# Patient Record
Sex: MALE | Race: BLACK OR AFRICAN AMERICAN | Employment: OTHER | URBAN - METROPOLITAN AREA
[De-identification: names, ages, dates, MRNs, and addresses within clinical notes are randomized per-mention and may not be internally consistent; named-entity substitution may affect disease eponyms.]

---

## 2020-08-11 ENCOUNTER — IP HISTORICAL/CONVERTED ENCOUNTER (OUTPATIENT)
Dept: OTHER | Age: 75
End: 2020-08-11

## 2020-08-20 ENCOUNTER — ED HISTORICAL/CONVERTED ENCOUNTER (OUTPATIENT)
Dept: OTHER | Age: 75
End: 2020-08-20

## 2020-10-11 ENCOUNTER — HOSPITAL ENCOUNTER (OUTPATIENT)
Dept: PREADMISSION TESTING | Age: 75
Discharge: HOME | End: 2020-10-11
Payer: MEDICARE

## 2020-10-11 PROCEDURE — 87635 SARS-COV-2 COVID-19 AMP PRB: CPT

## 2020-10-12 LAB — SARS-COV-2, COV2: NORMAL

## 2020-10-13 LAB — SARS-COV-2, COV2NT: NOT DETECTED

## 2020-10-14 ENCOUNTER — ANESTHESIA EVENT (OUTPATIENT)
Dept: ENDOSCOPY | Age: 75
End: 2020-10-14
Payer: MEDICARE

## 2020-10-15 ENCOUNTER — APPOINTMENT (OUTPATIENT)
Dept: ENDOSCOPY | Age: 75
End: 2020-10-15
Attending: INTERNAL MEDICINE
Payer: MEDICARE

## 2020-10-15 ENCOUNTER — TRANSCRIBE ORDER (OUTPATIENT)
Dept: SCHEDULING | Age: 75
End: 2020-10-15

## 2020-10-15 ENCOUNTER — ANESTHESIA (OUTPATIENT)
Dept: ENDOSCOPY | Age: 75
End: 2020-10-15
Payer: MEDICARE

## 2020-10-15 ENCOUNTER — HOSPITAL ENCOUNTER (OUTPATIENT)
Age: 75
Setting detail: OUTPATIENT SURGERY
Discharge: HOME | End: 2020-10-15
Attending: INTERNAL MEDICINE | Admitting: INTERNAL MEDICINE
Payer: MEDICARE

## 2020-10-15 VITALS
TEMPERATURE: 98.7 F | BODY MASS INDEX: 39.4 KG/M2 | HEIGHT: 68 IN | OXYGEN SATURATION: 98 % | WEIGHT: 260 LBS | HEART RATE: 84 BPM | DIASTOLIC BLOOD PRESSURE: 75 MMHG | RESPIRATION RATE: 16 BRPM | SYSTOLIC BLOOD PRESSURE: 130 MMHG

## 2020-10-15 DIAGNOSIS — R60.0 EDEMA, LEG: ICD-10-CM

## 2020-10-15 DIAGNOSIS — N18.9 CHRONIC KIDNEY DISEASE, UNSPECIFIED: Primary | ICD-10-CM

## 2020-10-15 PROCEDURE — 77030011223 HC DEV LIG POLYLP OCOA -B: Performed by: INTERNAL MEDICINE

## 2020-10-15 PROCEDURE — 76040000007: Performed by: INTERNAL MEDICINE

## 2020-10-15 PROCEDURE — 74011250636 HC RX REV CODE- 250/636: Performed by: NURSE ANESTHETIST, CERTIFIED REGISTERED

## 2020-10-15 PROCEDURE — 77030013992 HC SNR POLYP ENDOSC BSC -B: Performed by: INTERNAL MEDICINE

## 2020-10-15 PROCEDURE — 76060000032 HC ANESTHESIA 0.5 TO 1 HR: Performed by: INTERNAL MEDICINE

## 2020-10-15 PROCEDURE — 74011250636 HC RX REV CODE- 250/636: Performed by: INTERNAL MEDICINE

## 2020-10-15 PROCEDURE — 88305 TISSUE EXAM BY PATHOLOGIST: CPT

## 2020-10-15 PROCEDURE — 2709999900 HC NON-CHARGEABLE SUPPLY: Performed by: INTERNAL MEDICINE

## 2020-10-15 PROCEDURE — 74011000250 HC RX REV CODE- 250: Performed by: NURSE ANESTHETIST, CERTIFIED REGISTERED

## 2020-10-15 RX ORDER — LIDOCAINE HYDROCHLORIDE 20 MG/ML
INJECTION, SOLUTION EPIDURAL; INFILTRATION; INTRACAUDAL; PERINEURAL
Status: COMPLETED
Start: 2020-10-15 | End: 2020-10-15

## 2020-10-15 RX ORDER — LIDOCAINE HYDROCHLORIDE 20 MG/ML
INJECTION, SOLUTION EPIDURAL; INFILTRATION; INTRACAUDAL; PERINEURAL AS NEEDED
Status: DISCONTINUED | OUTPATIENT
Start: 2020-10-15 | End: 2020-10-15 | Stop reason: HOSPADM

## 2020-10-15 RX ORDER — PROPOFOL 10 MG/ML
INJECTION, EMULSION INTRAVENOUS AS NEEDED
Status: DISCONTINUED | OUTPATIENT
Start: 2020-10-15 | End: 2020-10-15 | Stop reason: HOSPADM

## 2020-10-15 RX ORDER — SODIUM CHLORIDE, SODIUM LACTATE, POTASSIUM CHLORIDE, CALCIUM CHLORIDE 600; 310; 30; 20 MG/100ML; MG/100ML; MG/100ML; MG/100ML
INJECTION, SOLUTION INTRAVENOUS
Status: DISCONTINUED | OUTPATIENT
Start: 2020-10-15 | End: 2020-10-15 | Stop reason: HOSPADM

## 2020-10-15 RX ORDER — SODIUM CHLORIDE, SODIUM LACTATE, POTASSIUM CHLORIDE, CALCIUM CHLORIDE 600; 310; 30; 20 MG/100ML; MG/100ML; MG/100ML; MG/100ML
50 INJECTION, SOLUTION INTRAVENOUS CONTINUOUS
Status: DISCONTINUED | OUTPATIENT
Start: 2020-10-15 | End: 2020-10-15 | Stop reason: HOSPADM

## 2020-10-15 RX ORDER — PROPOFOL 10 MG/ML
INJECTION, EMULSION INTRAVENOUS
Status: COMPLETED
Start: 2020-10-15 | End: 2020-10-15

## 2020-10-15 RX ADMIN — PROPOFOL 50 MG: 10 INJECTION, EMULSION INTRAVENOUS at 10:10

## 2020-10-15 RX ADMIN — SODIUM CHLORIDE, POTASSIUM CHLORIDE, SODIUM LACTATE AND CALCIUM CHLORIDE: 600; 310; 30; 20 INJECTION, SOLUTION INTRAVENOUS at 09:10

## 2020-10-15 RX ADMIN — LIDOCAINE HYDROCHLORIDE 40 MG: 20 INJECTION, SOLUTION EPIDURAL; INFILTRATION; INTRACAUDAL; PERINEURAL at 10:07

## 2020-10-15 RX ADMIN — PROPOFOL 50 MG: 10 INJECTION, EMULSION INTRAVENOUS at 10:25

## 2020-10-15 RX ADMIN — PROPOFOL 50 MG: 10 INJECTION, EMULSION INTRAVENOUS at 10:08

## 2020-10-15 RX ADMIN — PROPOFOL 50 MG: 10 INJECTION, EMULSION INTRAVENOUS at 10:13

## 2020-10-15 RX ADMIN — LIDOCAINE HYDROCHLORIDE 60 MG: 20 INJECTION, SOLUTION EPIDURAL; INFILTRATION; INTRACAUDAL; PERINEURAL at 10:08

## 2020-10-15 RX ADMIN — SODIUM CHLORIDE, POTASSIUM CHLORIDE, SODIUM LACTATE AND CALCIUM CHLORIDE 50 ML/HR: 600; 310; 30; 20 INJECTION, SOLUTION INTRAVENOUS at 09:12

## 2020-10-15 RX ADMIN — PROPOFOL 50 MG: 10 INJECTION, EMULSION INTRAVENOUS at 10:07

## 2020-10-15 RX ADMIN — PROPOFOL 50 MG: 10 INJECTION, EMULSION INTRAVENOUS at 10:22

## 2020-10-15 NOTE — ANESTHESIA PREPROCEDURE EVALUATION
Relevant Problems   No relevant active problems       Anesthetic History   No history of anesthetic complications            Review of Systems / Medical History  Patient summary reviewed, nursing notes reviewed and pertinent labs reviewed    Pulmonary  Within defined limits                 Neuro/Psych   Within defined limits           Cardiovascular    Hypertension          Hyperlipidemia    Exercise tolerance: >4 METS     GI/Hepatic/Renal  Within defined limits              Endo/Other        Obesity     Other Findings            Physical Exam    Airway  Mallampati: I  TM Distance: 4 - 6 cm  Neck ROM: normal range of motion   Mouth opening: Normal     Cardiovascular    Rhythm: regular  Rate: normal         Dental         Pulmonary  Breath sounds clear to auscultation               Abdominal  GI exam deferred       Other Findings            Anesthetic Plan    ASA: 3  Anesthesia type: general and total IV anesthesia          Induction: Intravenous  Anesthetic plan and risks discussed with: Patient

## 2020-10-15 NOTE — ROUTINE PROCESS
Pt and wife talked with MD prior to d/c.  IV d/c'd, v/s remained stable, pt taking po's well, d/c'd home with wife.

## 2020-10-15 NOTE — ANESTHESIA POSTPROCEDURE EVALUATION
Procedure(s):  ENDOSCOPIC POLYPECTOMY.     general, total IV anesthesia    Anesthesia Post Evaluation      Multimodal analgesia: multimodal analgesia not used between 6 hours prior to anesthesia start to PACU discharge  Patient location during evaluation: bedside  Patient participation: complete - patient participated  Level of consciousness: sleepy but conscious  Airway patency: fixed obstruction  Anesthetic complications: no  Cardiovascular status: acceptable  Respiratory status: acceptable  Hydration status: acceptable  Post anesthesia nausea and vomiting:  none      INITIAL Post-op Vital signs:   Vitals Value Taken Time   /75 10/15/2020 11:08 AM   Temp     Pulse 84 10/15/2020 11:08 AM   Resp 16 10/15/2020 11:08 AM   SpO2 98 % 10/15/2020 11:08 AM

## 2020-10-22 ENCOUNTER — HOSPITAL ENCOUNTER (OUTPATIENT)
Dept: ULTRASOUND IMAGING | Age: 75
Discharge: HOME | End: 2020-10-22
Attending: INTERNAL MEDICINE
Payer: MEDICARE

## 2020-10-22 DIAGNOSIS — N18.9 CHRONIC KIDNEY DISEASE, UNSPECIFIED: ICD-10-CM

## 2020-10-22 DIAGNOSIS — R60.0 EDEMA, LEG: ICD-10-CM

## 2020-10-22 PROCEDURE — 76770 US EXAM ABDO BACK WALL COMP: CPT

## 2021-07-20 ENCOUNTER — HOSPITAL ENCOUNTER (INPATIENT)
Age: 76
LOS: 6 days | Discharge: HOME HEALTH | DRG: 603 | End: 2021-07-27
Attending: EMERGENCY MEDICINE | Admitting: INTERNAL MEDICINE
Payer: MEDICARE

## 2021-07-20 DIAGNOSIS — E87.5 ACUTE HYPERKALEMIA: ICD-10-CM

## 2021-07-20 DIAGNOSIS — N17.9 AKI (ACUTE KIDNEY INJURY) (HCC): Primary | ICD-10-CM

## 2021-07-20 DIAGNOSIS — E87.1 HYPONATREMIA: ICD-10-CM

## 2021-07-20 DIAGNOSIS — L03.90 CELLULITIS, UNSPECIFIED CELLULITIS SITE: ICD-10-CM

## 2021-07-20 LAB
ALBUMIN SERPL-MCNC: 2.1 G/DL (ref 3.5–5)
ALBUMIN/GLOB SERPL: 0.4 {RATIO} (ref 1.1–2.2)
ALP SERPL-CCNC: 114 U/L (ref 45–117)
ALT SERPL-CCNC: 103 U/L (ref 12–78)
ANION GAP SERPL CALC-SCNC: 9 MMOL/L (ref 5–15)
AST SERPL W P-5'-P-CCNC: 35 U/L (ref 15–37)
BASOPHILS # BLD: 0 K/UL (ref 0–0.1)
BASOPHILS NFR BLD: 0 % (ref 0–1)
BILIRUB SERPL-MCNC: 0.3 MG/DL (ref 0.2–1)
BUN SERPL-MCNC: 127 MG/DL (ref 6–20)
BUN/CREAT SERPL: 36 (ref 12–20)
CA-I BLD-MCNC: 9.9 MG/DL (ref 8.5–10.1)
CHLORIDE SERPL-SCNC: 99 MMOL/L (ref 97–108)
CO2 SERPL-SCNC: 20 MMOL/L (ref 21–32)
CREAT SERPL-MCNC: 3.53 MG/DL (ref 0.7–1.3)
DIFFERENTIAL METHOD BLD: ABNORMAL
EOSINOPHIL # BLD: 0.1 K/UL (ref 0–0.4)
EOSINOPHIL NFR BLD: 1 % (ref 0–7)
ERYTHROCYTE [DISTWIDTH] IN BLOOD BY AUTOMATED COUNT: 14.6 % (ref 11.5–14.5)
GLOBULIN SER CALC-MCNC: 5.8 G/DL (ref 2–4)
GLUCOSE SERPL-MCNC: 197 MG/DL (ref 65–100)
HCT VFR BLD AUTO: 33.8 % (ref 36.6–50.3)
HGB BLD-MCNC: 10.8 G/DL (ref 12.1–17)
IMM GRANULOCYTES # BLD AUTO: 0 K/UL (ref 0–0.04)
IMM GRANULOCYTES NFR BLD AUTO: 0 % (ref 0–0.5)
LACTATE SERPL-SCNC: 2.1 MMOL/L (ref 0.4–2)
LYMPHOCYTES # BLD: 1.2 K/UL (ref 0.8–3.5)
LYMPHOCYTES NFR BLD: 8 % (ref 12–49)
MCH RBC QN AUTO: 27.6 PG (ref 26–34)
MCHC RBC AUTO-ENTMCNC: 32 G/DL (ref 30–36.5)
MCV RBC AUTO: 86.4 FL (ref 80–99)
METAMYELOCYTES NFR BLD MANUAL: 5 %
MONOCYTES # BLD: 1 K/UL (ref 0–1)
MONOCYTES NFR BLD: 7 % (ref 5–13)
MYELOCYTES NFR BLD MANUAL: 2 %
NEUTS SEG # BLD: 11.3 K/UL (ref 1.8–8)
NEUTS SEG NFR BLD: 77 % (ref 32–75)
NRBC # BLD: 0 K/UL (ref 0–0.01)
NRBC BLD-RTO: 0 PER 100 WBC
PLATELET # BLD AUTO: 395 K/UL (ref 150–400)
PMV BLD AUTO: 9.9 FL (ref 8.9–12.9)
POTASSIUM SERPL-SCNC: 6.6 MMOL/L (ref 3.5–5.1)
PROT SERPL-MCNC: 7.9 G/DL (ref 6.4–8.2)
RBC # BLD AUTO: 3.91 M/UL (ref 4.1–5.7)
RBC MORPH BLD: ABNORMAL
SODIUM SERPL-SCNC: 128 MMOL/L (ref 136–145)
WBC # BLD AUTO: 14.7 K/UL (ref 4.1–11.1)

## 2021-07-20 PROCEDURE — 99285 EMERGENCY DEPT VISIT HI MDM: CPT

## 2021-07-20 PROCEDURE — 83605 ASSAY OF LACTIC ACID: CPT

## 2021-07-20 PROCEDURE — 87040 BLOOD CULTURE FOR BACTERIA: CPT

## 2021-07-20 PROCEDURE — 93005 ELECTROCARDIOGRAM TRACING: CPT

## 2021-07-20 PROCEDURE — 86140 C-REACTIVE PROTEIN: CPT

## 2021-07-20 PROCEDURE — 85025 COMPLETE CBC W/AUTO DIFF WBC: CPT

## 2021-07-20 PROCEDURE — 36415 COLL VENOUS BLD VENIPUNCTURE: CPT

## 2021-07-20 PROCEDURE — 80053 COMPREHEN METABOLIC PANEL: CPT

## 2021-07-20 NOTE — ED TRIAGE NOTES
Per EMS, pt has had bilateral lower leg edema and weeping for the last year that has gotten worse over the last week and generalized weakness over the last week. Per EMS pt has been on multiple rounds of antibiotics to treat bilateral lower leg cellulitis.

## 2021-07-20 NOTE — ED NOTES
Bedside and Verbal shift change report given to Nancy GONZALES (oncoming nurse) by Gala Winchester (offgoing nurse). Report included the following information SBAR, Kardex, ED Summary and Cardiac Rhythm NSR. Labs and paired blood cultures drawn and sent. Wife at bedside.

## 2021-07-21 ENCOUNTER — APPOINTMENT (OUTPATIENT)
Dept: GENERAL RADIOLOGY | Age: 76
DRG: 603 | End: 2021-07-21
Attending: EMERGENCY MEDICINE
Payer: MEDICARE

## 2021-07-21 PROBLEM — E87.5 HYPERKALEMIA: Status: ACTIVE | Noted: 2021-07-21

## 2021-07-21 LAB
ANION GAP SERPL CALC-SCNC: 9 MMOL/L (ref 5–15)
APPEARANCE UR: CLEAR
BACTERIA URNS QL MICRO: NEGATIVE /HPF
BILIRUB UR QL: NEGATIVE
BUN SERPL-MCNC: 121 MG/DL (ref 6–20)
BUN/CREAT SERPL: 39 (ref 12–20)
CA-I BLD-MCNC: 9 MG/DL (ref 8.5–10.1)
CHLORIDE SERPL-SCNC: 105 MMOL/L (ref 97–108)
CO2 SERPL-SCNC: 19 MMOL/L (ref 21–32)
COLOR UR: ABNORMAL
CREAT SERPL-MCNC: 3.07 MG/DL (ref 0.7–1.3)
CREAT UR-MCNC: 52 MG/DL
CRP SERPL-MCNC: 8.64 MG/DL (ref 0–0.6)
GLUCOSE SERPL-MCNC: 121 MG/DL (ref 65–100)
GLUCOSE UR STRIP.AUTO-MCNC: NEGATIVE MG/DL
HGB UR QL STRIP: ABNORMAL
KETONES UR QL STRIP.AUTO: NEGATIVE MG/DL
LACTATE SERPL-SCNC: 1.3 MMOL/L (ref 0.4–2)
LEUKOCYTE ESTERASE UR QL STRIP.AUTO: ABNORMAL
MUCOUS THREADS URNS QL MICRO: ABNORMAL /LPF
NITRITE UR QL STRIP.AUTO: NEGATIVE
PH UR STRIP: 5 [PH] (ref 5–8)
POTASSIUM SERPL-SCNC: 5.6 MMOL/L (ref 3.5–5.1)
PROT UR STRIP-MCNC: NEGATIVE MG/DL
PROT UR-MCNC: 23 MG/DL (ref 0–11.9)
PROT/CREAT UR-RTO: 0.4
RBC #/AREA URNS HPF: ABNORMAL /HPF (ref 0–5)
SODIUM SERPL-SCNC: 133 MMOL/L (ref 136–145)
SODIUM UR-SCNC: 20 MMOL/L
SP GR UR REFRACTOMETRY: 1.01 (ref 1–1.03)
UA: UC IF INDICATED,UAUC: ABNORMAL
UROBILINOGEN UR QL STRIP.AUTO: 0.1 EU/DL (ref 0.1–1)
WBC URNS QL MICRO: ABNORMAL /HPF (ref 0–4)

## 2021-07-21 PROCEDURE — 83605 ASSAY OF LACTIC ACID: CPT

## 2021-07-21 PROCEDURE — 84300 ASSAY OF URINE SODIUM: CPT

## 2021-07-21 PROCEDURE — 73590 X-RAY EXAM OF LOWER LEG: CPT

## 2021-07-21 PROCEDURE — 96375 TX/PRO/DX INJ NEW DRUG ADDON: CPT

## 2021-07-21 PROCEDURE — 84156 ASSAY OF PROTEIN URINE: CPT

## 2021-07-21 PROCEDURE — 74011250636 HC RX REV CODE- 250/636: Performed by: INTERNAL MEDICINE

## 2021-07-21 PROCEDURE — 74011000258 HC RX REV CODE- 258: Performed by: INTERNAL MEDICINE

## 2021-07-21 PROCEDURE — 74011250636 HC RX REV CODE- 250/636: Performed by: EMERGENCY MEDICINE

## 2021-07-21 PROCEDURE — 74011250637 HC RX REV CODE- 250/637: Performed by: INTERNAL MEDICINE

## 2021-07-21 PROCEDURE — 65270000029 HC RM PRIVATE

## 2021-07-21 PROCEDURE — 80048 BASIC METABOLIC PNL TOTAL CA: CPT

## 2021-07-21 PROCEDURE — 74011000258 HC RX REV CODE- 258: Performed by: EMERGENCY MEDICINE

## 2021-07-21 PROCEDURE — 81001 URINALYSIS AUTO W/SCOPE: CPT

## 2021-07-21 PROCEDURE — 96365 THER/PROPH/DIAG IV INF INIT: CPT

## 2021-07-21 PROCEDURE — 96367 TX/PROPH/DG ADDL SEQ IV INF: CPT

## 2021-07-21 RX ORDER — FUROSEMIDE 40 MG/1
40 TABLET ORAL DAILY
Status: DISCONTINUED | OUTPATIENT
Start: 2021-07-22 | End: 2021-07-21

## 2021-07-21 RX ORDER — CARVEDILOL 12.5 MG/1
25 TABLET ORAL 2 TIMES DAILY WITH MEALS
Status: DISCONTINUED | OUTPATIENT
Start: 2021-07-21 | End: 2021-07-22

## 2021-07-21 RX ORDER — SODIUM POLYSTYRENE SULFONATE 15 G/60ML
15 SUSPENSION ORAL; RECTAL
Status: DISPENSED | OUTPATIENT
Start: 2021-07-21 | End: 2021-07-21

## 2021-07-21 RX ORDER — HYDROMORPHONE HYDROCHLORIDE 1 MG/ML
0.5 INJECTION, SOLUTION INTRAMUSCULAR; INTRAVENOUS; SUBCUTANEOUS ONCE
Status: COMPLETED | OUTPATIENT
Start: 2021-07-21 | End: 2021-07-21

## 2021-07-21 RX ORDER — ACETAMINOPHEN 325 MG/1
650 TABLET ORAL
Status: DISCONTINUED | OUTPATIENT
Start: 2021-07-21 | End: 2021-07-27 | Stop reason: HOSPADM

## 2021-07-21 RX ORDER — UREA 10 %
100 LOTION (ML) TOPICAL DAILY
Status: DISCONTINUED | OUTPATIENT
Start: 2021-07-22 | End: 2021-07-27 | Stop reason: HOSPADM

## 2021-07-21 RX ORDER — ASPIRIN 325 MG
325 TABLET ORAL DAILY
COMMUNITY

## 2021-07-21 RX ORDER — ASPIRIN 325 MG
325 TABLET ORAL DAILY
Status: DISCONTINUED | OUTPATIENT
Start: 2021-07-22 | End: 2021-07-25

## 2021-07-21 RX ORDER — HEPARIN SODIUM 5000 [USP'U]/ML
5000 INJECTION, SOLUTION INTRAVENOUS; SUBCUTANEOUS EVERY 12 HOURS
Status: DISCONTINUED | OUTPATIENT
Start: 2021-07-21 | End: 2021-07-27 | Stop reason: HOSPADM

## 2021-07-21 RX ORDER — FUROSEMIDE 40 MG/1
40 TABLET ORAL DAILY
COMMUNITY
End: 2021-07-27

## 2021-07-21 RX ORDER — FUROSEMIDE 10 MG/ML
60 INJECTION INTRAMUSCULAR; INTRAVENOUS
Status: COMPLETED | OUTPATIENT
Start: 2021-07-21 | End: 2021-07-21

## 2021-07-21 RX ORDER — UREA 10 %
100 LOTION (ML) TOPICAL DAILY
COMMUNITY
End: 2022-08-08

## 2021-07-21 RX ORDER — CARVEDILOL 25 MG/1
25 TABLET ORAL 2 TIMES DAILY WITH MEALS
COMMUNITY
End: 2021-07-27

## 2021-07-21 RX ADMIN — HYDROMORPHONE HYDROCHLORIDE 0.5 MG: 1 INJECTION, SOLUTION INTRAMUSCULAR; INTRAVENOUS; SUBCUTANEOUS at 00:10

## 2021-07-21 RX ADMIN — SODIUM CHLORIDE 1000 ML: 9 INJECTION, SOLUTION INTRAVENOUS at 01:34

## 2021-07-21 RX ADMIN — VANCOMYCIN HYDROCHLORIDE 1250 MG: 1.25 INJECTION, POWDER, LYOPHILIZED, FOR SOLUTION INTRAVENOUS at 03:26

## 2021-07-21 RX ADMIN — FUROSEMIDE 60 MG: 10 INJECTION INTRAMUSCULAR; INTRAVENOUS at 01:33

## 2021-07-21 RX ADMIN — CARVEDILOL 25 MG: 12.5 TABLET, FILM COATED ORAL at 17:54

## 2021-07-21 RX ADMIN — HEPARIN SODIUM 5000 UNITS: 5000 INJECTION INTRAVENOUS; SUBCUTANEOUS at 17:54

## 2021-07-21 RX ADMIN — ACETAMINOPHEN 650 MG: 325 TABLET, FILM COATED ORAL at 20:02

## 2021-07-21 RX ADMIN — PIPERACILLIN AND TAZOBACTAM 3.38 G: 3; .375 INJECTION, POWDER, LYOPHILIZED, FOR SOLUTION INTRAVENOUS at 01:33

## 2021-07-21 RX ADMIN — SODIUM CHLORIDE 1000 ML: 9 INJECTION, SOLUTION INTRAVENOUS at 01:33

## 2021-07-21 RX ADMIN — PIPERACILLIN AND TAZOBACTAM 2.25 G: 2; .25 INJECTION, POWDER, LYOPHILIZED, FOR SOLUTION INTRAVENOUS at 17:54

## 2021-07-21 NOTE — ROUTINE PROCESS
TRANSFER - OUT REPORT:    Verbal report given to Anjelica Chaves RN (name) on [employer].  being transferred to [facility] (unit) for routine progression of care       Report consisted of patients Situation, Background, Assessment and   Recommendations(SBAR). Information from the following report(s) SBAR, Kardex, ED Summary, [facility] and Recent Results was reviewed with the receiving nurse. Lines:   Peripheral IV 07/20/21 Left Antecubital (Active)        Opportunity for questions and clarification was provided.       Patient transported with:   Monitor  Tech, pt chart, personal belongings

## 2021-07-21 NOTE — PROGRESS NOTES
Primary Nurse Sil Puente RN and JANET Garcia performed a dual skin assessment on this patient Impairment noted- see wound doc flow sheet  Mekhi score is 21    +1 pitting edema in bilateral feet, barnacles noted on bilateral lower legs

## 2021-07-21 NOTE — ED PROVIDER NOTES
EMERGENCY DEPARTMENT HISTORY AND PHYSICAL EXAM      Date: 7/20/2021  Patient Name: Juan Manuel Chapin. History of Presenting Illness     Chief Complaint   Patient presents with    Peripheral Edema       History Provided By: Patient    HPI: Juan Manuel Nation, 67 y.o. male with PMHx as noted below presents the emergency department with complaints of lower leg pain and a generalized weakness. Patient states that he has been dealing with leg pain now for the last year. Patient states that he has had several rounds of antibiotics for cellulitis but notes symptoms seem to have acutely worsened approximately 1 week ago. Patient reports fatigue and some body aches. Pt denies any other alleviating or exacerbating factors. Additionally, pt specifically denies any recent fever, chills, headache, nausea, vomiting, abdominal pain, CP, SOB, lightheadedness, dizziness, numbness, weakness, BLE swelling, heart palpitations, urinary sxs, diarrhea, constipation, melena, hematochezia, cough, or congestion. PCP: Lisa Keller MD    Current Facility-Administered Medications   Medication Dose Route Frequency Provider Last Rate Last Admin    sodium polystyrene (KAYEXALATE) 15 gram/60 mL oral suspension 15 g  15 g Oral NOW Fartun Mckeon MD           Past History     Past Medical History:  Past Medical History:   Diagnosis Date    Dyslipidemia     HTN (hypertension)        Past Surgical History:  Past Surgical History:   Procedure Laterality Date    HX CHOLECYSTECTOMY         Family History:  Family History   Problem Relation Age of Onset    Diabetes Mother     Diabetes Brother        Social History:  Social History     Tobacco Use    Smoking status: Never Smoker    Smokeless tobacco: Never Used   Substance Use Topics    Alcohol use: Never    Drug use: Never       Allergies:  No Known Allergies      Review of Systems   Review of Systems  Constitutional: Negative for feve.   Positive chills, and fatigue. HENT: Negative for congestion, sore throat, rhinorrhea, sneezing and neck stiffness   Eyes: Negative for discharge and redness. Respiratory: Negative for  shortness of breath, wheezing   Cardiovascular: Negative for chest pain, palpitations   Gastrointestinal: Negative for nausea, vomiting, abdominal pain, constipation, diarrhea and blood in stool. Genitourinary: Negative for dysuria, urgency, frequency, hematuria, flank pain, decreased urine volume, discharge,   Musculoskeletal: Negative for myalgias or joint pain . Skin: Negative for rash or lesions . Neurological: Negative weakness, light-headedness, numbness and headaches. Physical Exam   Physical Exam    GENERAL: alert and oriented, no acute distress  EYES: PEERL, No injection, discharge or icterus. ENT: Mucous membranes pink and moist.  NECK: Supple  LUNGS: Airway patent. Non-labored respirations. Breath sounds clear with good air entry bilaterally. HEART: Regular rate and rhythm. No peripheral edema  ABDOMEN: Non-distended and non-tender, without guarding or rebound.   SKIN:  warm, dry  EXTREMITIES: Swelling and chronic changes noted of the lower extremities, there is some drainage noted  NEUROLOGICAL: Alert, oriented      Diagnostic Study Results     Labs -     Recent Results (from the past 12 hour(s))   METABOLIC PANEL, BASIC    Collection Time: 07/21/21  3:00 AM   Result Value Ref Range    Sodium 133 (L) 136 - 145 mmol/L    Potassium 5.6 (H) 3.5 - 5.1 mmol/L    Chloride 105 97 - 108 mmol/L    CO2 19 (L) 21 - 32 mmol/L    Anion gap 9 5 - 15 mmol/L    Glucose 121 (H) 65 - 100 mg/dL     (H) 6 - 20 mg/dL    Creatinine 3.07 (H) 0.70 - 1.30 mg/dL    BUN/Creatinine ratio 39 (H) 12 - 20      GFR est AA 24 (L) >60 ml/min/1.73m2    GFR est non-AA 20 (L) >60 ml/min/1.73m2    Calcium 9.0 8.5 - 10.1 mg/dL   LACTIC ACID    Collection Time: 07/21/21  6:00 AM   Result Value Ref Range    Lactic acid 1.3 0.4 - 2.0 mmol/L       Radiologic Studies -   XR TIB/FIB LT   Final Result   No fracture or other specific acute abnormality. 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 Soft tissue changes suggestive of edema. Correlate with physical exam               XR TIB/FIB RT   Final Result   No fracture or other specific acute abnormality. 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 Soft tissue changes suggestive of edema. Correlate with physical exam                 CT Results  (Last 48 hours)    None        CXR Results  (Last 48 hours)    None            Medical Decision Making     IAndres MD am the first provider for this patient and am the attending of record for this patient encounter. I reviewed the vital signs, available nursing notes, past medical history, past surgical history, family history and social history. Vital Signs-Reviewed the patient's vital signs. Patient Vitals for the past 12 hrs:   Pulse Resp BP SpO2   07/21/21 0616 97 18 127/77 100 %   07/21/21 0341 93 18 132/66 98 %   07/21/21 0134 88 18 (!) 106/57 97 %   07/21/21 0006 87 18 117/65 99 %   07/20/21 2020 81 18 113/62 100 %         Records Reviewed: Nursing Notes and Old Medical Records    Provider Notes (Medical Decision Making):   25-year-old male presenting with weakness and worsening lower extremity pain, swelling and drainage. Differential includes sepsis, cellulitis, DVT, necrotizing soft tissue infection, abscess. Labs were notable for leukocytosis concerning for possible infection. As patient states he has been on multiple antibiotics will broaden coverage with vancomycin and Zosyn. Labs also notable for presumed LINDA with hyperkalemia. There were no EKG changes so was aggressively hydrated with IV fluids and given Lasix, on reassessment potassium had significantly improved so no need for emergent dialysis. We will plan to admit for further management. ED Course:   Initial assessment performed.  The patients presenting problems have been discussed, and they are in agreement with the care plan formulated and outlined with them. I have encouraged them to ask questions as they arise throughout their visit. Medications   sodium polystyrene (KAYEXALATE) 15 gram/60 mL oral suspension 15 g (has no administration in time range)   HYDROmorphone (DILAUDID) syringe 0.5 mg (0.5 mg IntraVENous Given 7/21/21 0010)   sodium chloride 0.9 % bolus infusion 1,000 mL (1,000 mL IntraVENous New Bag 7/21/21 0134)   sodium chloride 0.9 % bolus infusion 1,000 mL (1,000 mL IntraVENous New Bag 7/21/21 0133)   furosemide (LASIX) injection 60 mg (60 mg IntraVENous Given 7/21/21 0133)   piperacillin-tazobactam (ZOSYN) 3.375 g in 0.9% sodium chloride (MBP/ADV) 100 mL MBP (3.375 g IntraVENous New Bag 7/21/21 0133)   vancomycin (VANCOCIN) 1,250 mg in 0.9% sodium chloride 250 mL (VIAL-MATE) (1,250 mg IntraVENous New Bag 7/21/21 0326)         PROGRESS:  The patient has been re-evaluated and sx have improved. Reviewed available results with patient and have counseled them on diagnosis and care plan. They have expressed understanding, and all their questions have been answered. They agree with plan for admission. CONSULT:  Andres Priest MD spoke with the hospitalist.  Discussed HPI and PE, available diagnostic tests and clinical findings. He is in agreement with care plans as outlined and will evaluate for admission     Admit Note  Patient is being admitted to the hospitalist.  The results of their tests and reasons for their admission have been discussed with them and/or available family. They convey agreement and understanding for the need to be admitted and for their admission diagnosis. Consultation has been made with the inpatient physician specialist for hospitalization. Disposition:  admission    PLAN:  1. Admit     Diagnosis     Clinical Impression:   1. LINDA (acute kidney injury) ([address]    2. Acute hyperkalemia    3. Hyponatremia    4.  Cellulitis, unspecified cellulitis site        Please note that this dictation was completed with Dragon, computer voice recognition software. Quite often unanticipated grammatical, syntax, homophones, and other interpretive errors are inadvertently transcribed by the computer software. Please disregard these errors. Additionally, please excuse any errors that have escaped final proofreading.

## 2021-07-21 NOTE — ACP (ADVANCE CARE PLANNING)
Advance Care Planning   Healthcare Decision Maker:       Primary Decision Maker: Poppy Watkins - [facility] - [phone]

## 2021-07-21 NOTE — PROGRESS NOTES
Reason for Admission:  Hyperkalemia                     RUR Score: 10%                    Plan for utilizing home health: Declined/uses w/c. PCP: First and Last name:  Sarah Ramos MD     Name of Practice:    Are you a current patient: Yes/No: Yes   Approximate date of last visit: Seen a few mos ago. Can you participate in a virtual visit with your PCP:                   Yes/Call  Current Advanced Directive/Advance Care Plan: No Order      Healthcare Decision Maker:                Primary Decision Maker: Poppy Watkins - Spouse - [phone]                  Transition of Care Plan:    D/C Plan is home with wife & she will transport home  Upon discharge. Pt uses w/c.

## 2021-07-21 NOTE — PROGRESS NOTES
7/21/21. PCP is Dr. Anika Paredes. D/C Plan is home with [facility] # [phone] & she will transport pt home upon discharge. Declined [facility]. Uses w/c.

## 2021-07-21 NOTE — PROGRESS NOTES
Bedside shift change report given to [facility] (oncoming nurse) by Landy Coronado RN (offgoing nurse).  Report included the following information SBAR, Intake/Output, Recent Results, Med Rec Status and Cardiac Rhythm SR.

## 2021-07-21 NOTE — H&P
History and Physical (Inpatient)    Patient:    Shivani Fernandez.   67 y.o. Chief Complaint:   Chief Complaint   Patient presents with    Peripheral Edema         History of Present Illness: This is a 69-year-old black male with history of chronic leg edema on diuretics, hypertension who presents with inability to walk some leg pain and worsening skin infection. He has had significant leg edema that is being managed with diuretics and now he comes in with foul-smelling leg elephantiasis looking. Denies any chest pain or shortness of breath at rest suggest [place] to walk. ROS: Denies any chronic cardiac blurry vision URI symptoms no chest pain palpitation no cough shortness of breath no orthopnea PND no abdominal pain nausea vomiting no bloody bowel movement no urinary symptoms of discharge urgency frequency. He has leg pain, swelling with secondary skin changes. History:  Past Medical History:   Diagnosis Date    Dyslipidemia     HTN (hypertension)    UTI    Past surgical history  Status post cholecystectomy  S/p Appendectomy    Social history  He is  with 2 children. He quit alcohol , he is a retired [employer] supervisor      Family History   Problem Relation Age of Onset    Diabetes Mother     Diabetes Brother      Both parents are . Mother had diabetes. Allergies:  No Known Allergies    Current Medications:    Current Facility-Administered Medications:     sodium polystyrene (KAYEXALATE) 15 gram/60 mL oral suspension 15 g, 15 g, Oral, NOW, Leia Sheth MD  No current outpatient medications on file.        Physical Exam:  Visit Vitals  /72   Pulse 100   Temp 98.9 °F (37.2 °C)   Resp 18   Ht 5' 8\" (1.727 m)   Wt 122.5 kg (270 lb)   SpO2 99%   BMI 41.05 kg/m²     Patient is an elderly black male lying in bed comfortably no distress  HEENT normocephalic atraumatic anicteric sclera  Neck without any distended neck vein   lungs are clear bilaterally no wheeze  Heart S1-S2 regular  Abdomen soft obese positive bowel sounds no appreciable organomegaly  Lower extremities with foul-smelling drainage. Elephantiasis-looking. Multiple nodularity skin lesion. Severe onychomycosis. CNS alert and oriented x3 moves extremities difficult to lift legs due to pain  Psych cooperative    This is a 79-year-old black male with history of chronic legs swelling hypertension who presented with bilateral lower extremity infection with significant and severe secondary skin changes admitted for further care. Findings/Diagnosis: Bilateral lower extremity cellulitis  Chronic leg edema with secondary skin changes  Onychomycosis  Hypertension  Obesity  Difficulty walking  Acute kidney injury due to diuretic use  Hyperkalemia    Laboratory:      Recent Results (from the past 24 hour(s))   CBC WITH AUTOMATED DIFF    Collection Time: 07/20/21  6:43 PM   Result Value Ref Range    WBC 14.7 (H) 4.1 - 11.1 K/uL    RBC 3.91 (L) 4.10 - 5.70 M/uL    HGB 10.8 (L) 12.1 - 17.0 g/dL    HCT 33.8 (L) 36.6 - 50.3 %    MCV 86.4 80.0 - 99.0 FL    MCH 27.6 26.0 - 34.0 PG    MCHC 32.0 30.0 - 36.5 g/dL    RDW 14.6 (H) 11.5 - 14.5 %    PLATELET 885 844 - 372 K/uL    MPV 9.9 8.9 - 12.9 FL    NRBC 0.0 0.0  WBC    ABSOLUTE NRBC 0.00 0.00 - 0.01 K/uL    NEUTROPHILS 77 (H) 32 - 75 %    LYMPHOCYTES 8 (L) 12 - 49 %    MONOCYTES 7 5 - 13 %    EOSINOPHILS 1 0 - 7 %    BASOPHILS 0 0 - 1 %    IMMATURE GRANULOCYTES 0 0 - 0.5 %    ABS. NEUTROPHILS 11.3 (H) 1.8 - 8.0 K/UL    ABS. LYMPHOCYTES 1.2 0.8 - 3.5 K/UL    ABS. MONOCYTES 1.0 0.0 - 1.0 K/UL    ABS. EOSINOPHILS 0.1 0.0 - 0.4 K/UL    ABS. BASOPHILS 0.0 0.0 - 0.1 K/UL    ABS. IMM.  GRANS. 0.0 0.00 - 0.04 K/UL    DF AUTOMATED      METAMYELOCYTES 5 %    MYELOCYTES 2 %    RBC COMMENTS Normocytic, Normochromic     METABOLIC PANEL, COMPREHENSIVE    Collection Time: 07/20/21  6:43 PM   Result Value Ref Range    Sodium 128 (L) 136 - 145 mmol/L    Potassium 6.6 (HH) 3.5 - 5.1 mmol/L    Chloride 99 97 - 108 mmol/L    CO2 20 (L) 21 - 32 mmol/L    Anion gap 9 5 - 15 mmol/L    Glucose 197 (H) 65 - 100 mg/dL     (H) 6 - 20 mg/dL    Creatinine 3.53 (H) 0.70 - 1.30 mg/dL    BUN/Creatinine ratio 36 (H) 12 - 20      GFR est AA 20 (L) >60 ml/min/1.73m2    GFR est non-AA 17 (L) >60 ml/min/1.73m2    Calcium 9.9 8.5 - 10.1 mg/dL    Bilirubin, total 0.3 0.2 - 1.0 mg/dL    AST (SGOT) 35 15 - 37 U/L    ALT (SGPT) 103 (H) 12 - 78 U/L    Alk.  phosphatase 114 45 - 117 U/L    Protein, total 7.9 6.4 - 8.2 g/dL    Albumin 2.1 (L) 3.5 - 5.0 g/dL    Globulin 5.8 (H) 2.0 - 4.0 g/dL    A-G Ratio 0.4 (L) 1.1 - 2.2     LACTIC ACID    Collection Time: 07/20/21  6:43 PM   Result Value Ref Range    Lactic acid 2.1 (HH) 0.4 - 2.0 mmol/L   C REACTIVE PROTEIN, QT    Collection Time: 07/20/21  6:43 PM   Result Value Ref Range    C-Reactive protein 8.64 (H) 0.00 - 7.30 mg/dL   METABOLIC PANEL, BASIC    Collection Time: 07/21/21  3:00 AM   Result Value Ref Range    Sodium 133 (L) 136 - 145 mmol/L    Potassium 5.6 (H) 3.5 - 5.1 mmol/L    Chloride 105 97 - 108 mmol/L    CO2 19 (L) 21 - 32 mmol/L    Anion gap 9 5 - 15 mmol/L    Glucose 121 (H) 65 - 100 mg/dL     (H) 6 - 20 mg/dL    Creatinine 3.07 (H) 0.70 - 1.30 mg/dL    BUN/Creatinine ratio 39 (H) 12 - 20      GFR est AA 24 (L) >60 ml/min/1.73m2    GFR est non-AA 20 (L) >60 ml/min/1.73m2    Calcium 9.0 8.5 - 10.1 mg/dL   LACTIC ACID    Collection Time: 07/21/21  6:00 AM   Result Value Ref Range    Lactic acid 1.3 0.4 - 2.0 mmol/L   URINALYSIS W/ REFLEX CULTURE    Collection Time: 07/21/21  8:10 AM    Specimen: Urine   Result Value Ref Range    Color Yellow/Straw      Appearance Clear Clear      Specific gravity 1.008 1.003 - 1.030      pH (UA) 5.0 5.0 - 8.0      Protein Negative Negative mg/dL    Glucose Negative Negative mg/dL    Ketone Negative Negative mg/dL    Bilirubin Negative Negative      Blood Small (A) Negative      Urobilinogen 0.1 0.1 - 1.0 EU/dL    Nitrites Negative Negative      Leukocyte Esterase Small (A) Negative      UA:UC IF INDICATED Culture not indicated by UA result Culture not indicated by UA result      WBC 5-10 0 - 4 /hpf    RBC 20-50 0 - 5 /hpf    Bacteria Negative Negative /hpf    Mucus Trace /lpf       XR TIB/FIB LT   Final Result   No fracture or other specific acute abnormality. 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 Soft tissue changes suggestive of edema. Correlate with physical exam               XR TIB/FIB RT   Final Result   No fracture or other specific acute abnormality. 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 Soft tissue changes suggestive of edema. Correlate with physical exam                   Plan of Care/Planned Procedure: Admit to the hospital.  Consult surgeon. Empiric antibiotics. Consult wound care. Continue his routine medications. Work-up for kidney failure. Hold diuretics. Gentle hydration. Renal ultrasound. Check urine sodium urine protein ratio. He may need extensive debridement of this leg but will defer to wound care/surgeon input. DVT prophylaxis.

## 2021-07-21 NOTE — ED NOTES
Dinorah care provided to patient. Pt cleaned, wiped down, and given new sheets and gown. Pt pulled up in bed and repositioned.

## 2021-07-22 ENCOUNTER — APPOINTMENT (OUTPATIENT)
Dept: ULTRASOUND IMAGING | Age: 76
DRG: 603 | End: 2021-07-22
Attending: INTERNAL MEDICINE
Payer: MEDICARE

## 2021-07-22 LAB
ALBUMIN SERPL-MCNC: 1.8 G/DL (ref 3.5–5)
ALBUMIN/GLOB SERPL: 0.4 {RATIO} (ref 1.1–2.2)
ALP SERPL-CCNC: 92 U/L (ref 45–117)
ALT SERPL-CCNC: 73 U/L (ref 12–78)
ANION GAP SERPL CALC-SCNC: 8 MMOL/L (ref 5–15)
AST SERPL W P-5'-P-CCNC: 62 U/L (ref 15–37)
ATRIAL RATE: 97 BPM
BASOPHILS # BLD: 0 K/UL (ref 0–0.1)
BASOPHILS NFR BLD: 0 % (ref 0–1)
BILIRUB SERPL-MCNC: 0.4 MG/DL (ref 0.2–1)
BUN SERPL-MCNC: 104 MG/DL (ref 6–20)
BUN/CREAT SERPL: 37 (ref 12–20)
CA-I BLD-MCNC: 9.3 MG/DL (ref 8.5–10.1)
CALCULATED P AXIS, ECG09: 45 DEGREES
CALCULATED R AXIS, ECG10: 1 DEGREES
CALCULATED T AXIS, ECG11: 39 DEGREES
CHLORIDE SERPL-SCNC: 105 MMOL/L (ref 97–108)
CO2 SERPL-SCNC: 26 MMOL/L (ref 21–32)
CREAT SERPL-MCNC: 2.78 MG/DL (ref 0.7–1.3)
DIAGNOSIS, 93000: NORMAL
DIFFERENTIAL METHOD BLD: ABNORMAL
EOSINOPHIL # BLD: 0.1 K/UL (ref 0–0.4)
EOSINOPHIL NFR BLD: 1 % (ref 0–7)
ERYTHROCYTE [DISTWIDTH] IN BLOOD BY AUTOMATED COUNT: 14.7 % (ref 11.5–14.5)
ERYTHROCYTE [SEDIMENTATION RATE] IN BLOOD: 129 MM/HR
GLOBULIN SER CALC-MCNC: 5 G/DL (ref 2–4)
GLUCOSE SERPL-MCNC: 134 MG/DL (ref 65–100)
HCT VFR BLD AUTO: 31.6 % (ref 36.6–50.3)
HGB BLD-MCNC: 9.9 G/DL (ref 12.1–17)
IMM GRANULOCYTES # BLD AUTO: 0 K/UL
IMM GRANULOCYTES NFR BLD AUTO: 0 %
LYMPHOCYTES # BLD: 0.8 K/UL (ref 0.8–3.5)
LYMPHOCYTES NFR BLD: 8 % (ref 12–49)
MCH RBC QN AUTO: 27.3 PG (ref 26–34)
MCHC RBC AUTO-ENTMCNC: 31.3 G/DL (ref 30–36.5)
MCV RBC AUTO: 87.1 FL (ref 80–99)
MONOCYTES # BLD: 0.7 K/UL (ref 0–1)
MONOCYTES NFR BLD: 7 % (ref 5–13)
NEUTS SEG # BLD: 8.9 K/UL (ref 1.8–8)
NEUTS SEG NFR BLD: 84 % (ref 32–75)
NRBC # BLD: 0 K/UL (ref 0–0.01)
NRBC BLD-RTO: 0 PER 100 WBC
P-R INTERVAL, ECG05: 186 MS
PLATELET # BLD AUTO: 377 K/UL (ref 150–400)
PMV BLD AUTO: 9.8 FL (ref 8.9–12.9)
POTASSIUM SERPL-SCNC: 3.9 MMOL/L (ref 3.5–5.1)
PROT SERPL-MCNC: 6.8 G/DL (ref 6.4–8.2)
Q-T INTERVAL, ECG07: 356 MS
QRS DURATION, ECG06: 62 MS
QTC CALCULATION (BEZET), ECG08: 452 MS
RBC # BLD AUTO: 3.63 M/UL (ref 4.1–5.7)
RBC MORPH BLD: ABNORMAL
SODIUM SERPL-SCNC: 139 MMOL/L (ref 136–145)
VENTRICULAR RATE, ECG03: 97 BPM
WBC # BLD AUTO: 10.5 K/UL (ref 4.1–11.1)

## 2021-07-22 PROCEDURE — 65270000029 HC RM PRIVATE

## 2021-07-22 PROCEDURE — 74011250636 HC RX REV CODE- 250/636: Performed by: INTERNAL MEDICINE

## 2021-07-22 PROCEDURE — 80053 COMPREHEN METABOLIC PANEL: CPT

## 2021-07-22 PROCEDURE — 97530 THERAPEUTIC ACTIVITIES: CPT

## 2021-07-22 PROCEDURE — 74011000258 HC RX REV CODE- 258: Performed by: INTERNAL MEDICINE

## 2021-07-22 PROCEDURE — 74011250637 HC RX REV CODE- 250/637: Performed by: INTERNAL MEDICINE

## 2021-07-22 PROCEDURE — 97165 OT EVAL LOW COMPLEX 30 MIN: CPT

## 2021-07-22 PROCEDURE — 36415 COLL VENOUS BLD VENIPUNCTURE: CPT

## 2021-07-22 PROCEDURE — 85652 RBC SED RATE AUTOMATED: CPT

## 2021-07-22 PROCEDURE — 76770 US EXAM ABDO BACK WALL COMP: CPT

## 2021-07-22 PROCEDURE — 85025 COMPLETE CBC W/AUTO DIFF WBC: CPT

## 2021-07-22 PROCEDURE — 99222 1ST HOSP IP/OBS MODERATE 55: CPT | Performed by: SURGERY

## 2021-07-22 RX ORDER — CARVEDILOL 3.12 MG/1
6.25 TABLET ORAL 2 TIMES DAILY WITH MEALS
Status: DISCONTINUED | OUTPATIENT
Start: 2021-07-23 | End: 2021-07-27 | Stop reason: HOSPADM

## 2021-07-22 RX ORDER — AMMONIUM LACTATE 12 G/100G
CREAM TOPICAL DAILY
Status: DISCONTINUED | OUTPATIENT
Start: 2021-07-23 | End: 2021-07-27 | Stop reason: HOSPADM

## 2021-07-22 RX ADMIN — CARVEDILOL 25 MG: 12.5 TABLET, FILM COATED ORAL at 09:57

## 2021-07-22 RX ADMIN — HEPARIN SODIUM 5000 UNITS: 5000 INJECTION INTRAVENOUS; SUBCUTANEOUS at 17:18

## 2021-07-22 RX ADMIN — VITAM B12 100 MCG: 100 TAB at 09:57

## 2021-07-22 RX ADMIN — PIPERACILLIN AND TAZOBACTAM 2.25 G: 2; .25 INJECTION, POWDER, LYOPHILIZED, FOR SOLUTION INTRAVENOUS at 09:57

## 2021-07-22 RX ADMIN — PIPERACILLIN AND TAZOBACTAM 2.25 G: 2; .25 INJECTION, POWDER, LYOPHILIZED, FOR SOLUTION INTRAVENOUS at 02:16

## 2021-07-22 RX ADMIN — ASPIRIN 325 MG ORAL TABLET 325 MG: 325 PILL ORAL at 09:57

## 2021-07-22 RX ADMIN — PIPERACILLIN AND TAZOBACTAM 2.25 G: 2; .25 INJECTION, POWDER, LYOPHILIZED, FOR SOLUTION INTRAVENOUS at 17:18

## 2021-07-22 RX ADMIN — HEPARIN SODIUM 5000 UNITS: 5000 INJECTION INTRAVENOUS; SUBCUTANEOUS at 05:35

## 2021-07-22 RX ADMIN — WHITE PETROLATUM,ZINC OXIDE: 57; 17 PASTE TOPICAL at 22:00

## 2021-07-22 RX ADMIN — WHITE PETROLATUM,ZINC OXIDE: 57; 17 PASTE TOPICAL at 17:26

## 2021-07-22 NOTE — PROGRESS NOTES
Problem: Falls - Risk of  Goal: *Absence of Falls  Description: Document Kelly Matthews Fall Risk and appropriate interventions in the flowsheet.   Outcome: Progressing Towards Goal  Note: Fall Risk Interventions:  Mobility Interventions: PT Consult for mobility concerns, Strengthening exercises (ROM-active/passive), Utilize walker, cane, or other assistive device         Medication Interventions: Patient to call before getting OOB, Teach patient to arise slowly    Elimination Interventions: Call light in reach, Toileting schedule/hourly rounds, Urinal in reach              Problem: Patient Education: Go to Patient Education Activity  Goal: Patient/Family Education  Outcome: Progressing Towards Goal

## 2021-07-22 NOTE — CONSULTS
History and Physical    Chief complaints: Bilateral leg wound issues. History of Presenting Illness:  Herman Ortiz is a 67 y.o. pleasant man currently hospitalized with bilateral leg swelling and wound issues. I was consulted for this. Patient examined this morning. Patient is morbidly obese. I examined both legs. Patient denies any particular worsening pain. Patient does have extensive edema and scaly skin throughout the medial calf and pretibial area bilaterally. There is no open ulcers. There is no cellulitis. Past Medical History:   Diagnosis Date    Dyslipidemia     HTN (hypertension)       Past Surgical History:   Procedure Laterality Date    HX CHOLECYSTECTOMY       Family History   Problem Relation Age of Onset    Diabetes Mother     Diabetes Brother       Social History     Tobacco Use    Smoking status: Never Smoker    Smokeless tobacco: Never Used   Substance Use Topics    Alcohol use: Never       Prior to Admission medications    Medication Sig Start Date End Date Taking? Authorizing Provider   aspirin (ASPIRIN) 325 mg tablet Take 325 mg by mouth daily. Yes Provider, Historical   cyanocobalamin (Vitamin B-12) 100 mcg tablet Take 100 mcg by mouth daily. Yes Provider, Historical   carvediloL (Coreg) 25 mg tablet Take 25 mg by mouth two (2) times daily (with meals). Yes Provider, Historical   furosemide (Lasix) 40 mg tablet Take 40 mg by mouth daily. Yes Provider, Historical     No Known Allergies     Review of Systems:  Pertinent review of systems discussed in HPI, and rest of organ systems personally reviewed and they are negative.     Objective:   Vital signs reviewed:      Visit Vitals  /64   Pulse 82   Temp 97.8 °F (36.6 °C)   Resp 17   Ht 5' 8\" (1.727 m)   Wt 270 lb (122.5 kg)   SpO2 99%   BMI 41.05 kg/m²       Physical Exam:   General appearance:   Patient is awake and alert  Head and neck atraumatic normocephalic  Cardiac is regular rate  Eyes pupil equal gaze appropriate  ENT oral mucosa is normal no hoarse voice no jaundice  Pulmonary no audible wheeze  Chest wall excursion was normal with respiration cycle  Abdomen soft not distended  Skin is warm moist  Hematology system no bruising  Neurologically intact nonfocal cranial nerves intact  Musculoskeletal system moving 4 extremities  Vascular examination both is well-perfused with palpable pulse with a warm foot. Patient has extensive edema  Psychological appropriate cooperative. Data Review: Labs are reviewed. Discussed  Recent Results (from the past 24 hour(s))   URINALYSIS W/ REFLEX CULTURE    Collection Time: 07/21/21  8:10 AM    Specimen: Urine   Result Value Ref Range    Color Yellow/Straw      Appearance Clear Clear      Specific gravity 1.008 1.003 - 1.030      pH (UA) 5.0 5.0 - 8.0      Protein Negative Negative mg/dL    Glucose Negative Negative mg/dL    Ketone Negative Negative mg/dL    Bilirubin Negative Negative      Blood Small (A) Negative      Urobilinogen 0.1 0.1 - 1.0 EU/dL    Nitrites Negative Negative      Leukocyte Esterase Small (A) Negative      UA:UC IF INDICATED Culture not indicated by UA result Culture not indicated by UA result      WBC 5-10 0 - 4 /hpf    RBC 20-50 0 - 5 /hpf    Bacteria Negative Negative /hpf    Mucus Trace /lpf   SODIUM, UR, RANDOM    Collection Time: 07/21/21  6:45 PM   Result Value Ref Range    Sodium,urine random 20 mmol/L   PROTEIN/CREATININE RATIO, URINE    Collection Time: 07/21/21  6:45 PM   Result Value Ref Range    Protein, urine random 23 (H) 0.0 - 11.9 mg/dL    Creatinine, urine 52.00 mg/dL    Protein/Creat. urine Ratio 0.4           Imagings reviewed: discussed as below. Assessment:     Active Problems:    Hyperkalemia (7/21/2021)        Plan:     Patient does have a lymphedema with significant scaly lesion on the skin level throughout the bilateral calf and the pretibial area. Patient says a he took a shower about a week ago. Patient will need complaints of lymphedema therapy including appropriate compression garments to be applied most likely the custom made once at some point once wounds are cleaned up. I do recommend the whirlpool therapy both calf area to cleanse these wounds debrided the or the scaly skin lesions and the deposits. I am not sure the [facility] can provide verbal therapy for this gentleman. I know we had a problem with the resources with orbital therapy from physical therapy department. Please consult physical therapy for other options to cleanse these wounds.

## 2021-07-22 NOTE — WOUND CARE
IP WOUND CONSULT    Sophie Keane MEDICAL RECORD NUMBER:  [id number]  AGE: 67 y.o. GENDER: male  : 1945  TODAY'S DATE:  2021    GENERAL     [] Follow-up   [x] New Consult    Sophie Keane is a 67 y.o. male referred by:   [x] Physician  [] Nursing  [] Other:         PAST MEDICAL HISTORY    Past Medical History:   Diagnosis Date    Dyslipidemia     HTN (hypertension)         PAST SURGICAL HISTORY    Past Surgical History:   Procedure Laterality Date    HX CHOLECYSTECTOMY         FAMILY HISTORY    Family History   Problem Relation Age of Onset    Diabetes Mother     Diabetes Brother          ALLERGIES    No Known Allergies    MEDICATIONS    No current facility-administered medications on file prior to encounter. Current Outpatient Medications on File Prior to Encounter   Medication Sig Dispense Refill    aspirin (ASPIRIN) 325 mg tablet Take 325 mg by mouth daily.  cyanocobalamin (Vitamin B-12) 100 mcg tablet Take 100 mcg by mouth daily.  carvediloL (Coreg) 25 mg tablet Take 25 mg by mouth two (2) times daily (with meals).  furosemide (Lasix) 40 mg tablet Take 40 mg by mouth daily.            [unfilled]  Visit Vitals  BP (!) 95/58 (BP 1 Location: Right upper arm, BP Patient Position: At rest;Supine)   Pulse 77   Temp 97.7 °F (36.5 °C)   Resp 18   Ht 5' 8\" (1.727 m)   Wt 122.5 kg (270 lb)   SpO2 100%   BMI 41.05 kg/m²       ASSESSMENT     Wound Identification & Type: Ulceration to LLE posterior r/t edema; minor MASD to buttocks and gluteal cleft; ulceration to penis and scrotum likely r/t MASD  Dressing change: Yes, see flowchart  Verbal consent for picture: Yes    Contributing Factors: anticoagulation therapy, edema, lymphedema, poor glucose control, decreased mobility, shear force, incontinence of urine and obesity    Wound Pretibial Right skin barnacles (Active)   Wound Etiology Other (Comment) 21 1421   Number of days: 2       Wound Pretibial Left skin barnacles (Active)   Wound Image   07/22/21 1336   Wound Etiology Other (Comment) 07/22/21 1336   Cleansed Other (Comment) 07/22/21 1336   Dressing/Treatment ABD pad;Alginate with Ag;Roll gauze;Tape/Soft cloth adhesive tape 07/22/21 1336   Dressing Change Due 07/23/21 07/22/21 1336   Wound Assessment Pink/red 07/22/21 1336   Drainage Amount Small 07/22/21 1336   Drainage Description Serosanguinous 07/22/21 1336   Wound Odor None 07/22/21 1336   Dinorah-Wound/Incision Assessment Dry/flaky;Fragile; Hyperkeratosis (Callous) 07/22/21 1336   Edges Undefined edges 07/22/21 1336   Wound Thickness Description Partial thickness 07/22/21 1336   Number of days: 2       Wound Rectum 07/21/21 (Active)   Number of days: 1       Wound Penis 07/21/21 (Active)   Wound Image   07/22/21 1326   Wound Etiology Other (Comment) 07/22/21 1326   Cleansed Other (Comment) 07/22/21 1326   Dressing/Treatment Zinc paste 07/22/21 1326   Wound Assessment Pink/red 07/22/21 1326   Wound Odor None 07/22/21 1326   Dinorah-Wound/Incision Assessment Fragile 07/22/21 1326   Edges Undefined edges 07/22/21 1326   Wound Thickness Description Partial thickness 07/22/21 1326   Number of days: 1       Wound Groin 07/21/21 (Active)   Wound Etiology Other (Comment) 07/22/21 1054   Number of days: 1          PLAN     Skin Care & Pressure Relief Recommendations  Minimize layers of linen  Turn/reposition approximately every 2 hours  Pillow wedges  Manage incontinence   Promote continence; Skin Protective lotion/cream to buttocks and sacrum daily and as needed with incontinence care  Offload heels pillows    Mekhi 20   Blood Glucose: 121 on 7/21/21                              Albumin: 2.1 on 7/20/21  WBCs: 14.7 on 7/20/21    Support Surface: Foam mattress. Will have transferred to a gel mattress for pressure reduction benefit. Physician/Provider notified:   Recommendations: Per patient, edema/swelling in BLEs is improved.   Wrap with ACE wraps daily for gentle compression, see dressing order. Apply Amlactin lotion daily after cleansing and prior to wrapping. Float heels with 2-3 pillows at all times daily while in the bed for offloading of the heels. Patient stated he is not incontinent of urine but said, \"when it comes it just comes\". Patient was sitting with urinal between between thighs which is probably contributing to some of the irritation to penis and scrotum. Spoke to JACINTAE and recommended applying Primofit Male Urinary Incontinence Management System. Apply zinc paste TID and prn to buttocks, gluteal fold / cleft, scrotum and penis. Use foam wedge to ensure turning q2h at 30 degree angle or more to offload sacrum and buttocks. Maintain HOB at 30 degrees or less if not contraindicated to reduce pressure to buttocks and coccyx. Will continue to follow.       Teaching completed with:   [] Patient           [] Family member       [] Caregiver          [] Nursing  [] Other    Patient/Caregiver Teaching:  Level of patient/caregiver understanding able to:   [] Indicates understanding       [] Needs reinforcement  [] Unsuccessful      [] Verbal Understanding  [] Demonstrated understanding       [] No evidence of learning  [] Refused teaching         [] N/A       Electronically signed by Marisol Early RN on 7/22/2021 at 2:03 PM

## 2021-07-22 NOTE — PROGRESS NOTES
OCCUPATIONAL THERAPY EVALUATION  Patient: Royal Kim (78 y.o. male)  Date: 7/22/2021  Primary Diagnosis: Hyperkalemia [E87.5]        Precautions: Fall    ASSESSMENT  Patient is a 67year old male, who came to the ED with lower leg pain and generalized weakness and admitted for hyperkalemia on 7/20/2021. PMH includes: chronic leg edema, dyslipidemia, HTN. Based on the objective data described below, the patient presents with impaired balance, generalized weakness, decreased activity tolerance and increased need for A with self care and functional mobility/transfers. Patient semi-supine upon OT arrival and agreeable to working with therapy. Patient A&O x4 and per pt report, pt lives with his wife in a 2 SH with 12 SHEILA and right handrail. Patient reports living on the 1st floor. Patient reports IND with ADLs/IADLs and uses a rollator for mobility. Patient reports owning a cane and shower chair, no other DME reported at this time. Patient states he has not had any falls in the last 3 months. Patient mod A x2 rolling, supine <> sit, min A x2 scooting for bed mobility. Patient total A LB dressing (socks). Patient max A x2 sit > stand with gait belt and RW x2 attempts. Patient tolerated approximately 15 seconds standing before requesting to return to EOB due to reported lower extremity weakness, mod A x2 stand > sit. Patient setup A grooming (washing face) seated EOB, mod A x2 sit > supine. Patient would benefit from continued skilled OT services to address above deficits and improve safety and independence with self care and functional mobility/transfers. Recommend discharge to SNF when medically appropriate.     Current Level of Function Impacting Discharge (ADLs/self-care): total A LB dressing (socks), setup A grooming (washing face) seated EOB    Other factors to consider for discharge: PLOF, DME, family support, severity of deficits     PLAN :  Recommendations and Planned Interventions: self care training, functional mobility training, therapeutic exercise, balance training, therapeutic activities, endurance activities, patient education and home safety training    Frequency/Duration: Patient will be followed by occupational therapy 5 times a week to address goals. Recommendation for discharge: (in order for the patient to meet his/her long term goals)  SNF    This discharge recommendation:  Has been made in collaboration with the attending provider and/or case management    IF patient discharges home will need the following DME: TBD       SUBJECTIVE:   Patient stated I need to sit back down, my legs are weak.     OBJECTIVE DATA SUMMARY:   HISTORY:   Past Medical History:   Diagnosis Date    Dyslipidemia     HTN (hypertension)      Past Surgical History:   Procedure Laterality Date    HX CHOLECYSTECTOMY         Expanded or extensive additional review of patient history:     Home Situation  Home Environment: Private residence  # Steps to Enter: 12  Rails to Enter: Yes  Hand Rails : Right  One/Two Story Residence: Two story, live on 1st floor  Living Alone: No  Support Systems: Spouse/Significant Other/Partner  Patient Expects to be Discharged to[de-identified] House  Current DME Used/Available at Home: [address] [facility], [place], straight, Walker, rollator, Shower chair  Tub or Shower Type: Shower    PLOF: Pt IND for ADLS/IADLS, mod I with mobility prior to admission. Hand dominance: Right    EXAMINATION OF PERFORMANCE DEFICITS:  Cognitive/Behavioral Status:  Neurologic State: Alert  Orientation Level: Oriented X4  Cognition: Follows commands    Skin: bandages to BLE, otherwise intact where visible    Edema: BLE    Hearing: Auditory  Auditory Impairment: None    Vision/Perceptual:    Not formally assessed, appears WFL.     Range of Motion:  AROM: Generally decreased, functional    Strength:  Strength: Generally decreased, functional     RUE Strength  Observation: Observed 4-/5     LUE Strength  Observation: Observed 4-/5    Tone & Sensation:  Tone: Normal  Sensation: Intact    Balance:  Sitting: Intact; With support  Standing: Impaired; With support  Standing - Static: Poor;Constant support    Functional Mobility and Transfers for ADLs:  Bed Mobility:  Rolling: Moderate assistance;Assist x2  Supine to Sit: Moderate assistance;Assist x2  Sit to Supine: Moderate assistance;Assist x2  Scooting: Minimum assistance;Assist x2    Transfers:  Sit to Stand: Maximum assistance;Assist x2  Stand to Sit: Moderate assistance;Assist x2  Assistive Device : Gait Belt;Walker, rolling    ADL Intervention and task modifications:  Grooming  Position Performed: Seated edge of bed  Washing Face: Set-up    Lower Body Dressing Assistance  Socks: Total assistance (dependent)    Therapeutic Exercise:  Patient may benefit from UE HEP, initiate at next session as able. Functional Measure:    [address] [place] [id number] AM-PAC \"6 Clicks\"                                                       Daily Activity Inpatient Short Form  How much help from another person does the patient currently need. .. Total; A Lot A Little None   1. Putting on and taking off regular lower body clothing? [x]  1 []  2 []  3 []  4   2. Bathing (including washing, rinsing, drying)? [x]  1 []  2 []  3 []  4   3. Toileting, which includes using toilet, bedpan or urinal? [x] 1 []  2 []  3 []  4   4. Putting on and taking off regular upper body clothing? []  1 [x]  2 []  3 []  4   5. Taking care of personal grooming such as brushing teeth? []  1 []  2 [x]  3 []  4   6. Eating meals? []  1 []  2 [x]  3 []  4   © 2007, Trustees of [address] [place] [address] [address] [address], under license to [employer]. All rights reserved     Score: 11/24     Interpretation of Tool:  Represents clinically-significant functional categories (i.e. Activities of daily living).   Percentage of Impairment CH    0%   CI    1-19% CJ    20-39% CK    40-59% CL    60-79% CM    80-99% CN     100%   [facility]  Score 6-24 24 23 20-22 15-19 10-14 7-9 6 Occupational Therapy Evaluation Charge Determination   History Examination Decision-Making   LOW Complexity : Brief history review  LOW Complexity : 1-3 performance deficits relating to physical, cognitive , or psychosocial skils that result in activity limitations and / or participation restrictions  MEDIUM Complexity : Patient may present with comorbidities that affect occupational performnce. Miniml to moderate modification of tasks or assistance (eg, physical or verbal ) with assesment(s) is necessary to enable patient to complete evaluation       Based on the above components, the patient evaluation is determined to be of the following complexity level: LOW     Pain Ratin/10    Activity Tolerance:   Fair    After treatment patient left in no apparent distress:    Supine in bed, Call bell within reach, Caregiver / family present and Side rails x 3, BLE elevated on pillows    COMMUNICATION/EDUCATION:   The patients plan of care was discussed with: Registered nurse. Patient/family have participated as able in goal setting and plan of care. This patients plan of care is appropriate for delegation to [facility]. Problem: Self Care Deficits Care Plan (Adult)  Goal: *Acute Goals and Plan of Care (Insert Text)  Description: Pt will be mod I sup <> sit in prep for EOB ADLs  Pt will be mod I grooming sitting EOB  Pt will be min A LE dressing sitting EOB/long sit  Pt will be mod I sit <>  prep for toileting  Pt will be mod I toileting/toilet transfer/cloth mgmt   Pt will be IND following UE HEP in prep for self care tasks   Outcome: Not Met     Patient was seen by OT student, Maurice Deras, under direct supervision of supervising OT, Irene Tayolr. Supervising OT has reviewed documentation for accuracy and co-signed report.     Thank you for this referral.  AGUSTO Mueller  Time Calculation: 33 mins

## 2021-07-22 NOTE — PROGRESS NOTES
PROGRESS NOTE      Subjective: Feels okay. Laying in bed. Legs wrapped. Wife bedside him. He denies any chest pain shortness of breath. No leg pain. Visit Vitals  BP 98/61 (BP 1 Location: Left upper arm, BP Patient Position: At rest;Supine)   Pulse 76   Temp 97.7 °F (36.5 °C)   Resp 18   Ht 5' 8\" (1.727 m)   Wt 122.5 kg (270 lb)   SpO2 98%   BMI 41.05 kg/m²       Current Facility-Administered Medications:     [START ON 7/23/2021] ammonium lactate (AMLACTIN) 12 % cream, , Topical, DAILY, KashCallie MD    zinc oxide-white petrolatum 17-57 % topical paste, , Topical, TID, Jovi HERNANDEZ MD    aspirin tablet 325 mg, 325 mg, Oral, DAILY, Tay Gonzales MD, 325 mg at 07/22/21 0957    carvediloL (COREG) tablet 25 mg, 25 mg, Oral, BID WITH MEALS, Tay Gonzales MD, 25 mg at 07/22/21 0957    cyanocobalamin (VITAMIN B12) tablet 100 mcg, 100 mcg, Oral, DAILY, Tay Landers MD, 100 mcg at 07/22/21 0957    piperacillin-tazobactam (ZOSYN) 2.25 g in 0.9% sodium chloride (MBP/ADV) 50 mL MBP, 2.25 g, IntraVENous, Q8H, Tay Landers MD, Last Rate: 12.5 mL/hr at 07/22/21 0957, 2.25 g at 07/22/21 0957    heparin (porcine) injection 5,000 Units, 5,000 Units, SubCUTAneous, Q12H, Jovi HERNANDEZ MD, 5,000 Units at 07/22/21 0535    acetaminophen (TYLENOL) tablet 650 mg, 650 mg, Oral, Q6H PRN, Jovi HERNANDEZ MD, 650 mg at 07/21/21 2002  Recent Results (from the past 24 hour(s))   SODIUM, UR, RANDOM    Collection Time: 07/21/21  6:45 PM   Result Value Ref Range    Sodium,urine random 20 mmol/L   PROTEIN/CREATININE RATIO, URINE    Collection Time: 07/21/21  6:45 PM   Result Value Ref Range    Protein, urine random 23 (H) 0.0 - 11.9 mg/dL    Creatinine, urine 52.00 mg/dL    Protein/Creat.  urine Ratio 0.4     SED RATE (ESR)    Collection Time: 07/22/21  9:43 AM   Result Value Ref Range    Sed rate, automated 129 mm/hr   CBC WITH AUTOMATED DIFF    Collection Time: 07/22/21  9:43 AM   Result Value Ref Range    WBC 10.5 4.1 - 11.1 K/uL    RBC 3.63 (L) 4.10 - 5.70 M/uL    HGB 9.9 (L) 12.1 - 17.0 g/dL    HCT 31.6 (L) 36.6 - 50.3 %    MCV 87.1 80.0 - 99.0 FL    MCH 27.3 26.0 - 34.0 PG    MCHC 31.3 30.0 - 36.5 g/dL    RDW 14.7 (H) 11.5 - 14.5 %    PLATELET 230 920 - 168 K/uL    MPV 9.8 8.9 - 12.9 FL    NRBC 0.0 0.0  WBC    ABSOLUTE NRBC 0.00 0.00 - 0.01 K/uL    NEUTROPHILS 84 (H) 32 - 75 %    LYMPHOCYTES 8 (L) 12 - 49 %    MONOCYTES 7 5 - 13 %    EOSINOPHILS 1 0 - 7 %    BASOPHILS 0 0 - 1 %    IMMATURE GRANULOCYTES 0 %    ABS. NEUTROPHILS 8.9 (H) 1.8 - 8.0 K/UL    ABS. LYMPHOCYTES 0.8 0.8 - 3.5 K/UL    ABS. MONOCYTES 0.7 0.0 - 1.0 K/UL    ABS. EOSINOPHILS 0.1 0.0 - 0.4 K/UL    ABS. BASOPHILS 0.0 0.0 - 0.1 K/UL    ABS. IMM. GRANS. 0.0 K/UL    DF AUTOMATED      RBC COMMENTS Anisocytosis  1+       METABOLIC PANEL, COMPREHENSIVE    Collection Time: 07/22/21  9:43 AM   Result Value Ref Range    Sodium 139 136 - 145 mmol/L    Potassium 3.9 3.5 - 5.1 mmol/L    Chloride 105 97 - 108 mmol/L    CO2 26 21 - 32 mmol/L    Anion gap 8 5 - 15 mmol/L    Glucose 134 (H) 65 - 100 mg/dL     (H) 6 - 20 mg/dL    Creatinine 2.78 (H) 0.70 - 1.30 mg/dL    BUN/Creatinine ratio 37 (H) 12 - 20      GFR est AA 27 (L) >60 ml/min/1.73m2    GFR est non-AA 22 (L) >60 ml/min/1.73m2    Calcium 9.3 8.5 - 10.1 mg/dL    Bilirubin, total 0.4 0.2 - 1.0 mg/dL    AST (SGOT) 62 (H) 15 - 37 U/L    ALT (SGPT) 73 12 - 78 U/L    Alk. phosphatase 92 45 - 117 U/L    Protein, total 6.8 6.4 - 8.2 g/dL    Albumin 1.8 (L) 3.5 - 5.0 g/dL    Globulin 5.0 (H) 2.0 - 4.0 g/dL    A-G Ratio 0.4 (L) 1.1 - 2.2       US RETROPERITONEUM COMP   Final Result   1. There is mild thinning of the right renal cortex and a mild increase in echo   characteristics of the right kidney.    2.  There is a hypoechoic lesion within the medial cortex of the right kidney   likely representing a cyst but somewhat difficult to characterized based upon   this examination. See the above recommendation   3. This examination is negative for renal obstruction. 4.  There is increased echogenicity along the floor the bladder which may   represent layering debris within the bladder floor from an incomplete emptying   of the bladder. XR TIB/FIB LT   Final Result   No fracture or other specific acute abnormality. 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 Soft tissue changes suggestive of edema. Correlate with physical exam               XR TIB/FIB RT   Final Result   No fracture or other specific acute abnormality. 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 Soft tissue changes suggestive of edema. Correlate with physical exam                         HEENT: Normocephalic atraumatic anicteric sclera  Neck: No distended neck vein  Lungs: Clear anteriorly bilaterally no wheeze  Heart: Regular  Abdomen: Soft nontender nondistended positive bowel sounds  Lower Extremities: Legs are wrapped bilaterally. Onychomycosis. CNS: Alert and oriented moves extremities   psych: Cooperative     Assessment: Bilateral lower extremity edema with secondary skin changes and cellulitis. Onychomycosis  Hypotension   Bilateral lower extremity cellulitis   Chronic leg edema with secondary skin changes   Onychomycosis   Hypertension   Obesity   Difficulty walking   Acute kidney injury due to diuretic use   Hyperkalemia better         Plan: Appreciate consultant input. Will need long-term chronic wound care. Considering IV antibiotic for home might need PICC line. Decrease Coreg dose. Discussed with wife who is present in the room.

## 2021-07-22 NOTE — ROUTINE PROCESS
Bedside and Verbal shift change report given to Sandip Dumont RN   (oncoming nurse) by Gary Ibarra   (offgoing nurse). Report included the following information SBAR and Kardex.

## 2021-07-22 NOTE — PROGRESS NOTES
PT consult received regarding possible whirlpool tx for pt's B LEs. PT screen completed. At this time, pt is not appropriate for whirlpool tx for B LE wounds. Per general surgeon who was present in room during PT screen, pt will be following up at wound clinic post d/c from hospital.      Functional mobility was not assessed during this screen as pt needed L LE re-wrapped and dressing changed as it had been taken off when surgeon was present to assess. Pt also stated he wanted to eat his breakfast prior to any mobility attempts. Will check back at a later time to follow up with overall mobility assessment.

## 2021-07-22 NOTE — PROGRESS NOTES
Patient resting without distress; complaint of mild pain; seemingly resolved with tylenol (see MAR) VS within defined parameters; no further interventions necessary at this time.

## 2021-07-22 NOTE — CONSULTS
Consult    Patient: Courtney Penaloza MRN: [id number]  SSN: [id number]    YOB: 1945  Age: 67 y.o. Sex: male      Subjective:      Courtney Penaloza is a 67 y.o. male who is being seen for peripheral edema that is being treated with antibiotics and diuretics. He has had leg pain for the past year and got worse about a week ago. He states he is eating and sleeping well. He is not able to walk due to pain. He denies fever, chills, fatigue, nausea, vomiting, and pain at this time.      Past Medical History:   Diagnosis Date    Dyslipidemia     HTN (hypertension)      Past Surgical History:   Procedure Laterality Date    HX CHOLECYSTECTOMY        Family History   Problem Relation Age of Onset    Diabetes Mother     Diabetes Brother      Social History     Tobacco Use    Smoking status: Never Smoker    Smokeless tobacco: Never Used   Substance Use Topics    Alcohol use: Never      Current Facility-Administered Medications   Medication Dose Route Frequency Provider Last Rate Last Admin    aspirin tablet 325 mg  325 mg Oral DAILY Matt HERNANDEZ MD        carvediloL (COREG) tablet 25 mg  25 mg Oral BID WITH MEALS Matt HERNANDEZ MD   25 mg at 07/21/21 1754    cyanocobalamin (VITAMIN B12) tablet 100 mcg  100 mcg Oral DAILY Matt HERNANDEZ MD        piperacillin-tazobactam (ZOSYN) 2.25 g in 0.9% sodium chloride (MBP/ADV) 50 mL MBP  2.25 g IntraVENous Q8H Tay Landers MD 12.5 mL/hr at 07/22/21 0216 2.25 g at 07/22/21 0216    heparin (porcine) injection 5,000 Units  5,000 Units SubCUTAneous Q12H Matt HERNANDEZ MD   5,000 Units at 07/22/21 0535    acetaminophen (TYLENOL) tablet 650 mg  650 mg Oral Q6H PRN Matt Pompa MD   650 mg at 07/21/21 2002        No Known Allergies    Review of Systems:  Constitutional: Denies fever, chills, fatigue  Cardiovascular: Denies palpitations, CP, murmurs  Respiratory: Denies SOB, coughing, wheezing  Gastrointestinal: Denies nausea, vomiting, abdominal pain, constipation, diarrhea. Musculoskeletal: Denies joint pain, myalgia  Skin: Denies bruising, rashes, lesions. Objective:     Vitals:    07/21/21 2000 07/21/21 2341 07/22/21 0000 07/22/21 0400   BP:  103/66  132/64   Pulse: 84 82 84 82   Resp:  19  17   Temp:  97.6 °F (36.4 °C)  97.8 °F (36.6 °C)   SpO2:  100%  99%   Weight:       Height:            Physical Exam:  GENERAL: alert, cooperative, no distress, appears stated age  LUNG: clear to auscultation bilaterally. No wheezing, rales, rhonchi. HEART: regular rate and rhythm, S1, S2 normal, no murmur, click, rub or gallop  ABDOMEN: soft, non-tender. Bowel sounds normal. No masses,  no organomegaly  EXTREMITIES:  B/L severe lower extremity edema with chronic hypertrophic skin changes involving bilateral leg. Distal pulses +, no cyanosis or clubbing. . Left posterior leg area has an open wound about 6cms x 5cms in size, tender. Base clean and dry. Pain with lifting both legs. Little strength in both lower extremities. SKIN: wam, dry, with crust seen on the bed from both lower extremities. Assessment:     Hospital Problems  Never Reviewed        Codes Class Noted POA    Hyperkalemia ICD-10-CM: E87.5  ICD-9-CM: 276.7  7/21/2021 Unknown            Peripheral edema in b/l lower extremities    Chronic BLE Lymphedema    Plan:     Continue DVT prophylaxis     Continue current medications for chronic conditions. Elevate legs to reduce swelling. Continue on antibiotics for cellulitis    Apply AquacelAg to left posterior leg ulcer and apply kerlex. Apply ace wrap to BLE and keep both legs elevated. PT consult. Thank you for allowing me to participate in the care of Mr. Yumiko Cash.           Signed By: Fadia SPARROW    July 22, 2021

## 2021-07-23 LAB
ANION GAP SERPL CALC-SCNC: 7 MMOL/L (ref 5–15)
BUN SERPL-MCNC: 86 MG/DL (ref 6–20)
BUN/CREAT SERPL: 34 (ref 12–20)
CA-I BLD-MCNC: 8.7 MG/DL (ref 8.5–10.1)
CHLORIDE SERPL-SCNC: 110 MMOL/L (ref 97–108)
CO2 SERPL-SCNC: 25 MMOL/L (ref 21–32)
CREAT SERPL-MCNC: 2.5 MG/DL (ref 0.7–1.3)
GLUCOSE SERPL-MCNC: 116 MG/DL (ref 65–100)
IRON SATN MFR SERPL: 24 % (ref 20–50)
IRON SERPL-MCNC: 38 UG/DL (ref 35–150)
POTASSIUM SERPL-SCNC: 3.7 MMOL/L (ref 3.5–5.1)
SODIUM SERPL-SCNC: 142 MMOL/L (ref 136–145)
TIBC SERPL-MCNC: 157 UG/DL (ref 250–450)

## 2021-07-23 PROCEDURE — 74011250636 HC RX REV CODE- 250/636: Performed by: INTERNAL MEDICINE

## 2021-07-23 PROCEDURE — 83540 ASSAY OF IRON: CPT

## 2021-07-23 PROCEDURE — 97530 THERAPEUTIC ACTIVITIES: CPT | Performed by: PHYSICAL THERAPIST

## 2021-07-23 PROCEDURE — 36415 COLL VENOUS BLD VENIPUNCTURE: CPT

## 2021-07-23 PROCEDURE — 97161 PT EVAL LOW COMPLEX 20 MIN: CPT | Performed by: PHYSICAL THERAPIST

## 2021-07-23 PROCEDURE — 74011250637 HC RX REV CODE- 250/637: Performed by: INTERNAL MEDICINE

## 2021-07-23 PROCEDURE — 65270000029 HC RM PRIVATE

## 2021-07-23 PROCEDURE — 80048 BASIC METABOLIC PNL TOTAL CA: CPT

## 2021-07-23 PROCEDURE — 74011000258 HC RX REV CODE- 258: Performed by: INTERNAL MEDICINE

## 2021-07-23 RX ORDER — SODIUM CHLORIDE 9 MG/ML
75 INJECTION, SOLUTION INTRAVENOUS CONTINUOUS
Status: DISCONTINUED | OUTPATIENT
Start: 2021-07-23 | End: 2021-07-25

## 2021-07-23 RX ADMIN — ACETAMINOPHEN 650 MG: 325 TABLET, FILM COATED ORAL at 10:38

## 2021-07-23 RX ADMIN — VITAM B12 100 MCG: 100 TAB at 10:38

## 2021-07-23 RX ADMIN — SODIUM CHLORIDE 75 ML/HR: 9 INJECTION, SOLUTION INTRAVENOUS at 10:39

## 2021-07-23 RX ADMIN — CARVEDILOL 6.25 MG: 3.12 TABLET, FILM COATED ORAL at 17:53

## 2021-07-23 RX ADMIN — HEPARIN SODIUM 5000 UNITS: 5000 INJECTION INTRAVENOUS; SUBCUTANEOUS at 17:52

## 2021-07-23 RX ADMIN — PIPERACILLIN AND TAZOBACTAM 2.25 G: 2; .25 INJECTION, POWDER, LYOPHILIZED, FOR SOLUTION INTRAVENOUS at 17:53

## 2021-07-23 RX ADMIN — ACETAMINOPHEN 650 MG: 325 TABLET, FILM COATED ORAL at 01:16

## 2021-07-23 RX ADMIN — ASPIRIN 325 MG ORAL TABLET 325 MG: 325 PILL ORAL at 10:38

## 2021-07-23 RX ADMIN — PIPERACILLIN AND TAZOBACTAM 2.25 G: 2; .25 INJECTION, POWDER, LYOPHILIZED, FOR SOLUTION INTRAVENOUS at 10:37

## 2021-07-23 RX ADMIN — PIPERACILLIN AND TAZOBACTAM 2.25 G: 2; .25 INJECTION, POWDER, LYOPHILIZED, FOR SOLUTION INTRAVENOUS at 01:25

## 2021-07-23 RX ADMIN — CARVEDILOL 6.25 MG: 3.12 TABLET, FILM COATED ORAL at 10:39

## 2021-07-23 RX ADMIN — WHITE PETROLATUM,ZINC OXIDE: 57; 17 PASTE TOPICAL at 10:40

## 2021-07-23 RX ADMIN — HEPARIN SODIUM 5000 UNITS: 5000 INJECTION INTRAVENOUS; SUBCUTANEOUS at 05:39

## 2021-07-23 NOTE — PROGRESS NOTES
Problem: Falls - Risk of  Goal: *Absence of Falls  Description: Document Juan Medina Fall Risk and appropriate interventions in the flowsheet.   Outcome: Progressing Towards Goal  Note: Fall Risk Interventions:  Mobility Interventions: Bed/chair exit alarm, Communicate number of staff needed for ambulation/transfer, PT Consult for mobility concerns, Strengthening exercises (ROM-active/passive)         Medication Interventions: Bed/chair exit alarm, Patient to call before getting OOB, Teach patient to arise slowly    Elimination Interventions: Bed/chair exit alarm, Call light in reach, Urinal in reach, Toileting schedule/hourly rounds              Problem: Patient Education: Go to Patient Education Activity  Goal: Patient/Family Education  Outcome: Progressing Towards Goal     Problem: Patient Education: Go to Patient Education Activity  Goal: Patient/Family Education  Outcome: Progressing Towards Goal

## 2021-07-23 NOTE — PROGRESS NOTES
Patient resting without distress; no complaints of pain at this time; reminded to alter his weight on his bottom periodically; no further interventions at this time.

## 2021-07-23 NOTE — PROGRESS NOTES
PROGRESS NOTE      Subjective: Very weak. Unable to lift both legs against gravity. No fever or chills. No significant pain. Appetite is okay.      Visit Vitals  /67 (BP 1 Location: Right upper arm, BP Patient Position: At rest;Supine)   Pulse 86   Temp 97.8 °F (36.6 °C)   Resp 18   Ht 5' 8\" (1.727 m)   Wt 122.5 kg (270 lb)   SpO2 98%   BMI 41.05 kg/m²       Current Facility-Administered Medications:     0.9% sodium chloride infusion, 75 mL/hr, IntraVENous, CONTINUOUS, Tay Hickey MD    ammonium lactate (AMLACTIN) 12 % cream, , Topical, DAILY, Kash, Chalo Arana MD    zinc oxide-white petrolatum 17-57 % topical paste, , Topical, TID, Lillian HERNANDEZ MD, Given at 07/22/21 2200    carvediloL (COREG) tablet 6.25 mg, 6.25 mg, Oral, BID WITH MEALS, Lillian Molina MD    aspirin tablet 325 mg, 325 mg, Oral, DAILY, Lillian HERNANDEZ MD, 325 mg at 07/22/21 0957    cyanocobalamin (VITAMIN B12) tablet 100 mcg, 100 mcg, Oral, DAILY, Tay Hickey MD, 100 mcg at 07/22/21 0957    piperacillin-tazobactam (ZOSYN) 2.25 g in 0.9% sodium chloride (MBP/ADV) 50 mL MBP, 2.25 g, IntraVENous, Q8H, Tay Landers MD, Last Rate: 12.5 mL/hr at 07/23/21 0125, 2.25 g at 07/23/21 0125    heparin (porcine) injection 5,000 Units, 5,000 Units, SubCUTAneous, Q12H, Lillian HERNANDEZ MD, 5,000 Units at 07/23/21 0539    acetaminophen (TYLENOL) tablet 650 mg, 650 mg, Oral, Q6H PRN, Lillian HERNANDEZ MD, 650 mg at 07/23/21 0116  Recent Results (from the past 24 hour(s))   METABOLIC PANEL, BASIC    Collection Time: 07/23/21  4:00 AM   Result Value Ref Range    Sodium 142 136 - 145 mmol/L    Potassium 3.7 3.5 - 5.1 mmol/L    Chloride 110 (H) 97 - 108 mmol/L    CO2 25 21 - 32 mmol/L    Anion gap 7 5 - 15 mmol/L    Glucose 116 (H) 65 - 100 mg/dL    BUN 86 (H) 6 - 20 mg/dL    Creatinine 2.50 (H) 0.70 - 1.30 mg/dL    BUN/Creatinine ratio 34 (H) 12 - 20      GFR est AA 31 (L) >60 ml/min/1.73m2    GFR est non-AA 25 (L) >60 ml/min/1.73m2    Calcium 8.7 8.5 - 10.1 mg/dL     US RETROPERITONEUM COMP   Final Result   1. There is mild thinning of the right renal cortex and a mild increase in echo   characteristics of the right kidney. 2.  There is a hypoechoic lesion within the medial cortex of the right kidney   likely representing a cyst but somewhat difficult to characterized based upon   this examination. See the above recommendation   3. This examination is negative for renal obstruction. 4.  There is increased echogenicity along the floor the bladder which may   represent layering debris within the bladder floor from an incomplete emptying   of the bladder. XR TIB/FIB LT   Final Result   No fracture or other specific acute abnormality. 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 Soft tissue changes suggestive of edema. Correlate with physical exam               XR TIB/FIB RT   Final Result   No fracture or other specific acute abnormality. 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 Soft tissue changes suggestive of edema. Correlate with physical exam                         HEENT: Normocephalic atraumatic anicteric sclera. Neck: No distended neck vein. Lungs: Clear bilaterally no wheeze. Heart: Regular no murmur. Abdomen: Obese positive bowel sounds. Lower Extremities: Legs wrapped. Onychomycosis. CNS: Alert and oriented x3. Unable to lift legs against gravity due to weakness and weight  Psych: Cooperative. Assessment:     Bilateral lower extremity edema with secondary skin changes and cellulitis. Onychomycosis   Hypotension   Bilateral lower extremity cellulitis   Chronic leg edema with secondary skin changes   Onychomycosis   Hypertension   Obesity   Difficulty walking   Acute kidney injury due to diuretic use   Hyperkalemia better       Plan: We will give gentle hydration. Skilled nursing care facility transfer.   His wife is frail, she cannot do much for him if he cannot transfer at least.  Discussed with nurse and case management. Continue antibiotics for now. We will continue to monitor his kidney function. We will give one bag of fluid check iron studies.

## 2021-07-23 NOTE — PROGRESS NOTES
Problem: Mobility Impaired (Adult and Pediatric)  Goal: *Acute Goals and Plan of Care (Insert Text)  Description: Pt will be I with LE HEP in 7 days. Pt will perform bed mobility with Min A x2 in 7 days. Pt will perform transfers with Ar x2 in 7 days. Pt will amb 10 feet with LRAD safely with mod A x2 in 7 days. Outcome: Not Met     Problem: Patient Education: Go to Patient Education Activity  Goal: Patient/Family Education  Outcome: Not Met     PHYSICAL THERAPY EVALUATION  Patient: Janessa Robledo (78 y.o. male)  Date: 7/23/2021  Primary Diagnosis: Hyperkalemia [E87.5]        Precautions:   Fall    ASSESSMENT  Patient is a 67year old male, who came to the ED with lower leg pain and generalized weakness and admitted for hyperkalemia on 7/20/2021. PMH includes: chronic leg edema, dyslipidemia, HTN. Patient A&O x4 and per pt report, pt lives with his wife in a 2  with 12 SHEILA and right handrail. Patient reports living on the 1st floor. Patient reports IND with ADLs/IADLs and uses a rollator for mobility. Patient reports owning a cane and shower chair, no other DME reported at this time. Patient states he has not had any falls in the last 3 months. Patient max A x1 rolling in B directions. Pt was found to have soiled the bed so PT cleaned pt up. Supine <>sit was nto performed at this was not safe today. Based on the objective data described below, the patient presents with impaired balance, generalized weakness, decreased activity tolerance and increased need for assist with functional mobility/transfers. Patient would benefit from acute PT services to address above deficits and improve safety and independence with functional mobility/transfers. Recommend discharge to SNF when medically appropriate. Patient will benefit from skilled therapy intervention to address the above noted impairments.        PLAN :  Recommendations and Planned Interventions: bed mobility training, transfer training, gait training, therapeutic exercises, neuromuscular re-education, and therapeutic activities      Frequency/Duration: Patient will be followed by physical therapy:  4 times a week to address goals. Recommendation for discharge: (in order for the patient to meet his/her long term goals)  SNF    This discharge recommendation:  Has not yet been discussed the attending provider and/or case management    IF patient discharges home will need the following DME: none         SUBJECTIVE:   Patient stated i'm weak.     OBJECTIVE DATA SUMMARY:   HISTORY:    Past Medical History:   Diagnosis Date    Dyslipidemia     HTN (hypertension)      Past Surgical History:   Procedure Laterality Date    HX CHOLECYSTECTOMY         Personal factors and/or comorbidities impacting plan of care: see pt chart    Home Situation  Home Environment: Private residence  # Steps to Enter: 12  Rails to Enter: Yes  Hand Rails : Right  One/Two Story Residence: Two story, live on 1st floor  Living Alone: No  Support Systems: Spouse/Significant Other/Partner  Patient Expects to be Discharged to[de-identified] House  Current DME Used/Available at Home: [employer], straight, Walker, rollator, Shower chair  Tub or Shower Type: Shower    PLOF: Pt IND for ADLS/IADLS, IND with mobility prior to admission. EXAMINATION/PRESENTATION/DECISION MAKING:   Critical Behavior:  Neurologic State: Alert  Orientation Level: Oriented X4  Cognition: Follows commands     Hearing: Auditory  Auditory Impairment: None    Range Of Motion:      WFL                    Strength:        Decreased               Functional Mobility:  Bed Mobility:  Rolling: Maximum assistance;Assist x1        [facility] AM-PAC 6 Clicks         Basic Mobility Inpatient Short Form  How much difficulty does the patient currently have. .. Unable A Lot A Little None   1. Turning over in bed (including adjusting bedclothes, sheets and blankets)? [] 1   [x] 2   [] 3   [] 4   2.   Sitting down on and standing up from a chair with arms ( e.g., wheelchair, bedside commode, etc.)   [x] 1   [] 2   [] 3   [] 4   3. Moving from lying on back to sitting on the side of the bed? [x] 1   [] 2   [] 3   [] 4          How much help from another person does the patient currently need. .. Total A Lot A Little None   4. Moving to and from a bed to a chair (including a wheelchair)? [x] 1   [] 2   [] 3   [] 4   5. Need to walk in hospital room? [x] 1   [] 2   [] 3   [] 4   6. Climbing 3-5 steps with a railing? [x] 1   [] 2   [] 3   [] 4   © , Trustees of [facility] [employer], under license to [employer]. All rights reserved     Score:  Initial:  Most Recent: X (Date: -- )   Interpretation of Tool:  Represents activities that are increasingly more difficult (i.e. Bed mobility, Transfers, Gait). Score 24 23 22-20 19-15 14-10 9-7 6   Modifier CH CI CJ CK CL CM CN          Physical Therapy Evaluation Charge Determination   History Examination Presentation Decision-Making   LOW Complexity : Zero comorbidities / personal factors that will impact the outcome / POC LOW Complexity : 1-2 Standardized tests and measures addressing body structure, function, activity limitation and / or participation in recreation  LOW Complexity : Stable, uncomplicated  Other Functional Measure AMPAC 6       Based on the above components, the patient evaluation is determined to be of the following complexity level: LOW     Pain Ratin/10 BLE    Activity Tolerance:   Poor  Please refer to the flowsheet for vital signs taken during this treatment. After treatment patient left in no apparent distress:   Supine in bed, Call bell within reach, and nursing in room to fix pt's primo fit    COMMUNICATION/EDUCATION:   The patients plan of care was discussed with: Registered nurse. Patient/family agree to work toward stated goals and plan of care.     Thank you for this referral.  Sangeetha Aldana, PT, DPT   Time Calculation: 23 mins

## 2021-07-24 LAB
ALBUMIN SERPL-MCNC: 1.8 G/DL (ref 3.5–5)
ANION GAP SERPL CALC-SCNC: 7 MMOL/L (ref 5–15)
BASOPHILS # BLD: 0 K/UL (ref 0–0.1)
BASOPHILS NFR BLD: 1 % (ref 0–1)
BUN SERPL-MCNC: 66 MG/DL (ref 6–20)
BUN/CREAT SERPL: 30 (ref 12–20)
CA-I BLD-MCNC: 8 MG/DL (ref 8.5–10.1)
CA-I BLD-MCNC: 8.4 MG/DL (ref 8.5–10.1)
CHLORIDE SERPL-SCNC: 112 MMOL/L (ref 97–108)
CO2 SERPL-SCNC: 24 MMOL/L (ref 21–32)
CREAT SERPL-MCNC: 2.2 MG/DL (ref 0.7–1.3)
DIFFERENTIAL METHOD BLD: ABNORMAL
EOSINOPHIL # BLD: 0.2 K/UL (ref 0–0.4)
EOSINOPHIL NFR BLD: 3 % (ref 0–7)
ERYTHROCYTE [DISTWIDTH] IN BLOOD BY AUTOMATED COUNT: 14.9 % (ref 11.5–14.5)
GLUCOSE SERPL-MCNC: 108 MG/DL (ref 65–100)
HCT VFR BLD AUTO: 29.1 % (ref 36.6–50.3)
HGB BLD-MCNC: 9.1 G/DL (ref 12.1–17)
IMM GRANULOCYTES # BLD AUTO: 0.2 K/UL (ref 0–0.04)
IMM GRANULOCYTES NFR BLD AUTO: 3 % (ref 0–0.5)
LYMPHOCYTES # BLD: 1 K/UL (ref 0.8–3.5)
LYMPHOCYTES NFR BLD: 13 % (ref 12–49)
MCH RBC QN AUTO: 27.8 PG (ref 26–34)
MCHC RBC AUTO-ENTMCNC: 31.3 G/DL (ref 30–36.5)
MCV RBC AUTO: 89 FL (ref 80–99)
MONOCYTES # BLD: 0.6 K/UL (ref 0–1)
MONOCYTES NFR BLD: 8 % (ref 5–13)
NEUTS SEG # BLD: 5.4 K/UL (ref 1.8–8)
NEUTS SEG NFR BLD: 72 % (ref 32–75)
NRBC # BLD: 0 K/UL (ref 0–0.01)
NRBC BLD-RTO: 0 PER 100 WBC
PHOSPHATE SERPL-MCNC: 2.9 MG/DL (ref 2.6–4.7)
PLATELET # BLD AUTO: 356 K/UL (ref 150–400)
PMV BLD AUTO: 9.7 FL (ref 8.9–12.9)
POTASSIUM SERPL-SCNC: 3.5 MMOL/L (ref 3.5–5.1)
PTH-INTACT SERPL-MCNC: 196.5 PG/ML (ref 18.4–88)
RBC # BLD AUTO: 3.27 M/UL (ref 4.1–5.7)
SODIUM SERPL-SCNC: 143 MMOL/L (ref 136–145)
WBC # BLD AUTO: 7.5 K/UL (ref 4.1–11.1)

## 2021-07-24 PROCEDURE — 74011250636 HC RX REV CODE- 250/636: Performed by: INTERNAL MEDICINE

## 2021-07-24 PROCEDURE — 80069 RENAL FUNCTION PANEL: CPT

## 2021-07-24 PROCEDURE — 74011250637 HC RX REV CODE- 250/637: Performed by: SURGERY

## 2021-07-24 PROCEDURE — 74011250637 HC RX REV CODE- 250/637: Performed by: INTERNAL MEDICINE

## 2021-07-24 PROCEDURE — 83970 ASSAY OF PARATHORMONE: CPT

## 2021-07-24 PROCEDURE — 65270000029 HC RM PRIVATE

## 2021-07-24 PROCEDURE — 74011000258 HC RX REV CODE- 258: Performed by: INTERNAL MEDICINE

## 2021-07-24 PROCEDURE — 36415 COLL VENOUS BLD VENIPUNCTURE: CPT

## 2021-07-24 PROCEDURE — 85027 COMPLETE CBC AUTOMATED: CPT

## 2021-07-24 RX ADMIN — PIPERACILLIN AND TAZOBACTAM 2.25 G: 2; .25 INJECTION, POWDER, LYOPHILIZED, FOR SOLUTION INTRAVENOUS at 21:21

## 2021-07-24 RX ADMIN — VITAM B12 100 MCG: 100 TAB at 09:17

## 2021-07-24 RX ADMIN — WHITE PETROLATUM,ZINC OXIDE: 57; 17 PASTE TOPICAL at 21:22

## 2021-07-24 RX ADMIN — Medication: at 01:05

## 2021-07-24 RX ADMIN — HEPARIN SODIUM 5000 UNITS: 5000 INJECTION INTRAVENOUS; SUBCUTANEOUS at 17:49

## 2021-07-24 RX ADMIN — CARVEDILOL 6.25 MG: 3.12 TABLET, FILM COATED ORAL at 17:49

## 2021-07-24 RX ADMIN — ACETAMINOPHEN 650 MG: 325 TABLET, FILM COATED ORAL at 09:17

## 2021-07-24 RX ADMIN — HEPARIN SODIUM 5000 UNITS: 5000 INJECTION INTRAVENOUS; SUBCUTANEOUS at 06:39

## 2021-07-24 RX ADMIN — ASPIRIN 325 MG ORAL TABLET 325 MG: 325 PILL ORAL at 09:17

## 2021-07-24 RX ADMIN — PIPERACILLIN AND TAZOBACTAM 2.25 G: 2; .25 INJECTION, POWDER, LYOPHILIZED, FOR SOLUTION INTRAVENOUS at 01:04

## 2021-07-24 RX ADMIN — CARVEDILOL 6.25 MG: 3.12 TABLET, FILM COATED ORAL at 09:17

## 2021-07-24 RX ADMIN — PIPERACILLIN AND TAZOBACTAM: 2; .25 INJECTION, POWDER, LYOPHILIZED, FOR SOLUTION INTRAVENOUS at 09:17

## 2021-07-24 RX ADMIN — WHITE PETROLATUM,ZINC OXIDE: 57; 17 PASTE TOPICAL at 01:04

## 2021-07-24 NOTE — PROGRESS NOTES
Problem: Falls - Risk of  Goal: *Absence of Falls  Description: Document Valentine David Fall Risk and appropriate interventions in the flowsheet. Outcome: Progressing Towards Goal  Note: Fall Risk Interventions:  Mobility Interventions: Patient to call before getting OOB, Bed/chair exit alarm, Strengthening exercises (ROM-active/passive)         Medication Interventions: Bed/chair exit alarm, Patient to call before getting OOB    Elimination Interventions: Bed/chair exit alarm, Call light in reach, Patient to call for help with toileting needs              Problem: Patient Education: Go to Patient Education Activity  Goal: Patient/Family Education  Outcome: Progressing Towards Goal     Problem: Pressure Injury - Risk of  Goal: *Prevention of pressure injury  Description: Document Mekhi Scale and appropriate interventions in the flowsheet.   Outcome: Progressing Towards Goal  Note: Pressure Injury Interventions:  Sensory Interventions: Assess changes in LOC, Float heels, Keep linens dry and wrinkle-free, Maintain/enhance activity level, Minimize linen layers         Activity Interventions: Increase time out of bed, PT/OT evaluation    Mobility Interventions: PT/OT evaluation, HOB 30 degrees or less, Float heels    Nutrition Interventions: Document food/fluid/supplement intake, Offer support with meals,snacks and hydration    Friction and Shear Interventions: Apply protective barrier, creams and emollients, Feet elevated on foot rest, HOB 30 degrees or less, Minimize layers                Problem: Patient Education: Go to Patient Education Activity  Goal: Patient/Family Education  Outcome: Progressing Towards Goal

## 2021-07-24 NOTE — PROGRESS NOTES
PROGRESS NOTE      Subjective: Uneventful night. No chest pain shortness of breath no fever chills. Still very weak. Occasional soreness in the legs.      Visit Vitals  /62 (BP 1 Location: Right upper arm, BP Patient Position: At rest;Semi fowlers)   Pulse 87   Temp 98.4 °F (36.9 °C)   Resp 14   Ht 5' 8\" (1.727 m)   Wt 122.5 kg (270 lb)   SpO2 99%   BMI 41.05 kg/m²       Current Facility-Administered Medications:     0.9% sodium chloride infusion, 75 mL/hr, IntraVENous, CONTINUOUS, Tay Lopez MD, Last Rate: 75 mL/hr at 07/23/21 1039, 75 mL/hr at 07/23/21 1039    ammonium lactate (AMLACTIN) 12 % cream, , Topical, DAILY, Kash, Diane Graves MD, Given at 07/24/21 0105    zinc oxide-white petrolatum 17-57 % topical paste, , Topical, TID, Vikas Bergeron MD, Given at 07/24/21 0104    carvediloL (COREG) tablet 6.25 mg, 6.25 mg, Oral, BID WITH MEALS, Vikas HERNANDEZ MD, 6.25 mg at 07/23/21 1753    aspirin tablet 325 mg, 325 mg, Oral, DAILY, Vikas HERNANDEZ MD, 325 mg at 07/23/21 1038    cyanocobalamin (VITAMIN B12) tablet 100 mcg, 100 mcg, Oral, DAILY, Tay Lopez MD, 100 mcg at 07/23/21 1038    piperacillin-tazobactam (ZOSYN) 2.25 g in 0.9% sodium chloride (MBP/ADV) 50 mL MBP, 2.25 g, IntraVENous, Q8H, Tay Landers MD, Last Rate: 12.5 mL/hr at 07/24/21 0104, 2.25 g at 07/24/21 0104    heparin (porcine) injection 5,000 Units, 5,000 Units, SubCUTAneous, Q12H, Vikas HERNANDEZ MD, 5,000 Units at 07/24/21 0639    acetaminophen (TYLENOL) tablet 650 mg, 650 mg, Oral, Q6H PRN, Vikas HERNANDEZ MD, 650 mg at 07/23/21 1038  Recent Results (from the past 24 hour(s))   RENAL FUNCTION PANEL    Collection Time: 07/24/21  8:02 AM   Result Value Ref Range    Sodium 143 136 - 145 mmol/L    Potassium 3.5 3.5 - 5.1 mmol/L    Chloride 112 (H) 97 - 108 mmol/L    CO2 24 21 - 32 mmol/L    Anion gap 7 5 - 15 mmol/L    Glucose 108 (H) 65 - 100 mg/dL    BUN 66 (H) 6 - 20 mg/dL    Creatinine 2.20 (H) 0.70 - 1.30 mg/dL    BUN/Creatinine ratio 30 (H) 12 - 20      GFR est AA 35 (L) >60 ml/min/1.73m2    GFR est non-AA 29 (L) >60 ml/min/1.73m2    Calcium 8.4 (L) 8.5 - 10.1 mg/dL    Phosphorus 2.9 2.6 - 4.7 mg/dL    Albumin 1.8 (L) 3.5 - 5.0 g/dL     US RETROPERITONEUM COMP   Final Result   1. There is mild thinning of the right renal cortex and a mild increase in echo   characteristics of the right kidney. 2.  There is a hypoechoic lesion within the medial cortex of the right kidney   likely representing a cyst but somewhat difficult to characterized based upon   this examination. See the above recommendation   3. This examination is negative for renal obstruction. 4.  There is increased echogenicity along the floor the bladder which may   represent layering debris within the bladder floor from an incomplete emptying   of the bladder. XR TIB/FIB LT   Final Result   No fracture or other specific acute abnormality. 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 Soft tissue changes suggestive of edema. Correlate with physical exam               XR TIB/FIB RT   Final Result   No fracture or other specific acute abnormality. 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 Soft tissue changes suggestive of edema. Correlate with physical exam                         HEENT: Normocephalic atraumatic. Neck: No distended neck vein. Lungs: Clear anteriorly bilaterally no coarse crackles or wheeze  Heart: Regular  Abdomen: Soft obese nontender nondistended   Lower Extremities: Legs wrapped. Onychomycosis. CNS: Alert and oriented x3   psych: Cooperative. Assessment:         Bilateral lower extremity edema with secondary skin changes and cellulitis.    Onychomycosis   Hypotension   Bilateral lower extremity cellulitis   Chronic leg edema with secondary skin changes   Onychomycosis   Hypertension   Obesity   Difficulty walking   Acute kidney injury on CKD stage III due to diuretic use   Hyperkalemia better    Plan: Kidney function improving. Continue gentle hydration. Ambulate. Continue antibiotics. Continue to watch kidney function.

## 2021-07-25 LAB
ANION GAP SERPL CALC-SCNC: 6 MMOL/L (ref 5–15)
BUN SERPL-MCNC: 48 MG/DL (ref 6–20)
BUN/CREAT SERPL: 24 (ref 12–20)
CA-I BLD-MCNC: 8.3 MG/DL (ref 8.5–10.1)
CHLORIDE SERPL-SCNC: 114 MMOL/L (ref 97–108)
CO2 SERPL-SCNC: 25 MMOL/L (ref 21–32)
CREAT SERPL-MCNC: 1.98 MG/DL (ref 0.7–1.3)
GLUCOSE SERPL-MCNC: 94 MG/DL (ref 65–100)
POTASSIUM SERPL-SCNC: 3.5 MMOL/L (ref 3.5–5.1)
SODIUM SERPL-SCNC: 145 MMOL/L (ref 136–145)

## 2021-07-25 PROCEDURE — 74011250636 HC RX REV CODE- 250/636: Performed by: INTERNAL MEDICINE

## 2021-07-25 PROCEDURE — 65270000029 HC RM PRIVATE

## 2021-07-25 PROCEDURE — 74011250637 HC RX REV CODE- 250/637: Performed by: INTERNAL MEDICINE

## 2021-07-25 PROCEDURE — 74011000258 HC RX REV CODE- 258: Performed by: INTERNAL MEDICINE

## 2021-07-25 PROCEDURE — 36415 COLL VENOUS BLD VENIPUNCTURE: CPT

## 2021-07-25 PROCEDURE — 80048 BASIC METABOLIC PNL TOTAL CA: CPT

## 2021-07-25 RX ORDER — GUAIFENESIN 100 MG/5ML
81 LIQUID (ML) ORAL DAILY
Status: DISCONTINUED | OUTPATIENT
Start: 2021-07-26 | End: 2021-07-27 | Stop reason: HOSPADM

## 2021-07-25 RX ADMIN — PIPERACILLIN AND TAZOBACTAM 2.25 G: 2; .25 INJECTION, POWDER, LYOPHILIZED, FOR SOLUTION INTRAVENOUS at 07:18

## 2021-07-25 RX ADMIN — HEPARIN SODIUM 5000 UNITS: 5000 INJECTION INTRAVENOUS; SUBCUTANEOUS at 17:41

## 2021-07-25 RX ADMIN — ACETAMINOPHEN 650 MG: 325 TABLET, FILM COATED ORAL at 09:05

## 2021-07-25 RX ADMIN — ASPIRIN 325 MG ORAL TABLET 325 MG: 325 PILL ORAL at 09:04

## 2021-07-25 RX ADMIN — CARVEDILOL 6.25 MG: 3.12 TABLET, FILM COATED ORAL at 17:41

## 2021-07-25 RX ADMIN — PIPERACILLIN AND TAZOBACTAM 2.25 G: 2; .25 INJECTION, POWDER, LYOPHILIZED, FOR SOLUTION INTRAVENOUS at 17:42

## 2021-07-25 RX ADMIN — VITAM B12 100 MCG: 100 TAB at 09:05

## 2021-07-25 RX ADMIN — CARVEDILOL 6.25 MG: 3.12 TABLET, FILM COATED ORAL at 09:04

## 2021-07-25 RX ADMIN — HEPARIN SODIUM 5000 UNITS: 5000 INJECTION INTRAVENOUS; SUBCUTANEOUS at 07:18

## 2021-07-25 NOTE — PROGRESS NOTES
Progress Note    Patient: Travis Eid MRN: [id number]  SSN: [id number]    YOB: 1945  Age: 67 y.o. Sex: male      Admit Date: 7/20/2021    LOS: 4 days     Subjective:     No new complaints. No fever or chills. Still has some pain in bilateral LE. Past Medical History:   Diagnosis Date    Dyslipidemia      HTN (hypertension)              Past Surgical History:   Procedure Laterality Date    HX CHOLECYSTECTOMY                Family History   Problem Relation Age of Onset    Diabetes Mother      Diabetes Brother        Social History           Tobacco Use    Smoking status: Never Smoker    Smokeless tobacco: Never Used   Substance Use Topics    Alcohol use: Never                Current Facility-Administered Medications   Medication Dose Route Frequency Provider Last Rate Last Admin    aspirin tablet 325 mg  325 mg Oral DAILY Jessica HERNANDEZ MD        carvediloL (COREG) tablet 25 mg  25 mg Oral BID WITH MEALS Jessica HERNANDEZ MD   25 mg at 07/21/21 1754    cyanocobalamin (VITAMIN B12) tablet 100 mcg  100 mcg Oral DAILY Jessica HERNANDEZ MD        piperacillin-tazobactam (ZOSYN) 2.25 g in 0.9% sodium chloride (MBP/ADV) 50 mL MBP  2.25 g IntraVENous Q8H Tay Landers MD 12.5 mL/hr at 07/22/21 0216 2.25 g at 07/22/21 0216    heparin (porcine) injection 5,000 Units  5,000 Units SubCUTAneous Q12H Jessica HERNANDEZ MD   5,000 Units at 07/22/21 0535    acetaminophen (TYLENOL) tablet 650 mg  650 mg Oral Q6H PRN Jessica Monteiro MD   650 mg at 07/21/21 2002      No Known Allergies     Review of Systems:  Constitutional: Denies fever, chills, fatigue  Cardiovascular: Denies palpitations, CP, murmurs  Respiratory: Denies SOB, coughing, wheezing  Gastrointestinal: Denies nausea, vomiting, abdominal pain, constipation, diarrhea. Musculoskeletal: Denies joint pain, myalgia  Skin: Denies bruising, rashes, lesions.       Objective:     Vitals: 07/24/21 1952 07/25/21 0410 07/25/21 0809 07/25/21 1139   BP: 104/68 94/62 106/64 (!) 90/58   Pulse: 67 75 92 91   Resp: 17 17 18 20   Temp: 99 °F (37.2 °C) 99 °F (37.2 °C) 99.1 °F (37.3 °C) 98.8 °F (37.1 °C)   SpO2: 98% 96% 98% 97%   Weight:       Height:            Intake and Output:  Current Shift: No intake/output data recorded. Last three shifts: 07/23 1901 - 07/25 0700  In: -   Out: 800 [Urine:800]    Physical Exam:  GENERAL: alert, cooperative, no distress, appears stated age  LUNG: clear to auscultation bilaterally. No wheezing, rales, rhonchi. HEART: regular rate and rhythm, S1, S2 normal, no murmur, click, rub or gallop  ABDOMEN: soft, non-tender. Bowel sounds normal. No masses,  no organomegaly  EXTREMITIES:  B/L severe lower extremity edema with chronic hypertrophic skin changes involving bilateral leg. Kerlix bandage had been applied. Distal pulses +, no cyanosis or clubbing. . Left posterior leg area has an open wound about 5cms x 5cms in size, tender. Base clean and dry. SKIN: wam, dry, with crust seen on the bed from both lower extremities. Lab/Data Review:    Recent Results (from the past 24 hour(s))   CBC W/O DIFF    Collection Time: 07/24/21  6:00 PM   Result Value Ref Range    WBC 7.5 4.1 - 11.1 K/uL    RBC 3.27 (L) 4.10 - 5.70 M/uL    HGB 9.1 (L) 12.1 - 17.0 g/dL    HCT 29.1 (L) 36.6 - 50.3 %    MCV 89.0 80.0 - 99.0 FL    MCH 27.8 26.0 - 34.0 PG    MCHC 31.3 30.0 - 36.5 g/dL    RDW 14.9 (H) 11.5 - 14.5 %    PLATELET 453 291 - 628 K/uL    MPV 9.7 8.9 - 12.9 FL    NRBC 0.0 0.0  WBC    ABSOLUTE NRBC 0.00 0.00 - 0.01 K/uL   DIFFERENTIAL, AUTO    Collection Time: 07/24/21  6:00 PM   Result Value Ref Range    NEUTROPHILS 72 32 - 75 %    LYMPHOCYTES 13 12 - 49 %    MONOCYTES 8 5 - 13 %    EOSINOPHILS 3 0 - 7 %    BASOPHILS 1 0 - 1 %    IMMATURE GRANULOCYTES 3 (H) 0 - 0.5 %    ABS. NEUTROPHILS 5.4 1.8 - 8.0 K/UL    ABS. LYMPHOCYTES 1.0 0.8 - 3.5 K/UL    ABS.  MONOCYTES 0.6 0.0 - 1.0 K/UL ABS. EOSINOPHILS 0.2 0.0 - 0.4 K/UL    ABS. BASOPHILS 0.0 0.0 - 0.1 K/UL    ABS. IMM. GRANS. 0.2 (H) 0.00 - 0.04 K/UL    DF AUTOMATED           Assessment:     Active Problems:    Hyperkalemia (7/21/2021)    Peripheral edema in b/l lower extremities    Chronic BLE lymphedema    Left posterior leg open wound    Plan:     Continue DVT prophylaxis      Continue current medications for chronic conditions.     Continue on antibiotics for cellulitis     Apply AquacelAg to left posterior leg ulcer and apply kerlex.     Apply ace wrap to BLE and continue to keep both legs elevated with pillows.     Thank you for allowing me to participate in the care of Mr. Esther Flores.     By:

## 2021-07-25 NOTE — PROGRESS NOTES
PROGRESS NOTE      Subjective: No complaints today apparently still feels weak just laying down. Wife is at bedside. She reports poor appetite. Dry skin. No particular pain no shortness of breath. He tells me he has not been able to walk to the bathroom by himself.      Visit Vitals  /65 (BP 1 Location: Right upper arm)   Pulse 84   Temp 98.8 °F (37.1 °C)   Resp 16   Ht 5' 8\" (1.727 m)   Wt 122.5 kg (270 lb)   SpO2 98%   BMI 41.05 kg/m²       Current Facility-Administered Medications:     0.9% sodium chloride infusion, 75 mL/hr, IntraVENous, CONTINUOUS, Tay Gillis MD, Last Rate: 75 mL/hr at 07/23/21 1039, 75 mL/hr at 07/23/21 1039    ammonium lactate (AMLACTIN) 12 % cream, , Topical, DAILY, Kash, Leeanna Snider MD, Given at 07/24/21 0105    zinc oxide-white petrolatum 17-57 % topical paste, , Topical, TID, Rinku Carr MD, Given at 07/24/21 2122    carvediloL (COREG) tablet 6.25 mg, 6.25 mg, Oral, BID WITH MEALS, Rinku HERNANDEZ MD, 6.25 mg at 07/25/21 0117    aspirin tablet 325 mg, 325 mg, Oral, DAILY, Rinku HERNANDEZ MD, 325 mg at 07/25/21 0904    cyanocobalamin (VITAMIN B12) tablet 100 mcg, 100 mcg, Oral, DAILY, Tay Gillis MD, 100 mcg at 07/25/21 0905    piperacillin-tazobactam (ZOSYN) 2.25 g in 0.9% sodium chloride (MBP/ADV) 50 mL MBP, 2.25 g, IntraVENous, Q8H, Rinku HERNANDEZ MD, Held at 07/25/21 1000    heparin (porcine) injection 5,000 Units, 5,000 Units, SubCUTAneous, Q12H, Rinku HERNANDEZ MD, 5,000 Units at 07/25/21 0718    acetaminophen (TYLENOL) tablet 650 mg, 650 mg, Oral, Q6H PRN, Rinku HERNANDEZ MD, 650 mg at 07/25/21 4795  Recent Results (from the past 24 hour(s))   CBC W/O DIFF    Collection Time: 07/24/21  6:00 PM   Result Value Ref Range    WBC 7.5 4.1 - 11.1 K/uL    RBC 3.27 (L) 4.10 - 5.70 M/uL    HGB 9.1 (L) 12.1 - 17.0 g/dL    HCT 29.1 (L) 36.6 - 50.3 %    MCV 89.0 80.0 - 99.0 FL    MCH 27.8 26.0 - 34.0 PG MCHC 31.3 30.0 - 36.5 g/dL    RDW 14.9 (H) 11.5 - 14.5 %    PLATELET 412 767 - 440 K/uL    MPV 9.7 8.9 - 12.9 FL    NRBC 0.0 0.0  WBC    ABSOLUTE NRBC 0.00 0.00 - 0.01 K/uL   DIFFERENTIAL, AUTO    Collection Time: 07/24/21  6:00 PM   Result Value Ref Range    NEUTROPHILS 72 32 - 75 %    LYMPHOCYTES 13 12 - 49 %    MONOCYTES 8 5 - 13 %    EOSINOPHILS 3 0 - 7 %    BASOPHILS 1 0 - 1 %    IMMATURE GRANULOCYTES 3 (H) 0 - 0.5 %    ABS. NEUTROPHILS 5.4 1.8 - 8.0 K/UL    ABS. LYMPHOCYTES 1.0 0.8 - 3.5 K/UL    ABS. MONOCYTES 0.6 0.0 - 1.0 K/UL    ABS. EOSINOPHILS 0.2 0.0 - 0.4 K/UL    ABS. BASOPHILS 0.0 0.0 - 0.1 K/UL    ABS. IMM. GRANS. 0.2 (H) 0.00 - 0.04 K/UL    DF AUTOMATED       US RETROPERITONEUM COMP   Final Result   1. There is mild thinning of the right renal cortex and a mild increase in echo   characteristics of the right kidney. 2.  There is a hypoechoic lesion within the medial cortex of the right kidney   likely representing a cyst but somewhat difficult to characterized based upon   this examination. See the above recommendation   3. This examination is negative for renal obstruction. 4.  There is increased echogenicity along the floor the bladder which may   represent layering debris within the bladder floor from an incomplete emptying   of the bladder. XR TIB/FIB LT   Final Result   No fracture or other specific acute abnormality. 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 Soft tissue changes suggestive of edema. Correlate with physical exam               XR TIB/FIB RT   Final Result   No fracture or other specific acute abnormality. 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 Soft tissue changes suggestive of edema. Correlate with physical exam                         HEENT: Normocephalic atraumatic anicteric sclera   neck: No distended neck vein.   Lungs: Clear anteriorly bilaterally no wheeze or crackles  Heart: Regular  Abdomen: Obese positive bowel sounds   lower Extremities: Legs are wrapped. Onychomycosis. CNS: Alert and oriented 3. Bilateral lower extremity weakness  Psych: Cooperative     Assessment:     Bilateral lower extremity edema with secondary skin changes and cellulitis. Onychomycosis   Hypotension   Bilateral lower extremity cellulitis   Chronic leg edema with secondary skin changes   Onychomycosis   Hypertension   Obesity   Chronic anemia  Difficulty walking   Acute kidney injury on CKD stage III due to diuretic use   Hyperkalemia better       Plan: He will probably be best served in an acute inpatient rehab. Especially since he was walking before all this. Discontinue IV fluids. Continue IV antibiotics for now switch to p.o. on discharge. Moisturizing lotion. Discussed with wife and nurse.

## 2021-07-25 NOTE — PROGRESS NOTES
Problem: Pressure Injury - Risk of  Goal: *Prevention of pressure injury  Description: Document Mekhi Scale and appropriate interventions in the flowsheet. Outcome: Progressing Towards Goal  Note: Pressure Injury Interventions:  Sensory Interventions: Assess changes in LOC, Float heels, Keep linens dry and wrinkle-free, Minimize linen layers, Pad between skin to skin, Turn and reposition approx. every two hours (pillows and wedges if needed)    Moisture Interventions: Absorbent underpads, Apply protective barrier, creams and emollients, Check for incontinence Q2 hours and as needed, Maintain skin hydration (lotion/cream), Internal/External urinary devices    Activity Interventions: PT/OT evaluation, Increase time out of bed    Mobility Interventions: Float heels, PT/OT evaluation, Turn and reposition approx.  every two hours(pillow and wedges)    Nutrition Interventions: Document food/fluid/supplement intake, Offer support with meals,snacks and hydration    Friction and Shear Interventions: Apply protective barrier, creams and emollients, Minimize layers                Problem: Patient Education: Go to Patient Education Activity  Goal: Patient/Family Education  Outcome: Progressing Towards Goal

## 2021-07-26 LAB
ALBUMIN SERPL-MCNC: 1.7 G/DL (ref 3.5–5)
ANION GAP SERPL CALC-SCNC: 6 MMOL/L (ref 5–15)
BUN SERPL-MCNC: 42 MG/DL (ref 6–20)
BUN/CREAT SERPL: 23 (ref 12–20)
CA-I BLD-MCNC: 8.6 MG/DL (ref 8.5–10.1)
CHLORIDE SERPL-SCNC: 114 MMOL/L (ref 97–108)
CO2 SERPL-SCNC: 25 MMOL/L (ref 21–32)
CREAT SERPL-MCNC: 1.86 MG/DL (ref 0.7–1.3)
GLUCOSE SERPL-MCNC: 110 MG/DL (ref 65–100)
PHOSPHATE SERPL-MCNC: 2.2 MG/DL (ref 2.6–4.7)
POTASSIUM SERPL-SCNC: 3.4 MMOL/L (ref 3.5–5.1)
SODIUM SERPL-SCNC: 145 MMOL/L (ref 136–145)

## 2021-07-26 PROCEDURE — 74011000258 HC RX REV CODE- 258: Performed by: INTERNAL MEDICINE

## 2021-07-26 PROCEDURE — 97530 THERAPEUTIC ACTIVITIES: CPT

## 2021-07-26 PROCEDURE — 36415 COLL VENOUS BLD VENIPUNCTURE: CPT

## 2021-07-26 PROCEDURE — 65270000029 HC RM PRIVATE

## 2021-07-26 PROCEDURE — 74011250637 HC RX REV CODE- 250/637: Performed by: INTERNAL MEDICINE

## 2021-07-26 PROCEDURE — 74011250636 HC RX REV CODE- 250/636: Performed by: INTERNAL MEDICINE

## 2021-07-26 PROCEDURE — 80069 RENAL FUNCTION PANEL: CPT

## 2021-07-26 RX ADMIN — VITAM B12 100 MCG: 100 TAB at 10:32

## 2021-07-26 RX ADMIN — HEPARIN SODIUM 5000 UNITS: 5000 INJECTION INTRAVENOUS; SUBCUTANEOUS at 08:02

## 2021-07-26 RX ADMIN — Medication: at 02:34

## 2021-07-26 RX ADMIN — HEPARIN SODIUM 5000 UNITS: 5000 INJECTION INTRAVENOUS; SUBCUTANEOUS at 17:52

## 2021-07-26 RX ADMIN — WHITE PETROLATUM,ZINC OXIDE: 57; 17 PASTE TOPICAL at 02:35

## 2021-07-26 RX ADMIN — ASPIRIN 81 MG: 81 TABLET, CHEWABLE ORAL at 10:31

## 2021-07-26 RX ADMIN — PIPERACILLIN AND TAZOBACTAM 2.25 G: 2; .25 INJECTION, POWDER, LYOPHILIZED, FOR SOLUTION INTRAVENOUS at 17:52

## 2021-07-26 RX ADMIN — PIPERACILLIN AND TAZOBACTAM 2.25 G: 2; .25 INJECTION, POWDER, LYOPHILIZED, FOR SOLUTION INTRAVENOUS at 10:31

## 2021-07-26 RX ADMIN — WHITE PETROLATUM,ZINC OXIDE: 57; 17 PASTE TOPICAL at 10:34

## 2021-07-26 RX ADMIN — PIPERACILLIN AND TAZOBACTAM 2.25 G: 2; .25 INJECTION, POWDER, LYOPHILIZED, FOR SOLUTION INTRAVENOUS at 02:34

## 2021-07-26 NOTE — PROGRESS NOTES
Problem: Falls - Risk of  Goal: *Absence of Falls  Description: Document Anaya De Los Santos Fall Risk and appropriate interventions in the flowsheet. Outcome: Progressing Towards Goal  Note: Fall Risk Interventions:  Mobility Interventions: Bed/chair exit alarm, Utilize walker, cane, or other assistive device, Patient to call before getting OOB, OT consult for ADLs         Medication Interventions: Bed/chair exit alarm, Patient to call before getting OOB, Teach patient to arise slowly    Elimination Interventions: Bed/chair exit alarm, Call light in reach, Patient to call for help with toileting needs              Problem: Pressure Injury - Risk of  Goal: *Prevention of pressure injury  Description: Document Mekhi Scale and appropriate interventions in the flowsheet. Outcome: Progressing Towards Goal  Note: Pressure Injury Interventions:  Sensory Interventions: Assess changes in LOC, Float heels, Keep linens dry and wrinkle-free, Maintain/enhance activity level, Minimize linen layers    Moisture Interventions: Absorbent underpads, Apply protective barrier, creams and emollients, Check for incontinence Q2 hours and as needed, Minimize layers    Activity Interventions: PT/OT evaluation, Increase time out of bed    Mobility Interventions: PT/OT evaluation, Turn and reposition approx.  every two hours(pillow and wedges)    Nutrition Interventions: Document food/fluid/supplement intake, Offer support with meals,snacks and hydration    Friction and Shear Interventions: Apply protective barrier, creams and emollients, Minimize layers                Problem: Patient Education: Go to Patient Education Activity  Goal: Patient/Family Education  Outcome: Progressing Towards Goal

## 2021-07-26 NOTE — PROGRESS NOTES
PHYSICAL THERAPY TREATMENT  Patient: Stephen Casanova (78 y.o. male)  Date: 7/26/2021  Diagnosis: Hyperkalemia [E87.5] <principal problem not specified>       Precautions: Fall  Chart, physical therapy assessment, plan of care and goals were reviewed. ASSESSMENT  Patient continues with skilled PT services and is progressing towards goals. Pt semi supine in bed upon arrival and agreeable to session. Noted improvement in all mobility this session, see details below. Patient able to ambulate to bathroom with RW and min A, patient slightly unsteady but no LOB noted. Patient took seated rest break while going to the bathroom, see OT note for toileting details. Pt ambulated from bathroom to recliner, no LOB noted. Completed long sit therex, see details below. Pt left seated in recliner with call bell in reach, needs met, and chair alarm on. Recommending d/c to SNF once medically appropriate. .     Current Level of Function Impacting Discharge (mobility/balance): assistance required for OOB mobiltiy    Other factors to consider for discharge: PLOF, time since onset, severity of deficits         PLAN :  Patient continues to benefit from skilled intervention to address the above impairments. Continue treatment per established plan of care. to address goals. Recommendation for discharge: (in order for the patient to meet his/her long term goals)  Therapy up to 5 days/week in SNF setting    This discharge recommendation:  Has been made in collaboration with the attending provider and/or case management    IF patient discharges home will need the following DME: to be determined (TBD)       SUBJECTIVE:   Patient stated I can get up.     OBJECTIVE DATA SUMMARY:   Critical Behavior:  Neurologic State: Alert  Orientation Level: Oriented X4  Cognition: Follows commands     Functional Mobility Training:  Bed Mobility:  Rolling: Stand-by assistance  Supine to Sit: Stand-by assistance  Scooting: Stand-by assistance    Transfers:  Sit to Stand: Minimum assistance;Assist x2  Stand to Sit: Minimum assistance;Assist x2  Bed to Chair: Minimum assistance    Balance:  Sitting: Intact; Without support  Standing: Impaired; With support  Standing - Static: Constant support; Fair  Standing - Dynamic : Constant support; Fair    Ambulation/Gait Training:  Distance (ft): 25 Feet (ft)  Assistive Device: Gait belt;Walker, rolling  Ambulation - Level of Assistance: Minimal assistance  Base of Support: Narrowed  Speed/Jeanette: Slow  Step Length: Left shortened;Right shortened      Therapeutic Exercises:   1 x 10 AP  1 x 10 SLR  1 x 10 Heel slides    Pain Ratin/10    Activity Tolerance:   Fair and requires rest breaks  Please refer to the flowsheet for vital signs taken during this treatment. After treatment patient left in no apparent distress:   Sitting in chair, Call bell within reach, and Bed / chair alarm activated    COMMUNICATION/COLLABORATION:   The patients plan of care was discussed with: Occupational therapy assistant and Registered nurse. OT/PT sessions occurred together for increased patient and clinician safety    Problem: Mobility Impaired (Adult and Pediatric)  Goal: *Acute Goals and Plan of Care (Insert Text)  Description: Pt will be I with LE HEP in 7 days. Pt will perform bed mobility with Min A x2 in 7 days. Pt will perform transfers with Ar x2 in 7 days. Pt will amb 10 feet with LRAD safely with mod A x2 in 7 days.    Outcome: Progressing Towards Goal       Dougie Garcia PTA   Time Calculation: 39 mins

## 2021-07-26 NOTE — PROGRESS NOTES
CM met with patient and his wife, Tasneem Jacinto, in room to discuss DCP. Patient is currently recc for SNF by PT/OT, however patient reports that he has improved enough to where he feels comfortable returning home. Patient states that he has been up and able to go to and from the bathroom independently at this time and feels he will continue to progress at home. Patient does have approx 8 steps to enter home, CM explained that he would likely need assistance to get up the steps and he would not qualify for ambulance transport, patient reported that he would have someone to assist him. Patient's wife is agreeable to patient d/c to home. Patient is agreeable to [facility], gave no preference, choice letter signed, referral sent via [facility]im[facility], awaiting [facility] acceptance. CM continues to follow.

## 2021-07-26 NOTE — PROGRESS NOTES
PROGRESS NOTE      Subjective: No new complaints. Patient was actually sitting in the chair for the first time. He said he ambulated to the bathroom today. No fever or chills. No diarrhea.      Visit Vitals  BP 98/66 (BP 1 Location: Right upper arm, BP Patient Position: Sitting)   Pulse 91   Temp 98 °F (36.7 °C)   Resp 18   Ht 5' 8\" (1.727 m)   Wt 122.5 kg (270 lb)   SpO2 100%   BMI 41.05 kg/m²       Current Facility-Administered Medications:     aspirin chewable tablet 81 mg, 81 mg, Oral, DAILY, Tay Gillis MD, 81 mg at 07/26/21 1031    ammonium lactate (AMLACTIN) 12 % cream, , Topical, DAILY, Kash, Leeanna Snider MD, Given at 07/26/21 0234    zinc oxide-white petrolatum 17-57 % topical paste, , Topical, TID, Rinku HERNANDEZ MD, Given at 07/26/21 1034    carvediloL (COREG) tablet 6.25 mg, 6.25 mg, Oral, BID WITH MEALS, Rinku HERNANDEZ MD, 6.25 mg at 07/25/21 1741    cyanocobalamin (VITAMIN B12) tablet 100 mcg, 100 mcg, Oral, DAILY, Tay Gillis MD, 100 mcg at 07/26/21 1032    piperacillin-tazobactam (ZOSYN) 2.25 g in 0.9% sodium chloride (MBP/ADV) 50 mL MBP, 2.25 g, IntraVENous, Q8H, Tay Landers MD, Last Rate: 12.5 mL/hr at 07/26/21 1752, 2.25 g at 07/26/21 1752    heparin (porcine) injection 5,000 Units, 5,000 Units, SubCUTAneous, Q12H, Rinku HERNANDEZ MD, 5,000 Units at 07/26/21 1752    acetaminophen (TYLENOL) tablet 650 mg, 650 mg, Oral, Q6H PRN, Rinku HERNANDEZ MD, 650 mg at 07/25/21 1770  Recent Results (from the past 24 hour(s))   RENAL FUNCTION PANEL    Collection Time: 07/26/21  6:00 AM   Result Value Ref Range    Sodium 145 136 - 145 mmol/L    Potassium 3.4 (L) 3.5 - 5.1 mmol/L    Chloride 114 (H) 97 - 108 mmol/L    CO2 25 21 - 32 mmol/L    Anion gap 6 5 - 15 mmol/L    Glucose 110 (H) 65 - 100 mg/dL    BUN 42 (H) 6 - 20 mg/dL    Creatinine 1.86 (H) 0.70 - 1.30 mg/dL    BUN/Creatinine ratio 23 (H) 12 - 20      GFR est AA 43 (L) >60 ml/min/1.73m2    GFR est non-AA 35 (L) >60 ml/min/1.73m2    Calcium 8.6 8.5 - 10.1 mg/dL    Phosphorus 2.2 (L) 2.6 - 4.7 mg/dL    Albumin 1.7 (L) 3.5 - 5.0 g/dL     US RETROPERITONEUM COMP   Final Result   1. There is mild thinning of the right renal cortex and a mild increase in echo   characteristics of the right kidney. 2.  There is a hypoechoic lesion within the medial cortex of the right kidney   likely representing a cyst but somewhat difficult to characterized based upon   this examination. See the above recommendation   3. This examination is negative for renal obstruction. 4.  There is increased echogenicity along the floor the bladder which may   represent layering debris within the bladder floor from an incomplete emptying   of the bladder. XR TIB/FIB LT   Final Result   No fracture or other specific acute abnormality. 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 Soft tissue changes suggestive of edema. Correlate with physical exam               XR TIB/FIB RT   Final Result   No fracture or other specific acute abnormality. 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 Soft tissue changes suggestive of edema. Correlate with physical exam                         HEENT: Normocephalic atraumatic  Neck:   Lungs: Clear anteriorly heart: Regular  Abdomen: Obese positive bowel sounds  Lower Extremities: Legs wrapped. Onychomycosis CNS: Alert and oriented follows command  Psych: Cooperative     Assessment:     Bilateral lower extremity edema with secondary skin changes and cellulitis. Onychomycosis   Hypotension   Bilateral lower extremity cellulitis   Chronic leg edema with secondary skin changes   Onychomycosis   Hypertension   Obesity   Chronic anemia   Difficulty walking   Acute kidney injury on CKD stage III due to diuretic use   Hyperkalemia better       Plan: Discharge planning. Hopefully we can switch to antibiotic pills tomorrow. Continue current care.   We will need close follow-up

## 2021-07-26 NOTE — PROGRESS NOTES
OCCUPATIONAL THERAPY TREATMENT  Patient: Sho Allen (78 y.o. male)  Date: 7/26/2021  Diagnosis: Hyperkalemia [E87.5] <principal problem not specified>       Precautions: Fall  Chart, occupational therapy assessment, plan of care, and goals were reviewed. ASSESSMENT  Patient continues with skilled OT services and is progressing towards goals. Pt. Received semi-supine in bed and agreeable to therapy session. Noted significant improvement in all mobility this session. Patient completed bed mobility with SBA, good sitting balance at EOB, pt able to ambulate to bathroom with RW and min A. Pt required Min A for toilet transfer. Increased time to complete toileting, pt able to complete toileting hygiene with SBA while seated on commode however required total A for throughoness while standing at RW. Pt completed functional ambulation from bathroom to recliner. Pt completed facial grooming and hand washing while seated in chair with set-up assistance. Pt left seated in recliner with call bell in reach, needs met, and chair alarm on. Recommending d/c to SNF once medically appropriate. Current Level of Function Impacting Discharge (ADLs): increased assistance with transfers and ambulation. Other factors to consider for discharge: PLOF, time since on set. PLAN :  Patient continues to benefit from skilled intervention to address the above impairments. Continue treatment per established plan of care. to address goals. Recommendation for discharge: (in order for the patient to meet his/her long term goals)  Therapy up to 5 days/week in SNF setting    This discharge recommendation:  Has been made in collaboration with the attending provider and/or case management    IF patient discharges home will need the following DME: TBD       SUBJECTIVE:   Patient stated  my legs hurt a little bit.     OBJECTIVE DATA SUMMARY:   Cognitive/Behavioral Status:  Neurologic State: Alert  Orientation Level: Oriented X4  Cognition: Follows commands    Functional Mobility and Transfers for ADLs:  Bed Mobility:  Rolling: Stand-by assistance  Supine to Sit: Stand-by assistance  Scooting: Stand-by assistance    Transfers:  Sit to Stand: Minimum assistance;Assist x2  Functional Transfers  Bathroom Mobility: Minimum assistance  Toilet Transfer : Minimum assistance  Bed to Chair: Minimum assistance    Balance:  Sitting: Intact; Without support  Standing: Impaired; With support  Standing - Static: Constant support; Fair  Standing - Dynamic : Constant support; Fair    ADL Intervention:  Grooming  Grooming Assistance: Set-up; Stand-by assistance  Position Performed: Seated in chair  Washing Hands: Stand-by assistance    Lower Body Dressing Assistance  Socks: Total assistance (dependent)  Position Performed: Seated edge of bed    Toileting  Toileting Assistance: Stand-by assistance  Bowel Hygiene: Stand-by assistance; Total assistance (dependent)  Clothing Management: Total assistance (dependent)    Therapeutic Exercises: khanh UE's  Exercise Sets Reps AROM AAROM PROM Self PROM Comments   Shoulder flex/ext 1 10 [x] [] [] []    Elbow flex/ext 1 10 [x] [] [] []    Wrist flex/ext 1 10 [x] [] [] []       [] [] [] []      Pain:  Slight pain with Khanh LE's    Activity Tolerance:   Fair  Please refer to the flowsheet for vital signs taken during this treatment. After treatment patient left in no apparent distress:   Sitting in chair, Call bell within reach, and Bed / chair alarm activated    COMMUNICATION/COLLABORATION:   The patients plan of care was discussed with: Physical therapy assistant. Co-tx with PTA for increased assistance with transfers. pt will require assistance with  increased distance during functional ambulation.        [facility] [place]  Time Calculation: 40 mins    Problem: Self Care Deficits Care Plan (Adult)  Goal: *Acute Goals and Plan of Care (Insert Text)  Description: Pt will be mod I sup <> sit in prep for EOB ADLs  Pt will be mod I grooming sitting EOB  Pt will be min A LE dressing sitting EOB/long sit  Pt will be mod I sit <>  prep for toileting  Pt will be mod I toileting/toilet transfer/cloth mgmt   Pt will be IND following UE HEP in prep for self care tasks   Outcome: Progressing Towards Goal

## 2021-07-27 VITALS
DIASTOLIC BLOOD PRESSURE: 67 MMHG | HEIGHT: 68 IN | WEIGHT: 270 LBS | TEMPERATURE: 97.8 F | SYSTOLIC BLOOD PRESSURE: 123 MMHG | RESPIRATION RATE: 18 BRPM | OXYGEN SATURATION: 99 % | HEART RATE: 81 BPM | BODY MASS INDEX: 40.92 KG/M2

## 2021-07-27 LAB
BACTERIA SPEC CULT: NORMAL
SPECIAL REQUESTS,SREQ: NORMAL

## 2021-07-27 PROCEDURE — 74011250636 HC RX REV CODE- 250/636: Performed by: INTERNAL MEDICINE

## 2021-07-27 PROCEDURE — 74011000258 HC RX REV CODE- 258: Performed by: INTERNAL MEDICINE

## 2021-07-27 PROCEDURE — 74011250637 HC RX REV CODE- 250/637: Performed by: INTERNAL MEDICINE

## 2021-07-27 RX ORDER — CARVEDILOL 6.25 MG/1
6.25 TABLET ORAL 2 TIMES DAILY WITH MEALS
Qty: 60 TABLET | Refills: 0 | Status: SHIPPED | OUTPATIENT
Start: 2021-07-27 | End: 2021-08-26

## 2021-07-27 RX ORDER — AMOXICILLIN AND CLAVULANATE POTASSIUM 875; 125 MG/1; MG/1
1 TABLET, FILM COATED ORAL 2 TIMES DAILY
Qty: 14 TABLET | Refills: 0 | Status: SHIPPED | OUTPATIENT
Start: 2021-07-27 | End: 2021-08-03

## 2021-07-27 RX ORDER — AMMONIUM LACTATE 12 G/100G
CREAM TOPICAL DAILY
Qty: 280 G | Refills: 0 | Status: SHIPPED | OUTPATIENT
Start: 2021-07-28 | End: 2022-08-08

## 2021-07-27 RX ADMIN — Medication: at 09:00

## 2021-07-27 RX ADMIN — WHITE PETROLATUM,ZINC OXIDE: 57; 17 PASTE TOPICAL at 16:46

## 2021-07-27 RX ADMIN — CARVEDILOL 6.25 MG: 3.12 TABLET, FILM COATED ORAL at 16:50

## 2021-07-27 RX ADMIN — ASPIRIN 81 MG: 81 TABLET, CHEWABLE ORAL at 09:26

## 2021-07-27 RX ADMIN — VITAM B12 100 MCG: 100 TAB at 09:26

## 2021-07-27 RX ADMIN — WHITE PETROLATUM,ZINC OXIDE: 57; 17 PASTE TOPICAL at 09:31

## 2021-07-27 RX ADMIN — PIPERACILLIN AND TAZOBACTAM 2.25 G: 2; .25 INJECTION, POWDER, LYOPHILIZED, FOR SOLUTION INTRAVENOUS at 02:19

## 2021-07-27 RX ADMIN — PIPERACILLIN AND TAZOBACTAM 2.25 G: 2; .25 INJECTION, POWDER, LYOPHILIZED, FOR SOLUTION INTRAVENOUS at 09:26

## 2021-07-27 RX ADMIN — HEPARIN SODIUM 5000 UNITS: 5000 INJECTION INTRAVENOUS; SUBCUTANEOUS at 05:06

## 2021-07-27 RX ADMIN — CARVEDILOL 6.25 MG: 3.12 TABLET, FILM COATED ORAL at 09:26

## 2021-07-27 NOTE — PROGRESS NOTES
Patient has been accepted with [facility]. SOC date 7/29/2021. CM has sent all necessary paperwork to [address] to initiate services. Discharge plan of care/case management plan validated with provider discharge order.

## 2021-07-27 NOTE — PROGRESS NOTES
Comprehensive Nutrition Assessment    Type and Reason for Visit: RD nutrition re-screen/LOS    Nutrition Recommendations/Plan:   Continue current diet  Nursing to monitor BM, I/Os, %PO    Nutrition Assessment:  Admitted 2/2 hyperkalemia, leg edema. Current appetite reported as fair. Per pt 50%PO Cardiac Restriction: Low Fat/Low Chol/High Fiber/EDMUND. Nutrition intervention- RD added snacks per pt request, food preferences. Labs: HH 9.1/29. 1. Phos 2.2. K 3.4. BUN 42. . Creat 1.86. Alb 1.7. Cl 114. Meds reviewed. Malnutrition Assessment:  Malnutrition Status:  No malnutrition      Nutrition Related Findings:  No NFPE performed, appears nourished, morbid obesity. Denies N/V/C/D. BM 7/26, normal per pt. Denies C/S issues. Edema: generalized non-pitting. Wounds:    Multiple (Pretibial right skin barnacles, pretibial left skin barnacles, rectum wound, penis wound, groin wound)       Current Nutrition Therapies:  ADULT DIET Regular; Low Fat/Low Chol/High Fiber/EDMUND; No pork, Likes: chicken, turkey. ADULT ORAL NUTRITION SUPPLEMENT PM Snack; Snack; Fruit cup (peaches) and low sodium potato chips  ADULT ORAL NUTRITION SUPPLEMENT Dinner; Snack;  Apple pie or sweets    Anthropometric Measures:  · Height:  5' 8\" (172.7 cm)  · Current Body Wt:  122.5 kg (270 lb)   · Admission Body Wt:  270 lb    · Usual Body Wt:   (250-260 lbs)     · Ideal Body Wt:  154 lbs:  175.3 %   · BMI Category:  Obese class 3 (BMI 40.0 or greater)     Wt Readings from Last 3 Encounters:   07/21/21 122.5 kg (270 lb)   10/15/20 117.9 kg (260 lb)   ·     Nutrition Interventions:   Food and/or Nutrient Delivery: Continue current diet  Nutrition Education and Counseling: No recommendations at this time  Coordination of Nutrition Care: No recommendation at this time    Nutrition Monitoring and Evaluation:   Behavioral-Environmental Outcomes: None identified  Food/Nutrient Intake Outcomes: Food and nutrient intake  Physical Signs/Symptoms Outcomes: Weight    Discharge Planning:    Continue current diet     Electronically signed by Emilio Frey RD on 7/27/2021 at 12:37 PM    Contact: Ext [phone]

## 2021-07-27 NOTE — PROGRESS NOTES
Transfer skin assessment done by myself and Collins Hamlin. Moisture associated skin damage present to buttocks, gluteal cleft, penis and scrotum. Barnacles present on bilaterally legs and ulceration present to left lower posterior extremity. Cleansed and new dressing applied. The rest of the skin is clean, dry, and intact.

## 2021-07-27 NOTE — DISCHARGE SUMMARY
Discharge Summary     Eamon Akers. Admit Date:   7/20/2021  5:45 PM  Discharge Date:   7/27/2021  Discharge Condition:    Improved, stable  Discharge Diagnosis  Problem List Items Addressed This Visit     None      Visit Diagnoses     LINDA (acute kidney injury) ([facility] [address]    -  Primary    Relevant Medications    furosemide (Lasix) 40 mg tablet    Acute hyperkalemia        Relevant Medications    furosemide (Lasix) 40 mg tablet    Hyponatremia        Relevant Medications    furosemide (Lasix) 40 mg tablet    Cellulitis, unspecified cellulitis site          Bilateral lower extremity edema with secondary skin changes and cellulitis. Onychomycosis   Hypotension   Bilateral lower extremity cellulitis   Chronic leg edema with secondary skin changes   Onychomycosis   Hypertension   Obesity   Chronic anemia   Difficulty walking   Acute kidney injury on CKD stage III due to diuretic use   Hyperkalemia better       Hospital Stay  Narrative of Hospital Course: See H&P for full details. Briefly this is a 76-year-old black male with history of hypertension chronic lymphedema, leg swelling who presents with leg pain difficulty ambulating found to have infection admitted for further care. Patient was admitted to the hospital.  His hyperkalemia was treated. Initially had diuretics to help the leg swelling. This continued to improve. Was seen by vascular surgeon as well as general surgeon. Recommendations were followed. He had his leg wrapped. He was started on antibiotic for the foul-smelling discharge. His white count improved his leg pain improved his blood pressure actually decreased so his Coreg had to be adjusted. Because of worsening kidney function due to diuretics he had gentle hydration and the diuretics were discontinued. Today is hemodynamically stable will be discharged to home he is ambulating better. He still need physical therapy at home and skilled nursing services.              Consultants: General surgeon for wound care    Surgeries/procedures Performed:  Unna boot, antibiotics IV         Discharge Plan:    [facility]/Incidental Findings Requiring Follow Up:     Patient Instructions:  Leg elevation. Unna boots. Antibiotics. Diet: DIET ADULT  DIET ADULT ORAL NUTRITION SUPPLEMENT  DIET ADULT ORAL NUTRITION SUPPLEMENT     Activity: As tolerated. For number of days (if applicable): Other Instructions: Follow-up with PCP and wound care doctor. Provider Follow-Up:   As scheduled. Follow-up Appointments   Procedures    FOLLOW UP VISIT Appointment in: One Week     Standing Status:   Standing     Number of Occurrences:   1     Order Specific Question:   Appointment in     Answer: One Week        Significant Diagnostic Studies:     US RETROPERITONEUM COMP   Final Result   1. There is mild thinning of the right renal cortex and a mild increase in echo   characteristics of the right kidney. 2.  There is a hypoechoic lesion within the medial cortex of the right kidney   likely representing a cyst but somewhat difficult to characterized based upon   this examination. See the above recommendation   3. This examination is negative for renal obstruction. 4.  There is increased echogenicity along the floor the bladder which may   represent layering debris within the bladder floor from an incomplete emptying   of the bladder. XR TIB/FIB LT   Final Result   No fracture or other specific acute abnormality. 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 Soft tissue changes suggestive of edema. Correlate with physical exam               XR TIB/FIB RT   Final Result   No fracture or other specific acute abnormality. 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 Soft tissue changes suggestive of edema. Correlate with physical exam                   No results found for this or any previous visit (from the past 24 hour(s)).     Discharge Medications:  Current Discharge Medication List      START taking these medications    Details   ammonium lactate (AMLACTIN) 12 % topical cream Apply  to affected area daily. rub in to affected area well  Indications: dry skin  Qty: 280 g, Refills: 0  Start date: 7/28/2021      zinc oxide-white petrolatum 17-57 % topical paste Apply to both legs daily  Indications: skin irritation  Qty: 60 g, Refills: 0  Start date: 7/27/2021      amoxicillin-clavulanate (Augmentin) 875-125 mg per tablet Take 1 Tablet by mouth two (2) times a day for 7 days. Qty: 14 Tablet, Refills: 0  Start date: 7/27/2021, End date: 8/3/2021         CONTINUE these medications which have CHANGED    Details   carvediloL (COREG) 6.25 mg tablet Take 1 Tablet by mouth two (2) times daily (with meals) for 30 days. Qty: 60 Tablet, Refills: 0  Start date: 7/27/2021, End date: 8/26/2021         CONTINUE these medications which have NOT CHANGED    Details   aspirin (ASPIRIN) 325 mg tablet Take 325 mg by mouth daily. cyanocobalamin (Vitamin B-12) 100 mcg tablet Take 100 mcg by mouth daily.          STOP taking these medications       furosemide (Lasix) 40 mg tablet Comments:   Reason for Stopping:                Time Spent on Discharge:

## 2021-07-27 NOTE — PROGRESS NOTES
Patient given dc instructions verbalized understanding. Alert and oriented x4. On room air. No distress noted. Dc home with home health Discharge plan of care/case management plan validated with provider discharge order. dc home via wheelchair with wife

## 2021-08-16 ENCOUNTER — OFFICE VISIT (OUTPATIENT)
Dept: SURGERY | Age: 76
End: 2021-08-16
Payer: MEDICARE

## 2021-08-16 VITALS
RESPIRATION RATE: 18 BRPM | HEART RATE: 88 BPM | WEIGHT: 263.8 LBS | DIASTOLIC BLOOD PRESSURE: 62 MMHG | BODY MASS INDEX: 40.11 KG/M2 | OXYGEN SATURATION: 95 % | TEMPERATURE: 98.6 F | SYSTOLIC BLOOD PRESSURE: 119 MMHG

## 2021-08-16 DIAGNOSIS — I89.0 LYMPHEDEMA: Primary | ICD-10-CM

## 2021-08-16 PROCEDURE — G8536 NO DOC ELDER MAL SCRN: HCPCS | Performed by: SURGERY

## 2021-08-16 PROCEDURE — G8417 CALC BMI ABV UP PARAM F/U: HCPCS | Performed by: SURGERY

## 2021-08-16 PROCEDURE — 1101F PT FALLS ASSESS-DOCD LE1/YR: CPT | Performed by: SURGERY

## 2021-08-16 PROCEDURE — 99213 OFFICE O/P EST LOW 20 MIN: CPT | Performed by: SURGERY

## 2021-08-16 PROCEDURE — G8510 SCR DEP NEG, NO PLAN REQD: HCPCS | Performed by: SURGERY

## 2021-08-16 PROCEDURE — G8427 DOCREV CUR MEDS BY ELIG CLIN: HCPCS | Performed by: SURGERY

## 2021-08-16 PROCEDURE — 1111F DSCHRG MED/CURRENT MED MERGE: CPT | Performed by: SURGERY

## 2021-08-16 RX ORDER — FUROSEMIDE 40 MG/1
40 TABLET ORAL 2 TIMES DAILY
COMMUNITY

## 2021-08-16 RX ORDER — LANOLIN ALCOHOL/MO/W.PET/CERES
400 CREAM (GRAM) TOPICAL DAILY
COMMUNITY

## 2021-08-16 NOTE — PROGRESS NOTES
Identified pt with two pt identifiers(name and ). Reviewed record in preparation for visit and have obtained necessary documentation. Chief Complaint   Patient presents with    Wound Check     BLE      Vitals:    21 1323   BP: 119/62   Pulse: 88   Resp: 18   Temp: 98.6 °F (37 °C)   TempSrc: Temporal   SpO2: 95%   Weight: 263 lb 12.8 oz (119.7 kg)   PainSc:   0 - No pain       Health Maintenance Review: Patient reminded of \"due or due soon\" health maintenance. I have asked the patient to contact his/her primary care provider (PCP) for follow-up on his/her health maintenance. Coordination of Care Questionnaire:  :   1) Have you been to an emergency room, urgent care, or hospitalized since your last visit? If yes, where when, and reason for visit? no       2. Have seen or consulted any other health care provider since your last visit? If yes, where when, and reason for visit? NO    ADL Assessment 2021   Feeding yourself No Help Needed   Getting from bed to chair No Help Needed   Getting dressed No Help Needed   Bathing or showering No Help Needed   Walk across the room (includes cane/walker) No Help Needed   Using the telphone No Help Needed   Taking your medications No Help Needed   Preparing meals No Help Needed   Managing money (expenses/bills) No Help Needed   Moderately strenuous housework (laundry) No Help Needed   Shopping for personal items (toiletries/medicines) No Help Needed   Shopping for groceries No Help Needed   Driving No Help Needed   Climbing a flight of stairs No Help Needed   Getting to places beyond walking distances No Help Needed     Abuse Screening Questionnaire 2021   Do you ever feel afraid of your partner? N   Are you in a relationship with someone who physically or mentally threatens you? N   Is it safe for you to go home? Y     Who is the primary learner? Patient    What is the preferred language for health care of the primary learner?  ENGLISH    How does the primary learner prefer to learn new concepts?  VIDEOS    Answered By patient    Relationship to Learner SELF

## 2021-08-19 PROBLEM — I89.0 LYMPHEDEMA: Status: ACTIVE | Noted: 2021-08-19

## 2021-08-19 NOTE — PROGRESS NOTES
VASCULAR FOLLOW UP      Subjective:   CHIEF COMPLAINTS:  Lymphedema  PRESENTATION OF ILLNESS:    Mr. Tim Garcia was recently hospitalized for bilateral leg edema and cellulitis. This was resolved. Patient is here today for follow-up. Patient denies any chest pain shortness of breath patient has not been wearing a compression wraps or stockings yet. Past Medical History:   Diagnosis Date    Dyslipidemia     HTN (hypertension)       Past Surgical History:   Procedure Laterality Date    HX CHOLECYSTECTOMY       Family History   Problem Relation Age of Onset    Diabetes Mother     Diabetes Brother       Social History     Tobacco Use    Smoking status: Never Smoker    Smokeless tobacco: Never Used   Substance Use Topics    Alcohol use: Never       Prior to Admission medications    Medication Sig Start Date End Date Taking? Authorizing Provider   furosemide (LASIX) 40 mg tablet Take 40 mg by mouth two (2) times a day. Yes Provider, Historical   vitamin E, dl,tocopheryl acet, (vitamin E acetate) 400 unit capsule Take 400 Units by mouth daily. Yes Provider, Historical   carvediloL (COREG) 6.25 mg tablet Take 1 Tablet by mouth two (2) times daily (with meals) for 30 days. 7/27/21 8/26/21 Yes Lei Lujan MD   ammonium lactate (AMLACTIN) 12 % topical cream Apply  to affected area daily. rub in to affected area well  Indications: dry skin 7/28/21  Yes Lei Lujan MD   zinc oxide-white petrolatum 17-57 % topical paste Apply to both legs daily  Indications: skin irritation 7/27/21  Yes Lei Lujan MD   aspirin (ASPIRIN) 325 mg tablet Take 325 mg by mouth daily. Yes Provider, Historical   cyanocobalamin (Vitamin B-12) 100 mcg tablet Take 100 mcg by mouth daily.    Yes Provider, Historical     No Known Allergies     Review of Systems:  I reviewed the rest of organ systems personally and they were negative signed by Dr. Raquel Piña    Objective:     Visit Vitals  /62 (BP 1 Location: Left upper arm, BP Patient Position: Sitting, BP Cuff Size: Adult)   Pulse 88   Temp 98.6 °F (37 °C) (Temporal)   Resp 18   Wt 263 lb 12.8 oz (119.7 kg)   SpO2 95%   BMI 40.11 kg/m²     VITAL SIGNS REVIEWED. Physical Exam:  Patient is well-nourished pleasant in conversation is appropriate. Head and neck examination atraumatic, normocephalic. Gaze appropriate. Conversation appropriate. Neck examination shows supple. No mass. No obvious carotid bruit. Chest examination shows lungs are clear bilaterally well-expanded, no crackles or wheezes. Cardiovascular system regular rate, no obvious murmur. Skin warm to touch  and moist, no skin lesions. Abdomen is soft ,not tender or distended bowel sounds present. No palpable mass. Neurological examinations, no focal neuro deficits moving all 4 extremities. Cranial nerves intact. Sensation is intact as well. Hematologic: No obvious bruise or swelling or obvious lymphadenopathy. Psychosocial: Appropriate. Has good effect. Musculoskeletal system: No muscle wasting, appropriate movements upper and lower extremity. Vascular examination: I examined both legs patient has significant drainage in the open wound and also the posterior calf of the left leg. I wrapped the leg with Aquacel Ag and 2 layer compression wraps on both legs. Data Review:   No results displayed because visit has over 200 results. Assessment:     Problem List Items Addressed This Visit        Other    Lymphedema - Primary    Relevant Medications    furosemide (LASIX) 40 mg tablet              Plan:     Dressing was applied on the especially left calf with drainage area with Aquacel Ag 2 layers of compression wrap. Patient is to be closely monitor wound healing progress. Patient will be reassessed in 2 weeks. Patient was instructed about wound care in details. And once wounds are closed the patient will refer to lymphedema clinic.         Cem Tate MD

## 2021-09-23 ENCOUNTER — TELEPHONE (OUTPATIENT)
Dept: SURGERY | Age: 76
End: 2021-09-23

## 2021-09-23 NOTE — TELEPHONE ENCOUNTER
Kathy Meneses from [address] [place], [place] [address] [facility] called stated patients wound is not getting any better and has maggots in lower left extremities.  Please call Kathy Meneses to advise [phone]

## 2021-09-24 NOTE — TELEPHONE ENCOUNTER
Oriana Rodriguez again. She states that yesterday's visit was the first time she had seen the patient in 2-3 weeks. I advised her that Dr. Anurag Kolb was not in the office currently and my best advice was for the patient to go to the ER. She states that other nurses have seen this patient and documented the maggots. She states she didn't see where anyone had contacted our office, appears they've contacted Dr. Sherryle Basta office. Dr. Sangeetha Fabian has ordered the wound be cleaned with Dakins solution - states the pharm hasn't gotten that order apparently and she has a call out to them as well. She states she knows that Dr. Anurag Kolb has been following patient's wound and wants to make sure the Dakins is ok with him. Also said that Dr. Sherryle Basta office is signing the home health orders. I advised her I would reach out to Dr. Anurag Kolb about the maggots and the Dakins solution. She verbalized understanding and requested a call back after I speak with him.

## 2021-11-18 ENCOUNTER — OFFICE VISIT (OUTPATIENT)
Dept: SURGERY | Age: 76
End: 2021-11-18
Payer: MEDICARE

## 2021-11-18 VITALS
HEART RATE: 76 BPM | DIASTOLIC BLOOD PRESSURE: 79 MMHG | TEMPERATURE: 98.4 F | OXYGEN SATURATION: 95 % | SYSTOLIC BLOOD PRESSURE: 138 MMHG | WEIGHT: 272.4 LBS | HEIGHT: 68 IN | BODY MASS INDEX: 41.28 KG/M2

## 2021-11-18 DIAGNOSIS — E87.5 HYPERKALEMIA: ICD-10-CM

## 2021-11-18 DIAGNOSIS — I89.0 LYMPHEDEMA: Primary | ICD-10-CM

## 2021-11-18 PROCEDURE — G8432 DEP SCR NOT DOC, RNG: HCPCS | Performed by: SURGERY

## 2021-11-18 PROCEDURE — 99213 OFFICE O/P EST LOW 20 MIN: CPT | Performed by: SURGERY

## 2021-11-18 PROCEDURE — G8536 NO DOC ELDER MAL SCRN: HCPCS | Performed by: SURGERY

## 2021-11-18 PROCEDURE — 1101F PT FALLS ASSESS-DOCD LE1/YR: CPT | Performed by: SURGERY

## 2021-11-18 PROCEDURE — 29581 APPL MULTLAYER CMPRN SYS LEG: CPT | Performed by: SURGERY

## 2021-11-18 PROCEDURE — G8417 CALC BMI ABV UP PARAM F/U: HCPCS | Performed by: SURGERY

## 2021-11-18 PROCEDURE — G8427 DOCREV CUR MEDS BY ELIG CLIN: HCPCS | Performed by: SURGERY

## 2021-11-18 NOTE — PROGRESS NOTES
Chief Complaint   Patient presents with    Follow-up     lymphedema     Visit Vitals  /79 (BP 1 Location: Left arm, BP Patient Position: Sitting, BP Cuff Size: Adult)   Pulse 76   Temp 98.4 °F (36.9 °C) (Temporal)   Ht 5' 8\" (1.727 m)   Wt 272 lb 6.4 oz (123.6 kg)   SpO2 95%   BMI 41.42 kg/m²

## 2021-11-30 NOTE — PROGRESS NOTES
VASCULAR FOLLOW UP      Subjective:   CHIEF COMPLAINTS:  Lymphedema follow-up. PRESENTATION OF ILLNESS:  Gloria Villa is a 67 y.o. very pleasant man does have a complication from wound issues in both legs with lymphedema. Last time we discussed about wound cares and compression wrap to be applied. Patient denies fever chills. Last time wound seems to be improved. However wound seems to be worse today's visit. Past Medical History:   Diagnosis Date    Dyslipidemia     HTN (hypertension)       Past Surgical History:   Procedure Laterality Date    HX CHOLECYSTECTOMY       Family History   Problem Relation Age of Onset    Diabetes Mother     Diabetes Brother       Social History     Tobacco Use    Smoking status: Never Smoker    Smokeless tobacco: Never Used   Substance Use Topics    Alcohol use: Never       Prior to Admission medications    Medication Sig Start Date End Date Taking? Authorizing Provider   furosemide (LASIX) 40 mg tablet Take 40 mg by mouth two (2) times a day. Yes Provider, Historical   vitamin E, dl,tocopheryl acet, (vitamin E acetate) 400 unit capsule Take 400 Units by mouth daily. Yes Provider, Historical   ammonium lactate (AMLACTIN) 12 % topical cream Apply  to affected area daily. rub in to affected area well  Indications: dry skin 7/28/21  Yes Theresa Washington MD   zinc oxide-white petrolatum 17-57 % topical paste Apply to both legs daily  Indications: skin irritation 7/27/21  Yes Theresa Washington MD   aspirin (ASPIRIN) 325 mg tablet Take 325 mg by mouth daily. Yes Provider, Historical   cyanocobalamin (Vitamin B-12) 100 mcg tablet Take 100 mcg by mouth daily.    Yes Provider, Historical     No Known Allergies     Review of Systems:  I reviewed the rest of organ systems personally and they were negative signed by Dr. Jose Rafael Taveras    Objective:     Visit Vitals  /79 (BP 1 Location: Left arm, BP Patient Position: Sitting, BP Cuff Size: Adult)   Pulse 76 Temp 98.4 °F (36.9 °C) (Temporal)   Ht 5' 8\" (1.727 m)   Wt 272 lb 6.4 oz (123.6 kg)   SpO2 95%   BMI 41.42 kg/m²     VITAL SIGNS REVIEWED. Physical Exam:  Patient is well-nourished pleasant in conversation is appropriate. Head and neck examination atraumatic, normocephalic. Gaze appropriate. Conversation appropriate. Neck examination shows supple. No mass. No obvious carotid bruit. Chest examination shows lungs are clear bilaterally well-expanded, no crackles or wheezes. Cardiovascular system regular rate, no obvious murmur. Skin warm to touch  and moist, no skin lesions. Abdomen is soft ,not tender or distended bowel sounds present. No palpable mass. Neurological examinations, no focal neuro deficits moving all 4 extremities. Cranial nerves intact. Sensation is intact as well. Hematologic: No obvious bruise or swelling or obvious lymphadenopathy. Psychosocial: Appropriate. Has good effect. Musculoskeletal system: No muscle wasting, appropriate movements upper and lower extremity. Vascular examination: I examined both legs lymphedema is somewhat under control however wounds are open. There is drainage noted. I covered all the open area with Aquacel Ag Kerlix and Ace wraps and modified compression wraps applied both legs. Data Review:   No visits with results within 1 Month(s) from this visit. Latest known visit with results is:   No results displayed because visit has over 200 results. Assessment:     Problem List Items Addressed This Visit        Other    Hyperkalemia    Lymphedema - Primary              Plan:     Patient was instructed about wound care is to be done every other day. And patient will need to be reassessed 3 to 4 weeks follow-up.         Yolanda Cervantes MD

## 2022-01-06 ENCOUNTER — APPOINTMENT (OUTPATIENT)
Dept: PHYSICAL THERAPY | Age: 77
End: 2022-01-06

## 2022-02-21 ENCOUNTER — OFFICE VISIT (OUTPATIENT)
Dept: SURGERY | Age: 77
End: 2022-02-21
Payer: MEDICARE

## 2022-02-21 VITALS
HEIGHT: 68 IN | BODY MASS INDEX: 42.1 KG/M2 | TEMPERATURE: 98 F | OXYGEN SATURATION: 97 % | SYSTOLIC BLOOD PRESSURE: 158 MMHG | DIASTOLIC BLOOD PRESSURE: 82 MMHG | WEIGHT: 277.8 LBS | HEART RATE: 83 BPM

## 2022-02-21 DIAGNOSIS — I89.0 LYMPHEDEMA: Primary | ICD-10-CM

## 2022-02-21 DIAGNOSIS — L02.416 CELLULITIS AND ABSCESS OF LEFT LOWER EXTREMITY: ICD-10-CM

## 2022-02-21 DIAGNOSIS — L03.116 CELLULITIS AND ABSCESS OF LEFT LOWER EXTREMITY: ICD-10-CM

## 2022-02-21 PROCEDURE — G8417 CALC BMI ABV UP PARAM F/U: HCPCS | Performed by: SURGERY

## 2022-02-21 PROCEDURE — 29581 APPL MULTLAYER CMPRN SYS LEG: CPT | Performed by: SURGERY

## 2022-02-21 PROCEDURE — G8536 NO DOC ELDER MAL SCRN: HCPCS | Performed by: SURGERY

## 2022-02-21 PROCEDURE — 99213 OFFICE O/P EST LOW 20 MIN: CPT | Performed by: SURGERY

## 2022-02-21 PROCEDURE — G8432 DEP SCR NOT DOC, RNG: HCPCS | Performed by: SURGERY

## 2022-02-21 PROCEDURE — 1101F PT FALLS ASSESS-DOCD LE1/YR: CPT | Performed by: SURGERY

## 2022-02-21 PROCEDURE — G8427 DOCREV CUR MEDS BY ELIG CLIN: HCPCS | Performed by: SURGERY

## 2022-02-21 RX ORDER — CIPROFLOXACIN 500 MG/1
500 TABLET ORAL 2 TIMES DAILY
Qty: 20 TABLET | Refills: 0 | Status: SHIPPED | OUTPATIENT
Start: 2022-02-21 | End: 2022-03-03

## 2022-02-21 RX ORDER — CLINDAMYCIN HYDROCHLORIDE 300 MG/1
300 CAPSULE ORAL 4 TIMES DAILY
Qty: 40 CAPSULE | Refills: 0 | Status: SHIPPED | OUTPATIENT
Start: 2022-02-21 | End: 2022-03-03

## 2022-02-21 NOTE — PROGRESS NOTES
Chief Complaint   Patient presents with    Follow-up     2 month - check legs     Visit Vitals  BP (!) 158/82 (BP 1 Location: Left arm, BP Patient Position: Sitting, BP Cuff Size: Adult)   Pulse 83   Temp 98 °F (36.7 °C) (Temporal)   Ht 5' 8\" (1.727 m)   Wt 277 lb 12.8 oz (126 kg)   SpO2 97%   BMI 42.24 kg/m²     1. Have you been to the ER, urgent care clinic since your last visit? Hospitalized since your last visit? No  2. Have you seen or consulted any other health care providers outside of the [address] [place] [address] since your last visit? Include any pap smears or colon screening. Yes - PCP    Patient states he took his BP this morning at home and it was 122/73. Patient states he has not taken his \"fluid pill\" yet since he had his appointment.

## 2022-02-25 PROBLEM — L02.416 CELLULITIS AND ABSCESS OF LEFT LOWER EXTREMITY: Status: ACTIVE | Noted: 2022-02-25

## 2022-02-25 PROBLEM — L03.116 CELLULITIS AND ABSCESS OF LEFT LOWER EXTREMITY: Status: ACTIVE | Noted: 2022-02-25

## 2022-02-25 NOTE — PROGRESS NOTES
VASCULAR FOLLOW UP      Subjective:   CHIEF COMPLAINTS:  Lymphedema with infection  PRESENTATION OF ILLNESS:  Mollie Boast. is a 67 y.o. very pleasant man is here today for follow-up wound check of both legs. Patient suffers from longstanding lymphedema. Last time when we examined the wound patient has wound developed in both calf area. At that time recommend continue Aquacel Ag and multilayer compression wraps. Patient is here today for follow-up. Past Medical History:   Diagnosis Date    Dyslipidemia     HTN (hypertension)       Past Surgical History:   Procedure Laterality Date    HX CHOLECYSTECTOMY       Family History   Problem Relation Age of Onset    Diabetes Mother     Diabetes Brother       Social History     Tobacco Use    Smoking status: Never Smoker    Smokeless tobacco: Never Used   Substance Use Topics    Alcohol use: Never       Prior to Admission medications    Medication Sig Start Date End Date Taking? Authorizing Provider   ciprofloxacin HCl (CIPRO) 500 mg tablet Take 1 Tablet by mouth two (2) times a day for 10 days. 2/21/22 3/3/22 Yes Concepcion Hewitt MD   clindamycin (CLEOCIN) 300 mg capsule Take 1 Capsule by mouth four (4) times daily for 10 days. 2/21/22 3/3/22 Yes Concepcion Hewitt MD   furosemide (LASIX) 40 mg tablet Take 40 mg by mouth two (2) times a day. Yes Provider, Historical   vitamin E, dl,tocopheryl acet, (vitamin E acetate) 400 unit capsule Take 400 Units by mouth daily. Yes Provider, Historical   ammonium lactate (AMLACTIN) 12 % topical cream Apply  to affected area daily. rub in to affected area well  Indications: dry skin 7/28/21  Yes Gurpreet Schmid MD   zinc oxide-white petrolatum 17-57 % topical paste Apply to both legs daily  Indications: skin irritation 7/27/21  Yes Gurpreet Schmid MD   aspirin (ASPIRIN) 325 mg tablet Take 325 mg by mouth daily.    Yes Provider, Historical   cyanocobalamin (Vitamin B-12) 100 mcg tablet Take 100 mcg by mouth daily. Yes Provider, Historical     No Known Allergies     Review of Systems:  I reviewed the rest of organ systems personally and they were negative signed by Dr. John Smoker    Objective:     Visit Vitals  BP (!) 158/82 (BP 1 Location: Left arm, BP Patient Position: Sitting, BP Cuff Size: Adult)   Pulse 83   Temp 98 °F (36.7 °C) (Temporal)   Ht 5' 8\" (1.727 m)   Wt 277 lb 12.8 oz (126 kg)   SpO2 97%   BMI 42.24 kg/m²     VITAL SIGNS REVIEWED. Physical Exam:  Patient is well-nourished pleasant in conversation is appropriate. Head and neck examination atraumatic, normocephalic. Gaze appropriate. Conversation appropriate. Neck examination shows supple. No mass. No obvious carotid bruit. Chest examination shows lungs are clear bilaterally well-expanded, no crackles or wheezes. Cardiovascular system regular rate, no obvious murmur. Skin warm to touch  and moist, no skin lesions. Abdomen is soft ,not tender or distended bowel sounds present. No palpable mass. Neurological examinations, no focal neuro deficits moving all 4 extremities. Cranial nerves intact. Sensation is intact as well. Hematologic: No obvious bruise or swelling or obvious lymphadenopathy. Psychosocial: Appropriate. Has good effect. Musculoskeletal system: No muscle wasting, appropriate movements upper and lower extremity. Vascular examination: I examined both legs. There is more drainage in both calf area with serous fluid. Swelling is about the same. I have wrapped both legs with both of her compression wraps including Kerlix and Ace wraps. Aquacel Ag is applied on the open area both calf. Data Review:   No visits with results within 1 Month(s) from this visit. Latest known visit with results is:   No results displayed because visit has over 200 results.            Assessment:     Problem List Items Addressed This Visit        Other    Lymphedema - Primary    Cellulitis and abscess of left lower extremity Plan:     Patient was provided additional prescription for Aquacel Ag dressing supplies. Patient was instructed to the wound care daily possible. If more drainage dressings to be changed daily if not every 48 hours. Patient was instructed use Aquacel Ag and Ace wraps both legs. Patient will be reassessed 2 weeks follow-up. I will also prescribe the patient antibiotics including Cipro and clindamycin for suspected secondary infection both calf area.         Pili Trinh MD

## 2022-03-18 PROBLEM — E87.5 HYPERKALEMIA: Status: ACTIVE | Noted: 2021-07-21

## 2022-03-19 PROBLEM — I89.0 LYMPHEDEMA: Status: ACTIVE | Noted: 2021-08-19

## 2022-03-20 PROBLEM — L02.416 CELLULITIS AND ABSCESS OF LEFT LOWER EXTREMITY: Status: ACTIVE | Noted: 2022-02-25

## 2022-03-20 PROBLEM — L03.116 CELLULITIS AND ABSCESS OF LEFT LOWER EXTREMITY: Status: ACTIVE | Noted: 2022-02-25

## 2022-07-21 ENCOUNTER — OFFICE VISIT (OUTPATIENT)
Dept: SURGERY | Age: 77
End: 2022-07-21
Payer: MEDICARE

## 2022-07-21 VITALS
HEART RATE: 88 BPM | SYSTOLIC BLOOD PRESSURE: 134 MMHG | HEIGHT: 68 IN | TEMPERATURE: 98 F | WEIGHT: 288 LBS | BODY MASS INDEX: 43.65 KG/M2 | OXYGEN SATURATION: 97 % | DIASTOLIC BLOOD PRESSURE: 71 MMHG

## 2022-07-21 DIAGNOSIS — L02.416 CELLULITIS AND ABSCESS OF LEFT LOWER EXTREMITY: Primary | ICD-10-CM

## 2022-07-21 DIAGNOSIS — I89.0 LYMPHEDEMA: ICD-10-CM

## 2022-07-21 DIAGNOSIS — L03.116 CELLULITIS AND ABSCESS OF LEFT LOWER EXTREMITY: Primary | ICD-10-CM

## 2022-07-21 PROCEDURE — G8510 SCR DEP NEG, NO PLAN REQD: HCPCS | Performed by: SURGERY

## 2022-07-21 PROCEDURE — 1101F PT FALLS ASSESS-DOCD LE1/YR: CPT | Performed by: SURGERY

## 2022-07-21 PROCEDURE — G8536 NO DOC ELDER MAL SCRN: HCPCS | Performed by: SURGERY

## 2022-07-21 PROCEDURE — 29581 APPL MULTLAYER CMPRN SYS LEG: CPT | Performed by: SURGERY

## 2022-07-21 PROCEDURE — G8427 DOCREV CUR MEDS BY ELIG CLIN: HCPCS | Performed by: SURGERY

## 2022-07-21 PROCEDURE — 1123F ACP DISCUSS/DSCN MKR DOCD: CPT | Performed by: SURGERY

## 2022-07-21 PROCEDURE — 99213 OFFICE O/P EST LOW 20 MIN: CPT | Performed by: SURGERY

## 2022-07-21 PROCEDURE — G8417 CALC BMI ABV UP PARAM F/U: HCPCS | Performed by: SURGERY

## 2022-07-21 NOTE — PROGRESS NOTES
Chief Complaint   Patient presents with    Follow-up     Check legs     Visit Vitals  /71 (BP 1 Location: Left upper arm, BP Patient Position: Sitting, BP Cuff Size: Adult)   Pulse 88   Temp 98 °F (36.7 °C) (Temporal)   Ht 5' 8\" (1.727 m)   Wt 288 lb (130.6 kg)   SpO2 97%   BMI 43.79 kg/m² Excisional Biopsy Additional Text (Leave Blank If You Do Not Want): The margin was drawn around the clinically apparent lesion. An elliptical shape was then drawn on the skin incorporating the lesion and margins.  Incisions were then made along these lines to the appropriate tissue plane and the lesion was extirpated.

## 2022-07-22 NOTE — PROGRESS NOTES
VASCULAR FOLLOW UP      Subjective:   CHIEF COMPLAINTS:  Edema and drainage  PRESENTATION OF ILLNESS:  Jessika Urbano is a 65 y.o. very pleasant man is here today for follow-up to reexamine bilateral lower extremity wound. Patient does have his chronic lymphedema and the drainage problem. Last time we applied Aquacel Ag to the open area of the bilateral calf area with multilayer compression wrap. Patient is currently doing wound care with his wife to help dressing changes. Apparently nurse stopped coming. Past Medical History:   Diagnosis Date    Dyslipidemia     HTN (hypertension)       Past Surgical History:   Procedure Laterality Date    HX CHOLECYSTECTOMY       Family History   Problem Relation Age of Onset    Diabetes Mother     Diabetes Brother       Social History     Tobacco Use    Smoking status: Never    Smokeless tobacco: Never   Substance Use Topics    Alcohol use: Never       Prior to Admission medications    Medication Sig Start Date End Date Taking? Authorizing Provider   furosemide (LASIX) 40 mg tablet Take 40 mg by mouth two (2) times a day. Yes Provider, Historical   vitamin E, dl,tocopheryl acet, (vitamin E acetate) 400 unit capsule Take 400 Units by mouth daily. Yes Provider, Historical   ammonium lactate (AMLACTIN) 12 % topical cream Apply  to affected area daily. rub in to affected area well  Indications: dry skin 7/28/21  Yes Brittany Gomez MD   aspirin (ASPIRIN) 325 mg tablet Take 325 mg by mouth daily. Yes Provider, Historical   cyanocobalamin (VITAMIN B12) 100 mcg tablet Take 100 mcg by mouth daily.    Yes Provider, Historical   zinc oxide-white petrolatum 17-57 % topical paste Apply to both legs daily  Indications: skin irritation  Patient not taking: Reported on 7/21/2022 7/27/21   Brittany Gomez MD     No Known Allergies     Review of Systems:  I reviewed the rest of organ systems personally and they were negative signed by Dr. Quinton Jurado    Objective: Visit Vitals  /71 (BP 1 Location: Left upper arm, BP Patient Position: Sitting, BP Cuff Size: Adult)   Pulse 88   Temp 98 °F (36.7 °C) (Temporal)   Ht 5' 8\" (1.727 m)   Wt 288 lb (130.6 kg)   SpO2 97%   BMI 43.79 kg/m²     VITAL SIGNS REVIEWED. Physical Exam:  Patient is well-nourished pleasant in conversation is appropriate. Head and neck examination atraumatic, normocephalic. Gaze appropriate. Conversation appropriate. Neck examination shows supple. No mass. No obvious carotid bruit. Chest examination shows lungs are clear bilaterally well-expanded, no crackles or wheezes. Cardiovascular system regular rate, no obvious murmur. Skin warm to touch  and moist, no skin lesions. Abdomen is soft ,not tender or distended bowel sounds present. No palpable mass. Neurological examinations, no focal neuro deficits moving all 4 extremities. Cranial nerves intact. Sensation is intact as well. Hematologic: No obvious bruise or swelling or obvious lymphadenopathy. Psychosocial: Appropriate. Has good effect. Musculoskeletal system: No muscle wasting, appropriate movements upper and lower extremity. Vascular examination: I examined both legs. Patient still have a significant drainage from the bilateral calf area. All these areas covered with Aquacel Ag and the multilayer compression wraps applied. Data Review:   No visits with results within 1 Month(s) from this visit. Latest known visit with results is:   No results displayed because visit has over 200 results. Assessment:     Problem List Items Addressed This Visit          Other    Lymphedema    Cellulitis and abscess of left lower extremity - Primary    Relevant Orders    REFERRAL TO WOUND CARE           Plan:     Patient will be referred to wound care center for wound care. Meanwhile patient was prescribed with prescription for Aquacel Ag. In the meantime will make an appointment wound care center for chronic follow-up. Patient was instructed daily wound care with Aquacel Ag to the open area of the bilateral calf area and apply multilayer compression wraps. Patient was also provided additional wound care supplies as well.         Ronaldo Yoon MD

## 2022-08-08 ENCOUNTER — HOSPITAL ENCOUNTER (OUTPATIENT)
Dept: WOUND CARE | Age: 77
Discharge: HOME | End: 2022-08-08
Payer: MEDICARE

## 2022-08-08 VITALS
HEIGHT: 68 IN | DIASTOLIC BLOOD PRESSURE: 71 MMHG | WEIGHT: 270 LBS | RESPIRATION RATE: 18 BRPM | TEMPERATURE: 99 F | SYSTOLIC BLOOD PRESSURE: 105 MMHG | BODY MASS INDEX: 40.92 KG/M2 | HEART RATE: 59 BPM

## 2022-08-08 DIAGNOSIS — L97.822 NON-PRESSURE CHRONIC ULCER OF OTHER PART OF LEFT LOWER LEG WITH FAT LAYER EXPOSED (HCC): ICD-10-CM

## 2022-08-08 DIAGNOSIS — L03.116 CELLULITIS AND ABSCESS OF LEFT LOWER EXTREMITY: Primary | ICD-10-CM

## 2022-08-08 DIAGNOSIS — L02.416 CELLULITIS AND ABSCESS OF LEFT LOWER EXTREMITY: Primary | ICD-10-CM

## 2022-08-08 DIAGNOSIS — L97.812 NON-PRESSURE CHRONIC ULCER OF OTHER PART OF RIGHT LOWER LEG WITH FAT LAYER EXPOSED (HCC): ICD-10-CM

## 2022-08-08 PROCEDURE — 99214 OFFICE O/P EST MOD 30 MIN: CPT

## 2022-08-08 PROCEDURE — 74011000250 HC RX REV CODE- 250: Performed by: SPECIALIST

## 2022-08-08 RX ORDER — LIDOCAINE HYDROCHLORIDE 20 MG/ML
15 SOLUTION OROPHARYNGEAL ONCE
Status: COMPLETED | OUTPATIENT
Start: 2022-08-08 | End: 2022-08-08

## 2022-08-08 RX ORDER — LIDOCAINE HYDROCHLORIDE 20 MG/ML
15 SOLUTION OROPHARYNGEAL ONCE
Status: CANCELLED | OUTPATIENT
Start: 2022-08-08 | End: 2022-08-08

## 2022-08-08 RX ORDER — SILVER SULFADIAZINE 10 G/1000G
CREAM TOPICAL ONCE
Status: CANCELLED | OUTPATIENT
Start: 2022-08-08 | End: 2022-08-08

## 2022-08-08 RX ORDER — MUPIROCIN 20 MG/G
OINTMENT TOPICAL ONCE
Status: CANCELLED | OUTPATIENT
Start: 2022-08-08 | End: 2022-08-08

## 2022-08-08 RX ORDER — BISMUTH SUBSALICYLATE 262 MG
1 TABLET,CHEWABLE ORAL DAILY
COMMUNITY

## 2022-08-08 RX ADMIN — LIDOCAINE HYDROCHLORIDE 15 ML: 20 SOLUTION ORAL; TOPICAL at 11:37

## 2022-08-08 NOTE — DISCHARGE INSTRUCTIONS
Discharge Instructions for  [address] [place] [address], [address] [place] [address]  Telephone: [phone]    NAME:  Patrick Moore. YOB: 1945  MEDICAL RECORD NUMBER:  [id number]  DATE:  8/8/2022      Return Appointment:  [] Dressing supply provider:   [x] Home Healthcare: [address] [place] [address]  initiate  [] Return Appointment:    1   Week(s)  [] Nurse Visit: Week(s)    [] Discharge from [facility]  [] Ordered tests:   [x] Referrals: ordered lymphedema pumps  [] Rx:   [] Other:      Wound Cleansing:   Do not scrub or use excessive force. Cleanse wound prior to applying a clean dressing with:  [] Normal Saline   [] Mild Soap & Water/Baby Shampoo   [] Keep Wound Dry in Shower  [] Other:      Topical Treatments:  Do not apply lotions, creams, or ointments to wound bed unless directed. [] Apply moisturizing lotion to skin surrounding the wound prior to dressing change.  [] Apply antifungal ointment to skin surrounding the wound prior to dressing change.  [] Apply thin film of moisture barrier/zinc ointment to skin immediately around wound. [] Other:       Dressings:           Wound Location R and L legs   [] Apply Primary Dressing:       [] MediHoney Gel    [] MediHoney Alginate               [] Calcium Alginate      [x] Calcium Alginate with Silver: Silvercel   [] Collagen                   [] Collagen with Silver                [] Santyl with Moistened saline gauze              [] Hydrofera Blue (cut to size and moistened)  [] Hydrofera Blue Ready (Cut to size)   [] NS wet to dry    [] Betadine wet to dry              [] Hydrogel                [] Mepitel     [] Bactroban/Mupirocin               [] Other:     [] Cover and Secure with:     [] Gauze [] Ayan [] Kerlix   [] Foam [] Super Absorbant [] ABD     [] ACE Wrap            [x] Other: drawtex   Limit contact of tape with skin.     [] Change dressing: [] Daily    [] Every Other Day [] Once per week   [] Twice per week   [x] Three times per week [] Do Not Change Dressing   [] Other:     Negative Pressure:           Wound Location:   [] Pressure @  mm/Hg  []Continuous []Intermittent   [] Black Foam [] White Foam              [] Bridge:               [] Change vac dressing three times per week    Pressure Relief:  [] When sitting, shift position or do seat lifts every 15 minutes.  [] Wheelchair cushion [] Specialty Bed/Mattress  [] Turn every 2 hours when in bed. Avoid direct pressure on wound site. Limit side lying to 30 degree tilt. Limit HOB elevation to 30 degrees. Edema Control:  Apply: [] Compression Stocking:  mmHg  []Right Leg []Left Leg   [x] Tubigrip [x]Right Leg Double Layer [x]Left Leg Double Layer      []Right Leg Single Layer []Left Leg Single Layer     [] Elevate leg(s) above the level of the heart when sitting. [] Avoid prolonged standing in one place. Compression:  Apply: [] Multilayer Compression Wrap: []RightLeg []Left Leg                                 []Coflex   []Coflex Lite             []Unna     [] Do not get leg(s) with wrap wet. If wraps become too tight call the center or completely remove the wrap. [] Elevate leg(s) above the level of the heart when sitting. [] Avoid prolonged standing in one place.     Off-Loading:   [] Off-loading when [] walking  [] in bed [] sitting  [] Total non-weight bearing  [] Right Leg  [] Left Leg   [] Assistive Device [] Walker [] Cane      [] Wheelchair  [] Crutches   [] Surgical shoe    [] Podus Boot(s)   [] Foam Boot(s)  [] Roll About    [] Cast Boot [] CROW Boot  [] Wedge Shoe  [] Other:  Dietary:  [x] Diet as tolerated: [] Calorie Diabetic Diet: [] No Added Salt:  [] Increase Protein: [] Other:     Activity:  [x] Activity as tolerated:    [] Patient has no activity restrictions       [] Strict Bedrest:   [] Remain off Work:       [] May return to full duty work:                                     [] Return to work with restrictions:               Wound Care Center Information: Should you experience any significant changes in your wound(s) or have questions about your wound care, please contact Errol Pereira 26 at [facility] 9536 8:00 am - 4:30. If you need help with your wound outside of these hours and cannot wait until we are again available, contact your PCP or go to the hospital emergency room. PLEASE NOTE: IF YOU ARE UNABLE TO OBTAIN WOUND SUPPLIES, CONTINUE TO USE THE SUPPLIES YOU HAVE AVAILABLE UNTIL YOU ARE ABLE TO REACH US. IT IS MOST IMPORTANT TO KEEP THE WOUND COVERED AT ALL TIMES.     [x] Dr. Savannah Cervantes   [] Dr. Evon Lennon  [] Dr. Aleksander Deluna

## 2022-08-08 NOTE — WOUND CARE
Discharge Instructions for  [facility], [facility]  Telephone: [phone]    NAME:  Lanora Favre. YOB: 1945  MEDICAL RECORD NUMBER:  [id number]  DATE:  8/8/2022      Return Appointment:  [] Dressing supply provider:   [x] Home Healthcare: [facility]  initiate  [] Return Appointment:    1   Week(s)  [] Nurse Visit: Week(s)    [] Discharge from [facility]  [] Ordered tests:   [x] Referrals: ordered lymphedema pumps  [] Rx:   [] Other:      Wound Cleansing:   Do not scrub or use excessive force. Cleanse wound prior to applying a clean dressing with:  [] Normal Saline   [] Mild Soap & Water/Baby Shampoo   [] Keep Wound Dry in Shower  [] Other:      Topical Treatments:  Do not apply lotions, creams, or ointments to wound bed unless directed. [] Apply moisturizing lotion to skin surrounding the wound prior to dressing change.  [] Apply antifungal ointment to skin surrounding the wound prior to dressing change.  [] Apply thin film of moisture barrier/zinc ointment to skin immediately around wound. [] Other:       Dressings:           Wound Location R and L legs   [] Apply Primary Dressing:       [] MediHoney Gel    [] MediHoney Alginate               [] Calcium Alginate      [x] Calcium Alginate with Silver: Silvercel   [] Collagen                   [] Collagen with Silver                [] Santyl with Moistened saline gauze              [] Hydrofera Blue (cut to size and moistened)  [] Hydrofera Blue Ready (Cut to size)   [] NS wet to dry    [] Betadine wet to dry              [] Hydrogel                [] Mepitel     [] Bactroban/Mupirocin               [] Other:     [] Cover and Secure with:     [] Gauze [] Ayan [] Kerlix   [] Foam [] Super Absorbant [] ABD     [] ACE Wrap            [x] Other: drawtex   Limit contact of tape with skin.     [] Change dressing: [] Daily    [] Every Other Day [] Once per week   [] Twice per week   [x] Three times per week [] Do Not Change Dressing   [] Other:     Negative Pressure:           Wound Location:   [] Pressure @  mm/Hg  []Continuous []Intermittent   [] Black Foam [] White Foam              [] Bridge:               [] Change vac dressing three times per week    Pressure Relief:  [] When sitting, shift position or do seat lifts every 15 minutes.  [] Wheelchair cushion [] Specialty Bed/Mattress  [] Turn every 2 hours when in bed. Avoid direct pressure on wound site. Limit side lying to 30 degree tilt. Limit HOB elevation to 30 degrees. Edema Control:  Apply: [] Compression Stocking:  mmHg  []Right Leg []Left Leg   [x] Tubigrip [x]Right Leg Double Layer [x]Left Leg Double Layer      []Right Leg Single Layer []Left Leg Single Layer     [] Elevate leg(s) above the level of the heart when sitting. [] Avoid prolonged standing in one place. Compression:  Apply: [] Multilayer Compression Wrap: []RightLeg []Left Leg                                 []Coflex   []Coflex Lite             []Unna     [] Do not get leg(s) with wrap wet. If wraps become too tight call the center or completely remove the wrap. [] Elevate leg(s) above the level of the heart when sitting. [] Avoid prolonged standing in one place.     Off-Loading:   [] Off-loading when [] walking  [] in bed [] sitting  [] Total non-weight bearing  [] Right Leg  [] Left Leg   [] Assistive Device [] Walker [] Cane      [] Wheelchair  [] Crutches   [] Surgical shoe    [] Podus Boot(s)   [] Foam Boot(s)  [] Roll About    [] Cast Boot [] CROW Boot  [] Wedge Shoe  [] Other:  Dietary:  [x] Diet as tolerated: [] Calorie Diabetic Diet: [] No Added Salt:  [] Increase Protein: [] Other:     Activity:  [x] Activity as tolerated:    [] Patient has no activity restrictions       [] Strict Bedrest:   [] Remain off Work:       [] May return to full duty work:                                     [] Return to work with restrictions:               Wound Care Center Information: Should you experience any significant changes in your wound(s) or have questions about your wound care, please contact Errol Kelli 26 at [facility] 1690 8:00 am - 4:30. If you need help with your wound outside of these hours and cannot wait until we are again available, contact your PCP or go to the hospital emergency room. PLEASE NOTE: IF YOU ARE UNABLE TO OBTAIN WOUND SUPPLIES, CONTINUE TO USE THE SUPPLIES YOU HAVE AVAILABLE UNTIL YOU ARE ABLE TO REACH US. IT IS MOST IMPORTANT TO KEEP THE WOUND COVERED AT ALL TIMES.     [x] Dr. Carmine Pate   [] Dr. Max Garcia  [] Dr. Jennifer Mir

## 2022-08-08 NOTE — WOUND CARE
[address]   Progress Note and Procedure Note   NO Procedure      Luís Prasad MEDICAL RECORD NUMBER:  [id number]  AGE: 65 y.o. RACE BLACK/  GENDER: male  : 1945  EPISODE DATE:  2022    Subjective:   67-year-old male has been followed by Dr. Gerry Devine for the past few months for evaluation and treatment of his lower extremity venous disease and lymphedema with open skin ulcers. Patient reportedly sleeps in a chair at night, does not wear compression stockings, does not have a lymphedema pump  Chief Complaint   Patient presents with    Wound Check     R leg, l leg          HISTORY of PRESENT ILLNESS HPI    Luís Prasad is a 65 y.o. male who presents today for wound/ulcer evaluation. History of Wound Context: BLE  Wound/Ulcer Pain Timing/Severity: mild  Quality of pain: aching  Severity:  1 / 10   Modifying Factors: None  Associated Signs/Symptoms: edema    Ulcer Identification:  Ulcer Type: venous    Contributing Factors: lymphedema    Wound: BLE         PAST MEDICAL HISTORY    Past Medical History:   Diagnosis Date    Dyslipidemia     HTN (hypertension)         PAST SURGICAL HISTORY    Past Surgical History:   Procedure Laterality Date    HX CHOLECYSTECTOMY         FAMILY HISTORY    Family History   Problem Relation Age of Onset    Diabetes Mother     Diabetes Brother     Diabetes Son        SOCIAL HISTORY    Social History     Tobacco Use    Smoking status: Never    Smokeless tobacco: Never   Vaping Use    Vaping Use: Never used   Substance Use Topics    Alcohol use: Never    Drug use: Never       ALLERGIES    No Known Allergies    MEDICATIONS    Current Outpatient Medications on File Prior to Encounter   Medication Sig Dispense Refill    multivitamin (ONE A DAY) tablet Take 1 Tablet by mouth in the morning. furosemide (LASIX) 40 mg tablet Take 40 mg by mouth two (2) times a day.       vitamin E, dl,tocopheryl acet, (vitamin E acetate) 400 unit capsule Take 400 Units by mouth daily. aspirin (ASPIRIN) 325 mg tablet Take 325 mg by mouth daily. No current facility-administered medications on file prior to encounter. REVIEW OF SYSTEMS    Pertinent items are noted in HPI. Objective:     Visit Vitals  /71 (BP 1 Location: Left upper arm, BP Patient Position: At rest;Sitting)   Pulse (!) 59   Temp 99 °F (37.2 °C)   Resp 18   Ht 5' 8\" (1.727 m)   Wt 122.5 kg (270 lb)   BMI 41.05 kg/m²       Wt Readings from Last 3 Encounters:   08/08/22 122.5 kg (270 lb)   07/21/22 130.6 kg (288 lb)   02/21/22 126 kg (277 lb 12.8 oz)       PHYSICAL EXAM  Bilateral calf wounds consistent with venous disease, chronic skin changes    Pertinent items are noted in HPI. Assessment:    Bilateral leg edema with open venous wounds    Problem List Items Addressed This Visit       Cellulitis and abscess of left lower extremity - Primary    Relevant Medications    lidocaine (XYLOCAINE) 2 % viscous solution 15 mL (Completed) (Start on 8/8/2022 12:00 PM)    Other Relevant Orders    INITIATE OUTPATIENT WOUND CARE PROTOCOL    Non-pressure chronic ulcer of other part of right lower leg with 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 ([address]    Relevant Orders    DUPLEX LOWER EXT VENOUS REFLUX BILAT    LOWER EXT ART PVR MULT LEVEL SEG PRESSURES    Non-pressure chronic ulcer of other part of left lower leg with 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 ([address]    Relevant Orders    DUPLEX LOWER EXT VENOUS REFLUX BILAT    LOWER EXT ART PVR MULT LEVEL SEG PRESSURES       Procedure Note  Indications:  Based on my examination of this patient's wound(s)/ulcer(s) today, debridement is not required to promote healing and evaluate the wound base.     Wound Leg Left #1 08/08/22 (Active)   Wound Image   08/08/22 1107   Wound Etiology Other (Comment) 08/08/22 1107   Dressing Status Breakthrough drainage noted 08/08/22 1107   Cleansed Cleansed with saline 08/08/22 1107   Wound Length (cm) 20 cm 08/08/22 1107   Wound Width (cm) 28 cm 08/08/22 1107   Wound Depth (cm) 0.1 cm 08/08/22 1107   Wound Surface Area (cm^2) 560 cm^2 08/08/22 1107   Wound Volume (cm^3) 56 cm^3 08/08/22 1107   Wound Assessment Eschar moist;Granulation tissue;Slough 08/08/22 1107   Drainage Amount Copious 08/08/22 1107   Drainage Description Serosanguinous 08/08/22 1107   Wound Odor Mild 08/08/22 1107   Dinorah-Wound/Incision Assessment Maceration; Hyperpigmented 08/08/22 1107   Edges Attached edges 08/08/22 1107   Wound Thickness Description Full thickness 08/08/22 1107   Number of days: 0       Wound Leg Right #2 08/08/22 (Active)   Wound Image   08/08/22 1107   Wound Etiology Other (Comment) 08/08/22 1107   Dressing Status Breakthrough drainage noted 08/08/22 1107   Cleansed Cleansed with saline 08/08/22 1107   Wound Length (cm) 18 cm 08/08/22 1107   Wound Width (cm) 27 cm 08/08/22 1107   Wound Depth (cm) 0.1 cm 08/08/22 1107   Wound Surface Area (cm^2) 486 cm^2 08/08/22 1107   Wound Volume (cm^3) 48.6 cm^3 08/08/22 1107   Wound Assessment Slough;Eschar moist;Granulation tissue 08/08/22 1107   Drainage Amount Copious 08/08/22 1107   Drainage Description Serosanguinous 08/08/22 1107   Wound Odor Mild 08/08/22 1107   Dinorah-Wound/Incision Assessment Maceration; Hypopigmented 08/08/22 1107   Edges Attached edges 08/08/22 1107   Wound Thickness Description Full thickness 08/08/22 1107   Number of days: 0        Plan:   Silvercel, double drawtex, leg elevation  Vascular studies - arterial and venous  Sleep in bed with legs elevated  Submit for lymphedema pump      Treatment Note please see attached Discharge Instructions    Written patient dismissal instructions given to patient or POA.          Electronically signed by Martina Coreas MD on 8/8/2022 at 11:52 AM

## 2022-08-15 ENCOUNTER — HOSPITAL ENCOUNTER (OUTPATIENT)
Dept: WOUND CARE | Age: 77
Discharge: HOME | End: 2022-08-15
Payer: MEDICARE

## 2022-08-15 VITALS
TEMPERATURE: 98.1 F | SYSTOLIC BLOOD PRESSURE: 120 MMHG | HEART RATE: 88 BPM | RESPIRATION RATE: 18 BRPM | DIASTOLIC BLOOD PRESSURE: 69 MMHG

## 2022-08-15 DIAGNOSIS — L03.116 CELLULITIS AND ABSCESS OF LEFT LOWER EXTREMITY: ICD-10-CM

## 2022-08-15 DIAGNOSIS — L97.822 NON-PRESSURE CHRONIC ULCER OF OTHER PART OF LEFT LOWER LEG WITH FAT LAYER EXPOSED (HCC): ICD-10-CM

## 2022-08-15 DIAGNOSIS — L97.812 NON-PRESSURE CHRONIC ULCER OF OTHER PART OF RIGHT LOWER LEG WITH FAT LAYER EXPOSED (HCC): Primary | ICD-10-CM

## 2022-08-15 DIAGNOSIS — L02.416 CELLULITIS AND ABSCESS OF LEFT LOWER EXTREMITY: ICD-10-CM

## 2022-08-15 PROCEDURE — 11045 DBRDMT SUBQ TISS EACH ADDL: CPT

## 2022-08-15 PROCEDURE — 74011000250 HC RX REV CODE- 250: Performed by: SPECIALIST

## 2022-08-15 PROCEDURE — 11042 DBRDMT SUBQ TIS 1ST 20SQCM/<: CPT

## 2022-08-15 RX ORDER — LIDOCAINE HYDROCHLORIDE 20 MG/ML
15 SOLUTION OROPHARYNGEAL ONCE
Status: DISCONTINUED | OUTPATIENT
Start: 2022-08-15 | End: 2022-08-16 | Stop reason: HOSPADM

## 2022-08-15 RX ORDER — MUPIROCIN 20 MG/G
OINTMENT TOPICAL ONCE
Status: CANCELLED | OUTPATIENT
Start: 2022-08-15 | End: 2022-08-15

## 2022-08-15 RX ORDER — LIDOCAINE HYDROCHLORIDE 20 MG/ML
15 SOLUTION OROPHARYNGEAL ONCE
Status: CANCELLED | OUTPATIENT
Start: 2022-08-15 | End: 2022-08-15

## 2022-08-15 RX ORDER — SILVER SULFADIAZINE 10 G/1000G
CREAM TOPICAL ONCE
Status: CANCELLED | OUTPATIENT
Start: 2022-08-15 | End: 2022-08-15

## 2022-08-15 NOTE — WOUND CARE
[address]   Progress Note and Procedure Note     Cira Paez MEDICAL RECORD NUMBER:  [id number]  AGE: 65 y.o. RACE BLACK/  GENDER: male  : 1945  EPISODE DATE:  8/15/2022    Subjective:   No new problems  Chief Complaint   Patient presents with    Wound Check     Bilateral legs         HISTORY of PRESENT ILLNESS HPI    Cira Paez is a 65 y.o. male who presents today for wound/ulcer evaluation. History of Wound Context: BLE  Wound/Ulcer Pain Timing/Severity: mild  Quality of pain: aching  Severity:  1 / 10   Modifying Factors: None  Associated Signs/Symptoms: edema    Ulcer Identification:  Ulcer Type: venous    Contributing Factors: edema    Wound: BLE         PAST MEDICAL HISTORY    Past Medical History:   Diagnosis Date    Dyslipidemia     HTN (hypertension)         PAST SURGICAL HISTORY    Past Surgical History:   Procedure Laterality Date    HX CHOLECYSTECTOMY         FAMILY HISTORY    Family History   Problem Relation Age of Onset    Diabetes Mother     Diabetes Brother     Diabetes Son        SOCIAL HISTORY    Social History     Tobacco Use    Smoking status: Never    Smokeless tobacco: Never   Vaping Use    Vaping Use: Never used   Substance Use Topics    Alcohol use: Never    Drug use: Never       ALLERGIES    No Known Allergies    MEDICATIONS    Current Outpatient Medications on File Prior to Encounter   Medication Sig Dispense Refill    multivitamin (ONE A DAY) tablet Take 1 Tablet by mouth in the morning. furosemide (LASIX) 40 mg tablet Take 40 mg by mouth two (2) times a day. vitamin E, dl,tocopheryl acet, (vitamin E acetate) 400 unit capsule Take 400 Units by mouth daily. aspirin (ASPIRIN) 325 mg tablet Take 325 mg by mouth daily. No current facility-administered medications on file prior to encounter. REVIEW OF SYSTEMS    Pertinent items are noted in HPI.     Objective:     Visit Vitals  /69 (BP 1 Location: Left upper arm, BP Patient Position: At rest;Sitting)   Pulse 88   Temp 98.1 °F (36.7 °C)   Resp 18       Wt Readings from Last 3 Encounters:   08/08/22 122.5 kg (270 lb)   07/21/22 130.6 kg (288 lb)   02/21/22 126 kg (277 lb 12.8 oz)       PHYSICAL EXAM  Bilateral lower extremity ulcerations are unchanged in area, foul-smelling, slough debrided, no evidence of active infection  Pertinent items are noted in HPI.     Assessment:   No improvement  Problem List Items Addressed This Visit       Cellulitis and abscess of left lower extremity    Relevant Medications    lidocaine (XYLOCAINE) 2 % viscous solution 15 mL    Other Relevant Orders    INITIATE OUTPATIENT WOUND CARE PROTOCOL    Non-pressure chronic ulcer of other part of right lower leg with 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 ([address] - Primary    Relevant Medications    lidocaine (XYLOCAINE) 2 % viscous solution 15 mL    Other Relevant Orders    INITIATE OUTPATIENT WOUND CARE PROTOCOL    Non-pressure chronic ulcer of other part of left lower leg with 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 ([facility])    Relevant Medications    lidocaine (XYLOCAINE) 2 % viscous solution 15 mL    Other Relevant Orders    INITIATE OUTPATIENT WOUND CARE PROTOCOL       Wound Leg Left #1 circumferential 08/08/22 (Active)   Wound Image   08/15/22 1325   Wound Etiology Other (Comment) 08/08/22 1107   Dressing Status Breakthrough drainage noted 08/15/22 1325   Cleansed Cleansed with saline 08/15/22 1325   Wound Length (cm) 19 cm 08/15/22 1325   Wound Width (cm) 28 cm 08/15/22 1325   Wound Depth (cm) 0.1 cm 08/15/22 1325   Wound Surface Area (cm^2) 532 cm^2 08/15/22 1325   Change in Wound Size % 5 08/15/22 1325   Wound Volume (cm^3) 53.2 cm^3 08/15/22 1325   Wound Healing % 5 08/15/22 1325   Post-Procedure Length (cm) 19 cm 08/15/22 1345   Post-Procedure Width (cm) 28 cm 08/15/22 1345   Post-Procedure Depth (cm) 0.1 cm 08/15/22 1345   Post-Procedure Surface Area (cm^2) 532 cm^2 08/15/22 1345   Post-Procedure Volume (cm^3) 53.2 cm^3 08/15/22 1345   Wound Assessment Granulation tissue;Slough 08/15/22 1325   Drainage Amount Copious 08/15/22 1325   Drainage Description Serosanguinous;Green 08/15/22 1325   Wound Odor None 08/15/22 1325   Dinorah-Wound/Incision Assessment Maceration; Hyperpigmented 08/15/22 1325   Edges Attached edges 08/15/22 1325   Wound Thickness Description Full thickness 08/15/22 1325   Number of days: 7       Wound Leg Right #2 circumferential 08/08/22 (Active)   Wound Image   08/15/22 1322   Wound Etiology Other (Comment) 08/08/22 1107   Dressing Status Breakthrough drainage noted 08/15/22 1322   Cleansed Cleansed with saline 08/15/22 1322   Wound Length (cm) 18 cm 08/15/22 1322   Wound Width (cm) 27 cm 08/15/22 1322   Wound Depth (cm) 0.1 cm 08/15/22 1322   Wound Surface Area (cm^2) 486 cm^2 08/15/22 1322   Change in Wound Size % 0 08/15/22 1322   Wound Volume (cm^3) 48.6 cm^3 08/15/22 1322   Wound Healing % 0 08/15/22 1322   Post-Procedure Length (cm) 18 cm 08/15/22 1347   Post-Procedure Width (cm) 27 cm 08/15/22 1347   Post-Procedure Depth (cm) 0.1 cm 08/15/22 1347   Post-Procedure Surface Area (cm^2) 486 cm^2 08/15/22 1347   Post-Procedure Volume (cm^3) 48.6 cm^3 08/15/22 1347   Wound Assessment Slough;Granulation tissue 08/15/22 1322   Drainage Amount Copious 08/15/22 1322   Drainage Description Serosanguinous;Green 08/15/22 1322   Wound Odor None 08/15/22 1322   Dinorah-Wound/Incision Assessment Maceration; Hypopigmented 08/15/22 1322   Edges Attached edges 08/15/22 1322   Wound Thickness Description Full thickness 08/15/22 1322   Number of days: 7       POP IN PROCEDURE TYPE (DEBRIDEMENT, BIOPSY, I&D, SKIN SUB, CHEMICAL CAUERTY)     Plan:   Continue silver cell, drawtex, double Tubigrip compression  Noninvasive vascular studies  Leg elevation, sleep in bed dry wound care    Treatment Note please see attached Discharge Instructions    Written patient dismissal instructions given to patient or POA. Electronically signed by Stephanie Alonso MD on 8/15/2022 at 2:04 PM               Debridement Wound Care        Problem List Items Addressed This Visit       Cellulitis and abscess of left lower extremity    Relevant Medications    lidocaine (XYLOCAINE) 2 % viscous solution 15 mL    Other Relevant Orders    INITIATE OUTPATIENT WOUND CARE PROTOCOL    Non-pressure chronic ulcer of other part of right lower leg with 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 ([address] - Primary    Relevant Medications    lidocaine (XYLOCAINE) 2 % viscous solution 15 mL    Other Relevant Orders    INITIATE OUTPATIENT WOUND CARE PROTOCOL    Non-pressure chronic ulcer of other part of left lower leg with 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 ([address]    Relevant Medications    lidocaine (XYLOCAINE) 2 % viscous solution 15 mL    Other Relevant Orders    INITIATE OUTPATIENT WOUND CARE PROTOCOL       Procedure Note  Indications:  Based on my examination of this patient's wound(s)/ulcer(s) today, debridement is required to promote healing and evaluate the wound base.     Performed by: Stephanie Alonso MD    Consent obtained: Yes    Time out taken: Yes    Debridement: Excisional    Using curette the wound(s)/ulcer(s) was/were sharply debrided down through and including the removal of    subcutaneous tissue    Devitalized Tissue Debrided: slough    Pre Debridement Measurements:  Are located in the [place]  Documentation Flow Sheet    Non-Pressure ulcer, 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    Wound/Ulcer #: 1 and 2    Post Debridement Measurements:  Wound/Ulcer Descriptions are Pre Debridement except measurements:    Wound Leg Left #1 circumferential 08/08/22 (Active)   Wound Image   08/15/22 1325   Wound Etiology Other (Comment) 08/08/22 1107   Dressing Status Breakthrough drainage noted 08/15/22 1325   Cleansed Cleansed with saline 08/15/22 1325   Wound Length (cm) 19 cm 08/15/22 1325   Wound Width (cm) 28 cm 08/15/22 1325   Wound Depth (cm) 0.1 cm 08/15/22 1325   Wound Surface Area (cm^2) 532 cm^2 08/15/22 1325   Change in Wound Size % 5 08/15/22 1325   Wound Volume (cm^3) 53.2 cm^3 08/15/22 1325   Wound Healing % 5 08/15/22 1325   Post-Procedure Length (cm) 19 cm 08/15/22 1345   Post-Procedure Width (cm) 28 cm 08/15/22 1345   Post-Procedure Depth (cm) 0.1 cm 08/15/22 1345   Post-Procedure Surface Area (cm^2) 532 cm^2 08/15/22 1345   Post-Procedure Volume (cm^3) 53.2 cm^3 08/15/22 1345   Wound Assessment Granulation tissue;Slough 08/15/22 1325   Drainage Amount Copious 08/15/22 1325   Drainage Description Serosanguinous;Green 08/15/22 1325   Wound Odor None 08/15/22 1325   Dinorah-Wound/Incision Assessment Maceration; Hyperpigmented 08/15/22 1325   Edges Attached edges 08/15/22 1325   Wound Thickness Description Full thickness 08/15/22 1325   Number of days: 7       Wound Leg Right #2 circumferential 08/08/22 (Active)   Wound Image   08/15/22 1322   Wound Etiology Other (Comment) 08/08/22 1107   Dressing Status Breakthrough drainage noted 08/15/22 1322   Cleansed Cleansed with saline 08/15/22 1322   Wound Length (cm) 18 cm 08/15/22 1322   Wound Width (cm) 27 cm 08/15/22 1322   Wound Depth (cm) 0.1 cm 08/15/22 1322   Wound Surface Area (cm^2) 486 cm^2 08/15/22 1322   Change in Wound Size % 0 08/15/22 1322   Wound Volume (cm^3) 48.6 cm^3 08/15/22 1322   Wound Healing % 0 08/15/22 1322   Post-Procedure Length (cm) 18 cm 08/15/22 1347   Post-Procedure Width (cm) 27 cm 08/15/22 1347   Post-Procedure Depth (cm) 0.1 cm 08/15/22 1347   Post-Procedure Surface Area (cm^2) 486 cm^2 08/15/22 1347   Post-Procedure Volume (cm^3) 48.6 cm^3 08/15/22 1347   Wound Assessment Slough;Granulation tissue 08/15/22 1322   Drainage Amount Copious 08/15/22 1322   Drainage Description Serosanguinous;Green 08/15/22 1322   Wound Odor None 08/15/22 1322   Dinorah-Wound/Incision Assessment Maceration; Hypopigmented 08/15/22 1322   Edges Attached edges 08/15/22 1322   Wound Thickness Description Full thickness 08/15/22 1322   Number of days: 7 Total Surface Area Debrided:  200 sq cm     Estimated Blood Loss:  Minimal     Hemostasis Achieved: Pressure    Procedural Pain: 1 / 10     Post Procedural Pain: 0 / 10     Response to treatment: Well tolerated by patient

## 2022-08-15 NOTE — DISCHARGE INSTRUCTIONS
Discharge Instructions for  [address] [facility], [facility]  Telephone: [phone]    NAME:  Jhonatan Doss. YOB: 1945  MEDICAL RECORD NUMBER:  [id number]  DATE:  8/15/2022      Return Appointment:  [] Dressing supply provider:   [x] Home Healthcare: Clare Tovar [facility]  [] Return Appointment: 1 Week(s)  [] Nurse Visit: Kirit Peña    [] Discharge from [facility]  [x] [facility]  [] Referrals:   [] Rx:   [] Other:      Wound Cleansing:   Do not scrub or use excessive force. Cleanse wound prior to applying a clean dressing with:  [] Normal Saline   [x] Mild Soap & Water/Baby Shampoo   [] Keep Wound Dry in Shower  [] Other:      Topical Treatments:  Do not apply lotions, creams, or ointments to wound bed unless directed. [x] Apply moisturizing lotion to skin surrounding the wound prior to dressing change.  [] Apply antifungal ointment to skin surrounding the wound prior to dressing change.  [] Apply thin film of moisture barrier/zinc ointment to skin immediately around wound. [] Other:       Dressings:           Wound Location R and L legs   [x] Apply Primary Dressing:       [] MediHoney Gel    [] MediHoney Alginate               [] Calcium Alginate      [x] Calcium Alginate with Silver: Silvercel   [] Collagen                   [] Collagen with Silver                [] Santyl with Moistened saline gauze              [] Hydrofera Blue (cut to size and moistened)  [] Hydrofera Blue Ready (Cut to size)   [] NS wet to dry    [] Betadine wet to dry              [] Hydrogel                [] Mepitel     [] Bactroban/Mupirocin               [x] Other: Drawtex    [x] Cover and Secure with:     [x] Gauze [] Ayan [x] Kerlix   [] Foam [] Super Absorbant [] ABD     [] ACE Wrap            [] Other:    Limit contact of tape with skin.     [] Change dressing: [] Daily    [] Every Other Day [] Once per week   [] Twice per week   [x] Three times per week [] Do Not Change Dressing   [] Other:     Edema Control:  Apply: [] Compression Stocking:  mmHg  []Right Leg []Left Leg   [x] Tubigrip [x]Right Leg Double Layer [x]Left Leg Double Layer      []Right Leg Single Layer []Left Leg Single Layer     [x] Elevate leg(s) above the level of the heart when sitting. [x] Avoid prolonged standing in one place. Compression:  Apply: [] Multilayer Compression Wrap: []RightLeg []Left Leg                                 []Coflex   []Coflex Lite             []Unna     [] Do not get leg(s) with wrap wet. If wraps become too tight call the center or completely remove the wrap. [] Elevate leg(s) above the level of the heart when sitting. [] Avoid prolonged standing in one place. Dietary:  [x] Diet as tolerated: [] Calorie Diabetic Diet: [] No Added Salt:  [x] Increase Protein: [] Other:     Activity:  [x] Activity as tolerated:    [] Patient has no activity restrictions       [] Strict Bedrest:   [] Remain off Work:       [] May return to full duty work:                                     [] Return to work with restrictions:               [address] Information: Should you experience any significant changes in your wound(s) or have questions about your wound care, please contact Errol Barry at [place] [address] 8:00 am - 4:30. If you need help with your wound outside of these hours and cannot wait until we are again available, contact your PCP or go to the hospital emergency room. PLEASE NOTE: IF YOU ARE UNABLE TO OBTAIN WOUND SUPPLIES, CONTINUE TO USE THE SUPPLIES YOU HAVE AVAILABLE UNTIL YOU ARE ABLE TO REACH US. IT IS MOST IMPORTANT TO KEEP THE WOUND COVERED AT ALL TIMES.     [x] Dr. Luther Alfonso   [] Dr. Magalis Pinto  [] Dr. Pili Raza

## 2022-08-15 NOTE — WOUND CARE
Discharge Instructions for  [address] [place] [address], [address] [place] [address]  Telephone: [phone]    NAME:  Cassius Guzman YOB: 1945  MEDICAL RECORD NUMBER:  [id number]  DATE:  8/15/2022      Return Appointment:  [] Dressing supply provider:   [x] Home Healthcare: Tiago Julian  [] Return Appointment: 1 Week(s)  [] Nurse Visit: [facility])    [] Discharge from [facility]  [x] [facility]  [] Referrals:   [] Rx:   [] Other:      Wound Cleansing:   Do not scrub or use excessive force. Cleanse wound prior to applying a clean dressing with:  [] Normal Saline   [x] Mild Soap & Water/Baby Shampoo   [] Keep Wound Dry in Shower  [] Other:      Topical Treatments:  Do not apply lotions, creams, or ointments to wound bed unless directed. [x] Apply moisturizing lotion to skin surrounding the wound prior to dressing change.  [] Apply antifungal ointment to skin surrounding the wound prior to dressing change.  [] Apply thin film of moisture barrier/zinc ointment to skin immediately around wound. [] Other:       Dressings:           Wound Location R and L legs   [x] Apply Primary Dressing:       [] MediHoney Gel    [] MediHoney Alginate               [] Calcium Alginate      [x] Calcium Alginate with Silver: Silvercel   [] Collagen                   [] Collagen with Silver                [] Santyl with Moistened saline gauze              [] Hydrofera Blue (cut to size and moistened)  [] Hydrofera Blue Ready (Cut to size)   [] NS wet to dry    [] Betadine wet to dry              [] Hydrogel                [] Mepitel     [] Bactroban/Mupirocin               [x] Other: Drawtex    [x] Cover and Secure with:     [x] Gauze [] Ayan [x] Kerlix   [] Foam [] Super Absorbant [] ABD     [] ACE Wrap            [] Other:    Limit contact of tape with skin.     [] Change dressing: [] Daily    [] Every Other Day [] Once per week   [] Twice per week   [x] Three times per week [] Do Not Change Dressing   [] Other:     Edema Control:  Apply: [] Compression Stocking:  mmHg  []Right Leg []Left Leg   [x] Tubigrip [x]Right Leg Double Layer [x]Left Leg Double Layer      []Right Leg Single Layer []Left Leg Single Layer     [x] Elevate leg(s) above the level of the heart when sitting. [x] Avoid prolonged standing in one place. Compression:  Apply: [] Multilayer Compression Wrap: []RightLeg []Left Leg                                 []Coflex   []Coflex Lite             []Unna     [] Do not get leg(s) with wrap wet. If wraps become too tight call the center or completely remove the wrap. [] Elevate leg(s) above the level of the heart when sitting. [] Avoid prolonged standing in one place. Dietary:  [x] Diet as tolerated: [] Calorie Diabetic Diet: [] No Added Salt:  [x] Increase Protein: [] Other:     Activity:  [x] Activity as tolerated:    [] Patient has no activity restrictions       [] Strict Bedrest:   [] Remain off Work:       [] May return to full duty work:                                     [] Return to work with restrictions:               [address] Information: Should you experience any significant changes in your wound(s) or have questions about your wound care, please contact Errol Barry at [place] [address] 8:00 am - 4:30. If you need help with your wound outside of these hours and cannot wait until we are again available, contact your PCP or go to the hospital emergency room. PLEASE NOTE: IF YOU ARE UNABLE TO OBTAIN WOUND SUPPLIES, CONTINUE TO USE THE SUPPLIES YOU HAVE AVAILABLE UNTIL YOU ARE ABLE TO REACH US. IT IS MOST IMPORTANT TO KEEP THE WOUND COVERED AT ALL TIMES.     [x] Dr. Simone Ramirez   [] Dr. Timoteo Golden  [] Dr. Daron Andino

## 2022-08-19 ENCOUNTER — HOSPITAL ENCOUNTER (OUTPATIENT)
Dept: NON INVASIVE DIAGNOSTICS | Age: 77
Discharge: HOME | End: 2022-08-19
Attending: SPECIALIST
Payer: MEDICARE

## 2022-08-19 DIAGNOSIS — L97.822 NON-PRESSURE CHRONIC ULCER OF OTHER PART OF LEFT LOWER LEG WITH FAT LAYER EXPOSED (HCC): ICD-10-CM

## 2022-08-19 DIAGNOSIS — L97.812 NON-PRESSURE CHRONIC ULCER OF OTHER PART OF RIGHT LOWER LEG WITH FAT LAYER EXPOSED (HCC): ICD-10-CM

## 2022-08-19 LAB
LEFT ABI: 1.04
LEFT ARM BP: 131 MMHG
LEFT TBI: 0.6
LEFT TOE PRESSURE: 88 MMHG
RIGHT ABI: 1.14
RIGHT ARM BP: 147 MMHG
RIGHT TBI: 0.54
RIGHT TOE PRESSURE: 80 MMHG
VAS LEFT DORSALIS PEDIS BP: 153 MMHG
VAS RIGHT DORSALIS PEDIS BP: 167 MMHG

## 2022-08-19 PROCEDURE — 93923 UPR/LXTR ART STDY 3+ LVLS: CPT

## 2022-08-19 PROCEDURE — 93970 EXTREMITY STUDY: CPT

## 2022-08-22 ENCOUNTER — HOSPITAL ENCOUNTER (OUTPATIENT)
Dept: WOUND CARE | Age: 77
Discharge: HOME | End: 2022-08-22
Payer: MEDICARE

## 2022-08-22 VITALS
RESPIRATION RATE: 18 BRPM | HEART RATE: 78 BPM | DIASTOLIC BLOOD PRESSURE: 61 MMHG | SYSTOLIC BLOOD PRESSURE: 112 MMHG | TEMPERATURE: 98.7 F

## 2022-08-22 DIAGNOSIS — L02.416 CELLULITIS AND ABSCESS OF LEFT LOWER EXTREMITY: ICD-10-CM

## 2022-08-22 DIAGNOSIS — L97.812 NON-PRESSURE CHRONIC ULCER OF OTHER PART OF RIGHT LOWER LEG WITH FAT LAYER EXPOSED (HCC): Primary | ICD-10-CM

## 2022-08-22 DIAGNOSIS — L97.822 NON-PRESSURE CHRONIC ULCER OF OTHER PART OF LEFT LOWER LEG WITH FAT LAYER EXPOSED (HCC): ICD-10-CM

## 2022-08-22 DIAGNOSIS — L03.116 CELLULITIS AND ABSCESS OF LEFT LOWER EXTREMITY: ICD-10-CM

## 2022-08-22 PROCEDURE — 99213 OFFICE O/P EST LOW 20 MIN: CPT

## 2022-08-22 PROCEDURE — 74011000250 HC RX REV CODE- 250: Performed by: SPECIALIST

## 2022-08-22 RX ORDER — MUPIROCIN 20 MG/G
OINTMENT TOPICAL ONCE
Status: CANCELLED | OUTPATIENT
Start: 2022-08-22 | End: 2022-08-22

## 2022-08-22 RX ORDER — SILVER SULFADIAZINE 10 G/1000G
CREAM TOPICAL ONCE
Status: CANCELLED | OUTPATIENT
Start: 2022-08-22 | End: 2022-08-22

## 2022-08-22 RX ORDER — LIDOCAINE HYDROCHLORIDE 20 MG/ML
15 SOLUTION OROPHARYNGEAL ONCE
Status: DISCONTINUED | OUTPATIENT
Start: 2022-08-22 | End: 2022-08-23 | Stop reason: HOSPADM

## 2022-08-22 RX ORDER — LIDOCAINE HYDROCHLORIDE 20 MG/ML
15 SOLUTION OROPHARYNGEAL ONCE
Status: CANCELLED | OUTPATIENT
Start: 2022-08-22 | End: 2022-08-22

## 2022-08-22 NOTE — DISCHARGE INSTRUCTIONS
Discharge Instructions for  [address] [place] [address], [address] [place] [address]  Telephone: [phone]    NAME:  Cassius Guzman YOB: 1945  MEDICAL RECORD NUMBER:  [id number]  DATE:  8/22/2022      Return Appointment:  [] Dressing supply provider:   [x] Home Healthcare: [facility]  [x] Return Appointment: 1 Week(s)  [] Nurse Visit: [facility])    [] Discharge from [facility]  [] Ordered tests:   [] Referrals:   [] Rx:   [] Other:      Wound Cleansing:   Do not scrub or use excessive force. Cleanse wound prior to applying a clean dressing with:  [] Normal Saline   [x] Mild Soap & Water/Baby Shampoo   [] Keep Wound Dry in Shower  [] Other:      Topical Treatments:  Do not apply lotions, creams, or ointments to wound bed unless directed. [x] Apply moisturizing lotion to skin surrounding the wound prior to dressing change.  [] Apply antifungal ointment to skin surrounding the wound prior to dressing change.  [] Apply thin film of moisture barrier/zinc ointment to skin immediately around wound. [] Other:       Dressings:           Wound Location R and L legs   [x] Apply Primary Dressing:       [] MediHoney Gel    [] MediHoney Alginate               [] Calcium Alginate      [x] Calcium Alginate with Silver: Silvercel   [] Collagen                   [] Collagen with Silver                [] Santyl with Moistened saline gauze              [] Hydrofera Blue (cut to size and moistened)  [] Hydrofera Blue Ready (Cut to size)   [] NS wet to dry    [] Betadine wet to dry              [] Hydrogel                [] Mepitel     [] Bactroban/Mupirocin               [x] Other: Drawtex    [x] Cover and Secure with:     [x] Gauze [] Ayan [x] Kerlix   [] Foam [] Super Absorbant [] ABD     [] ACE Wrap            [] Other:    Limit contact of tape with skin.     [] Change dressing: [] Daily    [] Every Other Day [] Once per week   [] Twice per week   [x] Three times per week [] Do Not Change Dressing   [] Other:     Edema Control:  Apply: [] Compression Stocking:  mmHg  []Right Leg []Left Leg   [x] Tubigrip [x]Right Leg Double Layer [x]Left Leg Double Layer      []Right Leg Single Layer []Left Leg Single Layer     [x] Elevate leg(s) above the level of the heart when sitting. [x] Avoid prolonged standing in one place. Compression:  Apply: [] Multilayer Compression Wrap: []RightLeg []Left Leg                                 []Coflex   []Coflex Lite             []Unna     [] Do not get leg(s) with wrap wet. If wraps become too tight call the center or completely remove the wrap. [] Elevate leg(s) above the level of the heart when sitting. [] Avoid prolonged standing in one place. Dietary:  [x] Diet as tolerated: [] Calorie Diabetic Diet: [] No Added Salt:  [x] Increase Protein: [] Other:     Activity:  [x] Activity as tolerated:    [] Patient has no activity restrictions       [] Strict Bedrest:   [] Remain off Work:       [] May return to full duty work:                                     [] Return to work with restrictions:               [address] Information: Should you experience any significant changes in your wound(s) or have questions about your wound care, please contact Errol Barry at [place] [address] 8:00 am - 4:30. If you need help with your wound outside of these hours and cannot wait until we are again available, contact your PCP or go to the hospital emergency room. PLEASE NOTE: IF YOU ARE UNABLE TO OBTAIN WOUND SUPPLIES, CONTINUE TO USE THE SUPPLIES YOU HAVE AVAILABLE UNTIL YOU ARE ABLE TO REACH US. IT IS MOST IMPORTANT TO KEEP THE WOUND COVERED AT ALL TIMES.     [x] Dr. Kaitlyn Blake   [] Dr. Fawad Rojas  [] Dr. Gardner Gosselin

## 2022-08-22 NOTE — WOUND CARE
Discharge Instructions for  [address] [place] [address], [address] [place] [address]  Telephone: [phone]    NAME:  Vi Valderrama. YOB: 1945  MEDICAL RECORD NUMBER:  [id number]  DATE:  8/22/2022      Return Appointment:  [] Dressing supply provider:   [x] Home Healthcare: [facility]  [x] Return Appointment: 1 Week(s)  [] Nurse Visit: [facility])    [] Discharge from [facility]  [] Ordered tests:   [] Referrals:   [] Rx:   [] Other:      Wound Cleansing:   Do not scrub or use excessive force. Cleanse wound prior to applying a clean dressing with:  [] Normal Saline   [x] Mild Soap & Water/Baby Shampoo   [] Keep Wound Dry in Shower  [] Other:      Topical Treatments:  Do not apply lotions, creams, or ointments to wound bed unless directed. [x] Apply moisturizing lotion to skin surrounding the wound prior to dressing change.  [] Apply antifungal ointment to skin surrounding the wound prior to dressing change.  [] Apply thin film of moisture barrier/zinc ointment to skin immediately around wound. [] Other:       Dressings:           Wound Location R and L legs   [x] Apply Primary Dressing:       [] MediHoney Gel    [] MediHoney Alginate               [] Calcium Alginate      [x] Calcium Alginate with Silver: Silvercel   [] Collagen                   [] Collagen with Silver                [] Santyl with Moistened saline gauze              [] Hydrofera Blue (cut to size and moistened)  [] Hydrofera Blue Ready (Cut to size)   [] NS wet to dry    [] Betadine wet to dry              [] Hydrogel                [] Mepitel     [] Bactroban/Mupirocin               [x] Other: Drawtex    [x] Cover and Secure with:     [x] Gauze [] Ayan [x] Kerlix   [] Foam [] Super Absorbant [] ABD     [] ACE Wrap            [] Other:    Limit contact of tape with skin.     [] Change dressing: [] Daily    [] Every Other Day [] Once per week   [] Twice per week   [x] Three times per week [] Do Not Change Dressing   [] Other:     Edema Control:  Apply: [] Compression Stocking:  mmHg  []Right Leg []Left Leg   [x] Tubigrip [x]Right Leg Double Layer [x]Left Leg Double Layer      []Right Leg Single Layer []Left Leg Single Layer     [x] Elevate leg(s) above the level of the heart when sitting. [x] Avoid prolonged standing in one place. Compression:  Apply: [] Multilayer Compression Wrap: []RightLeg []Left Leg                                 []Coflex   []Coflex Lite             []Unna     [] Do not get leg(s) with wrap wet. If wraps become too tight call the center or completely remove the wrap. [] Elevate leg(s) above the level of the heart when sitting. [] Avoid prolonged standing in one place. Dietary:  [x] Diet as tolerated: [] Calorie Diabetic Diet: [] No Added Salt:  [x] Increase Protein: [] Other:     Activity:  [x] Activity as tolerated:    [] Patient has no activity restrictions       [] Strict Bedrest:   [] Remain off Work:       [] May return to full duty work:                                     [] Return to work with restrictions:               [address] Information: Should you experience any significant changes in your wound(s) or have questions about your wound care, please contact Errol Barry at [place] [address] 8:00 am - 4:30. If you need help with your wound outside of these hours and cannot wait until we are again available, contact your PCP or go to the hospital emergency room. PLEASE NOTE: IF YOU ARE UNABLE TO OBTAIN WOUND SUPPLIES, CONTINUE TO USE THE SUPPLIES YOU HAVE AVAILABLE UNTIL YOU ARE ABLE TO REACH US. IT IS MOST IMPORTANT TO KEEP THE WOUND COVERED AT ALL TIMES.     [x] Dr. Sherin Beyer   [] Dr. Kee Becerra  [] Dr. Noemi Ivory

## 2022-08-22 NOTE — WOUND CARE
[address]   Progress Note and Procedure Note   NO Procedure      Babak Peres MEDICAL RECORD NUMBER:  [id number]  AGE: 65 y.o. RACE BLACK/  GENDER: male  : 1945  EPISODE DATE:  2022    Subjective:   Noninvasive venous studies did not demonstrate significant superficial venous reflux in either leg  The arterial study showed significant bilateral popliteal and below-knee arterial disease, worse on the left  Chief Complaint   Patient presents with    Wound Check     Bilateral legs         HISTORY of PRESENT ILLNESS HPI    Babak Peres is a 65 y.o. male who presents today for wound/ulcer evaluation. History of Wound Context: BLE  Wound/Ulcer Pain Timing/Severity: none  Quality of pain: dull  Severity:  0 / 10   Modifying Factors: None  Associated Signs/Symptoms: edema    Ulcer Identification:  Ulcer Type: venous    Contributing Factors: edema    Wound: BLE         PAST MEDICAL HISTORY    Past Medical History:   Diagnosis Date    Dyslipidemia     HTN (hypertension)         PAST SURGICAL HISTORY    Past Surgical History:   Procedure Laterality Date    HX CHOLECYSTECTOMY         FAMILY HISTORY    Family History   Problem Relation Age of Onset    Diabetes Mother     Diabetes Brother     Diabetes Son        SOCIAL HISTORY    Social History     Tobacco Use    Smoking status: Never    Smokeless tobacco: Never   Vaping Use    Vaping Use: Never used   Substance Use Topics    Alcohol use: Never    Drug use: Never       ALLERGIES    No Known Allergies    MEDICATIONS    Current Outpatient Medications on File Prior to Encounter   Medication Sig Dispense Refill    multivitamin (ONE A DAY) tablet Take 1 Tablet by mouth in the morning. furosemide (LASIX) 40 mg tablet Take 40 mg by mouth two (2) times a day. vitamin E, dl,tocopheryl acet, (vitamin E acetate) 400 unit capsule Take 400 Units by mouth daily.       aspirin (ASPIRIN) 325 mg tablet Take 325 mg by mouth daily. No current facility-administered medications on file prior to encounter. REVIEW OF SYSTEMS    Pertinent items are noted in HPI. Objective:     Visit Vitals  /61 (BP 1 Location: Left upper arm, BP Patient Position: At rest;Sitting)   Pulse 78   Temp 98.7 °F (37.1 °C)   Resp 18       Wt Readings from Last 3 Encounters:   08/08/22 122.5 kg (270 lb)   07/21/22 130.6 kg (288 lb)   02/21/22 126 kg (277 lb 12.8 oz)       PHYSICAL EXAM  Both left and right leg wounds measure smaller, less slough, no evidence of active infection    Pertinent items are noted in HPI. Assessment:   some Improvement noted  Problem List Items Addressed This Visit       Cellulitis and abscess of left lower extremity    Relevant Medications    lidocaine (XYLOCAINE) 2 % viscous solution 15 mL    Other Relevant Orders    INITIATE OUTPATIENT WOUND CARE PROTOCOL    Non-pressure chronic ulcer of other part of right lower leg with 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 ([address] - Primary    Relevant Medications    lidocaine (XYLOCAINE) 2 % viscous solution 15 mL    Other Relevant Orders    INITIATE OUTPATIENT WOUND CARE PROTOCOL    Non-pressure chronic ulcer of other part of left lower leg with 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 (HCC)    Relevant Medications    lidocaine (XYLOCAINE) 2 % viscous solution 15 mL    Other Relevant Orders    INITIATE OUTPATIENT WOUND CARE PROTOCOL       Procedure Note  Indications:  Based on my examination of this patient's wound(s)/ulcer(s) today, debridement is not required to promote healing and evaluate the wound base.     Wound Leg Left #1 circumferential 08/08/22 (Active)   Wound Image   08/22/22 1345   Wound Etiology Other (Comment) 08/08/22 1107   Dressing Status Breakthrough drainage noted 08/22/22 1345   Cleansed Cleansed with saline 08/22/22 1345   Wound Length (cm) 15 cm 08/22/22 1345   Wound Width (cm) 17 cm 08/22/22 1345   Wound Depth (cm) 0.1 cm 08/22/22 1345   Wound Surface Area (cm^2) 255 cm^2 08/22/22 1345 Change in Wound Size % 54.46 08/22/22 1345   Wound Volume (cm^3) 25.5 cm^3 08/22/22 1345   Wound Healing % 54 08/22/22 1345   Post-Procedure Length (cm) 19 cm 08/15/22 1345   Post-Procedure Width (cm) 28 cm 08/15/22 1345   Post-Procedure Depth (cm) 0.1 cm 08/15/22 1345   Post-Procedure Surface Area (cm^2) 532 cm^2 08/15/22 1345   Post-Procedure Volume (cm^3) 53.2 cm^3 08/15/22 1345   Wound Assessment Granulation tissue;Slough 08/22/22 1345   Drainage Amount Copious 08/22/22 1345   Drainage Description Serosanguinous;Green 08/22/22 1345   Wound Odor Moderate 08/22/22 1345   Dinorah-Wound/Incision Assessment Maceration; Hyperpigmented 08/22/22 1345   Edges Attached edges 08/22/22 1345   Wound Thickness Description Full thickness 08/22/22 1345   Number of days: 14       Wound Leg Right #2 circumferential 08/08/22 (Active)   Wound Image   08/22/22 1359   Wound Etiology Other (Comment) 08/08/22 1107   Dressing Status Breakthrough drainage noted 08/22/22 1359   Cleansed Cleansed with saline 08/22/22 1359   Wound Length (cm) 13 cm 08/22/22 1359   Wound Width (cm) 27 cm 08/22/22 1359   Wound Depth (cm) 0.1 cm 08/22/22 1359   Wound Surface Area (cm^2) 351 cm^2 08/22/22 1359   Change in Wound Size % 27.78 08/22/22 1359   Wound Volume (cm^3) 35.1 cm^3 08/22/22 1359   Wound Healing % 28 08/22/22 1359   Post-Procedure Length (cm) 18 cm 08/15/22 1347   Post-Procedure Width (cm) 27 cm 08/15/22 1347   Post-Procedure Depth (cm) 0.1 cm 08/15/22 1347   Post-Procedure Surface Area (cm^2) 486 cm^2 08/15/22 1347   Post-Procedure Volume (cm^3) 48.6 cm^3 08/15/22 1347   Wound Assessment Slough;Granulation tissue 08/22/22 1359   Drainage Amount Copious 08/22/22 1359   Drainage Description Serosanguinous;Green 08/22/22 1359   Wound Odor Moderate 08/22/22 1359   Dinorah-Wound/Incision Assessment Maceration; Hypopigmented 08/22/22 1359   Edges Attached edges 08/22/22 1359   Wound Thickness Description Full thickness 08/22/22 1359   Number of days: 14        Plan:   Continue silvercel, drawtex, double Tubigrip  Treatment Note please see attached Discharge Instructions    Written patient dismissal instructions given to patient or POA.          Electronically signed by Zayda Rawls MD on 8/22/2022 at 2:12 PM

## 2022-08-29 ENCOUNTER — HOSPITAL ENCOUNTER (OUTPATIENT)
Dept: WOUND CARE | Age: 77
Discharge: HOME | End: 2022-08-29
Payer: MEDICARE

## 2022-08-29 VITALS
HEART RATE: 82 BPM | SYSTOLIC BLOOD PRESSURE: 101 MMHG | DIASTOLIC BLOOD PRESSURE: 63 MMHG | TEMPERATURE: 99.6 F | RESPIRATION RATE: 18 BRPM

## 2022-08-29 DIAGNOSIS — L03.116 CELLULITIS AND ABSCESS OF LEFT LOWER EXTREMITY: ICD-10-CM

## 2022-08-29 DIAGNOSIS — L97.812 NON-PRESSURE CHRONIC ULCER OF OTHER PART OF RIGHT LOWER LEG WITH FAT LAYER EXPOSED (HCC): Primary | ICD-10-CM

## 2022-08-29 DIAGNOSIS — L02.416 CELLULITIS AND ABSCESS OF LEFT LOWER EXTREMITY: ICD-10-CM

## 2022-08-29 DIAGNOSIS — L97.822 NON-PRESSURE CHRONIC ULCER OF OTHER PART OF LEFT LOWER LEG WITH FAT LAYER EXPOSED (HCC): ICD-10-CM

## 2022-08-29 PROCEDURE — 11042 DBRDMT SUBQ TIS 1ST 20SQCM/<: CPT

## 2022-08-29 PROCEDURE — 11045 DBRDMT SUBQ TISS EACH ADDL: CPT

## 2022-08-29 PROCEDURE — 74011000250 HC RX REV CODE- 250: Performed by: SPECIALIST

## 2022-08-29 RX ORDER — MUPIROCIN 20 MG/G
OINTMENT TOPICAL ONCE
Status: CANCELLED | OUTPATIENT
Start: 2022-08-29 | End: 2022-08-29

## 2022-08-29 RX ORDER — LIDOCAINE HYDROCHLORIDE 20 MG/ML
15 SOLUTION OROPHARYNGEAL ONCE
Status: COMPLETED | OUTPATIENT
Start: 2022-08-29 | End: 2022-08-29

## 2022-08-29 RX ORDER — SILVER SULFADIAZINE 10 G/1000G
CREAM TOPICAL ONCE
Status: CANCELLED | OUTPATIENT
Start: 2022-08-29 | End: 2022-08-29

## 2022-08-29 RX ORDER — LIDOCAINE HYDROCHLORIDE 20 MG/ML
15 SOLUTION OROPHARYNGEAL ONCE
Status: CANCELLED | OUTPATIENT
Start: 2022-08-29 | End: 2022-08-29

## 2022-08-29 RX ADMIN — LIDOCAINE HYDROCHLORIDE 15 ML: 20 SOLUTION ORAL; TOPICAL at 13:28

## 2022-08-29 NOTE — DISCHARGE INSTRUCTIONS
Discharge Instructions for  [address] [place] [address], [address] [place] [address]  Telephone: [phone]    NAME:  Anamika Norton. YOB: 1945  MEDICAL RECORD NUMBER:  [id number]  DATE:  8/29/2022      Return Appointment:  [] Dressing supply provider:   [x] Home Healthcare: [facility]  [x] Return Appointment: 2 Week(s)  [] Nurse Visit: Apolinar Eaton    [] Discharge from [facility]  [] Ordered tests:   [] Referrals:   [] Rx:   [] Other:      Wound Cleansing:   Do not scrub or use excessive force. Cleanse wound prior to applying a clean dressing with:  [] Normal Saline   [x] Mild Soap & Water/Baby Shampoo   [] Keep Wound Dry in Shower  [] Other:      Topical Treatments:  Do not apply lotions, creams, or ointments to wound bed unless directed. [x] Apply moisturizing lotion to skin surrounding the wound prior to dressing change.  [] Apply antifungal ointment to skin surrounding the wound prior to dressing change.  [] Apply thin film of moisture barrier/zinc ointment to skin immediately around wound. [] Other:       Dressings:           Wound Location R and L legs   [x] Apply Primary Dressing:       [] MediHoney Gel    [] MediHoney Alginate               [] Calcium Alginate      [x] Calcium Alginate with Silver: Silvercel   [] Collagen                   [] Collagen with Silver                [] Santyl with Moistened saline gauze              [] Hydrofera Blue (cut to size and moistened)  [] Hydrofera Blue Ready (Cut to size)   [] NS wet to dry    [] Betadine wet to dry              [] Hydrogel                [] Mepitel     [] Bactroban/Mupirocin               [x] Other: Drawtex    [x] Cover and Secure with:     [x] Gauze [] Laymond Riddles [] Kerlix   [] Foam [] Super Absorbant [] ABD     [] ACE Wrap            [] Other:    Limit contact of tape with skin.     [] Change dressing: [] Daily    [] Every Other Day [] Once per week   [x] Twice per week   [] Three times per week [] Do Not Change Dressing   [] Other:     Edema Control:  Apply: [] Compression Stocking:  mmHg  []Right Leg []Left Leg   [] Tubigrip []Right Leg Double Layer []Left Leg Double Layer      []Right Leg Single Layer []Left Leg Single Layer     [] Elevate leg(s) above the level of the heart when sitting. [] Avoid prolonged standing in one place. Compression:  Apply: [x] Multilayer Compression Wrap: [x]RightLeg [x]Left Leg                                 []Coflex   []Coflex Lite             [x]Unna Lite     [x] Do not get leg(s) with wrap wet. If wraps become too tight call the center or completely remove the wrap. [x] Elevate leg(s) above the level of the heart when sitting. [x] Avoid prolonged standing in one place. Dietary:  [x] Diet as tolerated: [] Calorie Diabetic Diet: [] No Added Salt:  [x] Increase Protein: [] Other:     Activity:  [x] Activity as tolerated:    [] Patient has no activity restrictions       [] Strict Bedrest:   [] Remain off Work:       [] May return to full duty work:                                     [] Return to work with restrictions:               [address] Information: Should you experience any significant changes in your wound(s) or have questions about your wound care, please contact Errol Pereira 26 at [place] [address] 8:00 am - 4:30. If you need help with your wound outside of these hours and cannot wait until we are again available, contact your PCP or go to the hospital emergency room. PLEASE NOTE: IF YOU ARE UNABLE TO OBTAIN WOUND SUPPLIES, CONTINUE TO USE THE SUPPLIES YOU HAVE AVAILABLE UNTIL YOU ARE ABLE TO REACH US. IT IS MOST IMPORTANT TO KEEP THE WOUND COVERED AT ALL TIMES.     [x] Dr. Melissa Duenas   [] Dr. Nona Marquez  [] Dr. Ashley Henry

## 2022-08-29 NOTE — WOUND CARE
[employer] Application   Below Knee    NAME:  Amado Bang. YOB: 1945  MEDICAL RECORD NUMBER:  [id number]  DATE:  8/29/2022    Remove old [employer] or Boots. Applied moisturizing agent to dry skin as needed. Appied primary and secondary dressing as ordered. Applied Unna roll from toes to knee overlapping each time. Applied ace wrap or coban from toes to below the knee. Secured with tape and/or metal clips covered with tape. Instructed patient/caregiver to keep dressing dry and intact. DO NOT REMOVE DRESSING. Instructed pt/family/caregiver to report excessive draining, loose bandage, wet dressing, severe pain or tingling in toes. Applied [employer] dressing below the knee to bilateral lower legs. Unna Boot(s) were applied per  Guidelines.     Response to treatment: Well tolerated by patient      Electronically signed by Lieutenant Richard LPN on 5/23/4579 at 9:30 PM

## 2022-08-29 NOTE — WOUND CARE
[address]   Progress Note and Procedure Note     David Workman MEDICAL RECORD NUMBER:  [id number]  AGE: 65 y.o. RACE BLACK/  GENDER: male  : 1945  EPISODE DATE:  2022    Subjective:   No new problems  Chief Complaint   Patient presents with    Wound Check     Bilateral legs         HISTORY of PRESENT ILLNESS HPI    David Workman is a 65 y.o. male who presents today for wound/ulcer evaluation. History of Wound Context: BLE  Wound/Ulcer Pain Timing/Severity: mild  Quality of pain: aching  Severity:  1 / 10   Modifying Factors: None  Associated Signs/Symptoms: edema    Ulcer Identification:  Ulcer Type: lymphedema    Contributing Factors: lymphedema    Wound: BLE         PAST MEDICAL HISTORY    Past Medical History:   Diagnosis Date    Dyslipidemia     HTN (hypertension)         PAST SURGICAL HISTORY    Past Surgical History:   Procedure Laterality Date    HX CHOLECYSTECTOMY         FAMILY HISTORY    Family History   Problem Relation Age of Onset    Diabetes Mother     Diabetes Brother     Diabetes Son        SOCIAL HISTORY    Social History     Tobacco Use    Smoking status: Never    Smokeless tobacco: Never   Vaping Use    Vaping Use: Never used   Substance Use Topics    Alcohol use: Never    Drug use: Never       ALLERGIES    No Known Allergies    MEDICATIONS    Current Outpatient Medications on File Prior to Encounter   Medication Sig Dispense Refill    multivitamin (ONE A DAY) tablet Take 1 Tablet by mouth in the morning. furosemide (LASIX) 40 mg tablet Take 40 mg by mouth two (2) times a day. vitamin E, dl,tocopheryl acet, (vitamin E acetate) 400 unit capsule Take 400 Units by mouth daily. aspirin (ASPIRIN) 325 mg tablet Take 325 mg by mouth daily. No current facility-administered medications on file prior to encounter. REVIEW OF SYSTEMS    Pertinent items are noted in HPI.     Objective:     Visit Vitals  BP 101/63 (BP 1 Location: Left upper arm, BP Patient Position: At rest;Sitting)   Pulse 82   Temp 99.6 °F (37.6 °C)   Resp 18       Wt Readings from Last 3 Encounters:   08/08/22 122.5 kg (270 lb)   07/21/22 130.6 kg (288 lb)   02/21/22 126 kg (277 lb 12.8 oz)       PHYSICAL EXAM  Bilateral leg wounds measure essentially unchanged, less slough  Pertinent items are noted in HPI.     Assessment:    Minimal change  Problem List Items Addressed This Visit       Cellulitis and abscess of left lower extremity    Relevant Medications    lidocaine (XYLOCAINE) 2 % viscous solution 15 mL (Completed) (Start on 8/29/2022  2:00 PM)    Other Relevant Orders    INITIATE OUTPATIENT WOUND CARE PROTOCOL    Non-pressure chronic ulcer of other part of right lower leg with 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 ([address] - Primary    Relevant Medications    lidocaine (XYLOCAINE) 2 % viscous solution 15 mL (Completed) (Start on 8/29/2022  2:00 PM)    Other Relevant Orders    INITIATE OUTPATIENT WOUND CARE PROTOCOL    Non-pressure chronic ulcer of other part of left lower leg with 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 ([address]    Relevant Medications    lidocaine (XYLOCAINE) 2 % viscous solution 15 mL (Completed) (Start on 8/29/2022  2:00 PM)    Other Relevant Orders    INITIATE OUTPATIENT WOUND CARE PROTOCOL       Wound Leg Left #1 circumferential 08/08/22 (Active)   Wound Image   08/29/22 1323   Wound Etiology Other (Comment) 08/08/22 1107   Dressing Status Breakthrough drainage noted 08/29/22 1323   Cleansed Cleansed with saline 08/29/22 1323   Wound Length (cm) 13 cm 08/29/22 1323   Wound Width (cm) 18 cm 08/29/22 1323   Wound Depth (cm) 0.1 cm 08/29/22 1323   Wound Surface Area (cm^2) 234 cm^2 08/29/22 1323   Change in Wound Size % 58.21 08/29/22 1323   Wound Volume (cm^3) 23.4 cm^3 08/29/22 1323   Wound Healing % 58 08/29/22 1323   Post-Procedure Length (cm) 13 cm 08/29/22 1338   Post-Procedure Width (cm) 18 cm 08/29/22 1338   Post-Procedure Depth (cm) 0.1 cm 08/29/22 1338 Post-Procedure Surface Area (cm^2) 234 cm^2 08/29/22 1338   Post-Procedure Volume (cm^3) 23.4 cm^3 08/29/22 1338   Wound Assessment Granulation tissue;Slough 08/29/22 1323   Drainage Amount Copious 08/29/22 1323   Drainage Description Serosanguinous 08/29/22 1323   Wound Odor Moderate 08/29/22 1323   Dinorah-Wound/Incision Assessment Maceration; Hyperpigmented 08/29/22 1323   Edges Attached edges 08/29/22 1323   Wound Thickness Description Partial thickness 08/29/22 1323   Number of days: 21       Wound Leg Right #2 circumferential 08/08/22 (Active)   Wound Image   08/29/22 1326   Wound Etiology Other (Comment) 08/08/22 1107   Dressing Status Breakthrough drainage noted 08/29/22 1326   Cleansed Wound cleanser 08/29/22 1326   Wound Length (cm) 15.5 cm 08/29/22 1326   Wound Width (cm) 15 cm 08/29/22 1326   Wound Depth (cm) 0.1 cm 08/29/22 1326   Wound Surface Area (cm^2) 232.5 cm^2 08/29/22 1326   Change in Wound Size % 52.16 08/29/22 1326   Wound Volume (cm^3) 23.25 cm^3 08/29/22 1326   Wound Healing % 52 08/29/22 1326   Post-Procedure Length (cm) 15.5 cm 08/29/22 1338   Post-Procedure Width (cm) 15 cm 08/29/22 1338   Post-Procedure Depth (cm) 0.1 cm 08/29/22 1338   Post-Procedure Surface Area (cm^2) 232.5 cm^2 08/29/22 1338   Post-Procedure Volume (cm^3) 23.25 cm^3 08/29/22 1338   Wound Assessment Slough;Granulation tissue 08/29/22 1326   Drainage Amount Copious 08/29/22 1326   Drainage Description Serosanguinous 08/29/22 1326   Wound Odor Moderate 08/29/22 1326   Dinorah-Wound/Incision Assessment Maceration 08/29/22 1326   Edges Attached edges 08/29/22 1326   Wound Thickness Description Partial thickness 08/29/22 1326   Number of days: 21       POP IN PROCEDURE TYPE (DEBRIDEMENT, BIOPSY, I&D, SKIN SUB, CHEMICAL CAUERTY)     Plan:   Continue silvercel + drawtex  Switch from tubi  to [employer]    Treatment Note please see attached Discharge Instructions    Written patient dismissal instructions given to patient or POA.         Electronically signed by Harpal Moncada MD on 8/29/2022 at 1:49 PM               Debridement Wound Care        Problem List Items Addressed This Visit       Cellulitis and abscess of left lower extremity    Relevant Medications    lidocaine (XYLOCAINE) 2 % viscous solution 15 mL (Completed) (Start on 8/29/2022  2:00 PM)    Other Relevant Orders    INITIATE OUTPATIENT WOUND CARE PROTOCOL    Non-pressure chronic ulcer of other part of right lower leg with 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 ([address] - Primary    Relevant Medications    lidocaine (XYLOCAINE) 2 % viscous solution 15 mL (Completed) (Start on 8/29/2022  2:00 PM)    Other Relevant Orders    INITIATE OUTPATIENT WOUND CARE PROTOCOL    Non-pressure chronic ulcer of other part of left lower leg with 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 ([address]    Relevant Medications    lidocaine (XYLOCAINE) 2 % viscous solution 15 mL (Completed) (Start on 8/29/2022  2:00 PM)    Other Relevant Orders    INITIATE OUTPATIENT WOUND CARE PROTOCOL       Procedure Note  Indications:  Based on my examination of this patient's wound(s)/ulcer(s) today, debridement is required to promote healing and evaluate the wound base.     Performed by: Harpal oMncada MD    Consent obtained: Yes    Time out taken: Yes    Debridement: Excisional    Using curette the wound(s)/ulcer(s) was/were sharply debrided down through and including the removal of    subcutaneous tissue    Devitalized Tissue Debrided: slough    Pre Debridement Measurements:  Are located in the [facility]  Documentation Flow Sheet    Non-Pressure ulcer, 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    Wound/Ulcer #: 1 and 2    Post Debridement Measurements:  Wound/Ulcer Descriptions are Pre Debridement except measurements:    Wound Leg Left #1 circumferential 08/08/22 (Active)   Wound Image   08/29/22 1323   Wound Etiology Other (Comment) 08/08/22 1107   Dressing Status Breakthrough drainage noted 08/29/22 1323   Cleansed Cleansed with saline 08/29/22 1323   Wound Length (cm) 13 cm 08/29/22 1323   Wound Width (cm) 18 cm 08/29/22 1323   Wound Depth (cm) 0.1 cm 08/29/22 1323   Wound Surface Area (cm^2) 234 cm^2 08/29/22 1323   Change in Wound Size % 58.21 08/29/22 1323   Wound Volume (cm^3) 23.4 cm^3 08/29/22 1323   Wound Healing % 58 08/29/22 1323   Post-Procedure Length (cm) 13 cm 08/29/22 1338   Post-Procedure Width (cm) 18 cm 08/29/22 1338   Post-Procedure Depth (cm) 0.1 cm 08/29/22 1338   Post-Procedure Surface Area (cm^2) 234 cm^2 08/29/22 1338   Post-Procedure Volume (cm^3) 23.4 cm^3 08/29/22 1338   Wound Assessment Granulation tissue;Slough 08/29/22 1323   Drainage Amount Copious 08/29/22 1323   Drainage Description Serosanguinous 08/29/22 1323   Wound Odor Moderate 08/29/22 1323   Dinorah-Wound/Incision Assessment Maceration; Hyperpigmented 08/29/22 1323   Edges Attached edges 08/29/22 1323   Wound Thickness Description Partial thickness 08/29/22 1323   Number of days: 21       Wound Leg Right #2 circumferential 08/08/22 (Active)   Wound Image   08/29/22 1326   Wound Etiology Other (Comment) 08/08/22 1107   Dressing Status Breakthrough drainage noted 08/29/22 1326   Cleansed Wound cleanser 08/29/22 1326   Wound Length (cm) 15.5 cm 08/29/22 1326   Wound Width (cm) 15 cm 08/29/22 1326   Wound Depth (cm) 0.1 cm 08/29/22 1326   Wound Surface Area (cm^2) 232.5 cm^2 08/29/22 1326   Change in Wound Size % 52.16 08/29/22 1326   Wound Volume (cm^3) 23.25 cm^3 08/29/22 1326   Wound Healing % 52 08/29/22 1326   Post-Procedure Length (cm) 15.5 cm 08/29/22 1338   Post-Procedure Width (cm) 15 cm 08/29/22 1338   Post-Procedure Depth (cm) 0.1 cm 08/29/22 1338   Post-Procedure Surface Area (cm^2) 232.5 cm^2 08/29/22 1338   Post-Procedure Volume (cm^3) 23.25 cm^3 08/29/22 1338   Wound Assessment Slough;Granulation tissue 08/29/22 1326   Drainage Amount Copious 08/29/22 1326   Drainage Description Serosanguinous 08/29/22 1326   Wound Odor Moderate 08/29/22 1326   Dinorah-Wound/Incision Assessment Maceration 08/29/22 1326   Edges Attached edges 08/29/22 1326   Wound Thickness Description Partial thickness 08/29/22 1326   Number of days: 21        Total Surface Area Debrided:  240 sq cm     Estimated Blood Loss:  None    Hemostasis Achieved: Pressure    Procedural Pain: 1 / 10     Post Procedural Pain: 0 / 10     Response to treatment: Well tolerated by patient

## 2022-08-29 NOTE — WOUND CARE
Discharge Instructions for  [address] [place] [address], [facility]  Telephone: [phone]    NAME:  Amado Bang. YOB: 1945  MEDICAL RECORD NUMBER:  [id number]  DATE:  8/29/2022      Return Appointment:  [] Dressing supply provider:   [x] Home Healthcare: [address] [place] [address]  [x] Return Appointment: 2 Week(s)  [] Nurse Visit: [facility])    [] Discharge from [facility]  [] Ordered tests:   [] Referrals:   [] Rx:   [] Other:      Wound Cleansing:   Do not scrub or use excessive force. Cleanse wound prior to applying a clean dressing with:  [] Normal Saline   [x] Mild Soap & Water/Baby Shampoo   [] Keep Wound Dry in Shower  [] Other:      Topical Treatments:  Do not apply lotions, creams, or ointments to wound bed unless directed. [x] Apply moisturizing lotion to skin surrounding the wound prior to dressing change.  [] Apply antifungal ointment to skin surrounding the wound prior to dressing change.  [] Apply thin film of moisture barrier/zinc ointment to skin immediately around wound. [] Other:       Dressings:           Wound Location R and L legs   [x] Apply Primary Dressing:       [] MediHoney Gel    [] MediHoney Alginate               [] Calcium Alginate      [x] Calcium Alginate with Silver: Silvercel   [] Collagen                   [] Collagen with Silver                [] Santyl with Moistened saline gauze              [] Hydrofera Blue (cut to size and moistened)  [] Hydrofera Blue Ready (Cut to size)   [] NS wet to dry    [] Betadine wet to dry              [] Hydrogel                [] Mepitel     [] Bactroban/Mupirocin               [x] Other: Drawtex    [x] Cover and Secure with:     [x] Gauze [] Hannah Dunk [] Kerlix   [] Foam [] Super Absorbant [] ABD     [] ACE Wrap            [] Other:    Limit contact of tape with skin.     [] Change dressing: [] Daily    [] Every Other Day [] Once per week   [x] Twice per week   [] Three times per week [] Do Not Change Dressing   [] Other:     Edema Control:  Apply: [] Compression Stocking:  mmHg  []Right Leg []Left Leg   [] Tubigrip []Right Leg Double Layer []Left Leg Double Layer      []Right Leg Single Layer []Left Leg Single Layer     [] Elevate leg(s) above the level of the heart when sitting. [] Avoid prolonged standing in one place. Compression:  Apply: [x] Multilayer Compression Wrap: [x]RightLeg [x]Left Leg                                 []Coflex   []Coflex Lite             [x]Unna Lite     [x] Do not get leg(s) with wrap wet. If wraps become too tight call the center or completely remove the wrap. [x] Elevate leg(s) above the level of the heart when sitting. [x] Avoid prolonged standing in one place. Dietary:  [x] Diet as tolerated: [] Calorie Diabetic Diet: [] No Added Salt:  [x] Increase Protein: [] Other:     Activity:  [x] Activity as tolerated:    [] Patient has no activity restrictions       [] Strict Bedrest:   [] Remain off Work:       [] May return to full duty work:                                     [] Return to work with restrictions:               [address] Information: Should you experience any significant changes in your wound(s) or have questions about your wound care, please contact Errol [address] at [place] [address] 8:00 am - 4:30. If you need help with your wound outside of these hours and cannot wait until we are again available, contact your PCP or go to the hospital emergency room. PLEASE NOTE: IF YOU ARE UNABLE TO OBTAIN WOUND SUPPLIES, CONTINUE TO USE THE SUPPLIES YOU HAVE AVAILABLE UNTIL YOU ARE ABLE TO REACH US. IT IS MOST IMPORTANT TO KEEP THE WOUND COVERED AT ALL TIMES.     [x] Dr. Kaleen Goldmann   [] Dr. Huy Vogt  [] Dr. Víctor Loera

## 2022-09-12 ENCOUNTER — HOSPITAL ENCOUNTER (OUTPATIENT)
Dept: WOUND CARE | Age: 77
Discharge: HOME | End: 2022-09-12
Payer: MEDICARE

## 2022-09-12 VITALS
DIASTOLIC BLOOD PRESSURE: 78 MMHG | RESPIRATION RATE: 18 BRPM | SYSTOLIC BLOOD PRESSURE: 136 MMHG | HEART RATE: 84 BPM | TEMPERATURE: 97.1 F

## 2022-09-12 DIAGNOSIS — L02.416 CELLULITIS AND ABSCESS OF LEFT LOWER EXTREMITY: ICD-10-CM

## 2022-09-12 DIAGNOSIS — L97.812 NON-PRESSURE CHRONIC ULCER OF OTHER PART OF RIGHT LOWER LEG WITH FAT LAYER EXPOSED (HCC): Primary | ICD-10-CM

## 2022-09-12 DIAGNOSIS — L03.116 CELLULITIS AND ABSCESS OF LEFT LOWER EXTREMITY: ICD-10-CM

## 2022-09-12 DIAGNOSIS — L97.822 NON-PRESSURE CHRONIC ULCER OF OTHER PART OF LEFT LOWER LEG WITH FAT LAYER EXPOSED (HCC): ICD-10-CM

## 2022-09-12 PROCEDURE — 29580 STRAPPING UNNA BOOT: CPT

## 2022-09-12 PROCEDURE — 74011000250 HC RX REV CODE- 250: Performed by: SPECIALIST

## 2022-09-12 RX ORDER — MUPIROCIN 20 MG/G
OINTMENT TOPICAL ONCE
Status: CANCELLED | OUTPATIENT
Start: 2022-09-12 | End: 2022-09-12

## 2022-09-12 RX ORDER — LIDOCAINE HYDROCHLORIDE 20 MG/ML
15 SOLUTION OROPHARYNGEAL ONCE
Status: CANCELLED | OUTPATIENT
Start: 2022-09-12 | End: 2022-09-12

## 2022-09-12 RX ORDER — SILVER SULFADIAZINE 10 G/1000G
CREAM TOPICAL ONCE
Status: CANCELLED | OUTPATIENT
Start: 2022-09-12 | End: 2022-09-12

## 2022-09-12 RX ORDER — LIDOCAINE HYDROCHLORIDE 20 MG/ML
15 SOLUTION OROPHARYNGEAL ONCE
Status: COMPLETED | OUTPATIENT
Start: 2022-09-12 | End: 2022-09-12

## 2022-09-12 RX ADMIN — LIDOCAINE HYDROCHLORIDE 15 ML: 20 SOLUTION ORAL; TOPICAL at 13:30

## 2022-09-12 NOTE — WOUND CARE
Discharge Instructions for  [facility], [facility]  Telephone: [phone]    NAME:  Arminda Penn. YOB: 1945  MEDICAL RECORD NUMBER:  [id number]  DATE:  9/12/2022      Return Appointment:  [] Dressing supply provider:   [x] Home Healthcare: [address] [place] [address] (R leg is healed)  [x] Return Appointment: 2 Week(s)  [] Nurse Visit: Week(s)    [] Discharge from [facility]  [] Ordered tests:   [] Referrals:   [] Rx:   [] Other:      Wound Cleansing:   Do not scrub or use excessive force. Cleanse wound prior to applying a clean dressing with:  [] Normal Saline   [x] Mild Soap & Water/Baby Shampoo   [] Keep Wound Dry in Shower  [] Other:      Topical Treatments:  Do not apply lotions, creams, or ointments to wound bed unless directed. [x] Apply moisturizing lotion to skin surrounding the wound prior to dressing change.  [] Apply antifungal ointment to skin surrounding the wound prior to dressing change.  [] Apply thin film of moisture barrier/zinc ointment to skin immediately around wound. [] Other:       Dressings:           Wound Location R lateral foot and L leg   [x] Apply Primary Dressing:       [] MediHoney Gel    [] MediHoney Alginate               [] Calcium Alginate      [x] Calcium Alginate with Silver: Silvercel   [] Collagen                   [] Collagen with Silver                [] Santyl with Moistened saline gauze              [] Hydrofera Blue (cut to size and moistened)  [] Hydrofera Blue Ready (Cut to size)   [] NS wet to dry    [] Betadine wet to dry              [] Hydrogel                [] Mepitel     [] Bactroban/Mupirocin               [x] Other: Drawtex    [x] Cover and Secure with:     [x] Gauze [] Maria Del Carmen Dolores [] Kerlix   [] Foam [] Super Absorbant [] ABD     [] ACE Wrap            [] Other:    Limit contact of tape with skin.     [] Change dressing: [] Daily    [] Every Other Day [] Once per week [x] Twice per week   [] Three times per week [] Do Not Change Dressing   [] Other:     Edema Control:  Apply: [] Compression Stocking:  mmHg  []Right Leg []Left Leg   [x] Tubigrip [x]Right Leg Double Layer []Left Leg Double Layer      []Right Leg Single Layer []Left Leg Single Layer     [x] Elevate leg(s) above the level of the heart when sitting. [x] Avoid prolonged standing in one place. Compression:  Apply: [x] Multilayer Compression Wrap: []RightLeg [x]Left Leg                                 []Coflex   []Coflex Lite             [x]Unna Lite     [x] Do not get leg(s) with wrap wet. If wraps become too tight call the center or completely remove the wrap. [x] Elevate leg(s) above the level of the heart when sitting. [x] Avoid prolonged standing in one place. Dietary:  [x] Diet as tolerated: [] Calorie Diabetic Diet: [] No Added Salt:  [x] Increase Protein: [] Other:     Activity:  [x] Activity as tolerated:    [] Patient has no activity restrictions       [] Strict Bedrest:   [] Remain off Work:       [] May return to full duty work:                                     [] Return to work with restrictions:               [address] Information: Should you experience any significant changes in your wound(s) or have questions about your wound care, please contact Errol Barry at [place] [address] 8:00 am - 4:30. If you need help with your wound outside of these hours and cannot wait until we are again available, contact your PCP or go to the hospital emergency room. PLEASE NOTE: IF YOU ARE UNABLE TO OBTAIN WOUND SUPPLIES, CONTINUE TO USE THE SUPPLIES YOU HAVE AVAILABLE UNTIL YOU ARE ABLE TO REACH US. IT IS MOST IMPORTANT TO KEEP THE WOUND COVERED AT ALL TIMES.     [x] Dr. Melissa Duenas   [] Dr. Nona Marquez  [] Dr. [place] Henry

## 2022-09-12 NOTE — WOUND CARE
[address]   Progress Note and Procedure Note   NO Procedure      Susana Davis MEDICAL RECORD NUMBER:  [id number]  AGE: 65 y.o. RACE BLACK/  GENDER: male  : 1945  EPISODE DATE:  2022    Subjective:   Pain R foot from trying to force foot into tight shoe using shoe horn    Chief Complaint   Patient presents with    Wound Check     Bilateral legs         HISTORY of PRESENT ILLNESS HPI    Susana Davis is a 65 y.o. male who presents today for wound/ulcer evaluation. History of Wound Context: BLE  Wound/Ulcer Pain Timing/Severity: moderate  Quality of pain: aching  Severity:  2 / 10   Modifying Factors: None  Associated Signs/Symptoms: edema    Ulcer Identification:  Ulcer Type: venous    Contributing Factors: lymphedema    Wound: BLE         PAST MEDICAL HISTORY    Past Medical History:   Diagnosis Date    Dyslipidemia     HTN (hypertension)         PAST SURGICAL HISTORY    Past Surgical History:   Procedure Laterality Date    HX CHOLECYSTECTOMY         FAMILY HISTORY    Family History   Problem Relation Age of Onset    Diabetes Mother     Diabetes Brother     Diabetes Son        SOCIAL HISTORY    Social History     Tobacco Use    Smoking status: Never    Smokeless tobacco: Never   Vaping Use    Vaping Use: Never used   Substance Use Topics    Alcohol use: Never    Drug use: Never       ALLERGIES    No Known Allergies    MEDICATIONS    Current Outpatient Medications on File Prior to Encounter   Medication Sig Dispense Refill    multivitamin (ONE A DAY) tablet Take 1 Tablet by mouth in the morning. furosemide (LASIX) 40 mg tablet Take 40 mg by mouth two (2) times a day. vitamin E, dl,tocopheryl acet, (vitamin E acetate) 400 unit capsule Take 400 Units by mouth daily. aspirin (ASPIRIN) 325 mg tablet Take 325 mg by mouth daily. No current facility-administered medications on file prior to encounter.        REVIEW OF SYSTEMS    Pertinent items are noted in HPI. Objective:     Visit Vitals  /78 (BP 1 Location: Left arm, BP Patient Position: At rest;Sitting)   Pulse 84   Temp 97.1 °F (36.2 °C)   Resp 18       Wt Readings from Last 3 Encounters:   08/08/22 122.5 kg (270 lb)   07/21/22 130.6 kg (288 lb)   02/21/22 126 kg (277 lb 12.8 oz)       PHYSICAL EXAM  R leg healed, new wound R lateral foot  LLE wounds slightly smaller    Pertinent items are noted in HPI. Assessment:   Some progress    Problem List Items Addressed This Visit       Cellulitis and abscess of left lower extremity    Relevant Medications    lidocaine (XYLOCAINE) 2 % viscous solution 15 mL (Completed) (Start on 9/12/2022  2:00 PM)    Other Relevant Orders    INITIATE OUTPATIENT WOUND CARE PROTOCOL    Non-pressure chronic ulcer of other part of right lower leg with 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 ([address] - Primary    Relevant Medications    lidocaine (XYLOCAINE) 2 % viscous solution 15 mL (Completed) (Start on 9/12/2022  2:00 PM)    Other Relevant Orders    INITIATE OUTPATIENT WOUND CARE PROTOCOL    Non-pressure chronic ulcer of other part of left lower leg with 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 ([address]    Relevant Medications    lidocaine (XYLOCAINE) 2 % viscous solution 15 mL (Completed) (Start on 9/12/2022  2:00 PM)    Other Relevant Orders    INITIATE OUTPATIENT WOUND CARE PROTOCOL       Procedure Note  Indications:  Based on my examination of this patient's wound(s)/ulcer(s) today, debridement is not required to promote healing and evaluate the wound base.     Wound Leg Left #1 circumferential 08/08/22 (Active)   Wound Image   09/12/22 1336   Wound Etiology Other (Comment) 09/12/22 1336   Dressing Status Breakthrough drainage noted 09/12/22 1336   Cleansed Soap and water 09/12/22 1336   Wound Length (cm) 14 cm 09/12/22 1336   Wound Width (cm) 10.5 cm 09/12/22 1336   Wound Depth (cm) 0.1 cm 09/12/22 1336   Wound Surface Area (cm^2) 147 cm^2 09/12/22 1336   Change in Wound Size % 73.75 09/12/22 1336   Wound Volume (cm^3) 14.7 cm^3 09/12/22 1336   Wound Healing % 74 09/12/22 1336   Post-Procedure Length (cm) 13 cm 08/29/22 1338   Post-Procedure Width (cm) 18 cm 08/29/22 1338   Post-Procedure Depth (cm) 0.1 cm 08/29/22 1338   Post-Procedure Surface Area (cm^2) 234 cm^2 08/29/22 1338   Post-Procedure Volume (cm^3) 23.4 cm^3 08/29/22 1338   Wound Assessment Granulation tissue;Slough 09/12/22 1336   Drainage Amount Large 09/12/22 1336   Drainage Description Serosanguinous; Yellow 09/12/22 1336   Wound Odor Moderate 09/12/22 1336   Dinorah-Wound/Incision Assessment Maceration; Hyperpigmented;Dry/flaky 09/12/22 1336   Edges Attached edges 09/12/22 1336   Wound Thickness Description Partial thickness 09/12/22 1336   Number of days: 35       Wound Foot Right #3 09/12/22 (Active)   Wound Image   09/12/22 1338   Wound Etiology Other (Comment) 09/12/22 1338   Dressing Status Clean;Dry; Intact 09/12/22 1338   Cleansed Soap and water 09/12/22 1338   Wound Length (cm) 1 cm 09/12/22 1338   Wound Width (cm) 0.5 cm 09/12/22 1338   Wound Depth (cm) 0.1 cm 09/12/22 1338   Wound Surface Area (cm^2) 0.5 cm^2 09/12/22 1338   Wound Volume (cm^3) 0.05 cm^3 09/12/22 1338   Wound Assessment Granulation tissue;Slough 09/12/22 1338   Drainage Amount Small 09/12/22 1338   Drainage Description Serosanguinous 09/12/22 1338   Wound Odor None 09/12/22 1338   Dinorah-Wound/Incision Assessment Dry/flaky 09/12/22 1338   Edges Attached edges 09/12/22 1338   Wound Thickness Description Partial thickness 09/12/22 1338   Number of days: 0        Plan:   Silvercel + drawtex BLE  Unna lite on LLE  Double tubi  on RLE    Treatment Note please see attached Discharge Instructions    Written patient dismissal instructions given to patient or POA.          Electronically signed by Miladys Barragan MD on 9/12/2022 at 1:59 PM

## 2022-09-12 NOTE — DISCHARGE INSTRUCTIONS
Discharge Instructions for  [address] [place] [address], [address] [place] [address]  Telephone: [phone]    NAME:  Sagar Terry. YOB: 1945  MEDICAL RECORD NUMBER:  [id number]  DATE:  9/12/2022      Return Appointment:  [] Dressing supply provider:   [x] Home Healthcare: [address] [place] [address] (R leg is healed)  [x] Return Appointment: 2 Week(s)  [] Nurse Visit: Week(s)    [] Discharge from [facility]  [] Ordered tests:   [] Referrals:   [] Rx:   [] Other:      Wound Cleansing:   Do not scrub or use excessive force. Cleanse wound prior to applying a clean dressing with:  [] Normal Saline   [x] Mild Soap & Water/Baby Shampoo   [] Keep Wound Dry in Shower  [] Other:      Topical Treatments:  Do not apply lotions, creams, or ointments to wound bed unless directed. [x] Apply moisturizing lotion to skin surrounding the wound prior to dressing change.  [] Apply antifungal ointment to skin surrounding the wound prior to dressing change.  [] Apply thin film of moisture barrier/zinc ointment to skin immediately around wound. [] Other:       Dressings:           Wound Location R lateral foot and L leg   [x] Apply Primary Dressing:       [] MediHoney Gel    [] MediHoney Alginate               [] Calcium Alginate      [x] Calcium Alginate with Silver: Silvercel   [] Collagen                   [] Collagen with Silver                [] Santyl with Moistened saline gauze              [] Hydrofera Blue (cut to size and moistened)  [] Hydrofera Blue Ready (Cut to size)   [] NS wet to dry    [] Betadine wet to dry              [] Hydrogel                [] Mepitel     [] Bactroban/Mupirocin               [x] Other: Drawtex    [x] Cover and Secure with:     [x] Gauze [] Ellan Rosalio [] Kerlix   [] Foam [] Super Absorbant [] ABD     [] ACE Wrap            [] Other:    Limit contact of tape with skin.     [] Change dressing: [] Daily    [] Every Other Day [] Once per week   [x] Twice per week   [] Three times per week [] Do Not Change Dressing   [] Other:     Edema Control:  Apply: [] Compression Stocking:  mmHg  []Right Leg []Left Leg   [x] Tubigrip [x]Right Leg Double Layer []Left Leg Double Layer      []Right Leg Single Layer []Left Leg Single Layer     [x] Elevate leg(s) above the level of the heart when sitting. [x] Avoid prolonged standing in one place. Compression:  Apply: [x] Multilayer Compression Wrap: []RightLeg [x]Left Leg                                 []Coflex   []Coflex Lite             [x]Unna Lite     [x] Do not get leg(s) with wrap wet. If wraps become too tight call the center or completely remove the wrap. [x] Elevate leg(s) above the level of the heart when sitting. [x] Avoid prolonged standing in one place. Dietary:  [x] Diet as tolerated: [] Calorie Diabetic Diet: [] No Added Salt:  [x] Increase Protein: [] Other:     Activity:  [x] Activity as tolerated:    [] Patient has no activity restrictions       [] Strict Bedrest:   [] Remain off Work:       [] May return to full duty work:                                     [] Return to work with restrictions:               [address] Information: Should you experience any significant changes in your wound(s) or have questions about your wound care, please contact Errol Barry at [place] [address] 8:00 am - 4:30. If you need help with your wound outside of these hours and cannot wait until we are again available, contact your PCP or go to the hospital emergency room. PLEASE NOTE: IF YOU ARE UNABLE TO OBTAIN WOUND SUPPLIES, CONTINUE TO USE THE SUPPLIES YOU HAVE AVAILABLE UNTIL YOU ARE ABLE TO REACH US. IT IS MOST IMPORTANT TO KEEP THE WOUND COVERED AT ALL TIMES.     [x] Dr. Linda aPscal   [] Dr. Fanny Denton  [] Dr. Pili Novak

## 2022-09-12 NOTE — WOUND CARE
[employer] Application   Below Knee    NAME:  Gloria Quinteros. YOB: 1945  MEDICAL RECORD NUMBER:  [id number]  DATE:  9/12/2022    Remove old [employer] or Boots. Applied moisturizing agent to dry skin as needed. Appied primary and secondary dressing as ordered. Applied Unna roll from toes to knee overlapping each time. Applied ace wrap or coban from toes to below the knee. Secured with tape and/or metal clips covered with tape. Instructed patient/caregiver to keep dressing dry and intact. DO NOT REMOVE DRESSING. Instructed pt/family/caregiver to report excessive draining, loose bandage, wet dressing, severe pain or tingling in toes. Applied [employer] dressing below the knee to left lower leg. Unna Boot(s) were applied per  Guidelines.     Response to treatment: Well tolerated by patient      Electronically signed by Carey Perales LPN on 5/93/2299 at 4:37 PM

## 2022-09-26 ENCOUNTER — HOSPITAL ENCOUNTER (OUTPATIENT)
Dept: WOUND CARE | Age: 77
Discharge: HOME | End: 2022-09-26
Payer: MEDICARE

## 2022-09-26 VITALS
RESPIRATION RATE: 20 BRPM | DIASTOLIC BLOOD PRESSURE: 71 MMHG | SYSTOLIC BLOOD PRESSURE: 131 MMHG | HEART RATE: 81 BPM | TEMPERATURE: 97.1 F

## 2022-09-26 DIAGNOSIS — L97.822 NON-PRESSURE CHRONIC ULCER OF OTHER PART OF LEFT LOWER LEG WITH FAT LAYER EXPOSED (HCC): ICD-10-CM

## 2022-09-26 DIAGNOSIS — L02.416 CELLULITIS AND ABSCESS OF LEFT LOWER EXTREMITY: ICD-10-CM

## 2022-09-26 DIAGNOSIS — L03.116 CELLULITIS AND ABSCESS OF LEFT LOWER EXTREMITY: ICD-10-CM

## 2022-09-26 DIAGNOSIS — L97.812 NON-PRESSURE CHRONIC ULCER OF OTHER PART OF RIGHT LOWER LEG WITH FAT LAYER EXPOSED (HCC): Primary | ICD-10-CM

## 2022-09-26 PROBLEM — L97.512: Status: ACTIVE | Noted: 2022-09-26

## 2022-09-26 PROCEDURE — 11042 DBRDMT SUBQ TIS 1ST 20SQCM/<: CPT

## 2022-09-26 PROCEDURE — 29580 STRAPPING UNNA BOOT: CPT

## 2022-09-26 PROCEDURE — 74011000250 HC RX REV CODE- 250: Performed by: SPECIALIST

## 2022-09-26 RX ORDER — SILVER SULFADIAZINE 10 G/1000G
CREAM TOPICAL ONCE
Status: CANCELLED | OUTPATIENT
Start: 2022-09-26 | End: 2022-09-26

## 2022-09-26 RX ORDER — MUPIROCIN 20 MG/G
OINTMENT TOPICAL ONCE
Status: CANCELLED | OUTPATIENT
Start: 2022-09-26 | End: 2022-09-26

## 2022-09-26 RX ORDER — LIDOCAINE HYDROCHLORIDE 20 MG/ML
15 SOLUTION OROPHARYNGEAL ONCE
Status: CANCELLED | OUTPATIENT
Start: 2022-09-26 | End: 2022-09-26

## 2022-09-26 RX ORDER — LIDOCAINE HYDROCHLORIDE 20 MG/ML
15 SOLUTION OROPHARYNGEAL ONCE
Status: COMPLETED | OUTPATIENT
Start: 2022-09-26 | End: 2022-09-26

## 2022-09-26 RX ADMIN — LIDOCAINE HYDROCHLORIDE 15 ML: 20 SOLUTION ORAL; TOPICAL at 11:28

## 2022-09-26 NOTE — WOUND CARE
[address]   Progress Note and Procedure Note     Jessika Khan. MEDICAL RECORD NUMBER:  [id number]  AGE: 65 y.o. RACE BLACK/  GENDER: male  : 1945  EPISODE DATE:  2022    Subjective:   No new problems reported  Chief Complaint   Patient presents with    Wound Check     Khanh. Lower legs         HISTORY of PRESENT ILLNESS HPI    Jessika Urbano is a 65 y.o. male who presents today for wound/ulcer evaluation. History of Wound Context: BLE  Wound/Ulcer Pain Timing/Severity: mild  Quality of pain: aching and dull  Severity:  1 / 10   Modifying Factors: None  Associated Signs/Symptoms: edema    Ulcer Identification:  Ulcer Type: venous    Contributing Factors: lymphedema    Wound: BLE         PAST MEDICAL HISTORY    Past Medical History:   Diagnosis Date    Dyslipidemia     HTN (hypertension)         PAST SURGICAL HISTORY    Past Surgical History:   Procedure Laterality Date    HX CHOLECYSTECTOMY         FAMILY HISTORY    Family History   Problem Relation Age of Onset    Diabetes Mother     Diabetes Brother     Diabetes Son        SOCIAL HISTORY    Social History     Tobacco Use    Smoking status: Never    Smokeless tobacco: Never   Vaping Use    Vaping Use: Never used   Substance Use Topics    Alcohol use: Never    Drug use: Never       ALLERGIES    No Known Allergies    MEDICATIONS    Current Outpatient Medications on File Prior to Encounter   Medication Sig Dispense Refill    multivitamin (ONE A DAY) tablet Take 1 Tablet by mouth in the morning. furosemide (LASIX) 40 mg tablet Take 40 mg by mouth two (2) times a day. vitamin E, dl,tocopheryl acet, (vitamin E acetate) 400 unit capsule Take 400 Units by mouth daily. aspirin (ASPIRIN) 325 mg tablet Take 325 mg by mouth daily. No current facility-administered medications on file prior to encounter. REVIEW OF SYSTEMS    Pertinent items are noted in HPI.     Objective:     Visit Vitals  /71 (BP 1 Location: Right upper arm, BP Patient Position: At rest;Sitting)   Pulse 81   Temp 97.1 °F (36.2 °C)   Resp 20       Wt Readings from Last 3 Encounters:   08/08/22 122.5 kg (270 lb)   07/21/22 130.6 kg (288 lb)   02/21/22 126 kg (277 lb 12.8 oz)       PHYSICAL EXAM  LLE wound smaller, RLE wound larger, both superficial, no infection    Pertinent items are noted in HPI. Assessment:    L improved, R worse    Problem List Items Addressed This Visit       Cellulitis and abscess of left lower extremity    Relevant Medications    lidocaine (XYLOCAINE) 2 % viscous solution 15 mL (Completed) (Start on 9/26/2022 12:00 PM)    Other Relevant Orders    INITIATE OUTPATIENT WOUND CARE PROTOCOL    Non-pressure chronic ulcer of other part of right lower leg with 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 ([address] - Primary    Relevant Medications    lidocaine (XYLOCAINE) 2 % viscous solution 15 mL (Completed) (Start on 9/26/2022 12:00 PM)    Other Relevant Orders    INITIATE OUTPATIENT WOUND CARE PROTOCOL    Non-pressure chronic ulcer of other part of left lower leg with 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 ([address]    Relevant Medications    lidocaine (XYLOCAINE) 2 % viscous solution 15 mL (Completed) (Start on 9/26/2022 12:00 PM)    Other Relevant Orders    INITIATE OUTPATIENT WOUND CARE PROTOCOL       Wound Leg Left #1 circumferential 08/08/22 (Active)   Wound Image   09/26/22 1133   Wound Etiology Other (Comment) 09/26/22 1133   Dressing Status Clean;Dry; Intact 09/26/22 1133   Cleansed Cleansed with saline 09/26/22 1133   Wound Length (cm) 5 cm 09/26/22 1133   Wound Width (cm) 9 cm 09/26/22 1133   Wound Depth (cm) 0.1 cm 09/26/22 1133   Wound Surface Area (cm^2) 45 cm^2 09/26/22 1133   Change in Wound Size % 91.96 09/26/22 1133   Wound Volume (cm^3) 4.5 cm^3 09/26/22 1133   Wound Healing % 92 09/26/22 1133   Post-Procedure Length (cm) 13 cm 08/29/22 1338   Post-Procedure Width (cm) 18 cm 08/29/22 1338   Post-Procedure Depth (cm) 0.1 cm 08/29/22 5594 Post-Procedure Surface Area (cm^2) 234 cm^2 08/29/22 1338   Post-Procedure Volume (cm^3) 23.4 cm^3 08/29/22 1338   Wound Assessment Granulation tissue;Slough 09/26/22 1133   Drainage Amount Moderate 09/26/22 1133   Drainage Description Serosanguinous 09/26/22 1133   Wound Odor Mild 09/26/22 1133   Dinorah-Wound/Incision Assessment Hyperpigmented;Dry/flaky 09/26/22 1133   Edges Attached edges 09/26/22 1133   Wound Thickness Description Partial thickness 09/26/22 1133   Number of days: 49       Wound Foot Right #3 09/12/22 (Active)   Wound Image   09/26/22 1132   Wound Etiology Other (Comment) 09/26/22 1132   Dressing Status Clean;Dry; Intact 09/26/22 1132   Cleansed Cleansed with saline 09/26/22 1132   Wound Length (cm) 3.8 cm 09/26/22 1132   Wound Width (cm) 0.8 cm 09/26/22 1132   Wound Depth (cm) 0.2 cm 09/26/22 1132   Wound Surface Area (cm^2) 3.04 cm^2 09/26/22 1132   Change in Wound Size % -508 09/26/22 1132   Wound Volume (cm^3) 0.608 cm^3 09/26/22 1132   Wound Healing % -1116 09/26/22 1132   Post-Procedure Length (cm) 4.7 cm 09/26/22 1143   Post-Procedure Width (cm) 3.7 cm 09/26/22 1143   Post-Procedure Depth (cm) 0.1 cm 09/26/22 1143   Post-Procedure Surface Area (cm^2) 17.39 cm^2 09/26/22 1143   Post-Procedure Volume (cm^3) 1.739 cm^3 09/26/22 1143   Wound Assessment Granulation tissue;Slough 09/26/22 1132   Drainage Amount Moderate 09/26/22 1132   Drainage Description Serosanguinous 09/26/22 1132   Wound Odor Mild 09/26/22 1132   Dinorah-Wound/Incision Assessment Dry/flaky 09/26/22 1132   Edges Attached edges 09/26/22 1132   Wound Thickness Description Partial thickness 09/26/22 1132   Number of days: 14       POP IN PROCEDURE TYPE (DEBRIDEMENT, BIOPSY, I&D, SKIN SUB, CHEMICAL CAUERTY)     Plan:   Silvercel, drawtex, unna bilateral    Treatment Note please see attached Discharge Instructions    Written patient dismissal instructions given to patient or POA.          Electronically signed by Lora Officer, MD on 9/26/2022 at 11:49 AM               Debridement Wound Care        Problem List Items Addressed This Visit       Cellulitis and abscess of left lower extremity    Relevant Medications    lidocaine (XYLOCAINE) 2 % viscous solution 15 mL (Completed) (Start on 9/26/2022 12:00 PM)    Other Relevant Orders    INITIATE OUTPATIENT WOUND CARE PROTOCOL    Non-pressure chronic ulcer of other part of right lower leg with 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 ([address] - Primary    Relevant Medications    lidocaine (XYLOCAINE) 2 % viscous solution 15 mL (Completed) (Start on 9/26/2022 12:00 PM)    Other Relevant Orders    INITIATE OUTPATIENT WOUND CARE PROTOCOL    Non-pressure chronic ulcer of other part of left 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 Performed by: Alivia Thomas MD    Consent obtained: Yes    Time out taken: Yes    Debridement: Excisional    Using scissors and forceps the wound(s)/ulcer(s) was/were sharply debrided down through and including the removal of    dermis and subcutaneous tissue    Devitalized Tissue Debrided: slough and necrotic/eschar    Pre Debridement Measurements:  Are located in the [place]  Documentation Flow Sheet    Non-Pressure ulcer, 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    Wound/Ulcer #: 1 and 2    Post Debridement Measurements:  Wound/Ulcer Descriptions are Pre Debridement except measurements:    Wound Leg Left #1 circumferential 08/08/22 (Active)   Wound Image   09/26/22 1133   Wound Etiology Other (Comment) 09/26/22 1133   Dressing Status Clean;Dry; Intact 09/26/22 1133   Cleansed Cleansed with saline 09/26/22 1133   Wound Length (cm) 5 cm 09/26/22 1133 Wound Width (cm) 9 cm 09/26/22 1133   Wound Depth (cm) 0.1 cm 09/26/22 1133   Wound Surface Area (cm^2) 45 cm^2 09/26/22 1133   Change in Wound Size % 91.96 09/26/22 1133   Wound Volume (cm^3) 4.5 cm^3 09/26/22 1133   Wound Healing % 92 09/26/22 1133   Post-Procedure Length (cm) 13 cm 08/29/22 1338   Post-Procedure Width (cm) 18 cm 08/29/22 1338   Post-Procedure Depth (cm) 0.1 cm 08/29/22 1338   Post-Procedure Surface Area (cm^2) 234 cm^2 08/29/22 1338   Post-Procedure Volume (cm^3) 23.4 cm^3 08/29/22 1338   Wound Assessment Granulation tissue;Slough 09/26/22 1133   Drainage Amount Moderate 09/26/22 1133   Drainage Description Serosanguinous 09/26/22 1133   Wound Odor Mild 09/26/22 1133   Dinorah-Wound/Incision Assessment Hyperpigmented;Dry/flaky 09/26/22 1133   Edges Attached edges 09/26/22 1133   Wound Thickness Description Partial thickness 09/26/22 1133   Number of days: 49       Wound Foot Right #3 09/12/22 (Active)   Wound Image   09/26/22 1132   Wound Etiology Other (Comment) 09/26/22 1132   Dressing Status Clean;Dry; Intact 09/26/22 1132   Cleansed Cleansed with saline 09/26/22 1132   Wound Length (cm) 3.8 cm 09/26/22 1132   Wound Width (cm) 0.8 cm 09/26/22 1132   Wound Depth (cm) 0.2 cm 09/26/22 1132   Wound Surface Area (cm^2) 3.04 cm^2 09/26/22 1132   Change in Wound Size % -508 09/26/22 1132   Wound Volume (cm^3) 0.608 cm^3 09/26/22 1132   Wound Healing % -1116 09/26/22 1132   Post-Procedure Length (cm) 4.7 cm 09/26/22 1143   Post-Procedure Width (cm) 3.7 cm 09/26/22 1143   Post-Procedure Depth (cm) 0.1 cm 09/26/22 1143   Post-Procedure Surface Area (cm^2) 17.39 cm^2 09/26/22 1143   Post-Procedure Volume (cm^3) 1.739 cm^3 09/26/22 1143   Wound Assessment Granulation tissue;Slough 09/26/22 1132   Drainage Amount Moderate 09/26/22 1132   Drainage Description Serosanguinous 09/26/22 1132   Wound Odor Mild 09/26/22 1132   Dinorah-Wound/Incision Assessment Dry/flaky 09/26/22 1132   Edges Attached edges 09/26/22 1132   Wound Thickness Description Partial thickness 09/26/22 1132   Number of days: 14        Total Surface Area Debrided:  17.39 sq cm     Estimated Blood Loss:  Minimal     Hemostasis Achieved: Pressure    Procedural Pain: 2 / 10     Post Procedural Pain: 1 / 10     Response to treatment: Well tolerated by patient

## 2022-09-26 NOTE — DISCHARGE INSTRUCTIONS
Discharge Instructions for  [address] [place] [address], [address] [place] [address]  Telephone: [phone]    NAME:  Pastor Rincon. YOB: 1945  MEDICAL RECORD NUMBER:  [id number]  DATE:  9/26/2022      Return Appointment:  [] Dressing supply provider:   [x] Home Healthcare: [facility]   [x] Return Appointment: 1 Week(s)  [] Nurse Visit: [facility])    [] Discharge from [facility]  [] Ordered tests:   [] Referrals:   [] Rx:   [] Other:      Wound Cleansing:   Do not scrub or use excessive force. Cleanse wound prior to applying a clean dressing with:  [] Normal Saline   [x] Mild Soap & Water/Baby Shampoo   [] Keep Wound Dry in Shower  [] Other:      Topical Treatments:  Do not apply lotions, creams, or ointments to wound bed unless directed. [x] Apply moisturizing lotion to skin surrounding the wound prior to dressing change.  [] Apply antifungal ointment to skin surrounding the wound prior to dressing change.  [] Apply thin film of moisture barrier/zinc ointment to skin immediately around wound. [] Other:       Dressings:           Wound Location R lateral foot and L leg   [x] Apply Primary Dressing:       [] MediHoney Gel    [] MediHoney Alginate               [] Calcium Alginate      [x] Calcium Alginate with Silver: Silvercel   [] Collagen                   [] Collagen with Silver                [] Santyl with Moistened saline gauze              [] Hydrofera Blue (cut to size and moistened)  [] Hydrofera Blue Ready (Cut to size)   [] NS wet to dry    [] Betadine wet to dry              [] Hydrogel                [] Mepitel     [] Bactroban/Mupirocin               [x] Other: Drawtex    [x] Cover and Secure with:     [x] Gauze [] Paula Chapaa [] Kerlix   [] Foam [] Super Absorbant [] ABD     [] ACE Wrap            [] Other:    Limit contact of tape with skin.     [] Change dressing: [] Daily    [] Every Other Day [] Once per week   [x] Twice per week   [] Three times per week [] Do Not Change Dressing   [] Other:     Edema Control:  Apply: [] Compression Stocking:  mmHg  []Right Leg []Left Leg   [] Tubigrip []Right Leg Double Layer []Left Leg Double Layer      []Right Leg Single Layer []Left Leg Single Layer     [] Elevate leg(s) above the level of the heart when sitting. [] Avoid prolonged standing in one place. Compression:  Apply: [x] Multilayer Compression Wrap: [x]RightLeg [x]Left Leg                                 []Coflex   []Coflex Lite             [x]Unna Lite     [x] Do not get leg(s) with wrap wet. If wraps become too tight call the center or completely remove the wrap. [x] Elevate leg(s) above the level of the heart when sitting. [x] Avoid prolonged standing in one place. Dietary:  [x] Diet as tolerated: [] Calorie Diabetic Diet: [] No Added Salt:  [x] Increase Protein: [] Other:     Activity:  [x] Activity as tolerated:    [] Patient has no activity restrictions       [] Strict Bedrest:   [] Remain off Work:       [] May return to full duty work:                                     [] Return to work with restrictions:               [address] Information: Should you experience any significant changes in your wound(s) or have questions about your wound care, please contact Errol Pereira 26 at [place] [address] 8:00 am - 4:30. If you need help with your wound outside of these hours and cannot wait until we are again available, contact your PCP or go to the hospital emergency room. PLEASE NOTE: IF YOU ARE UNABLE TO OBTAIN WOUND SUPPLIES, CONTINUE TO USE THE SUPPLIES YOU HAVE AVAILABLE UNTIL YOU ARE ABLE TO REACH US. IT IS MOST IMPORTANT TO KEEP THE WOUND COVERED AT ALL TIMES.     [x] Dr. Evans Carlisle   [] Dr. Montilla Speaker  [] Dr. Jamar Arroyo

## 2022-09-26 NOTE — WOUND CARE
[employer] Application   Below Knee    NAME:  Dallin Mars. YOB: 1945  MEDICAL RECORD NUMBER:  [id number]  DATE:  9/26/2022    Remove old [employer] or Boots. Applied moisturizing agent to dry skin as needed. Appied primary and secondary dressing as ordered. Applied Unna roll from toes to knee overlapping each time. Applied ace wrap or coban from toes to below the knee. Secured with tape and/or metal clips covered with tape. Instructed patient/caregiver to keep dressing dry and intact. DO NOT REMOVE DRESSING. Instructed pt/family/caregiver to report excessive draining, loose bandage, wet dressing, severe pain or tingling in toes. Applied [employer] dressing below the knee to bilateral lower legs. Unna Boot(s) were applied per  Guidelines.     Response to treatment: Well tolerated by patient      Electronically signed by King Lino LPN on 6/66/7388 at 11:54 AM

## 2022-09-26 NOTE — WOUND CARE
Discharge Instructions for  [address] [place] [address], [address] [place] [address]  Telephone: [phone]    NAME:  Sadia Emerson. YOB: 1945  MEDICAL RECORD NUMBER:  [id number]  DATE:  9/26/2022      Return Appointment:  [] Dressing supply provider:   [x] [facility]   [x] Return Appointment: 1 Week(s)  [] Nurse Visit: [facility])    [] [facility]charge from [facility]  [] Ordered tests:   [] Referrals:   [] Rx:   [] Other:      Wound Cleansing:   Do not scrub or use excessive force. Cleanse wound prior to applying a clean dressing with:  [] Normal Saline   [x] Mild Soap & Water/Baby Shampoo   [] Keep Wound Dry in Shower  [] Other:      Topical Treatments:  Do not apply lotions, creams, or ointments to wound bed unless directed. [x] Apply moisturizing lotion to skin surrounding the wound prior to dressing change.  [] Apply antifungal ointment to skin surrounding the wound prior to dressing change.  [] Apply thin film of moisture barrier/zinc ointment to skin immediately around wound. [] Other:       Dressings:           Wound Location R lateral foot and L leg   [x] Apply Primary Dressing:       [] MediHoney Gel    [] MediHoney Alginate               [] Calcium Alginate      [x] Calcium Alginate with Silver: Silvercel   [] Collagen                   [] Collagen with Silver                [] Santyl with Moistened saline gauze              [] Hydrofera Blue (cut to size and moistened)  [] Hydrofera Blue Ready (Cut to size)   [] NS wet to dry    [] Betadine wet to dry              [] Hydrogel                [] Mepitel     [] Bactroban/Mupirocin               [x] Other: Drawtex    [x] Cover and Secure with:     [x] Gauze [] Maretta Conner [] Kerlix   [] Foam [] Super Absorbant [] ABD     [] ACE Wrap            [] Other:    Limit contact of tape with skin.     [] Change dressing: [] Daily    [] Every Other Day [] Once per week   [x] Twice per week   [] Three times per week [] Do Not Change Dressing   [] Other:     Edema Control:  Apply: [] Compression Stocking:  mmHg  []Right Leg []Left Leg   [] Tubigrip []Right Leg Double Layer []Left Leg Double Layer      []Right Leg Single Layer []Left Leg Single Layer     [] Elevate leg(s) above the level of the heart when sitting. [] Avoid prolonged standing in one place. Compression:  Apply: [x] Multilayer Compression Wrap: [x]RightLeg [x]Left Leg                                 []Coflex   []Coflex Lite             [x]Unna Lite     [x] Do not get leg(s) with wrap wet. If wraps become too tight call the center or completely remove the wrap. [x] Elevate leg(s) above the level of the heart when sitting. [x] Avoid prolonged standing in one place. Dietary:  [x] Diet as tolerated: [] Calorie Diabetic Diet: [] No Added Salt:  [x] Increase Protein: [] Other:     Activity:  [x] Activity as tolerated:    [] Patient has no activity restrictions       [] Strict Bedrest:   [] Remain off Work:       [] May return to full duty work:                                     [] Return to work with restrictions:               [address] Information: Should you experience any significant changes in your wound(s) or have questions about your wound care, please contact Errol Pereira 26 at [place] [address] 8:00 am - 4:30. If you need help with your wound outside of these hours and cannot wait until we are again available, contact your PCP or go to the hospital emergency room. PLEASE NOTE: IF YOU ARE UNABLE TO OBTAIN WOUND SUPPLIES, CONTINUE TO USE THE SUPPLIES YOU HAVE AVAILABLE UNTIL YOU ARE ABLE TO REACH US. IT IS MOST IMPORTANT TO KEEP THE WOUND COVERED AT ALL TIMES.     [x] Dr. Luther Alfonso   [] Dr. Magalis Pinto  [] Dr. Germaine Bowling

## 2022-10-03 ENCOUNTER — HOSPITAL ENCOUNTER (OUTPATIENT)
Dept: WOUND CARE | Age: 77
Discharge: HOME | End: 2022-10-03
Payer: MEDICARE

## 2022-10-03 VITALS
RESPIRATION RATE: 20 BRPM | DIASTOLIC BLOOD PRESSURE: 78 MMHG | HEART RATE: 79 BPM | TEMPERATURE: 98 F | SYSTOLIC BLOOD PRESSURE: 135 MMHG

## 2022-10-03 DIAGNOSIS — L02.416 CELLULITIS AND ABSCESS OF LEFT LOWER EXTREMITY: ICD-10-CM

## 2022-10-03 DIAGNOSIS — L03.116 CELLULITIS AND ABSCESS OF LEFT LOWER EXTREMITY: ICD-10-CM

## 2022-10-03 DIAGNOSIS — L97.812 NON-PRESSURE CHRONIC ULCER OF OTHER PART OF RIGHT LOWER LEG WITH FAT LAYER EXPOSED (HCC): Primary | ICD-10-CM

## 2022-10-03 DIAGNOSIS — L97.822 NON-PRESSURE CHRONIC ULCER OF OTHER PART OF LEFT LOWER LEG WITH FAT LAYER EXPOSED (HCC): ICD-10-CM

## 2022-10-03 DIAGNOSIS — L97.512 ULCER OF FOOT, CHRONIC, RIGHT, WITH FAT LAYER EXPOSED (HCC): ICD-10-CM

## 2022-10-03 PROCEDURE — 29580 STRAPPING UNNA BOOT: CPT

## 2022-10-03 PROCEDURE — 74011000250 HC RX REV CODE- 250: Performed by: SPECIALIST

## 2022-10-03 RX ORDER — LIDOCAINE HYDROCHLORIDE 20 MG/ML
15 SOLUTION OROPHARYNGEAL ONCE
Status: CANCELLED | OUTPATIENT
Start: 2022-10-03 | End: 2022-10-03

## 2022-10-03 RX ORDER — SILVER SULFADIAZINE 10 G/1000G
CREAM TOPICAL ONCE
Status: CANCELLED | OUTPATIENT
Start: 2022-10-03 | End: 2022-10-03

## 2022-10-03 RX ORDER — MUPIROCIN 20 MG/G
OINTMENT TOPICAL ONCE
Status: CANCELLED | OUTPATIENT
Start: 2022-10-03 | End: 2022-10-03

## 2022-10-03 RX ORDER — LIDOCAINE HYDROCHLORIDE 20 MG/ML
15 SOLUTION OROPHARYNGEAL ONCE
Status: COMPLETED | OUTPATIENT
Start: 2022-10-03 | End: 2022-10-03

## 2022-10-03 RX ADMIN — LIDOCAINE HYDROCHLORIDE 15 ML: 20 SOLUTION ORAL; TOPICAL at 11:07

## 2022-10-03 NOTE — WOUND CARE
[employer] Application   Below Knee    NAME:  Ness Dominguez. YOB: 1945  MEDICAL RECORD NUMBER:  [id number]  DATE:  10/3/2022    Remove old [employer] or Boots. Appied primary and secondary dressing as ordered. Applied Unna roll from toes to knee overlapping each time. Applied ace wrap or coban from toes to below the knee. Secured with tape and/or metal clips covered with tape. Instructed patient/caregiver to keep dressing dry and intact. DO NOT REMOVE DRESSING. Instructed pt/family/caregiver to report excessive draining, loose bandage, wet dressing, severe pain or tingling in toes. Applied [employer] dressing below the knee to bilateral lower legs. Unna Boot(s) were applied per  Guidelines.     Response to treatment: Well tolerated by patient      Electronically signed by Estela Ignacio RN on 10/3/2022 at 12:02 PM

## 2022-10-03 NOTE — WOUND CARE
Discharge Instructions for  [address] [place] [address], [address] [place] [address]  Telephone: [phone]    NAME:  Russell Murillo. YOB: 1945  MEDICAL RECORD NUMBER:  [id number]  DATE:  10/3/2022      Return Appointment:  [] Dressing supply provider:   [x] Home Healthcare: [facility]   [x] Return Appointment: 2 Week(s)  [] Nurse Visit: [facility])    [] Discharge from [facility]  [] Ordered tests:   [] Referrals:   [] Rx:   [] Other:      Wound Cleansing:   Do not scrub or use excessive force. Cleanse wound prior to applying a clean dressing with:  [] Normal Saline   [x] Mild Soap & Water/Baby Shampoo   [] Keep Wound Dry in Shower  [] Other:      Topical Treatments:  Do not apply lotions, creams, or ointments to wound bed unless directed. [x] Apply moisturizing lotion to skin surrounding the wound prior to dressing change.  [] Apply antifungal ointment to skin surrounding the wound prior to dressing change.  [] Apply thin film of moisture barrier/zinc ointment to skin immediately around wound. [] Other:       Dressings:           Wound Location R lateral foot and L leg   [x] Apply Primary Dressing:       [] MediHoney Gel    [] MediHoney Alginate               [] Calcium Alginate      [x] Calcium Alginate with Silver: Silvercel   [] Collagen                   [] Collagen with Silver                [] Santyl with Moistened saline gauze              [] Hydrofera Blue (cut to size and moistened)  [] Hydrofera Blue Ready (Cut to size)   [] NS wet to dry    [] Betadine wet to dry              [] Hydrogel                [] Mepitel     [] Bactroban/Mupirocin               [x] Other: Drawtex    [x] Cover and Secure with:     [x] Gauze [] Charl L[place] [] Kerlix   [] Foam [] Super Absorbant [] ABD     [] ACE Wrap            [] Other:    Limit contact of tape with skin.     [] Change dressing: [] Daily    [] Every Other Day [] Once per week   [x] Twice per week   [] Three times per week [] Do Not Change Dressing   [] Other:     Edema Control:  Apply: [] Compression Stocking:  mmHg  []Right Leg []Left Leg   [] Tubigrip []Right Leg Double Layer []Left Leg Double Layer      []Right Leg Single Layer []Left Leg Single Layer     [] Elevate leg(s) above the level of the heart when sitting. [] Avoid prolonged standing in one place. Compression:  Apply: [x] Multilayer Compression Wrap: [x]RightLeg [x]Left Leg                                 []Coflex   []Coflex Lite             [x]Unna Lite     [x] Do not get leg(s) with wrap wet. If wraps become too tight call the center or completely remove the wrap. [x] Elevate leg(s) above the level of the heart when sitting. [x] Avoid prolonged standing in one place. Dietary:  [x] Diet as tolerated: [] Calorie Diabetic Diet: [] No Added Salt:  [x] Increase Protein: [] Other:     Activity:  [x] Activity as tolerated:    [] Patient has no activity restrictions       [] Strict Bedrest:   [] Remain off Work:       [] May return to full duty work:                                     [] Return to work with restrictions:               [address] Information: Should you experience any significant changes in your wound(s) or have questions about your wound care, please contact Errol Pereira 26 at [place] [address] 8:00 am - 4:30. If you need help with your wound outside of these hours and cannot wait until we are again available, contact your PCP or go to the hospital emergency room. PLEASE NOTE: IF YOU ARE UNABLE TO OBTAIN WOUND SUPPLIES, CONTINUE TO USE THE SUPPLIES YOU HAVE AVAILABLE UNTIL YOU ARE ABLE TO REACH US. IT IS MOST IMPORTANT TO KEEP THE WOUND COVERED AT ALL TIMES.     [x] Dr. Varun Wan   [] Dr. Hugo Moss  [] Dr. Saray Lamas

## 2022-10-03 NOTE — DISCHARGE INSTRUCTIONS
Discharge Instructions for  [address] [place] [address], [address] [place] [address]  Telephone: [phone]    NAME:  Jenniffer Meza. YOB: 1945  MEDICAL RECORD NUMBER:  [id number]  DATE:  10/3/2022      Return Appointment:  [] Dressing supply provider:   [x] Home Healthcare: [facility]   [x] Return Appointment: 2 Week(s)  [] Nurse Visit: [facility])    [] Discharge from [facility]  [] Ordered tests:   [] Referrals:   [] Rx:   [] Other:      Wound Cleansing:   Do not scrub or use excessive force. Cleanse wound prior to applying a clean dressing with:  [] Normal Saline   [x] Mild Soap & Water/Baby Shampoo   [] Keep Wound Dry in Shower  [] Other:      Topical Treatments:  Do not apply lotions, creams, or ointments to wound bed unless directed. [x] Apply moisturizing lotion to skin surrounding the wound prior to dressing change.  [] Apply antifungal ointment to skin surrounding the wound prior to dressing change.  [] Apply thin film of moisture barrier/zinc ointment to skin immediately around wound. [] Other:       Dressings:           Wound Location R lateral foot and L leg   [x] Apply Primary Dressing:       [] MediHoney Gel    [] MediHoney Alginate               [] Calcium Alginate      [x] Calcium Alginate with Silver: Silvercel   [] Collagen                   [] Collagen with Silver                [] Santyl with Moistened saline gauze              [] Hydrofera Blue (cut to size and moistened)  [] Hydrofera Blue Ready (Cut to size)   [] NS wet to dry    [] Betadine wet to dry              [] Hydrogel                [] Mepitel     [] Bactroban/Mupirocin               [x] Other: Drawtex    [x] Cover and Secure with:     [x] Gauze [] Dora Fanti [] Kerlix   [] Foam [] Super Absorbant [] ABD     [] ACE Wrap            [] Other:    Limit contact of tape with skin.     [] Change dressing: [] Daily    [] Every Other Day [] Once per week   [x] Twice per week   [] Three times per week [] Do Not Change Dressing   [] Other:     Edema Control:  Apply: [] Compression Stocking:  mmHg  []Right Leg []Left Leg   [] Tubigrip []Right Leg Double Layer []Left Leg Double Layer      []Right Leg Single Layer []Left Leg Single Layer     [] Elevate leg(s) above the level of the heart when sitting. [] Avoid prolonged standing in one place. Compression:  Apply: [x] Multilayer Compression Wrap: [x]RightLeg [x]Left Leg                                 []Coflex   []Coflex Lite             [x]Unna Lite     [x] Do not get leg(s) with wrap wet. If wraps become too tight call the center or completely remove the wrap. [x] Elevate leg(s) above the level of the heart when sitting. [x] Avoid prolonged standing in one place. Dietary:  [x] Diet as tolerated: [] Calorie Diabetic Diet: [] No Added Salt:  [x] Increase Protein: [] Other:     Activity:  [x] Activity as tolerated:    [] Patient has no activity restrictions       [] Strict Bedrest:   [] Remain off Work:       [] May return to full duty work:                                     [] Return to work with restrictions:               [address] Information: Should you experience any significant changes in your wound(s) or have questions about your wound care, please contact Errol Pereira 26 at [place] [address] 8:00 am - 4:30. If you need help with your wound outside of these hours and cannot wait until we are again available, contact your PCP or go to the hospital emergency room. PLEASE NOTE: IF YOU ARE UNABLE TO OBTAIN WOUND SUPPLIES, CONTINUE TO USE THE SUPPLIES YOU HAVE AVAILABLE UNTIL YOU ARE ABLE TO REACH US. IT IS MOST IMPORTANT TO KEEP THE WOUND COVERED AT ALL TIMES.     [x] Dr. Ana Flores   [] Dr. Cristal Jordan  [] Dr. Sharita Padilla

## 2022-10-03 NOTE — WOUND CARE
[address]   Progress Note and Procedure Note   NO Procedure      Jenniffer Meza. MEDICAL RECORD NUMBER:  [id number]  AGE: 65 y.o. RACE BLACK/  GENDER: male  : 1945  EPISODE DATE:  10/3/2022    Subjective:   Some pain, mild drainage    Chief Complaint   Patient presents with    Wound Check     Right foot and left leg         HISTORY of PRESENT ILLNESS HPI    Jenniffer Hillman is a 65 y.o. male who presents today for wound/ulcer evaluation. History of Wound Context: BLE  Wound/Ulcer Pain Timing/Severity: mild  Quality of pain: aching  Severity:  1 / 10   Modifying Factors: None  Associated Signs/Symptoms: edema    Ulcer Identification:  Ulcer Type: venous    Contributing Factors: lymphedema    Wound: BLE         PAST MEDICAL HISTORY    Past Medical History:   Diagnosis Date    Dyslipidemia     HTN (hypertension)         PAST SURGICAL HISTORY    Past Surgical History:   Procedure Laterality Date    HX CHOLECYSTECTOMY         FAMILY HISTORY    Family History   Problem Relation Age of Onset    Diabetes Mother     Diabetes Brother     Diabetes Son        SOCIAL HISTORY    Social History     Tobacco Use    Smoking status: Never    Smokeless tobacco: Never   Vaping Use    Vaping Use: Never used   Substance Use Topics    Alcohol use: Never    Drug use: Never       ALLERGIES    No Known Allergies    MEDICATIONS    Current Outpatient Medications on File Prior to Encounter   Medication Sig Dispense Refill    multivitamin (ONE A DAY) tablet Take 1 Tablet by mouth in the morning. furosemide (LASIX) 40 mg tablet Take 40 mg by mouth two (2) times a day. vitamin E, dl,tocopheryl acet, (vitamin E acetate) 400 unit capsule Take 400 Units by mouth daily. aspirin (ASPIRIN) 325 mg tablet Take 325 mg by mouth daily. No current facility-administered medications on file prior to encounter.        REVIEW OF SYSTEMS    Pertinent items are noted in HPI.    Objective:     Visit Vitals  /78 (BP 1 Location: Left arm, BP Patient Position: At rest;Sitting)   Pulse 79   Temp 98 °F (36.7 °C)   Resp 20       Wt Readings from Last 3 Encounters:   08/08/22 122.5 kg (270 lb)   07/21/22 130.6 kg (288 lb)   02/21/22 126 kg (277 lb 12.8 oz)       PHYSICAL EXAM  R lateral foot wound slightly smaller, superficial, no infection  L lateral leg wounds x 2 smaller, no infection  Pertinent items are noted in HPI. Assessment:    Some improvement    Problem List Items Addressed This Visit       Cellulitis and abscess of left lower extremity    Relevant Medications    lidocaine (XYLOCAINE) 2 % viscous solution 15 mL (Completed) (Start on 10/3/2022 12:00 PM)    Other Relevant Orders    INITIATE OUTPATIENT WOUND CARE PROTOCOL    Non-pressure chronic ulcer of other part of right lower leg with 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 ([address] - Primary    Relevant Medications    lidocaine (XYLOCAINE) 2 % viscous solution 15 mL (Completed) (Start on 10/3/2022 12:00 PM)    Other Relevant Orders    INITIATE OUTPATIENT WOUND CARE PROTOCOL    Non-pressure chronic ulcer of other part of left lower leg with 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 ([address]    Relevant Medications    lidocaine (XYLOCAINE) 2 % viscous solution 15 mL (Completed) (Start on 10/3/2022 12:00 PM)    Other Relevant Orders    INITIATE OUTPATIENT WOUND CARE PROTOCOL    Ulcer of foot, chronic, right, with 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 ([address]    Relevant Medications    lidocaine (XYLOCAINE) 2 % viscous solution 15 mL (Completed) (Start on 10/3/2022 12:00 PM)    Other Relevant Orders    INITIATE OUTPATIENT WOUND CARE PROTOCOL       Procedure Note  Indications:  Based on my examination of this patient's wound(s)/ulcer(s) today, debridement is not required to promote healing and evaluate the wound base. Wound Leg Left #1 circumferential 08/08/22 (Active)   Wound Image   10/03/22 1112   Wound Etiology Other (Comment) 10/03/22 1112   Dressing Status Clean;Dry; Intact 10/03/22 1112 Cleansed Cleansed with saline 10/03/22 1112   Wound Length (cm) 14.5 cm 10/03/22 1112   Wound Width (cm) 4 cm 10/03/22 1112   Wound Depth (cm) 0.1 cm 10/03/22 1112   Wound Surface Area (cm^2) 58 cm^2 10/03/22 1112   Change in Wound Size % 89.64 10/03/22 1112   Wound Volume (cm^3) 5.8 cm^3 10/03/22 1112   Wound Healing % 90 10/03/22 1112   Post-Procedure Length (cm) 13 cm 08/29/22 1338   Post-Procedure Width (cm) 18 cm 08/29/22 1338   Post-Procedure Depth (cm) 0.1 cm 08/29/22 1338   Post-Procedure Surface Area (cm^2) 234 cm^2 08/29/22 1338   Post-Procedure Volume (cm^3) 23.4 cm^3 08/29/22 1338   Wound Assessment Granulation tissue;Slough 10/03/22 1112   Drainage Amount Moderate 10/03/22 1112   Drainage Description Serosanguinous 10/03/22 1112   Wound Odor Mild 10/03/22 1112   Dinorah-Wound/Incision Assessment Hyperpigmented;Dry/flaky; Maceration 10/03/22 1112   Edges Attached edges 10/03/22 1112   Wound Thickness Description Full thickness 10/03/22 1112   Number of days: 56       Wound Foot Right #3 09/12/22 (Active)   Wound Image   10/03/22 1113   Wound Etiology Other (Comment) 10/03/22 1113   Dressing Status Clean;Dry; Intact 10/03/22 1113   Cleansed Cleansed with saline 10/03/22 1113   Wound Length (cm) 1 cm 10/03/22 1113   Wound Width (cm) 1 cm 10/03/22 1113   Wound Depth (cm) 0.2 cm 10/03/22 1113   Wound Surface Area (cm^2) 1 cm^2 10/03/22 1113   Change in Wound Size % -100 10/03/22 1113   Wound Volume (cm^3) 0.2 cm^3 10/03/22 1113   Wound Healing % -300 10/03/22 1113   Post-Procedure Length (cm) 4.7 cm 09/26/22 1143   Post-Procedure Width (cm) 3.7 cm 09/26/22 1143   Post-Procedure Depth (cm) 0.1 cm 09/26/22 1143   Post-Procedure Surface Area (cm^2) 17.39 cm^2 09/26/22 1143   Post-Procedure Volume (cm^3) 1.739 cm^3 09/26/22 1143   Wound Assessment Granulation tissue;Slough 10/03/22 1113   Drainage Amount Moderate 10/03/22 1113   Drainage Description Serosanguinous 10/03/22 1113   Wound Odor Mild 10/03/22 1113 Dinorah-Wound/Incision Assessment Dry/flaky; Maceration 10/03/22 1113   Edges Attached edges 10/03/22 1113   Wound Thickness Description Full thickness 10/03/22 1113   Number of days: 21        Plan:   Silvercel, drawtex, Unna lite  Treatment Note please see attached Discharge Instructions    Written patient dismissal instructions given to patient or POA.          Electronically signed by Yvette Nguyen MD on 10/3/2022 at 11:39 AM

## 2022-10-17 ENCOUNTER — HOSPITAL ENCOUNTER (OUTPATIENT)
Dept: WOUND CARE | Age: 77
Discharge: HOME | End: 2022-10-17
Payer: MEDICARE

## 2022-10-17 VITALS — RESPIRATION RATE: 20 BRPM | TEMPERATURE: 96.6 F

## 2022-10-17 DIAGNOSIS — L97.512 ULCER OF FOOT, CHRONIC, RIGHT, WITH FAT LAYER EXPOSED (HCC): ICD-10-CM

## 2022-10-17 DIAGNOSIS — L97.822 NON-PRESSURE CHRONIC ULCER OF OTHER PART OF LEFT LOWER LEG WITH FAT LAYER EXPOSED (HCC): ICD-10-CM

## 2022-10-17 DIAGNOSIS — L02.416 CELLULITIS AND ABSCESS OF LEFT LOWER EXTREMITY: ICD-10-CM

## 2022-10-17 DIAGNOSIS — L03.116 CELLULITIS AND ABSCESS OF LEFT LOWER EXTREMITY: ICD-10-CM

## 2022-10-17 DIAGNOSIS — L97.812 NON-PRESSURE CHRONIC ULCER OF OTHER PART OF RIGHT LOWER LEG WITH FAT LAYER EXPOSED (HCC): Primary | ICD-10-CM

## 2022-10-17 PROCEDURE — 29580 STRAPPING UNNA BOOT: CPT

## 2022-10-17 PROCEDURE — 74011000250 HC RX REV CODE- 250: Performed by: SPECIALIST

## 2022-10-17 RX ORDER — LIDOCAINE HYDROCHLORIDE 20 MG/ML
15 SOLUTION OROPHARYNGEAL ONCE
Status: CANCELLED | OUTPATIENT
Start: 2022-10-17 | End: 2022-10-17

## 2022-10-17 RX ORDER — LIDOCAINE HYDROCHLORIDE 20 MG/ML
15 SOLUTION OROPHARYNGEAL ONCE
Status: COMPLETED | OUTPATIENT
Start: 2022-10-17 | End: 2022-10-17

## 2022-10-17 RX ORDER — MUPIROCIN 20 MG/G
OINTMENT TOPICAL ONCE
Status: CANCELLED | OUTPATIENT
Start: 2022-10-17 | End: 2022-10-17

## 2022-10-17 RX ORDER — SILVER SULFADIAZINE 10 G/1000G
CREAM TOPICAL ONCE
Status: CANCELLED | OUTPATIENT
Start: 2022-10-17 | End: 2022-10-17

## 2022-10-17 RX ADMIN — LIDOCAINE HYDROCHLORIDE 15 ML: 20 SOLUTION ORAL; TOPICAL at 10:55

## 2022-10-17 NOTE — DISCHARGE INSTRUCTIONS
Discharge Instructions for  [facility], [facility]  Telephone: [phone]    NAME:  Katy Jaramillo. YOB: 1945  MEDICAL RECORD NUMBER:  [id number]  DATE:  10/17/2022      Return Appointment:  [] Dressing supply provider:   [x] Home Healthcare: [facility]   [x] Return Appointment: 2 Week(s)  [] Nurse Visit: [facility])    [] Discharge from [facility]  [] Ordered tests:   [] Referrals:   [] Rx:   [x] Other: Consider whirlpool therapy     Wound Cleansing:   Do not scrub or use excessive force. Cleanse wound prior to applying a clean dressing with:  [] Normal Saline   [x] Mild Soap & Water/Baby Shampoo   [] Keep Wound Dry in Shower  [] Other:      Topical Treatments:  Do not apply lotions, creams, or ointments to wound bed unless directed. [x] Apply moisturizing lotion to skin surrounding the wound prior to dressing change.  [] Apply antifungal ointment to skin surrounding the wound prior to dressing change.  [] Apply thin film of moisture barrier/zinc ointment to skin immediately around wound. [] Other:       Dressings:           Wound Location L leg (R foot is healed)   [x] Apply Primary Dressing:       [] MediHoney Gel    [] MediHoney Alginate               [] Calcium Alginate      [x] Calcium Alginate with Silver: Silvercel   [] Collagen                   [] Collagen with Silver                [] Santyl with Moistened saline gauze              [] Hydrofera Blue (cut to size and moistened)  [] Hydrofera Blue Ready (Cut to size)   [] NS wet to dry    [] Betadine wet to dry              [] Hydrogel                [] Mepitel     [] Bactroban/Mupirocin               [x] Other: Drawtex    [x] Cover and Secure with:     [x] Gauze [] Katheran Leys [] Kerlix   [] Foam [] Super Absorbant [] ABD     [] ACE Wrap            [] Other:    Limit contact of tape with skin.     [] Change dressing: [] Daily    [] Every Other Day [] Once per week   [x] Twice per week   [] Three times per week [] Do Not Change Dressing   [] Other:     Edema Control:  Apply: [] Compression Stocking:  mmHg  []Right Leg []Left Leg   [x] Tubigrip []Right Leg Double Layer []Left Leg Double Layer      [x]Right Leg Single Layer []Left Leg Single Layer     [x] Elevate leg(s) above the level of the heart when sitting. [x] Avoid prolonged standing in one place. Compression:  Apply: [x] Multilayer Compression Wrap: []RightLeg [x]Left Leg                                 []Coflex   []Coflex Lite             [x]Unna Lite     [x] Do not get leg(s) with wrap wet. If wraps become too tight call the center or completely remove the wrap. [x] Elevate leg(s) above the level of the heart when sitting. [x] Avoid prolonged standing in one place. Dietary:  [x] Diet as tolerated: [] Calorie Diabetic Diet: [] No Added Salt:  [x] Increase Protein: [] Other:     Activity:  [x] Activity as tolerated:    [] Patient has no activity restrictions       [] Strict Bedrest:   [] Remain off Work:       [] May return to full duty work:                                     [] Return to work with restrictions:               [address] Information: Should you experience any significant changes in your wound(s) or have questions about your wound care, please contact Errol Barry at [place] [address] 8:00 am - 4:30. If you need help with your wound outside of these hours and cannot wait until we are again available, contact your PCP or go to the hospital emergency room. PLEASE NOTE: IF YOU ARE UNABLE TO OBTAIN WOUND SUPPLIES, CONTINUE TO USE THE SUPPLIES YOU HAVE AVAILABLE UNTIL YOU ARE ABLE TO REACH US. IT IS MOST IMPORTANT TO KEEP THE WOUND COVERED AT ALL TIMES.     [x] Dr. Luís Lee   [] Dr. Elgin Stoll  [] Dr. Gabe Soriano

## 2022-10-17 NOTE — WOUND CARE
[employer] Application   Below Knee    NAME:  Kendell Acharya. YOB: 1945  MEDICAL RECORD NUMBER:  [id number]  DATE:  10/17/2022    Remove old [employer] or Boots. Applied moisturizing agent to dry skin as needed. Appied primary and secondary dressing as ordered. Applied Unna roll from toes to knee overlapping each time. Applied ace wrap or coban from toes to below the knee. Secured with tape and/or metal clips covered with tape. Instructed patient/caregiver to keep dressing dry and intact. DO NOT REMOVE DRESSING. Instructed pt/family/caregiver to report excessive draining, loose bandage, wet dressing, severe pain or tingling in toes. Applied [employer] dressing below the knee to left lower leg. Unna Boot(s) were applied per  Guidelines.     Response to treatment: Well tolerated by patient      Electronically signed by Zoya Bethea RN on 10/17/2022 at 11:24 AM

## 2022-10-17 NOTE — WOUND CARE
[address]   Progress Note and Procedure Note   NO Procedure      Bartholomew Essex. MEDICAL RECORD NUMBER:  [id number]  AGE: 65 y.o. RACE BLACK/  GENDER: male  : 1945  EPISODE DATE:  10/17/2022    Subjective:   Mild pain LLE  No fever    Chief Complaint   Patient presents with    Wound Check     Right foot and left leg         HISTORY of PRESENT ILLNESS HPI    Bartholomew Essex. is a 65 y.o. male who presents today for wound/ulcer evaluation. History of Wound Context: LLE  Wound/Ulcer Pain Timing/Severity: mild  Quality of pain: dull  Severity:  1 / 10   Modifying Factors: Pain is relieved/improved with rest  Associated Signs/Symptoms: edema    Ulcer Identification:  Ulcer Type: venous    Contributing Factors: lymphedema    Wound: LLE         PAST MEDICAL HISTORY    Past Medical History:   Diagnosis Date    Dyslipidemia     HTN (hypertension)         PAST SURGICAL HISTORY    Past Surgical History:   Procedure Laterality Date    HX CHOLECYSTECTOMY         FAMILY HISTORY    Family History   Problem Relation Age of Onset    Diabetes Mother     Diabetes Brother     Diabetes Son        SOCIAL HISTORY    Social History     Tobacco Use    Smoking status: Never    Smokeless tobacco: Never   Vaping Use    Vaping Use: Never used   Substance Use Topics    Alcohol use: Never    Drug use: Never       ALLERGIES    No Known Allergies    MEDICATIONS    Current Outpatient Medications on File Prior to Encounter   Medication Sig Dispense Refill    multivitamin (ONE A DAY) tablet Take 1 Tablet by mouth in the morning. furosemide (LASIX) 40 mg tablet Take 40 mg by mouth two (2) times a day. vitamin E, dl,tocopheryl acet, (vitamin E acetate) 400 unit capsule Take 400 Units by mouth daily. aspirin (ASPIRIN) 325 mg tablet Take 325 mg by mouth daily. No current facility-administered medications on file prior to encounter.        REVIEW OF SYSTEMS    Pertinent items are noted in HPI. Objective:     Visit Vitals  Temp (!) 96.6 °F (35.9 °C)   Resp 20       Wt Readings from Last 3 Encounters:   08/08/22 122.5 kg (270 lb)   07/21/22 130.6 kg (288 lb)   02/21/22 126 kg (277 lb 12.8 oz)       PHYSICAL EXAM  RLE wounds healed  L leg wounds smaller, proximal wound slightly deeper, no evidence of infection  Pertinent items are noted in HPI. Assessment:    Modest progress  Problem List Items Addressed This Visit       Cellulitis and abscess of left lower extremity    Relevant Medications    lidocaine (XYLOCAINE) 2 % viscous solution 15 mL (Completed)    Other Relevant Orders    INITIATE OUTPATIENT WOUND CARE PROTOCOL    Non-pressure chronic ulcer of other part of right lower leg with 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 ([address] - Primary    Relevant Medications    lidocaine (XYLOCAINE) 2 % viscous solution 15 mL (Completed)    Other Relevant Orders    INITIATE OUTPATIENT WOUND CARE PROTOCOL    Non-pressure chronic ulcer of other part of left lower leg with 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 ([address]    Relevant Medications    lidocaine (XYLOCAINE) 2 % viscous solution 15 mL (Completed)    Other Relevant Orders    INITIATE OUTPATIENT WOUND CARE PROTOCOL    Ulcer of foot, chronic, right, with 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 (HCC)    Relevant Medications    lidocaine (XYLOCAINE) 2 % viscous solution 15 mL (Completed)    Other Relevant Orders    INITIATE OUTPATIENT WOUND CARE PROTOCOL       Procedure Note  Indications:  Based on my examination of this patient's wound(s)/ulcer(s) today, debridement is not required to promote healing and evaluate the wound base. Wound Leg Left #1 circumferential 08/08/22 (Active)   Wound Image   10/17/22 1106   Wound Etiology Other (Comment) 10/17/22 1106   Dressing Status Clean;Dry; Intact 10/03/22 1112   Cleansed Soap and water 10/17/22 1106   Wound Length (cm) 15 cm 10/17/22 1106   Wound Width (cm) 13 cm 10/17/22 1106   Wound Depth (cm) 0.2 cm 10/17/22 1106   Wound Surface Area (cm^2) 195 cm^2 10/17/22 1106   Change in Wound Size % 65.18 10/17/22 1106   Wound Volume (cm^3) 39 cm^3 10/17/22 1106   Wound Healing % 30 10/17/22 1106   Post-Procedure Length (cm) 13 cm 08/29/22 1338   Post-Procedure Width (cm) 18 cm 08/29/22 1338   Post-Procedure Depth (cm) 0.1 cm 08/29/22 1338   Post-Procedure Surface Area (cm^2) 234 cm^2 08/29/22 1338   Post-Procedure Volume (cm^3) 23.4 cm^3 08/29/22 1338   Wound Assessment Granulation tissue;Slough 10/17/22 1106   Drainage Amount Large 10/17/22 1106   Drainage Description Serosanguinous 10/17/22 1106   Wound Odor Mild 10/17/22 1106   Dinorah-Wound/Incision Assessment Hyperpigmented;Dry/flaky 10/17/22 1106   Edges Attached edges 10/17/22 1106   Wound Thickness Description Full thickness 10/17/22 1106   Number of days: 70        Plan:   Silvercel, drawtex, Unna on L leg  Double tubi  on R leg    Treatment Note please see attached Discharge Instructions    Written patient dismissal instructions given to patient or POA.          Electronically signed by Becky Leone MD on 10/17/2022 at 11:29 AM

## 2022-10-17 NOTE — WOUND CARE
Discharge Instructions for  [address] [place], [place] [address], [facility]  Telephone: [phone]    NAME:  Alberto Wolfe. YOB: 1945  MEDICAL RECORD NUMBER:  [id number]  DATE:  10/17/2022      Return Appointment:  [] Dressing supply provider:   [x] Home Healthcare: [facility]   [x] Return Appointment: 2 Week(s)  [] Nurse Visit: [facility])    [] Discharge from [facility]  [] Ordered tests:   [] Referrals:   [] Rx:   [x] Other: Consider whirlpool therapy     Wound Cleansing:   Do not scrub or use excessive force. Cleanse wound prior to applying a clean dressing with:  [] Normal Saline   [x] Mild Soap & Water/Baby Shampoo   [] Keep Wound Dry in Shower  [] Other:      Topical Treatments:  Do not apply lotions, creams, or ointments to wound bed unless directed. [x] Apply moisturizing lotion to skin surrounding the wound prior to dressing change.  [] Apply antifungal ointment to skin surrounding the wound prior to dressing change.  [] Apply thin film of moisture barrier/zinc ointment to skin immediately around wound. [] Other:       Dressings:           Wound Location L leg (R foot is healed)   [x] Apply Primary Dressing:       [] MediHoney Gel    [] MediHoney Alginate               [] Calcium Alginate      [x] Calcium Alginate with Silver: Silvercel   [] Collagen                   [] Collagen with Silver                [] Santyl with Moistened saline gauze              [] Hydrofera Blue (cut to size and moistened)  [] Hydrofera Blue Ready (Cut to size)   [] NS wet to dry    [] Betadine wet to dry              [] Hydrogel                [] Mepitel     [] Bactroban/Mupirocin               [x] Other: Drawtex    [x] Cover and Secure with:     [x] Gauze [] Mary Kay Garrett [] Kerlix   [] Foam [] Super Absorbant [] ABD     [] ACE Wrap            [] Other:    Limit contact of tape with skin.     [] Change dressing: [] Daily    [] Every Other Day [] Once per week   [x] Twice per week   [] Three times per week [] Do Not Change Dressing   [] Other:     Edema Control:  Apply: [] Compression Stocking:  mmHg  []Right Leg []Left Leg   [x] Tubigrip []Right Leg Double Layer []Left Leg Double Layer      [x]Right Leg Single Layer []Left Leg Single Layer     [x] Elevate leg(s) above the level of the heart when sitting. [x] Avoid prolonged standing in one place. Compression:  Apply: [x] Multilayer Compression Wrap: []RightLeg [x]Left Leg                                 []Coflex   []Coflex Lite             [x]Unna Lite     [x] Do not get leg(s) with wrap wet. If wraps become too tight call the center or completely remove the wrap. [x] Elevate leg(s) above the level of the heart when sitting. [x] Avoid prolonged standing in one place. Dietary:  [x] Diet as tolerated: [] Calorie Diabetic Diet: [] No Added Salt:  [x] Increase Protein: [] Other:     Activity:  [x] Activity as tolerated:    [] Patient has no activity restrictions       [] Strict Bedrest:   [] Remain off Work:       [] May return to full duty work:                                     [] Return to work with restrictions:               [address] Information: Should you experience any significant changes in your wound(s) or have questions about your wound care, please contact Errol Barry at [place] [address] 8:00 am - 4:30. If you need help with your wound outside of these hours and cannot wait until we are again available, contact your PCP or go to the hospital emergency room. PLEASE NOTE: IF YOU ARE UNABLE TO OBTAIN WOUND SUPPLIES, CONTINUE TO USE THE SUPPLIES YOU HAVE AVAILABLE UNTIL YOU ARE ABLE TO REACH US. IT IS MOST IMPORTANT TO KEEP THE WOUND COVERED AT ALL TIMES.     [x] Dr. Rosemary Haynes   [] Dr. Erum Villatoro  [] Dr. Pancho Wade

## 2022-10-25 NOTE — WOUND CARE
Received call from Satish Marx with [facility] stating wound to left leg getting worse and has new wound to R leg. Writer called patient to offer follow up in office with Dr. Chcuk Faulkner 10/26, patient declined stating he had other engagements and he wanted to just keep his appt 10/31. Patient stated he feels he has been doing too much and he plans to elevate his legs and rest. Writer informed patient to keep a NS wet to dry dressing on R leg and change it daily, patient said he was taking care of it fine.

## 2022-10-31 ENCOUNTER — HOSPITAL ENCOUNTER (OUTPATIENT)
Dept: WOUND CARE | Age: 77
Discharge: HOME | End: 2022-10-31
Payer: MEDICARE

## 2022-10-31 VITALS
DIASTOLIC BLOOD PRESSURE: 82 MMHG | SYSTOLIC BLOOD PRESSURE: 136 MMHG | TEMPERATURE: 96.9 F | HEART RATE: 115 BPM | RESPIRATION RATE: 20 BRPM

## 2022-10-31 DIAGNOSIS — L03.116 CELLULITIS AND ABSCESS OF LEFT LOWER EXTREMITY: ICD-10-CM

## 2022-10-31 DIAGNOSIS — L97.512 ULCER OF FOOT, CHRONIC, RIGHT, WITH FAT LAYER EXPOSED (HCC): ICD-10-CM

## 2022-10-31 DIAGNOSIS — L97.822 NON-PRESSURE CHRONIC ULCER OF OTHER PART OF LEFT LOWER LEG WITH FAT LAYER EXPOSED (HCC): ICD-10-CM

## 2022-10-31 DIAGNOSIS — L97.812 NON-PRESSURE CHRONIC ULCER OF OTHER PART OF RIGHT LOWER LEG WITH FAT LAYER EXPOSED (HCC): Primary | ICD-10-CM

## 2022-10-31 DIAGNOSIS — L02.416 CELLULITIS AND ABSCESS OF LEFT LOWER EXTREMITY: ICD-10-CM

## 2022-10-31 PROCEDURE — 11045 DBRDMT SUBQ TISS EACH ADDL: CPT

## 2022-10-31 PROCEDURE — 74011000250 HC RX REV CODE- 250: Performed by: SPECIALIST

## 2022-10-31 PROCEDURE — 11042 DBRDMT SUBQ TIS 1ST 20SQCM/<: CPT

## 2022-10-31 RX ORDER — MUPIROCIN 20 MG/G
OINTMENT TOPICAL ONCE
Status: CANCELLED | OUTPATIENT
Start: 2022-10-31 | End: 2022-10-31

## 2022-10-31 RX ORDER — LIDOCAINE HYDROCHLORIDE 20 MG/ML
15 SOLUTION OROPHARYNGEAL ONCE
Status: COMPLETED | OUTPATIENT
Start: 2022-10-31 | End: 2022-10-31

## 2022-10-31 RX ORDER — LIDOCAINE HYDROCHLORIDE 20 MG/ML
15 SOLUTION OROPHARYNGEAL ONCE
Status: CANCELLED | OUTPATIENT
Start: 2022-10-31 | End: 2022-10-31

## 2022-10-31 RX ORDER — SILVER SULFADIAZINE 10 G/1000G
CREAM TOPICAL ONCE
Status: CANCELLED | OUTPATIENT
Start: 2022-10-31 | End: 2022-10-31

## 2022-10-31 RX ADMIN — LIDOCAINE HYDROCHLORIDE 15 ML: 20 SOLUTION ORAL; TOPICAL at 14:06

## 2022-10-31 NOTE — WOUND CARE
Discharge Instructions for  [address] [place] [address], [address] [place] [address]  Telephone: [phone]    NAME:  Gilmar Aguilar. YOB: 1945  MEDICAL RECORD NUMBER:  [id number]  DATE:  10/31/2022      Return Appointment:  [] Dressing supply provider:   [x] Home Healthcare: [facility]   [x] Return Appointment: 2 Week(s)  [] Nurse Visit: Marybeth Sloan    [] Discharge from [facility]  [] Ordered tests:   [] Referrals:   [] Rx:   [] Other:     Wound Cleansing:   Do not scrub or use excessive force. Cleanse wound prior to applying a clean dressing with:  [] Normal Saline   [x] Mild Soap & Water/Baby Shampoo   [] Keep Wound Dry in Shower  [] Other:      Topical Treatments:  Do not apply lotions, creams, or ointments to wound bed unless directed. [x] Apply moisturizing lotion to skin surrounding the wound prior to dressing change.  [] Apply antifungal ointment to skin surrounding the wound prior to dressing change.  [] Apply thin film of moisture barrier/zinc ointment to skin immediately around wound. [] Other:       Dressings:           Wound Location L & R leg   [x] Apply Primary Dressing:       [] MediHoney Gel    [] MediHoney Alginate               [] Calcium Alginate      [] Calcium Alginate with Silver:    [] Collagen                   [] Collagen with Silver                [] Santyl with Moistened saline gauze              [] Hydrofera Blue (cut to size and moistened)  [] Hydrofera Blue Ready (Cut to size)   [] NS wet to dry    [] Betadine wet to dry              [] Hydrogel                [] Mepitel     [x] Xeroform 1st               [x] Other: Drawtex 2nd    [x] Cover and Secure with:     [x] Gauze [] [address]ust[address]ne Margieery [] Kerlix   [] Foam [] Super Absorbant [x] ABD     [] ACE Wrap            [] Other:    Limit contact of tape with skin.     [] Change dressing: [] Daily    [] Every Other Day [] Once per week   [] Twice per week   [x] Three times per week [] Do Not Change Dressing   [] Other:     Edema Control:  Apply: [] Compression Stocking:  mmHg  []Right Leg []Left Leg   [] Tubigrip []Right Leg Double Layer []Left Leg Double Layer      []Right Leg Single Layer []Left Leg Single Layer     [] Elevate leg(s) above the level of the heart when sitting. [] Avoid prolonged standing in one place. Compression:  Apply: [x] Multilayer Compression Wrap: [x]RightLeg [x]Left Leg                                 []Coflex   []Coflex Lite             [x]Unna Lite     [x] Do not get leg(s) with wrap wet. If wraps become too tight call the center or completely remove the wrap. [x] Elevate leg(s) above the level of the heart when sitting. [x] Avoid prolonged standing in one place. Dietary:  [x] Diet as tolerated: [] Calorie Diabetic Diet: [] No Added Salt:  [x] Increase Protein: [] Other:     Activity:  [x] Activity as tolerated:    [] Patient has no activity restrictions       [] Strict Bedrest:   [] Remain off Work:       [] May return to full duty work:                                     [] Return to work with restrictions:               [address] Information: Should you experience any significant changes in your wound(s) or have questions about your wound care, please contact Errol Barry at [place] [address] 8:00 am - 4:30. If you need help with your wound outside of these hours and cannot wait until we are again available, contact your PCP or go to the hospital emergency room. PLEASE NOTE: IF YOU ARE UNABLE TO OBTAIN WOUND SUPPLIES, CONTINUE TO USE THE SUPPLIES YOU HAVE AVAILABLE UNTIL YOU ARE ABLE TO REACH US. IT IS MOST IMPORTANT TO KEEP THE WOUND COVERED AT ALL TIMES.     [x] Dr. Chip Garcia   [] Dr. Dionisio Oliva  [] Dr. Ck Tucker

## 2022-10-31 NOTE — WOUND CARE
[address]   Progress Note and Procedure Note     Rama Shone. MEDICAL RECORD NUMBER:  [id number]  AGE: 65 y.o. RACE BLACK/  GENDER: male  : 1945  EPISODE DATE:  10/31/2022    Subjective:   Patient reports that he was very active this past couple weeks, has not had his legs elevated  Chief Complaint   Patient presents with    Wound Check     Bilateral legs         HISTORY of PRESENT ILLNESS HPI    Rama Shone. is a 65 y.o. male who presents today for wound/ulcer evaluation. History of Wound Context: BLE  Wound/Ulcer Pain Timing/Severity: mild  Quality of pain: aching  Severity:  1 / 10   Modifying Factors: Pain is relieved/improved with rest  Associated Signs/Symptoms: edema    Ulcer Identification:  Ulcer Type: venous    Contributing Factors: lymphedema    Wound: BLE         PAST MEDICAL HISTORY    Past Medical History:   Diagnosis Date    Dyslipidemia     HTN (hypertension)         PAST SURGICAL HISTORY    Past Surgical History:   Procedure Laterality Date    HX CHOLECYSTECTOMY         FAMILY HISTORY    Family History   Problem Relation Age of Onset    Diabetes Mother     Diabetes Brother     Diabetes Son        SOCIAL HISTORY    Social History     Tobacco Use    Smoking status: Never    Smokeless tobacco: Never   Vaping Use    Vaping Use: Never used   Substance Use Topics    Alcohol use: Never    Drug use: Never       ALLERGIES    No Known Allergies    MEDICATIONS    Current Outpatient Medications on File Prior to Encounter   Medication Sig Dispense Refill    multivitamin (ONE A DAY) tablet Take 1 Tablet by mouth in the morning. furosemide (LASIX) 40 mg tablet Take 40 mg by mouth two (2) times a day. vitamin E, dl,tocopheryl acet, (vitamin E acetate) 400 unit capsule Take 400 Units by mouth daily. aspirin (ASPIRIN) 325 mg tablet Take 325 mg by mouth daily.        No current facility-administered medications on file prior to encounter. REVIEW OF SYSTEMS    Pertinent items are noted in HPI. Objective:     Visit Vitals  /82 (BP 1 Location: Left arm, BP Patient Position: At rest;Sitting)   Pulse (!) 115   Temp 96.9 °F (36.1 °C)   Resp 20       Wt Readings from Last 3 Encounters:   08/08/22 122.5 kg (270 lb)   07/21/22 130.6 kg (288 lb)   02/21/22 126 kg (277 lb 12.8 oz)       PHYSICAL EXAM  The right leg wound is reopened  The left leg wound is significantly larger  There is no evidence of active infection    Pertinent items are noted in HPI.     Assessment:    Worsening of wounds bilaterally due to non-compliance with rest and elevation recommendations  Problem List Items Addressed This Visit       Cellulitis and abscess of left lower extremity    Relevant Medications    lidocaine (XYLOCAINE) 2 % viscous solution 15 mL (Completed)    Other Relevant Orders    INITIATE OUTPATIENT WOUND CARE PROTOCOL    Non-pressure chronic ulcer of other part of right lower leg with 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 ([address] - Primary    Relevant Medications    lidocaine (XYLOCAINE) 2 % viscous solution 15 mL (Completed)    Other Relevant Orders    INITIATE OUTPATIENT WOUND CARE PROTOCOL    Non-pressure chronic ulcer of other part of left lower leg with 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 ([address]    Relevant Medications    lidocaine (XYLOCAINE) 2 % viscous solution 15 mL (Completed)    Other Relevant Orders    INITIATE OUTPATIENT WOUND CARE PROTOCOL    Ulcer of foot, chronic, right, with 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 (HCC)    Relevant Medications    lidocaine (XYLOCAINE) 2 % viscous solution 15 mL (Completed)    Other Relevant Orders    INITIATE OUTPATIENT WOUND CARE PROTOCOL       Wound Leg Left #1 circumferential 08/08/22 (Active)   Wound Image   10/31/22 1400   Wound Etiology Other (Comment) 10/31/22 1400   Dressing Status Breakthrough drainage noted 10/31/22 1400   Cleansed Soap and water 10/31/22 1400   Wound Length (cm) 15.5 cm 10/31/22 1400   Wound Width (cm) 23 cm 10/31/22 1400   Wound Depth (cm) 0.2 cm 10/31/22 1400   Wound Surface Area (cm^2) 356.5 cm^2 10/31/22 1400   Change in Wound Size % 36.34 10/31/22 1400   Wound Volume (cm^3) 71.3 cm^3 10/31/22 1400   Wound Healing % -27 10/31/22 1400   Post-Procedure Length (cm) 15.5 cm 10/31/22 1420   Post-Procedure Width (cm) 23 cm 10/31/22 1420   Post-Procedure Depth (cm) 0.2 cm 10/31/22 1420   Post-Procedure Surface Area (cm^2) 356.5 cm^2 10/31/22 1420   Post-Procedure Volume (cm^3) 71.3 cm^3 10/31/22 1420   Wound Assessment Granulation tissue;Slough 10/31/22 1400   Drainage Amount Large 10/31/22 1400   Drainage Description Serosanguinous 10/31/22 1400   Wound Odor Moderate 10/31/22 1400   Dinorah-Wound/Incision Assessment Hyperpigmented;Dry/flaky 10/31/22 1400   Edges Attached edges 10/31/22 1400   Wound Thickness Description Full thickness 10/31/22 1400   Number of days: 84       Wound Leg Right #2 circumferential 10/31/22 (Active)   Wound Image   10/31/22 1404   Wound Etiology Other (Comment) 10/31/22 1404   Dressing Status Breakthrough drainage noted 10/31/22 1404   Cleansed Soap and water 10/31/22 1404   Wound Length (cm) 14 cm 10/31/22 1404   Wound Width (cm) 19 cm 10/31/22 1404   Wound Depth (cm) 0.1 cm 10/31/22 1404   Wound Surface Area (cm^2) 266 cm^2 10/31/22 1404   Wound Volume (cm^3) 26.6 cm^3 10/31/22 1404   Post-Procedure Length (cm) 14 cm 10/31/22 1420   Post-Procedure Width (cm) 19 cm 10/31/22 1420   Post-Procedure Depth (cm) 0.1 cm 10/31/22 1420   Post-Procedure Surface Area (cm^2) 266 cm^2 10/31/22 1420   Post-Procedure Volume (cm^3) 26.6 cm^3 10/31/22 1420   Wound Assessment Slough;Granulation tissue 10/31/22 1404   Drainage Amount Large 10/31/22 1404   Drainage Description Serosanguinous 10/31/22 1404   Wound Odor Moderate 10/31/22 1404   Dinorah-Wound/Incision Assessment Dry/flaky; Intact 10/31/22 1404   Edges Attached edges 10/31/22 1404   Wound Thickness Description Partial thickness 10/31/22 1404   Number of days: 0       POP IN PROCEDURE TYPE (DEBRIDEMENT, BIOPSY, I&D, SKIN SUB, CHEMICAL CAUERTY)     Plan:   Switch to xeroform  Unna lite compression    Treatment Note please see attached Discharge Instructions    Written patient dismissal instructions given to patient or POA. Electronically signed by Annamaria Welch MD on 10/31/2022 at 2:23 PM               Debridement Wound Care        Problem List Items Addressed This Visit       Cellulitis and abscess of left lower extremity    Relevant Medications    lidocaine (XYLOCAINE) 2 % viscous solution 15 mL (Completed)    Other Relevant Orders    INITIATE OUTPATIENT WOUND CARE PROTOCOL    Non-pressure chronic ulcer of other part of right lower leg with 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 ([address] - Primary    Relevant Medications    lidocaine (XYLOCAINE) 2 % viscous solution 15 mL (Completed)    Other Relevant Orders    INITIATE OUTPATIENT WOUND CARE PROTOCOL    Non-pressure chronic ulcer of other part of left lower leg with 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 ([address]    Relevant Medications    lidocaine (XYLOCAINE) 2 % viscous solution 15 mL (Completed)    Other Relevant Orders    INITIATE OUTPATIENT WOUND CARE PROTOCOL    Ulcer of foot, chronic, right, with 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 (HCC)    Relevant Medications    lidocaine (XYLOCAINE) 2 % viscous solution 15 mL (Completed)    Other Relevant Orders    INITIATE OUTPATIENT WOUND CARE PROTOCOL       Procedure Note  Indications:  Based on my examination of this patient's wound(s)/ulcer(s) today, debridement is required to promote healing and evaluate the wound base.     Performed by: Annamaria Welch MD    Consent obtained: Yes    Time out taken: Yes    Debridement: Excisional    Using curette the wound(s)/ulcer(s) was/were sharply debrided down through and including the removal of    subcutaneous tissue    Devitalized Tissue Debrided: slough    Pre Debridement Measurements:  Are located in the [place]  Documentation Flow Sheet    Non-Pressure ulcer, 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    Wound/Ulcer #: 1 and 2    Post Debridement Measurements:  Wound/Ulcer Descriptions are Pre Debridement except measurements:    Wound Leg Left #1 circumferential 08/08/22 (Active)   Wound Image   10/31/22 1400   Wound Etiology Other (Comment) 10/31/22 1400   Dressing Status Breakthrough drainage noted 10/31/22 1400   Cleansed Soap and water 10/31/22 1400   Wound Length (cm) 15.5 cm 10/31/22 1400   Wound Width (cm) 23 cm 10/31/22 1400   Wound Depth (cm) 0.2 cm 10/31/22 1400   Wound Surface Area (cm^2) 356.5 cm^2 10/31/22 1400   Change in Wound Size % 36.34 10/31/22 1400   Wound Volume (cm^3) 71.3 cm^3 10/31/22 1400   Wound Healing % -27 10/31/22 1400   Post-Procedure Length (cm) 15.5 cm 10/31/22 1420   Post-Procedure Width (cm) 23 cm 10/31/22 1420   Post-Procedure Depth (cm) 0.2 cm 10/31/22 1420   Post-Procedure Surface Area (cm^2) 356.5 cm^2 10/31/22 1420   Post-Procedure Volume (cm^3) 71.3 cm^3 10/31/22 1420   Wound Assessment Granulation tissue;Slough 10/31/22 1400   Drainage Amount Large 10/31/22 1400   Drainage Description Serosanguinous 10/31/22 1400   Wound Odor Moderate 10/31/22 1400   Dinorah-Wound/Incision Assessment Hyperpigmented;Dry/flaky 10/31/22 1400   Edges Attached edges 10/31/22 1400   Wound Thickness Description Full thickness 10/31/22 1400   Number of days: 84       Wound Leg Right #2 circumferential 10/31/22 (Active)   Wound Image   10/31/22 1404   Wound Etiology Other (Comment) 10/31/22 1404   Dressing Status Breakthrough drainage noted 10/31/22 1404   Cleansed Soap and water 10/31/22 1404   Wound Length (cm) 14 cm 10/31/22 1404   Wound Width (cm) 19 cm 10/31/22 1404   Wound Depth (cm) 0.1 cm 10/31/22 1404   Wound Surface Area (cm^2) 266 cm^2 10/31/22 1404   Wound Volume (cm^3) 26.6 cm^3 10/31/22 1404   Post-Procedure Length (cm) 14 cm 10/31/22 1420   Post-Procedure Width (cm) 19 cm 10/31/22 1420   Post-Procedure Depth (cm) 0.1 cm 10/31/22 1420   Post-Procedure Surface Area (cm^2) 266 cm^2 10/31/22 1420   Post-Procedure Volume (cm^3) 26.6 cm^3 10/31/22 1420   Wound Assessment Slough;Granulation tissue 10/31/22 1404   Drainage Amount Large 10/31/22 1404   Drainage Description Serosanguinous 10/31/22 1404   Wound Odor Moderate 10/31/22 1404   Dinorah-Wound/Incision Assessment Dry/flaky; Intact 10/31/22 1404   Edges Attached edges 10/31/22 1404   Wound Thickness Description Partial thickness 10/31/22 1404   Number of days: 0        Total Surface Area Debrided:  200 sq cm     Estimated Blood Loss:  Minimal     Hemostasis Achieved: Pressure    Procedural Pain: 1 / 10     Post Procedural Pain: 0 / 10     Response to treatment: Well tolerated by patient

## 2022-10-31 NOTE — DISCHARGE INSTRUCTIONS
Discharge Instructions for  [address] [place] [address], [address] [place] [address]  Telephone: [phone]    NAME:  Chon Donald. YOB: 1945  MEDICAL RECORD NUMBER:  [id number]  DATE:  10/31/2022      Return Appointment:  [] Dressing supply provider:   [x] Home Healthcare: [facility]   [x] Return Appointment: 1 Week(s)  [] Nurse Visit: [facility])    [] Discharge from [facility]  [] Ordered tests:   [] Referrals:   [] Rx:   [] Other:     Wound Cleansing:   Do not scrub or use excessive force. Cleanse wound prior to applying a clean dressing with:  [] Normal Saline   [x] Mild Soap & Water/Baby Shampoo   [] Keep Wound Dry in Shower  [] Other:      Topical Treatments:  Do not apply lotions, creams, or ointments to wound bed unless directed. [x] Apply moisturizing lotion to skin surrounding the wound prior to dressing change.  [] Apply antifungal ointment to skin surrounding the wound prior to dressing change.  [] Apply thin film of moisture barrier/zinc ointment to skin immediately around wound. [] Other:       Dressings:           Wound Location L & R leg   [x] Apply Primary Dressing:       [] MediHoney Gel    [] MediHoney Alginate               [] Calcium Alginate      [] Calcium Alginate with Silver:    [] Collagen                   [] Collagen with Silver                [] Santyl with Moistened saline gauze              [] Hydrofera Blue (cut to size and moistened)  [] Hydrofera Blue Ready (Cut to size)   [] NS wet to dry    [] Betadine wet to dry              [] Hydrogel                [] Mepitel     [x] Xeroform 1st               [x] Other: Drawtex 2nd    [x] Cover and Secure with:     [x] Gauze [] Talita Iqbal [] Kerlix   [] Foam [] Super Absorbant [x] ABD     [] ACE Wrap            [] Other:    Limit contact of tape with skin.     [] Change dressing: [] Daily    [] Every Other Day [] Once per week   [] Twice per week   [x] Three times per week [] Do Not Change Dressing   [] Other:     Edema Control:  Apply: [] Compression Stocking:  mmHg  []Right Leg []Left Leg   [] Tubigrip []Right Leg Double Layer []Left Leg Double Layer      []Right Leg Single Layer []Left Leg Single Layer     [] Elevate leg(s) above the level of the heart when sitting. [] Avoid prolonged standing in one place. Compression:  Apply: [x] Multilayer Compression Wrap: [x]RightLeg [x]Left Leg                                 []Coflex   []Coflex Lite             [x]Unna Lite     [x] Do not get leg(s) with wrap wet. If wraps become too tight call the center or completely remove the wrap. [x] Elevate leg(s) above the level of the heart when sitting. [x] Avoid prolonged standing in one place. Dietary:  [x] Diet as tolerated: [] Calorie Diabetic Diet: [] No Added Salt:  [x] Increase Protein: [] Other:     Activity:  [x] Activity as tolerated:    [] Patient has no activity restrictions       [] Strict Bedrest:   [] Remain off Work:       [] May return to full duty work:                                     [] Return to work with restrictions:               [address] [facility] [address] Information: Should you experience any significant changes in your wound(s) or have questions about your wound care, please contact Errol Barry at [place] [address] 8:00 am - 4:30. If you need help with your wound outside of these hours and cannot wait until we are again available, contact your PCP or go to the hospital emergency room. PLEASE NOTE: IF YOU ARE UNABLE TO OBTAIN WOUND SUPPLIES, CONTINUE TO USE THE SUPPLIES YOU HAVE AVAILABLE UNTIL YOU ARE ABLE TO REACH US. IT IS MOST IMPORTANT TO KEEP THE WOUND COVERED AT ALL TIMES.     [x] Dr. Xi Brewster   [] Dr. Shonda Kearns  [] Dr. Kyle Donato

## 2022-10-31 NOTE — WOUND CARE
[employer] Application   Below Knee    NAME:  Cleotis Cogan. YOB: 1945  MEDICAL RECORD NUMBER:  [id number]  DATE:  10/31/2022    Remove old [employer] or Boots. Applied moisturizing agent to dry skin as needed. Appied primary and secondary dressing as ordered. Applied Unna roll from toes to knee overlapping each time. Applied ace wrap or coban from toes to below the knee. Secured with tape and/or metal clips covered with tape. Instructed patient/caregiver to keep dressing dry and intact. DO NOT REMOVE DRESSING. Instructed pt/family/caregiver to report excessive draining, loose bandage, wet dressing, severe pain or tingling in toes. Applied [employer] dressing below the knee to bilateral lower legs. Unna Boot(s) were applied per  Guidelines.     Response to treatment: Well tolerated by patient , Patient tolerated treatment with mild discomfort but relieved after procedure was completed, Patient tolerated treatment with moderate discomfort but relieved after procedure was completed, Treatment was poorly tolerated by patient, Provider notified of pain related to procedure, and Notified primary physician regarding pain control and analgesic usage     Electronically signed by Jordan Culp LPN on 58/89/6039 at 2:38 PM

## 2022-11-14 ENCOUNTER — HOSPITAL ENCOUNTER (OUTPATIENT)
Dept: WOUND CARE | Age: 77
Discharge: HOME | End: 2022-11-14
Payer: MEDICARE

## 2022-11-14 VITALS
RESPIRATION RATE: 20 BRPM | SYSTOLIC BLOOD PRESSURE: 124 MMHG | HEART RATE: 82 BPM | TEMPERATURE: 97.1 F | DIASTOLIC BLOOD PRESSURE: 69 MMHG

## 2022-11-14 DIAGNOSIS — L03.116 CELLULITIS AND ABSCESS OF LEFT LOWER EXTREMITY: ICD-10-CM

## 2022-11-14 DIAGNOSIS — L97.812 NON-PRESSURE CHRONIC ULCER OF OTHER PART OF RIGHT LOWER LEG WITH FAT LAYER EXPOSED (HCC): Primary | ICD-10-CM

## 2022-11-14 DIAGNOSIS — L97.512 ULCER OF FOOT, CHRONIC, RIGHT, WITH FAT LAYER EXPOSED (HCC): ICD-10-CM

## 2022-11-14 DIAGNOSIS — L02.416 CELLULITIS AND ABSCESS OF LEFT LOWER EXTREMITY: ICD-10-CM

## 2022-11-14 DIAGNOSIS — L97.822 NON-PRESSURE CHRONIC ULCER OF OTHER PART OF LEFT LOWER LEG WITH FAT LAYER EXPOSED (HCC): ICD-10-CM

## 2022-11-14 PROCEDURE — 74011000250 HC RX REV CODE- 250: Performed by: SPECIALIST

## 2022-11-14 PROCEDURE — 29580 STRAPPING UNNA BOOT: CPT

## 2022-11-14 RX ORDER — LIDOCAINE HYDROCHLORIDE 20 MG/ML
15 SOLUTION OROPHARYNGEAL ONCE
Status: CANCELLED | OUTPATIENT
Start: 2022-11-14 | End: 2022-11-14

## 2022-11-14 RX ORDER — MUPIROCIN 20 MG/G
OINTMENT TOPICAL ONCE
Status: CANCELLED | OUTPATIENT
Start: 2022-11-14 | End: 2022-11-14

## 2022-11-14 RX ORDER — SILVER SULFADIAZINE 10 G/1000G
CREAM TOPICAL ONCE
Status: CANCELLED | OUTPATIENT
Start: 2022-11-14 | End: 2022-11-14

## 2022-11-14 RX ORDER — LIDOCAINE HYDROCHLORIDE 20 MG/ML
15 SOLUTION OROPHARYNGEAL ONCE
Status: COMPLETED | OUTPATIENT
Start: 2022-11-14 | End: 2022-11-14

## 2022-11-14 RX ADMIN — LIDOCAINE HYDROCHLORIDE 15 ML: 20 SOLUTION ORAL; TOPICAL at 09:49

## 2022-11-14 NOTE — WOUND CARE
[employer] Application   Below Knee    NAME:  Glenford Alpers. YOB: 1945  MEDICAL RECORD NUMBER:  [id number]  DATE:  11/14/2022    Remove old [employer] or Boots. Applied moisturizing agent to dry skin as needed. Appied primary and secondary dressing as ordered. Applied Unna roll from toes to knee overlapping each time. Applied ace wrap or coban from toes to below the knee. Secured with tape and/or metal clips covered with tape. Instructed patient/caregiver to keep dressing dry and intact. DO NOT REMOVE DRESSING. Instructed pt/family/caregiver to report excessive draining, loose bandage, wet dressing, severe pain or tingling in toes. Applied [employer] dressing below the knee to bilateral lower legs. Unna Boot(s) were applied per  Guidelines.     Response to treatment: Well tolerated by patient      Electronically signed by Shama Olson RN on 11/14/2022 at 10:35 AM

## 2022-11-14 NOTE — WOUND CARE
[address]   Progress Note and Procedure Note   NO Procedure      Alize Wang. MEDICAL RECORD NUMBER:  [id number]  AGE: 65 y.o. RACE BLACK/  GENDER: male  : 1945  EPISODE DATE:  2022    Subjective:   Not elevating legs  Chief Complaint   Patient presents with    Wound Check     Bilateral legs         HISTORY of PRESENT ILLNESS HPI    Alize Caballero is a 65 y.o. male who presents today for wound/ulcer evaluation. History of Wound Context: BLE  Wound/Ulcer Pain Timing/Severity: mild  Quality of pain: aching  Severity:  1 / 10   Modifying Factors: None  Associated Signs/Symptoms: edema    Ulcer Identification:  Ulcer Type: venous    Contributing Factors: edema    Wound: BLE         PAST MEDICAL HISTORY    Past Medical History:   Diagnosis Date    Dyslipidemia     HTN (hypertension)         PAST SURGICAL HISTORY    Past Surgical History:   Procedure Laterality Date    HX CHOLECYSTECTOMY         FAMILY HISTORY    Family History   Problem Relation Age of Onset    Diabetes Mother     Diabetes Brother     Diabetes Son        SOCIAL HISTORY    Social History     Tobacco Use    Smoking status: Never    Smokeless tobacco: Never   Vaping Use    Vaping Use: Never used   Substance Use Topics    Alcohol use: Never    Drug use: Never       ALLERGIES    No Known Allergies    MEDICATIONS    Current Outpatient Medications on File Prior to Encounter   Medication Sig Dispense Refill    multivitamin (ONE A DAY) tablet Take 1 Tablet by mouth in the morning. furosemide (LASIX) 40 mg tablet Take 40 mg by mouth two (2) times a day. vitamin E, dl,tocopheryl acet, (vitamin E acetate) 400 unit capsule Take 400 Units by mouth daily. aspirin (ASPIRIN) 325 mg tablet Take 325 mg by mouth daily. No current facility-administered medications on file prior to encounter. REVIEW OF SYSTEMS    Pertinent items are noted in HPI.     Objective:     Visit Vitals  /69 (BP 1 Location: Left arm, BP Patient Position: At rest;Sitting)   Pulse 82   Temp 97.1 °F (36.2 °C)   Resp 20       Wt Readings from Last 3 Encounters:   08/08/22 122.5 kg (270 lb)   07/21/22 130.6 kg (288 lb)   02/21/22 126 kg (277 lb 12.8 oz)       PHYSICAL EXAM  Right and left leg wounds appear unchanged, reasonably large, no evidence of infection  Pertinent items are noted in HPI. Assessment:   No Progress  Problem List Items Addressed This Visit       Cellulitis and abscess of left lower extremity    Relevant Medications    lidocaine (XYLOCAINE) 2 % viscous solution 15 mL (Completed)    Other Relevant Orders    INITIATE OUTPATIENT WOUND CARE PROTOCOL    Non-pressure chronic ulcer of other part of right lower leg with 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 ([address] - Primary    Relevant Medications    lidocaine (XYLOCAINE) 2 % viscous solution 15 mL (Completed)    Other Relevant Orders    INITIATE OUTPATIENT WOUND CARE PROTOCOL    Non-pressure chronic ulcer of other part of left lower leg with 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 ([address]    Relevant Medications    lidocaine (XYLOCAINE) 2 % viscous solution 15 mL (Completed)    Other Relevant Orders    INITIATE OUTPATIENT WOUND CARE PROTOCOL    Ulcer of foot, chronic, right, with 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 ([facility])    Relevant Medications    lidocaine (XYLOCAINE) 2 % viscous solution 15 mL (Completed)    Other Relevant Orders    INITIATE OUTPATIENT WOUND CARE PROTOCOL       Procedure Note  Indications:  Based on my examination of this patient's wound(s)/ulcer(s) today, debridement is not required to promote healing and evaluate the wound base.     Wound Leg Left #1 circumferential 08/08/22 (Active)   Wound Image   11/14/22 1000   Wound Etiology Other (Comment) 11/14/22 1000   Dressing Status Breakthrough drainage noted 11/14/22 1000   Cleansed Soap and water 11/14/22 1000   Wound Length (cm) 21 cm 11/14/22 1000   Wound Width (cm) 36 cm 11/14/22 1000   Wound Depth (cm) 0.2 cm 11/14/22 1000   Wound Surface Area (cm^2) 756 cm^2 11/14/22 1000   Change in Wound Size % -35 11/14/22 1000   Wound Volume (cm^3) 151.2 cm^3 11/14/22 1000   Wound Healing % -170 11/14/22 1000   Post-Procedure Length (cm) 15.5 cm 10/31/22 1420   Post-Procedure Width (cm) 23 cm 10/31/22 1420   Post-Procedure Depth (cm) 0.2 cm 10/31/22 1420   Post-Procedure Surface Area (cm^2) 356.5 cm^2 10/31/22 1420   Post-Procedure Volume (cm^3) 71.3 cm^3 10/31/22 1420   Wound Assessment Granulation tissue;Slough 11/14/22 1000   Drainage Amount Large 11/14/22 1000   Drainage Description Serosanguinous 11/14/22 1000   Wound Odor Moderate 11/14/22 1000   Dinorah-Wound/Incision Assessment Hyperpigmented;Dry/flaky 11/14/22 1000   Edges Attached edges 11/14/22 1000   Wound Thickness Description Full thickness 11/14/22 1000   Number of days: 98       Wound Leg Right #2 circumferential 10/31/22 (Active)   Wound Image   11/14/22 0959   Wound Etiology Other (Comment) 11/14/22 0959   Dressing Status Breakthrough drainage noted 11/14/22 0959   Cleansed Soap and water 11/14/22 0959   Wound Length (cm) 10 cm 11/14/22 0959   Wound Width (cm) 14.5 cm 11/14/22 0959   Wound Depth (cm) 0.1 cm 11/14/22 0959   Wound Surface Area (cm^2) 145 cm^2 11/14/22 0959   Change in Wound Size % 45.49 11/14/22 0959   Wound Volume (cm^3) 14.5 cm^3 11/14/22 0959   Wound Healing % 45 11/14/22 0959   Post-Procedure Length (cm) 14 cm 10/31/22 1420   Post-Procedure Width (cm) 19 cm 10/31/22 1420   Post-Procedure Depth (cm) 0.1 cm 10/31/22 1420   Post-Procedure Surface Area (cm^2) 266 cm^2 10/31/22 1420   Post-Procedure Volume (cm^3) 26.6 cm^3 10/31/22 1420   Wound Assessment Slough;Granulation tissue 11/14/22 0959   Drainage Amount Large 11/14/22 0959   Drainage Description Serosanguinous 11/14/22 0959   Wound Odor Moderate 11/14/22 0959   Dinorah-Wound/Incision Assessment Dry/flaky; Intact 11/14/22 0959   Edges Attached edges 11/14/22 0959   Wound Thickness Description Partial thickness 11/14/22 0391   Number of days: 14        Plan:   Order lymphedema pump  Switch to silver cell and drawtex    Treatment Note please see attached Discharge Instructions    Written patient dismissal instructions given to patient or POA.          Electronically signed by Gerry Cain MD on 11/14/2022 at 10:37 AM 5

## 2022-11-14 NOTE — WOUND CARE
Discharge Instructions for  [address] [facility]  [place], [address] [facility]  Telephone: [phone]    NAME:  Katy Jaramillo. YOB: 1945  MEDICAL RECORD NUMBER:  [id number]  DATE:  11/14/2022      Return Appointment:  [] Dressing supply provider:   [x] Home Healthcare: [facility]   [x] Return Appointment: 2 Week(s)  [] Nurse Visit: [facility])    [] Discharge from [facility]  [] Ordered tests:   [x] Referrals: Please see a podiatrist for toe nails  [] Rx:   [x] Other: will request Lymphedema pump for BLE    Wound Cleansing:   Do not scrub or use excessive force. Cleanse wound prior to applying a clean dressing with:  [] Normal Saline   [x] Mild Soap & Water/Baby Shampoo   [] Keep Wound Dry in Shower  [] Other:      Topical Treatments:  Do not apply lotions, creams, or ointments to wound bed unless directed. [x] Apply moisturizing lotion to skin surrounding the wound prior to dressing change.  [] Apply antifungal ointment to skin surrounding the wound prior to dressing change.  [] Apply thin film of moisture barrier/zinc ointment to skin immediately around wound. [] Other:       Dressings:           Wound Location L & R leg   [x] Apply Primary Dressing:       [] MediHoney Gel    [] MediHoney Alginate               [] Calcium Alginate      [x] Calcium Alginate with Silver 1st   [] Collagen                   [] Collagen with Silver                [] Santyl with Moistened saline gauze              [] Hydrofera Blue (cut to size and moistened)  [] Hydrofera Blue Ready (Cut to size)   [] NS wet to dry    [] Betadine wet to dry              [] Hydrogel                [] Mepitel     [] Xeroform 1st               [x] Other: Drawtex 2nd    [x] Cover and Secure with:     [x] Gauze [] Katheran Leys [] Kerlix   [] Foam [] Super Absorbant [x] ABD     [] ACE Wrap            [] Other:    Limit contact of tape with skin.     [] Change dressing: [] Daily    [] Every Other Day [] Once per week   [] Twice per week   [x] Three times per week every week  [] Do Not Change Dressing   [] Other:     Edema Control:  Apply: [] Compression Stocking:  mmHg  []Right Leg []Left Leg   [] Tubigrip []Right Leg Double Layer []Left Leg Double Layer      []Right Leg Single Layer []Left Leg Single Layer     [] Elevate leg(s) above the level of the heart when sitting. [] Avoid prolonged standing in one place. Compression:  Apply: [x] Multilayer Compression Wrap: [x]RightLeg [x]Left Leg                                 []Coflex   []Coflex Lite             [x]Unna Lite     [x] Do not get leg(s) with wrap wet. If wraps become too tight call the center or completely remove the wrap. [x] Elevate leg(s) above the level of the heart when sitting. [x] Avoid prolonged standing in one place. Dietary:  [x] Diet as tolerated: [] Calorie Diabetic Diet: [x] No Added Salt:  [x] Increase Protein: [] Other:     Activity:  [x] Activity as tolerated:    [] Patient has no activity restrictions       [] Strict Bedrest:   [] Remain off Work:       [] May return to full duty work:                                     [] Return to work with restrictions:               [address] [facility] [facility] [address] Information: Should you experience any significant changes in your wound(s) or have questions about your wound care, please contact Errol Barry at [place] [address] 8:00 am - 4:30. If you need help with your wound outside of these hours and cannot wait until we are again available, contact your PCP or go to the hospital emergency room. PLEASE NOTE: IF YOU ARE UNABLE TO OBTAIN WOUND SUPPLIES, CONTINUE TO USE THE SUPPLIES YOU HAVE AVAILABLE UNTIL YOU ARE ABLE TO REACH US. IT IS MOST IMPORTANT TO KEEP THE WOUND COVERED AT ALL TIMES.     [x] Dr. Cinda Castillo   [] Dr. Cleve Gonzalez  [] Dr. Zana De La Torre

## 2022-11-14 NOTE — DISCHARGE INSTRUCTIONS
Discharge Instructions for  [address] [place] [address], [facility]  Telephone: [phone]    NAME:  Ness Dominguez. YOB: 1945  MEDICAL RECORD NUMBER:  [id number]  DATE:  11/14/2022      Return Appointment:  [] Dressing supply provider:   [x] Home Healthcare: [facility]   [x] Return Appointment: 2 Week(s)  [] Nurse Visit: Feliz Martinez    [] Discharge from [facility]  [] Ordered tests:   [x] Referrals: Please see a podiatrist for toe nails  [] Rx:   [x] Other: will request Lymphedema pump for BLE    Wound Cleansing:   Do not scrub or use excessive force. Cleanse wound prior to applying a clean dressing with:  [] Normal Saline   [x] Mild Soap & Water/Baby Shampoo   [] Keep Wound Dry in Shower  [] Other:      Topical Treatments:  Do not apply lotions, creams, or ointments to wound bed unless directed. [x] Apply moisturizing lotion to skin surrounding the wound prior to dressing change.  [] Apply antifungal ointment to skin surrounding the wound prior to dressing change.  [] Apply thin film of moisture barrier/zinc ointment to skin immediately around wound. [] Other:       Dressings:           Wound Location L & R leg   [x] Apply Primary Dressing:       [] MediHoney Gel    [] MediHoney Alginate               [] Calcium Alginate      [x] Calcium Alginate with Silver 1st   [] Collagen                   [] Collagen with Silver                [] Santyl with Moistened saline gauze              [] Hydrofera Blue (cut to size and moistened)  [] Hydrofera Blue Ready (Cut to size)   [] NS wet to dry    [] Betadine wet to dry              [] Hydrogel                [] Mepitel     [] Xeroform 1st               [x] Other: Drawtex 2nd    [x] Cover and Secure with:     [x] Gauze [] Rodrigue Skeens [] Kerlix   [] Foam [] Super Absorbant [x] ABD     [] ACE Wrap            [] Other:    Limit contact of tape with skin.     [] Change dressing: [] Daily    [] Every Other Day [] Once per week   [] Twice per week   [x] Three times per week every week  [] Do Not Change Dressing   [] Other:     Edema Control:  Apply: [] Compression Stocking:  mmHg  []Right Leg []Left Leg   [] Tubigrip []Right Leg Double Layer []Left Leg Double Layer      []Right Leg Single Layer []Left Leg Single Layer     [] Elevate leg(s) above the level of the heart when sitting. [] Avoid prolonged standing in one place. Compression:  Apply: [x] Multilayer Compression Wrap: [x]RightLeg [x]Left Leg                                 []Coflex   []Coflex Lite             [x]Unna Lite     [x] Do not get leg(s) with wrap wet. If wraps become too tight call the center or completely remove the wrap. [x] Elevate leg(s) above the level of the heart when sitting. [x] Avoid prolonged standing in one place. Dietary:  [x] Diet as tolerated: [] Calorie Diabetic Diet: [x] No Added Salt:  [x] Increase Protein: [] Other:     Activity:  [x] Activity as tolerated:    [] Patient has no activity restrictions       [] Strict Bedrest:   [] Remain off Work:       [] May return to full duty work:                                     [] Return to work with restrictions:               [address] [facility] [address] [facility] [address] Information: Should you experience any significant changes in your wound(s) or have questions about your wound care, please contact Errol Barry at [place] [address] 8:00 am - 4:30. If you need help with your wound outside of these hours and cannot wait until we are again available, contact your PCP or go to the hospital emergency room. PLEASE NOTE: IF YOU ARE UNABLE TO OBTAIN WOUND SUPPLIES, CONTINUE TO USE THE SUPPLIES YOU HAVE AVAILABLE UNTIL YOU ARE ABLE TO REACH US. IT IS MOST IMPORTANT TO KEEP THE WOUND COVERED AT ALL TIMES.     [x] Dr. Tessie Tijerina   [] Dr. Yimi Mccoy  [] Dr. Rahul Malcolm

## 2022-11-28 ENCOUNTER — HOSPITAL ENCOUNTER (OUTPATIENT)
Dept: WOUND CARE | Age: 77
Discharge: HOME | End: 2022-11-28
Payer: MEDICARE

## 2022-11-28 VITALS
RESPIRATION RATE: 20 BRPM | TEMPERATURE: 96.8 F | SYSTOLIC BLOOD PRESSURE: 148 MMHG | DIASTOLIC BLOOD PRESSURE: 84 MMHG | HEART RATE: 93 BPM

## 2022-11-28 DIAGNOSIS — L02.416 CELLULITIS AND ABSCESS OF LEFT LOWER EXTREMITY: ICD-10-CM

## 2022-11-28 DIAGNOSIS — L97.812 NON-PRESSURE CHRONIC ULCER OF OTHER PART OF RIGHT LOWER LEG WITH FAT LAYER EXPOSED (HCC): Primary | ICD-10-CM

## 2022-11-28 DIAGNOSIS — L97.512 ULCER OF FOOT, CHRONIC, RIGHT, WITH FAT LAYER EXPOSED (HCC): ICD-10-CM

## 2022-11-28 DIAGNOSIS — L03.116 CELLULITIS AND ABSCESS OF LEFT LOWER EXTREMITY: ICD-10-CM

## 2022-11-28 DIAGNOSIS — L97.822 NON-PRESSURE CHRONIC ULCER OF OTHER PART OF LEFT LOWER LEG WITH FAT LAYER EXPOSED (HCC): ICD-10-CM

## 2022-11-28 PROCEDURE — 11045 DBRDMT SUBQ TISS EACH ADDL: CPT

## 2022-11-28 PROCEDURE — 11045 DBRDMT SUBQ TISS EACH ADDL: CPT | Performed by: SPECIALIST

## 2022-11-28 PROCEDURE — 11042 DBRDMT SUBQ TIS 1ST 20SQCM/<: CPT

## 2022-11-28 PROCEDURE — 74011000250 HC RX REV CODE- 250: Performed by: SPECIALIST

## 2022-11-28 RX ORDER — SILVER SULFADIAZINE 10 G/1000G
CREAM TOPICAL ONCE
OUTPATIENT
Start: 2022-11-28 | End: 2022-11-28

## 2022-11-28 RX ORDER — LIDOCAINE HYDROCHLORIDE 20 MG/ML
15 SOLUTION OROPHARYNGEAL ONCE
OUTPATIENT
Start: 2022-11-28 | End: 2022-11-28

## 2022-11-28 RX ORDER — LIDOCAINE HYDROCHLORIDE 20 MG/ML
15 SOLUTION OROPHARYNGEAL ONCE
Status: COMPLETED | OUTPATIENT
Start: 2022-11-28 | End: 2022-11-28

## 2022-11-28 RX ORDER — MUPIROCIN 20 MG/G
OINTMENT TOPICAL ONCE
OUTPATIENT
Start: 2022-11-28 | End: 2022-11-28

## 2022-11-28 RX ADMIN — LIDOCAINE HYDROCHLORIDE 15 ML: 20 SOLUTION ORAL; TOPICAL at 10:06

## 2022-11-28 NOTE — WOUND CARE
[address]   Progress Note and Procedure Note     Bartholomew Essex. MEDICAL RECORD NUMBER:  [id number]  AGE: 65 y.o. RACE BLACK/  GENDER: male  : 1945  EPISODE DATE:  2022    Subjective:   Not offloading as recommended  Lymphedema pump not yet arrived    Chief Complaint   Patient presents with    Wound Check     Bilateral legs         HISTORY of PRESENT ILLNESS HPI    Bartholomew Essex. is a 65 y.o. male who presents today for wound/ulcer evaluation. History of Wound Context: BLE  Wound/Ulcer Pain Timing/Severity: mild  Quality of pain: aching  Severity:  1 / 10   Modifying Factors: None  Associated Signs/Symptoms: edema    Ulcer Identification:  Ulcer Type: venous    Contributing Factors: lymphedema    Wound: BLE         PAST MEDICAL HISTORY    Past Medical History:   Diagnosis Date    Dyslipidemia     HTN (hypertension)         PAST SURGICAL HISTORY    Past Surgical History:   Procedure Laterality Date    HX CHOLECYSTECTOMY         FAMILY HISTORY    Family History   Problem Relation Age of Onset    Diabetes Mother     Diabetes Brother     Diabetes Son        SOCIAL HISTORY    Social History     Tobacco Use    Smoking status: Never    Smokeless tobacco: Never   Vaping Use    Vaping Use: Never used   Substance Use Topics    Alcohol use: Never    Drug use: Never       ALLERGIES    No Known Allergies    MEDICATIONS    Current Outpatient Medications on File Prior to Encounter   Medication Sig Dispense Refill    multivitamin (ONE A DAY) tablet Take 1 Tablet by mouth in the morning. furosemide (LASIX) 40 mg tablet Take 40 mg by mouth two (2) times a day. vitamin E, dl,tocopheryl acet, (vitamin E acetate) 400 unit capsule Take 400 Units by mouth daily. aspirin (ASPIRIN) 325 mg tablet Take 325 mg by mouth daily. No current facility-administered medications on file prior to encounter.        REVIEW OF SYSTEMS    Pertinent items are noted in HPI.    Objective:     Visit Vitals  BP (!) 148/84 (BP 1 Location: Left arm)   Pulse 93   Temp 96.8 °F (36 °C)   Resp 20       Wt Readings from Last 3 Encounters:   08/08/22 122.5 kg (270 lb)   07/21/22 130.6 kg (288 lb)   02/21/22 126 kg (277 lb 12.8 oz)       PHYSICAL EXAM    Pertinent items are noted in HPI. Assessment:      Problem List Items Addressed This Visit       Cellulitis and abscess of left lower extremity    Relevant Medications    lidocaine (XYLOCAINE) 2 % viscous solution 15 mL (Completed) (Start on 11/28/2022 11:00 AM)    Other Relevant Orders    INITIATE OUTPATIENT WOUND CARE PROTOCOL    Non-pressure chronic ulcer of other part of right lower leg with 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 ([address] - Primary    Relevant Medications    lidocaine (XYLOCAINE) 2 % viscous solution 15 mL (Completed) (Start on 11/28/2022 11:00 AM)    Other Relevant Orders    INITIATE OUTPATIENT WOUND CARE PROTOCOL    Non-pressure chronic ulcer of other part of left lower leg with 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 ([address]    Relevant Medications    lidocaine (XYLOCAINE) 2 % viscous solution 15 mL (Completed) (Start on 11/28/2022 11:00 AM)    Other Relevant Orders    INITIATE OUTPATIENT WOUND CARE PROTOCOL    Ulcer of foot, chronic, right, with 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 ([address]    Relevant Medications    lidocaine (XYLOCAINE) 2 % viscous solution 15 mL (Completed) (Start on 11/28/2022 11:00 AM)    Other Relevant Orders    INITIATE OUTPATIENT WOUND CARE PROTOCOL       Wound Leg Left #1 circumferential 08/08/22 (Active)   Wound Image   11/28/22 1000   Wound Etiology Other (Comment) 11/28/22 1000   Dressing Status Clean;Dry; Intact 11/28/22 1000   Cleansed Soap and water 11/28/22 1000   Wound Length (cm) 18 cm 11/28/22 1000   Wound Width (cm) 34.5 cm 11/28/22 1000   Wound Depth (cm) 0.1 cm 11/28/22 1000   Wound Surface Area (cm^2) 621 cm^2 11/28/22 1000   Change in Wound Size % -10.89 11/28/22 1000   Wound Volume (cm^3) 62.1 cm^3 11/28/22 1000   Wound Healing % -11 11/28/22 1000   Post-Procedure Length (cm) 18 cm 11/28/22 1010   Post-Procedure Width (cm) 34.5 cm 11/28/22 1010   Post-Procedure Depth (cm) 0.1 cm 11/28/22 1010   Post-Procedure Surface Area (cm^2) 621 cm^2 11/28/22 1010   Post-Procedure Volume (cm^3) 62.1 cm^3 11/28/22 1010   Wound Assessment Granulation tissue;Slough 11/28/22 1000   Drainage Amount Large 11/28/22 1000   Drainage Description Serosanguinous 11/28/22 1000   Wound Odor Moderate 11/28/22 1000   Dinorah-Wound/Incision Assessment Hyperpigmented;Dry/flaky 11/28/22 1000   Edges Attached edges 11/28/22 1000   Wound Thickness Description Full thickness 11/28/22 1000   Number of days: 112       Wound Leg Right #2 circumferential 10/31/22 (Active)   Wound Image   11/28/22 0959   Wound Etiology Other (Comment) 11/28/22 0959   Dressing Status Breakthrough drainage noted 11/14/22 0959   Cleansed Soap and water 11/28/22 0959   Wound Length (cm) 10 cm 11/28/22 0959   Wound Width (cm) 15 cm 11/28/22 0959   Wound Depth (cm) 0.1 cm 11/28/22 0959   Wound Surface Area (cm^2) 150 cm^2 11/28/22 0959   Change in Wound Size % 43.61 11/28/22 0959   Wound Volume (cm^3) 15 cm^3 11/28/22 0959   Wound Healing % 44 11/28/22 0959   Post-Procedure Length (cm) 10 cm 11/28/22 1010   Post-Procedure Width (cm) 15 cm 11/28/22 1010   Post-Procedure Depth (cm) 0.1 cm 11/28/22 1010   Post-Procedure Surface Area (cm^2) 150 cm^2 11/28/22 1010   Post-Procedure Volume (cm^3) 15 cm^3 11/28/22 1010   Wound Assessment Slough;Granulation tissue 11/28/22 0959   Drainage Amount Large 11/28/22 0959   Drainage Description Serosanguinous 11/28/22 0959   Wound Odor Moderate 11/28/22 0959   Dinorah-Wound/Incision Assessment Dry/flaky; Intact 11/28/22 0959   Edges Attached edges 11/28/22 0959   Wound Thickness Description Partial thickness 11/28/22 0959   Number of days: 28       POP IN PROCEDURE TYPE (DEBRIDEMENT, BIOPSY, I&D, SKIN SUB, CHEMICAL CAUERTY)     Plan:   Switch to Mepilex Transfer, continue [employer] boots  Await lymphedema pump    Treatment Note please see attached Discharge Instructions    Written patient dismissal instructions given to patient or POA. Electronically signed by Chelo Spring MD on 11/28/2022 at 10:15 AM               Debridement Wound Care        Problem List Items Addressed This Visit       Cellulitis and abscess of left lower extremity    Relevant Medications    lidocaine (XYLOCAINE) 2 % viscous solution 15 mL (Completed) (Start on 11/28/2022 11:00 AM)    Other Relevant Orders    INITIATE OUTPATIENT WOUND CARE PROTOCOL    Non-pressure chronic ulcer of other part of right lower leg with 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 ([address] - Primary    Relevant Medications    lidocaine (XYLOCAINE) 2 % viscous solution 15 mL (Completed) (Start on 11/28/2022 11:00 AM)    Other Relevant Orders    INITIATE OUTPATIENT WOUND CARE PROTOCOL    Non-pressure chronic ulcer of other part of left lower leg with 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 ([address]    Relevant Medications    lidocaine (XYLOCAINE) 2 % viscous solution 15 mL (Completed) (Start on 11/28/2022 11:00 AM)    Other Relevant Orders    INITIATE OUTPATIENT WOUND CARE PROTOCOL    Ulcer of foot, chronic, right, with 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 ([address]    Relevant Medications    lidocaine (XYLOCAINE) 2 % viscous solution 15 mL (Completed) (Start on 11/28/2022 11:00 AM)    Other Relevant Orders    INITIATE OUTPATIENT WOUND CARE PROTOCOL       Procedure Note  Indications:  Based on my examination of this patient's wound(s)/ulcer(s) today, debridement is required to promote healing and evaluate the wound base.     Performed by: Chelo Spring MD    Consent obtained: Yes    Time out taken: Yes    Debridement: Excisional    Using curette the wound(s)/ulcer(s) was/were sharply debrided down through and including the removal of    subcutaneous tissue    Devitalized Tissue Debrided: slough    Pre Debridement Measurements:  Are located in the [facility]  Documentation Flow Sheet    Non-Pressure ulcer, 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    Wound/Ulcer #: 1 and 2    Post Debridement Measurements:  Wound/Ulcer Descriptions are Pre Debridement except measurements:    Wound Leg Left #1 circumferential 08/08/22 (Active)   Wound Image   11/28/22 1000   Wound Etiology Other (Comment) 11/28/22 1000   Dressing Status Clean;Dry; Intact 11/28/22 1000   Cleansed Soap and water 11/28/22 1000   Wound Length (cm) 18 cm 11/28/22 1000   Wound Width (cm) 34.5 cm 11/28/22 1000   Wound Depth (cm) 0.1 cm 11/28/22 1000   Wound Surface Area (cm^2) 621 cm^2 11/28/22 1000   Change in Wound Size % -10.89 11/28/22 1000   Wound Volume (cm^3) 62.1 cm^3 11/28/22 1000   Wound Healing % -11 11/28/22 1000   Post-Procedure Length (cm) 18 cm 11/28/22 1010   Post-Procedure Width (cm) 34.5 cm 11/28/22 1010   Post-Procedure Depth (cm) 0.1 cm 11/28/22 1010   Post-Procedure Surface Area (cm^2) 621 cm^2 11/28/22 1010   Post-Procedure Volume (cm^3) 62.1 cm^3 11/28/22 1010   Wound Assessment Granulation tissue;Slough 11/28/22 1000   Drainage Amount Large 11/28/22 1000   Drainage Description Serosanguinous 11/28/22 1000   Wound Odor Moderate 11/28/22 1000   Dinorah-Wound/Incision Assessment Hyperpigmented;Dry/flaky 11/28/22 1000   Edges Attached edges 11/28/22 1000   Wound Thickness Description Full thickness 11/28/22 1000   Number of days: 112       Wound Leg Right #2 circumferential 10/31/22 (Active)   Wound Image   11/28/22 0959   Wound Etiology Other (Comment) 11/28/22 0959   Dressing Status Breakthrough drainage noted 11/14/22 0959   Cleansed Soap and water 11/28/22 0959   Wound Length (cm) 10 cm 11/28/22 0959   Wound Width (cm) 15 cm 11/28/22 0959   Wound Depth (cm) 0.1 cm 11/28/22 0959   Wound Surface Area (cm^2) 150 cm^2 11/28/22 0959   Change in Wound Size % 43.61 11/28/22 0959   Wound Volume (cm^3) 15 cm^3 11/28/22 0959   Wound Healing % 44 11/28/22 0959   Post-Procedure Length (cm) 10 cm 11/28/22 1010   Post-Procedure Width (cm) 15 cm 11/28/22 1010 Post-Procedure Depth (cm) 0.1 cm 11/28/22 1010   Post-Procedure Surface Area (cm^2) 150 cm^2 11/28/22 1010   Post-Procedure Volume (cm^3) 15 cm^3 11/28/22 1010   Wound Assessment Slough;Granulation tissue 11/28/22 0959   Drainage Amount Large 11/28/22 0959   Drainage Description Serosanguinous 11/28/22 0959   Wound Odor Moderate 11/28/22 0959   Dinorah-Wound/Incision Assessment Dry/flaky; Intact 11/28/22 0959   Edges Attached edges 11/28/22 0959   Wound Thickness Description Partial thickness 11/28/22 0959   Number of days: 28        Total Surface Area Debrided:  771 sq cm     Estimated Blood Loss:  None    Hemostasis Achieved: Pressure    Procedural Pain: 1 / 10     Post Procedural Pain: 0 / 10     Response to treatment: Well tolerated by patient

## 2022-11-28 NOTE — WOUND CARE
[facility] Application   Below Knee    NAME:  Chon Donald. YOB: 1945  MEDICAL RECORD NUMBER:  [id number]  DATE:  11/28/2022    Applied moisturizing agent to dry skin as needed. Appied primary and secondary dressing as ordered. Applied Unna roll from toes to knee overlapping each time. Applied ace wrap or coban from toes to below the knee. Secured with tape and/or metal clips covered with tape. Instructed patient/caregiver to keep dressing dry and intact. DO NOT REMOVE DRESSING. Instructed pt/family/caregiver to report excessive draining, loose bandage, wet dressing, severe pain or tingling in toes. Applied [employer] dressing below the knee to bilateral lower legs. Unna Boot(s) were applied per  Guidelines.     Response to treatment: Well tolerated by patient      Electronically signed by Eddy Doe LPN on 92/35/3789 at 10:44 AM

## 2022-11-28 NOTE — DISCHARGE INSTRUCTIONS
Discharge Instructions for  7 [facility], [facility]7 [facility]  Telephone: [phone]    NAME:  Glenford Alpers. YOB: 1945  MEDICAL RECORD NUMBER:  [id number]  DATE:  11/28/2022      Return Appointment:  [] Dressing supply provider:   [x] Home Healthcare: [facility]   [x] Return Appointment: 2 Week(s)  [] Nurse Visit: [facility])    [] Discharge from [facility]  [] Ordered tests:   [x] Referrals: Please see a podiatrist for toe nails  [] Rx:   [x] Other: will request Lymphedema pump for BLE    Wound Cleansing:   Do not scrub or use excessive force. Cleanse wound prior to applying a clean dressing with:  [] Normal Saline   [x] Mild Soap & Water/Baby Shampoo   [] Keep Wound Dry in Shower  [] Other:      Topical Treatments:  Do not apply lotions, creams, or ointments to wound bed unless directed. [x] Apply moisturizing lotion to skin surrounding the wound prior to dressing change.  [] Apply antifungal ointment to skin surrounding the wound prior to dressing change.  [] Apply thin film of moisture barrier/zinc ointment to skin immediately around wound. [] Other:       Dressings:           Wound Location L & R leg   [x] Apply Primary Dressing:       [] MediHoney Gel    [] MediHoney Alginate               [] Calcium Alginate      [] Calcium Alginate with Silver   [] Collagen                   [] Collagen with Silver                [] Santyl with Moistened saline gauze              [] Hydrofera Blue (cut to size and moistened)  [] Hydrofera Blue Ready (Cut to size)   [] NS wet to dry    [] Betadine wet to dry              [] Hydrogel                [] Mepitel     [x] Mepilex Transfer 1st               [x] Other: Drawtex 2nd    [x] Cover and Secure with:     [x] Gauze [] Marcos Guppy [] Kerlix   [] Foam [] Super Absorbant [x] ABD     [] ACE Wrap            [] Other:    Limit contact of tape with skin.     [] Change dressing: [] Daily [] Every Other Day [] Once per week   [] Twice per week   [x] Three times per week every week  [] Do Not Change Dressing   [] Other:     Edema Control:  Apply: [] Compression Stocking:  mmHg  []Right Leg []Left Leg   [] Tubigrip []Right Leg Double Layer []Left Leg Double Layer      []Right Leg Single Layer []Left Leg Single Layer     [] Elevate leg(s) above the level of the heart when sitting. [] Avoid prolonged standing in one place. Compression:  Apply: [x] Multilayer Compression Wrap: [x]RightLeg [x]Left Leg                                 []Coflex   []Coflex Lite             [x]Unna Lite     [x] Do not get leg(s) with wrap wet. If wraps become too tight call the center or completely remove the wrap. [x] Elevate leg(s) above the level of the heart when sitting. [x] Avoid prolonged standing in one place. Dietary:  [x] Diet as tolerated: [] Calorie Diabetic Diet: [x] No Added Salt:  [x] Increase Protein: [] Other:     Activity:  [x] Activity as tolerated:    [] Patient has no activity restrictions       [] Strict Bedrest:   [] Remain off Work:       [] May return to full duty work:                                     [] Return to work with restrictions:               [address] Information: Should you experience any significant changes in your wound(s) or have questions about your wound care, please contact Errol Barry at [place] [address] 8:00 am - 4:30. If you need help with your wound outside of these hours and cannot wait until we are again available, contact your PCP or go to the hospital emergency room. PLEASE NOTE: IF YOU ARE UNABLE TO OBTAIN WOUND SUPPLIES, CONTINUE TO USE THE SUPPLIES YOU HAVE AVAILABLE UNTIL YOU ARE ABLE TO REACH US. IT IS MOST IMPORTANT TO KEEP THE WOUND COVERED AT ALL TIMES.     [x] Dr. Rudolph Gonzalez   [] Dr. Janet Deras  [] Dr. Shabazz

## 2022-11-28 NOTE — WOUND CARE
Discharge Instructions for  7 [facility], [facility]7 [facility]  Telephone: [phone]    NAME:  Yoseph Esqueda. YOB: 1945  MEDICAL RECORD NUMBER:  [id number]  DATE:  11/28/2022      Return Appointment:  [] Dressing supply provider:   [x] Home Healthcare: [facility]   [x] Return Appointment: 2 Week(s)  [] Nurse Visit: Cathy Patel    [] Discharge from [facility]  [] Ordered tests:   [x] Referrals: Please see a podiatrist for toe nails  [] Rx:   [x] Other: will request Lymphedema pump for BLE    Wound Cleansing:   Do not scrub or use excessive force. Cleanse wound prior to applying a clean dressing with:  [] Normal Saline   [x] Mild Soap & Water/Baby Shampoo   [] Keep Wound Dry in Shower  [] Other:      Topical Treatments:  Do not apply lotions, creams, or ointments to wound bed unless directed. [x] Apply moisturizing lotion to skin surrounding the wound prior to dressing change.  [] Apply antifungal ointment to skin surrounding the wound prior to dressing change.  [] Apply thin film of moisture barrier/zinc ointment to skin immediately around wound. [] Other:       Dressings:           Wound Location L & R leg   [x] Apply Primary Dressing:       [] MediHoney Gel    [] MediHoney Alginate               [] Calcium Alginate      [] Calcium Alginate with Silver   [] Collagen                   [] Collagen with Silver                [] Santyl with Moistened saline gauze              [] Hydrofera Blue (cut to size and moistened)  [] Hydrofera Blue Ready (Cut to size)   [] NS wet to dry    [] Betadine wet to dry              [] Hydrogel                [] Mepitel     [x] Mepilex Transfer 1st               [x] Other: Drawtex 2nd    [x] Cover and Secure with:     [x] Gauze [] [place] [] Kerlix   [] Foam [] Super Absorbant [x] ABD     [] ACE Wrap            [] Other:    Limit contact of tape with skin.     [] Change dressing: [] Daily [] Every Other Day [] Once per week   [] Twice per week   [x] Three times per week every week  [] Do Not Change Dressing   [] Other:     Edema Control:  Apply: [] Compression Stocking:  mmHg  []Right Leg []Left Leg   [] Tubigrip []Right Leg Double Layer []Left Leg Double Layer      []Right Leg Single Layer []Left Leg Single Layer     [] Elevate leg(s) above the level of the heart when sitting. [] Avoid prolonged standing in one place. Compression:  Apply: [x] Multilayer Compression Wrap: [x]RightLeg [x]Left Leg                                 []Coflex   []Coflex Lite             [x]Unna Lite     [x] Do not get leg(s) with wrap wet. If wraps become too tight call the center or completely remove the wrap. [x] Elevate leg(s) above the level of the heart when sitting. [x] Avoid prolonged standing in one place. Dietary:  [x] Diet as tolerated: [] Calorie Diabetic Diet: [x] No Added Salt:  [x] Increase Protein: [] Other:     Activity:  [x] Activity as tolerated:    [] Patient has no activity restrictions       [] Strict Bedrest:   [] Remain off Work:       [] May return to full duty work:                                     [] Return to work with restrictions:               [address] Information: Should you experience any significant changes in your wound(s) or have questions about your wound care, please contact Errol Barry at [place] [address] 8:00 am - 4:30. If you need help with your wound outside of these hours and cannot wait until we are again available, contact your PCP or go to the hospital emergency room. PLEASE NOTE: IF YOU ARE UNABLE TO OBTAIN WOUND SUPPLIES, CONTINUE TO USE THE SUPPLIES YOU HAVE AVAILABLE UNTIL YOU ARE ABLE TO REACH US. IT IS MOST IMPORTANT TO KEEP THE WOUND COVERED AT ALL TIMES.     [x] Dr. Allison Roberson   [] Dr. Arleen De La Cruz  [] Dr. Anthony Fernandez

## 2022-12-12 ENCOUNTER — HOSPITAL ENCOUNTER (OUTPATIENT)
Dept: WOUND CARE | Age: 77
Discharge: HOME | End: 2022-12-12
Payer: MEDICARE

## 2022-12-12 VITALS
SYSTOLIC BLOOD PRESSURE: 140 MMHG | DIASTOLIC BLOOD PRESSURE: 81 MMHG | TEMPERATURE: 98.7 F | RESPIRATION RATE: 20 BRPM | HEART RATE: 87 BPM

## 2022-12-12 DIAGNOSIS — L97.812 NON-PRESSURE CHRONIC ULCER OF OTHER PART OF RIGHT LOWER LEG WITH FAT LAYER EXPOSED (HCC): Primary | ICD-10-CM

## 2022-12-12 DIAGNOSIS — L97.822 NON-PRESSURE CHRONIC ULCER OF OTHER PART OF LEFT LOWER LEG WITH FAT LAYER EXPOSED (HCC): ICD-10-CM

## 2022-12-12 DIAGNOSIS — L02.416 CELLULITIS AND ABSCESS OF LEFT LOWER EXTREMITY: ICD-10-CM

## 2022-12-12 DIAGNOSIS — L97.512 ULCER OF FOOT, CHRONIC, RIGHT, WITH FAT LAYER EXPOSED (HCC): ICD-10-CM

## 2022-12-12 DIAGNOSIS — L03.116 CELLULITIS AND ABSCESS OF LEFT LOWER EXTREMITY: ICD-10-CM

## 2022-12-12 PROCEDURE — 11045 DBRDMT SUBQ TISS EACH ADDL: CPT

## 2022-12-12 PROCEDURE — 11042 DBRDMT SUBQ TIS 1ST 20SQCM/<: CPT

## 2022-12-12 PROCEDURE — 74011000250 HC RX REV CODE- 250: Performed by: SPECIALIST

## 2022-12-12 RX ORDER — MUPIROCIN 20 MG/G
OINTMENT TOPICAL ONCE
OUTPATIENT
Start: 2022-12-12 | End: 2022-12-12

## 2022-12-12 RX ORDER — LIDOCAINE HYDROCHLORIDE 20 MG/ML
15 SOLUTION OROPHARYNGEAL ONCE
Status: DISPENSED | OUTPATIENT
Start: 2022-12-12 | End: 2022-12-12

## 2022-12-12 RX ORDER — LIDOCAINE HYDROCHLORIDE 20 MG/ML
15 SOLUTION OROPHARYNGEAL ONCE
OUTPATIENT
Start: 2022-12-12 | End: 2022-12-12

## 2022-12-12 RX ORDER — SILVER SULFADIAZINE 10 G/1000G
CREAM TOPICAL ONCE
OUTPATIENT
Start: 2022-12-12 | End: 2022-12-12

## 2022-12-12 NOTE — DISCHARGE INSTRUCTIONS
Discharge Instructions for  [address] [place] [address], [address] [place] [address]  Telephone: [phone]    NAME:  Gilmar Aguilar. YOB: 1945  MEDICAL RECORD NUMBER:  [id number]  DATE:  12/12/2022      Return Appointment:  [] Dressing supply provider:   [x] [facility] Healthcare: [facility]   [x] Return Appointment: 1 Week(s)  [] Nurse Visit: Week(s)    [] Discharge from [facility]  [] Ordered tests:   [x] Referrals: Please see a podiatrist for toe nails  [] Rx:   [x] Other: Use Lymphedema pump twice a day x 1 hour each    Wound Cleansing:   Do not scrub or use excessive force. Cleanse wound prior to applying a clean dressing with:  [] Normal Saline   [x] Mild Soap & Water/Baby Shampoo   [] Keep Wound Dry in Shower  [] Other:      Topical Treatments:  Do not apply lotions, creams, or ointments to wound bed unless directed. [x] Apply moisturizing lotion to skin surrounding the wound prior to dressing change.  [] Apply antifungal ointment to skin surrounding the wound prior to dressing change.  [] Apply thin film of moisture barrier/zinc ointment to skin immediately around wound. [] Other:       Dressings:           Wound Location L & R leg   [x] Apply Primary Dressing:       [] MediHoney Gel    [] MediHoney Alginate               [] Calcium Alginate      [] Calcium Alginate with Silver   [] Collagen                   [] Collagen with Silver                [] Santyl with Moistened saline gauze              [] Hydrofera Blue (cut to size and moistened)  [] Hydrofera Blue Ready (Cut to size)   [] NS wet to dry    [] Betadine wet to dry              [] Hydrogel                [] Mepitel     [x] Mepilex Transfer 1st               [x] Other: Drawtex 2nd    [x] Cover and Secure with:     [x] Gauze [] [place]ust[place]ne Guillermo [] Kerlix   [] Foam [] Super Absorbant [x] ABD     [] ACE Wrap            [] Other:    Limit contact of tape with skin.     [] Change dressing: [] Daily    [] Every Other Day [] Once per week   [] Twice per week   [x] Three times per week every week  [] Do Not Change Dressing   [] Other:     Edema Control:  Apply: [] Compression Stocking:  mmHg  []Right Leg []Left Leg   [] Tubigrip []Right Leg Double Layer []Left Leg Double Layer      []Right Leg Single Layer []Left Leg Single Layer     [] Elevate leg(s) above the level of the heart when sitting. [] Avoid prolonged standing in one place. Compression:  Apply: [x] Multilayer Compression Wrap: [x]RightLeg [x]Left Leg                                 []Coflex   []Coflex Lite             [x]Unna Lite     [x] Do not get leg(s) with wrap wet. If wraps become too tight call the center or completely remove the wrap. [x] Elevate leg(s) above the level of the heart when sitting. [x] Avoid prolonged standing in one place. Dietary:  [x] Diet as tolerated: [] Calorie Diabetic Diet: [x] No Added Salt:  [x] Increase Protein: [] Other:     Activity:  [x] Activity as tolerated:    [] Patient has no activity restrictions       [] Strict Bedrest:   [] Remain off Work:       [] May return to full duty work:                                     [] Return to work with restrictions:               [address] Information: Should you experience any significant changes in your wound(s) or have questions about your wound care, please contact Errol Barry at [place] [address] 8:00 am - 4:30. If you need help with your wound outside of these hours and cannot wait until we are again available, contact your PCP or go to the hospital emergency room. PLEASE NOTE: IF YOU ARE UNABLE TO OBTAIN WOUND SUPPLIES, CONTINUE TO USE THE SUPPLIES YOU HAVE AVAILABLE UNTIL YOU ARE ABLE TO REACH US. IT IS MOST IMPORTANT TO KEEP THE WOUND COVERED AT ALL TIMES.     [x] Dr. Sarah Beth Meade   [] Dr. Yuan Mariscal  [] Dr. Josselyn Churchill

## 2022-12-12 NOTE — WOUND CARE
Discharge Instructions for  [address] [place] [address], [address] [place] [address]  Telephone: [phone]    NAME:  Bossman Vazquez. YOB: 1945  MEDICAL RECORD NUMBER:  [id number]  DATE:  12/12/2022      Return Appointment:  [] Dressing supply provider:   [x] Home Healthcare: [facility]   [x] Return Appointment: 1 Week(s)  [] Nurse Visit: Week(s)    [] Discharge from [facility]  [] Ordered tests:   [x] Referrals: Please see a podiatrist for toe nails  [] Rx:   [x] Other: Use Lymphedema pump twice a day x 1 hour each    Wound Cleansing:   Do not scrub or use excessive force. Cleanse wound prior to applying a clean dressing with:  [] Normal Saline   [x] Mild Soap & Water/Baby Shampoo   [] Keep Wound Dry in Shower  [] Other:      Topical Treatments:  Do not apply lotions, creams, or ointments to wound bed unless directed. [x] Apply moisturizing lotion to skin surrounding the wound prior to dressing change.  [] Apply antifungal ointment to skin surrounding the wound prior to dressing change.  [] Apply thin film of moisture barrier/zinc ointment to skin immediately around wound. [] Other:       Dressings:           Wound Location L & R leg   [x] Apply Primary Dressing:       [] MediHoney Gel    [] MediHoney Alginate               [] Calcium Alginate      [] Calcium Alginate with Silver   [] Collagen                   [] Collagen with Silver                [] Santyl with Moistened saline gauze              [] Hydrofera Blue (cut to size and moistened)  [] Hydrofera Blue Ready (Cut to size)   [] NS wet to dry    [] Betadine wet to dry              [] Hydrogel                [] Mepitel     [x] Mepilex Transfer 1st               [x] Other: Drawtex 2nd    [x] Cover and Secure with:     [x] Gauze [] Lisandrah Teja [] Kerlix   [] Foam [] Super Absorbant [x] ABD     [] ACE Wrap            [] Other:    Limit contact of tape with skin.     [] Change dressing: [] Daily    [] Every Other Day [] Once per week   [] Twice per week   [x] Three times per week every week  [] Do Not Change Dressing   [] Other:     Edema Control:  Apply: [] Compression Stocking:  mmHg  []Right Leg []Left Leg   [] Tubigrip []Right Leg Double Layer []Left Leg Double Layer      []Right Leg Single Layer []Left Leg Single Layer     [] Elevate leg(s) above the level of the heart when sitting. [] Avoid prolonged standing in one place. Compression:  Apply: [x] Multilayer Compression Wrap: [x]RightLeg [x]Left Leg                                 []Coflex   []Coflex Lite             [x]Unna Lite     [x] Do not get leg(s) with wrap wet. If wraps become too tight call the center or completely remove the wrap. [x] Elevate leg(s) above the level of the heart when sitting. [x] Avoid prolonged standing in one place. Dietary:  [x] Diet as tolerated: [] Calorie Diabetic Diet: [x] No Added Salt:  [x] Increase Protein: [] Other:     Activity:  [x] Activity as tolerated:    [] Patient has no activity restrictions       [] Strict Bedrest:   [] Remain off Work:       [] May return to full duty work:                                     [] Return to work with restrictions:               [address] Information: Should you experience any significant changes in your wound(s) or have questions about your wound care, please contact Errol Barry at [place] [address] 8:00 am - 4:30. If you need help with your wound outside of these hours and cannot wait until we are again available, contact your PCP or go to the hospital emergency room. PLEASE NOTE: IF YOU ARE UNABLE TO OBTAIN WOUND SUPPLIES, CONTINUE TO USE THE SUPPLIES YOU HAVE AVAILABLE UNTIL YOU ARE ABLE TO REACH US. IT IS MOST IMPORTANT TO KEEP THE WOUND COVERED AT ALL TIMES.     [x] Dr. Sarah Beth Meade   [] Dr. Yuan Mariscal  [] Dr. Josselyn Churchill

## 2022-12-12 NOTE — WOUND CARE
[address]   Progress Note and Procedure Note     Lam Beck. MEDICAL RECORD NUMBER:  [id number]  AGE: 65 y.o. RACE BLACK/  GENDER: male  : 1945  EPISODE DATE:  2022    Subjective: Has received his lymphedema pump, scheduled for instruction on use this Wednesday  No new problems reported  Chief Complaint   Patient presents with    Wound Check     Bilateral legs          HISTORY of PRESENT ILLNESS HPI    Lam Beck. is a 65 y.o. male who presents today for wound/ulcer evaluation. History of Wound Context: BLE  Wound/Ulcer Pain Timing/Severity: mild  Quality of pain: aching  Severity:  1 / 10   Modifying Factors: None  Associated Signs/Symptoms: edema    Ulcer Identification:  Ulcer Type: venous    Contributing Factors: lymphedema    Wound: BLE         PAST MEDICAL HISTORY    Past Medical History:   Diagnosis Date    Dyslipidemia     HTN (hypertension)         PAST SURGICAL HISTORY    Past Surgical History:   Procedure Laterality Date    HX CHOLECYSTECTOMY         FAMILY HISTORY    Family History   Problem Relation Age of Onset    Diabetes Mother     Diabetes Brother     Diabetes Son        SOCIAL HISTORY    Social History     Tobacco Use    Smoking status: Never    Smokeless tobacco: Never   Vaping Use    Vaping Use: Never used   Substance Use Topics    Alcohol use: Never    Drug use: Never       ALLERGIES    No Known Allergies    MEDICATIONS    Current Outpatient Medications on File Prior to Encounter   Medication Sig Dispense Refill    multivitamin (ONE A DAY) tablet Take 1 Tablet by mouth in the morning. furosemide (LASIX) 40 mg tablet Take 40 mg by mouth two (2) times a day. vitamin E, dl,tocopheryl acet, (vitamin E acetate) 400 unit capsule Take 400 Units by mouth daily. aspirin (ASPIRIN) 325 mg tablet Take 325 mg by mouth daily. No current facility-administered medications on file prior to encounter. REVIEW OF SYSTEMS    Pertinent items are noted in HPI. Objective:     Visit Vitals  BP (!) 140/81 (BP 1 Location: Left arm, BP Patient Position: At rest;Sitting)   Pulse 87   Temp 98.7 °F (37.1 °C)   Resp 20       Wt Readings from Last 3 Encounters:   08/08/22 122.5 kg (270 lb)   07/21/22 130.6 kg (288 lb)   02/21/22 126 kg (277 lb 12.8 oz)       PHYSICAL EXAM  Right and left leg wounds measure slightly smaller, slough debrided, no evidence of active infection  Pertinent items are noted in HPI.     Assessment:   No significant change  Problem List Items Addressed This Visit       Cellulitis and abscess of left lower extremity    Relevant Medications    lidocaine (XYLOCAINE) 2 % viscous solution 15 mL    Other Relevant Orders    INITIATE OUTPATIENT WOUND CARE PROTOCOL    Non-pressure chronic ulcer of other part of right lower leg with 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 ([address] - Primary    Relevant Medications    lidocaine (XYLOCAINE) 2 % viscous solution 15 mL    Other Relevant Orders    INITIATE OUTPATIENT WOUND CARE PROTOCOL    Non-pressure chronic ulcer of other part of left lower leg with 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 ([address]    Relevant Medications    lidocaine (XYLOCAINE) 2 % viscous solution 15 mL    Other Relevant Orders    INITIATE OUTPATIENT WOUND CARE PROTOCOL    Ulcer of foot, chronic, right, with 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 (HCC)    Relevant Medications    lidocaine (XYLOCAINE) 2 % viscous solution 15 mL    Other Relevant Orders    INITIATE OUTPATIENT WOUND CARE PROTOCOL       Wound Leg Left #1 circumferential 08/08/22 (Active)   Wound Image   12/12/22 1003   Wound Etiology Other (Comment) 12/12/22 1003   Dressing Status Breakthrough drainage noted 12/12/22 1003   Cleansed Cleansed with saline 12/12/22 1003   Wound Length (cm) 14.5 cm 12/12/22 1003   Wound Width (cm) 25.5 cm 12/12/22 1003   Wound Depth (cm) 0.1 cm 12/12/22 1003   Wound Surface Area (cm^2) 369.75 cm^2 12/12/22 1003   Change in Wound Size % 33.97 12/12/22 1003   Wound Volume (cm^3) 36.975 cm^3 12/12/22 1003   Wound Healing % 34 12/12/22 1003   Post-Procedure Length (cm) 14.5 cm 12/12/22 1022   Post-Procedure Width (cm) 25.5 cm 12/12/22 1022   Post-Procedure Depth (cm) 0.1 cm 12/12/22 1022   Post-Procedure Surface Area (cm^2) 369.75 cm^2 12/12/22 1022   Post-Procedure Volume (cm^3) 36.975 cm^3 12/12/22 1022   Wound Assessment Granulation tissue;Slough 12/12/22 1003   Drainage Amount Large 12/12/22 1003   Drainage Description Serosanguinous 12/12/22 1003   Wound Odor Moderate 12/12/22 1003   Dinorah-Wound/Incision Assessment Hyperpigmented;Dry/flaky 12/12/22 1003   Edges Attached edges 12/12/22 1003   Wound Thickness Description Full thickness 12/12/22 1003   Number of days: 126       Wound Leg Right #2 circumferential 10/31/22 (Active)   Wound Image   12/12/22 1006   Wound Etiology Other (Comment) 12/12/22 1006   Dressing Status Breakthrough drainage noted 12/12/22 1006   Cleansed Cleansed with saline 12/12/22 1006   Wound Length (cm) 9 cm 12/12/22 1006   Wound Width (cm) 13 cm 12/12/22 1006   Wound Depth (cm) 0.1 cm 12/12/22 1006   Wound Surface Area (cm^2) 117 cm^2 12/12/22 1006   Change in Wound Size % 56.02 12/12/22 1006   Wound Volume (cm^3) 11.7 cm^3 12/12/22 1006   Wound Healing % 56 12/12/22 1006   Post-Procedure Length (cm) 9 cm 12/12/22 1022   Post-Procedure Width (cm) 13 cm 12/12/22 1022   Post-Procedure Depth (cm) 0.1 cm 12/12/22 1022   Post-Procedure Surface Area (cm^2) 117 cm^2 12/12/22 1022   Post-Procedure Volume (cm^3) 11.7 cm^3 12/12/22 1022   Wound Assessment Slough;Granulation tissue 12/12/22 1006   Drainage Amount Large 12/12/22 1006   Drainage Description Serosanguinous 12/12/22 1006   Wound Odor Moderate 12/12/22 1006   Dinorah-Wound/Incision Assessment Dry/flaky; Intact 12/12/22 1006   Edges Attached edges 12/12/22 1006   Wound Thickness Description Full thickness 12/12/22 1006   Number of days: 42       POP IN PROCEDURE TYPE (DEBRIDEMENT, BIOPSY, I&D, SKIN SUB, CHEMICAL CAUERTY)     Plan:   Continue Mepilex transfer, drawtex,unna boots  Start using lymphedema pump    Treatment Note please see attached Discharge Instructions    Written patient dismissal instructions given to patient or POA. Electronically signed by Brandon Basurto MD on 12/12/2022 at 10:26 AM               Debridement Wound Care        Problem List Items Addressed This Visit       Cellulitis and abscess of left lower extremity    Relevant Medications    lidocaine (XYLOCAINE) 2 % viscous solution 15 mL    Other Relevant Orders    INITIATE OUTPATIENT WOUND CARE PROTOCOL    Non-pressure chronic ulcer of other part of right lower leg with 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 ([address] - Primary    Relevant Medications    lidocaine (XYLOCAINE) 2 % viscous solution 15 mL    Other Relevant Orders    INITIATE OUTPATIENT WOUND CARE PROTOCOL    Non-pressure chronic ulcer of other part of left lower leg with 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 ([address]    Relevant Medications    lidocaine (XYLOCAINE) 2 % viscous solution 15 mL    Other Relevant Orders    INITIATE OUTPATIENT WOUND CARE PROTOCOL    Ulcer of foot, chronic, right, with 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 (HCC)    Relevant Medications    lidocaine (XYLOCAINE) 2 % viscous solution 15 mL    Other Relevant Orders    INITIATE OUTPATIENT WOUND CARE PROTOCOL       Procedure Note  Indications:  Based on my examination of this patient's wound(s)/ulcer(s) today, debridement is required to promote healing and evaluate the wound base.     Performed by: Brandon Basurto MD    Consent obtained: Yes    Time out taken: Yes    Debridement: Excisional    Using curette the wound(s)/ulcer(s) was/were sharply debrided down through and including the removal of    subcutaneous tissue    Devitalized Tissue Debrided: slough    Pre Debridement Measurements:  Are located in the Wound/Ulcer Documentation Flow Sheet    Non-Pressure ulcer, 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    Wound/Ulcer #: 1 and 2    Post Debridement Measurements:  Wound/Ulcer Descriptions are Pre Debridement except measurements:    Wound Leg Left #1 circumferential 08/08/22 (Active)   Wound Image   12/12/22 1003   Wound Etiology Other (Comment) 12/12/22 1003   Dressing Status Breakthrough drainage noted 12/12/22 1003   Cleansed Cleansed with saline 12/12/22 1003   Wound Length (cm) 14.5 cm 12/12/22 1003   Wound Width (cm) 25.5 cm 12/12/22 1003   Wound Depth (cm) 0.1 cm 12/12/22 1003   Wound Surface Area (cm^2) 369.75 cm^2 12/12/22 1003   Change in Wound Size % 33.97 12/12/22 1003   Wound Volume (cm^3) 36.975 cm^3 12/12/22 1003   Wound Healing % 34 12/12/22 1003   Post-Procedure Length (cm) 14.5 cm 12/12/22 1022   Post-Procedure Width (cm) 25.5 cm 12/12/22 1022   Post-Procedure Depth (cm) 0.1 cm 12/12/22 1022   Post-Procedure Surface Area (cm^2) 369.75 cm^2 12/12/22 1022   Post-Procedure Volume (cm^3) 36.975 cm^3 12/12/22 1022   Wound Assessment Granulation tissue;Slough 12/12/22 1003   Drainage Amount Large 12/12/22 1003   Drainage Description Serosanguinous 12/12/22 1003   Wound Odor Moderate 12/12/22 1003   Dinorah-Wound/Incision Assessment Hyperpigmented;Dry/flaky 12/12/22 1003   Edges Attached edges 12/12/22 1003   Wound Thickness Description Full thickness 12/12/22 1003   Number of days: 126       Wound Leg Right #2 circumferential 10/31/22 (Active)   Wound Image   12/12/22 1006   Wound Etiology Other (Comment) 12/12/22 1006   Dressing Status Breakthrough drainage noted 12/12/22 1006   Cleansed Cleansed with saline 12/12/22 1006   Wound Length (cm) 9 cm 12/12/22 1006   Wound Width (cm) 13 cm 12/12/22 1006   Wound Depth (cm) 0.1 cm 12/12/22 1006   Wound Surface Area (cm^2) 117 cm^2 12/12/22 1006   Change in Wound Size % 56.02 12/12/22 1006   Wound Volume (cm^3) 11.7 cm^3 12/12/22 1006   Wound Healing % 56 12/12/22 1006   Post-Procedure Length (cm) 9 cm 12/12/22 1022   Post-Procedure Width (cm) 13 cm 12/12/22 1022   Post-Procedure Depth (cm) 0.1 cm 12/12/22 1022   Post-Procedure Surface Area (cm^2) 117 cm^2 12/12/22 1022   Post-Procedure Volume (cm^3) 11.7 cm^3 12/12/22 1022   Wound Assessment Slough;Granulation tissue 12/12/22 1006   Drainage Amount Large 12/12/22 1006   Drainage Description Serosanguinous 12/12/22 1006   Wound Odor Moderate 12/12/22 1006   Dinorah-Wound/Incision Assessment Dry/flaky; Intact 12/12/22 1006   Edges Attached edges 12/12/22 1006   Wound Thickness Description Full thickness 12/12/22 1006   Number of days: 42        Total Surface Area Debrided:  117 sq cm     Estimated Blood Loss:  None    Hemostasis Achieved: Pressure    Procedural Pain: 1 / 10     Post Procedural Pain: 0 / 10     Response to treatment: Well tolerated by patient

## 2022-12-19 ENCOUNTER — HOSPITAL ENCOUNTER (OUTPATIENT)
Dept: WOUND CARE | Age: 77
Discharge: HOME | End: 2022-12-19
Payer: MEDICARE

## 2022-12-19 VITALS
TEMPERATURE: 96.6 F | HEART RATE: 75 BPM | DIASTOLIC BLOOD PRESSURE: 68 MMHG | RESPIRATION RATE: 20 BRPM | SYSTOLIC BLOOD PRESSURE: 129 MMHG

## 2022-12-19 DIAGNOSIS — L97.822 NON-PRESSURE CHRONIC ULCER OF OTHER PART OF LEFT LOWER LEG WITH FAT LAYER EXPOSED (HCC): ICD-10-CM

## 2022-12-19 DIAGNOSIS — L97.812 NON-PRESSURE CHRONIC ULCER OF OTHER PART OF RIGHT LOWER LEG WITH FAT LAYER EXPOSED (HCC): Primary | ICD-10-CM

## 2022-12-19 DIAGNOSIS — L02.416 CELLULITIS AND ABSCESS OF LEFT LOWER EXTREMITY: ICD-10-CM

## 2022-12-19 DIAGNOSIS — L03.116 CELLULITIS AND ABSCESS OF LEFT LOWER EXTREMITY: ICD-10-CM

## 2022-12-19 DIAGNOSIS — L97.512 ULCER OF FOOT, CHRONIC, RIGHT, WITH FAT LAYER EXPOSED (HCC): ICD-10-CM

## 2022-12-19 PROCEDURE — 74011000250 HC RX REV CODE- 250: Performed by: SPECIALIST

## 2022-12-19 PROCEDURE — 99213 OFFICE O/P EST LOW 20 MIN: CPT

## 2022-12-19 PROCEDURE — 87186 SC STD MICRODIL/AGAR DIL: CPT

## 2022-12-19 PROCEDURE — 87205 SMEAR GRAM STAIN: CPT

## 2022-12-19 PROCEDURE — 87077 CULTURE AEROBIC IDENTIFY: CPT

## 2022-12-19 RX ORDER — MUPIROCIN 20 MG/G
OINTMENT TOPICAL ONCE
OUTPATIENT
Start: 2022-12-19 | End: 2022-12-19

## 2022-12-19 RX ORDER — SILVER SULFADIAZINE 10 G/1000G
CREAM TOPICAL ONCE
OUTPATIENT
Start: 2022-12-19 | End: 2022-12-19

## 2022-12-19 RX ORDER — LIDOCAINE HYDROCHLORIDE 20 MG/ML
15 SOLUTION OROPHARYNGEAL ONCE
OUTPATIENT
Start: 2022-12-19 | End: 2022-12-19

## 2022-12-19 RX ORDER — LIDOCAINE HYDROCHLORIDE 20 MG/ML
15 SOLUTION OROPHARYNGEAL ONCE
Status: COMPLETED | OUTPATIENT
Start: 2022-12-19 | End: 2022-12-19

## 2022-12-19 RX ADMIN — LIDOCAINE HYDROCHLORIDE 15 ML: 20 SOLUTION ORAL; TOPICAL at 10:39

## 2022-12-19 NOTE — WOUND CARE
Discharge Instructions for  [address] [place] [address], [address] [place] [address]  Telephone: [phone]    NAME:  Alicia Perdomo. YOB: 1945  MEDICAL RECORD NUMBER:  [id number]  DATE:  12/19/2022      Return Appointment:  [] Dressing supply provider:   [x] [facility]: [facility]   [x] Return Appointment: 3 Week(s)  [] Nurse Visit: [facility])    [] Discharge from [facility]  [x] Ordered tests: culture obtained in clinic from L leg  [x] Referrals: Please see a podiatrist for toe nails  [] Rx:   [x] Other: Use Lymphedema pump twice a day x 1 hour each    Wound Cleansing:   Do not scrub or use excessive force. Cleanse wound prior to applying a clean dressing with:  [] Normal Saline   [x] Mild Soap & Water/Baby Shampoo   [] Keep Wound Dry in Shower  [] Other:      Topical Treatments:  Do not apply lotions, creams, or ointments to wound bed unless directed. [x] Apply moisturizing lotion to skin surrounding the wound prior to dressing change.  [] Apply antifungal ointment to skin surrounding the wound prior to dressing change.  [] Apply thin film of moisture barrier/zinc ointment to skin immediately around wound.   [] Other:       Dressings:           Wound Location L & R leg   [x] Apply Primary Dressing:       [] MediHoney Gel    [] MediHoney Alginate               [] Calcium Alginate      [x] Calcium Alginate with Silver   [] Collagen                   [] Collagen with Silver                [] Santyl with Moistened saline gauze              [] Hydrofera Blue (cut to size and moistened)  [] Hydrofera Blue Ready (Cut to size)   [] Vashe wet to dry    [] Betadine wet to dry              [] Hydrogel                [] Mepitel     [] Mepilex Transfer                [x] Other: Drawtex    [x] Cover and Secure with:     [x] Gauze [] Ayan [x] Kerlix   [] Foam [] Super Absorbant [x] ABD     [x] ACE Wrap            [] Other:    Limit contact of tape with skin. [] Change dressing: [] Daily    [] Every Other Day [] Once per week   [] Twice per week   [x] Three times per week every week  [] Do Not Change Dressing   [] Other:     Edema Control:  Apply: [] Compression Stocking:  mmHg  []Right Leg []Left Leg   [] Tubigrip []Right Leg Double Layer []Left Leg Double Layer      []Right Leg Single Layer []Left Leg Single Layer     [x] Elevate leg(s) above the level of the heart when sitting. [x] Avoid prolonged standing in one place. Compression:  Apply: [] Multilayer Compression Wrap: []RightLeg []Left Leg                                 []Coflex   []Coflex Lite             []Unna Lite     [] Do not get leg(s) with wrap wet. If wraps become too tight call the center or completely remove the wrap. [] Elevate leg(s) above the level of the heart when sitting. [] Avoid prolonged standing in one place. Dietary:  [x] Diet as tolerated: [] Calorie Diabetic Diet: [x] No Added Salt:  [x] Increase Protein: [] Other:     Activity:  [x] Activity as tolerated:    [] Patient has no activity restrictions       [] Strict Bedrest:   [] Remain off Work:       [] May return to full duty work:                                     [] Return to work with restrictions:               [address] Information: Should you experience any significant changes in your wound(s) or have questions about your wound care, please contact Errol Barry at [place] [address] 8:00 am - 4:30. If you need help with your wound outside of these hours and cannot wait until we are again available, contact your PCP or go to the hospital emergency room. PLEASE NOTE: IF YOU ARE UNABLE TO OBTAIN WOUND SUPPLIES, CONTINUE TO USE THE SUPPLIES YOU HAVE AVAILABLE UNTIL YOU ARE ABLE TO REACH US. IT IS MOST IMPORTANT TO KEEP THE WOUND COVERED AT ALL TIMES.     [x] Dr. Teresa Perez   [] Dr. Yaniv Jones  [] Dr. Jeannie Andre

## 2022-12-19 NOTE — WOUND CARE
[address]   Progress Note and Procedure Note   NO Procedure      Cecil Bernstein. MEDICAL RECORD NUMBER:  [id number]  AGE: 65 y.o. RACE BLACK/  GENDER: male  : 1945  EPISODE DATE:  2022    Subjective:   Using lymphedema pump daily    Chief Complaint   Patient presents with    Wound Check     R leg and L leg          HISTORY of PRESENT ILLNESS HPI    Cecil Ellison is a 65 y.o. male who presents today for wound/ulcer evaluation. History of Wound Context: BLE  Wound/Ulcer Pain Timing/Severity: mild  Quality of pain: aching  Severity:  1 / 10   Modifying Factors: None  Associated Signs/Symptoms: edema    Ulcer Identification:  Ulcer Type: venous    Contributing Factors: lymphedema    Wound: BLE         PAST MEDICAL HISTORY    Past Medical History:   Diagnosis Date    Dyslipidemia     HTN (hypertension)         PAST SURGICAL HISTORY    Past Surgical History:   Procedure Laterality Date    HX CHOLECYSTECTOMY         FAMILY HISTORY    Family History   Problem Relation Age of Onset    Diabetes Mother     Diabetes Brother     Diabetes Son        SOCIAL HISTORY    Social History     Tobacco Use    Smoking status: Never    Smokeless tobacco: Never   Vaping Use    Vaping Use: Never used   Substance Use Topics    Alcohol use: Never    Drug use: Never       ALLERGIES    No Known Allergies    MEDICATIONS    Current Outpatient Medications on File Prior to Encounter   Medication Sig Dispense Refill    multivitamin (ONE A DAY) tablet Take 1 Tablet by mouth in the morning. furosemide (LASIX) 40 mg tablet Take 40 mg by mouth two (2) times a day. vitamin E, dl,tocopheryl acet, (vitamin E acetate) 400 unit capsule Take 400 Units by mouth daily. aspirin (ASPIRIN) 325 mg tablet Take 325 mg by mouth daily. No current facility-administered medications on file prior to encounter.        REVIEW OF SYSTEMS    Pertinent items are noted in HPI.    Objective:     Visit Vitals  /68 (BP 1 Location: Left arm, BP Patient Position: At rest;Sitting)   Pulse 75   Temp (!) 96.6 °F (35.9 °C)   Resp 20       Wt Readings from Last 3 Encounters:   08/08/22 122.5 kg (270 lb)   07/21/22 130.6 kg (288 lb)   02/21/22 126 kg (277 lb 12.8 oz)       PHYSICAL EXAM  LLE wound larger, odor - culture taken  RLE wound unchanged  Pertinent items are noted in HPI. Assessment:   No real improvement noted   Problem List Items Addressed This Visit       Cellulitis and abscess of left lower extremity    Relevant Medications    lidocaine (XYLOCAINE) 2 % viscous solution 15 mL (Completed) (Start on 12/19/2022 11:00 AM)    Other Relevant Orders    INITIATE OUTPATIENT WOUND CARE PROTOCOL    Non-pressure chronic ulcer of other part of right lower leg with 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 ([address] - Primary    Relevant Medications    lidocaine (XYLOCAINE) 2 % viscous solution 15 mL (Completed) (Start on 12/19/2022 11:00 AM)    Other Relevant Orders    INITIATE OUTPATIENT WOUND CARE PROTOCOL    Non-pressure chronic ulcer of other part of left lower leg with 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 ([address]    Relevant Medications    lidocaine (XYLOCAINE) 2 % viscous solution 15 mL (Completed) (Start on 12/19/2022 11:00 AM)    Other Relevant Orders    INITIATE OUTPATIENT WOUND CARE PROTOCOL    Ulcer of foot, chronic, right, with 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 ([address]    Relevant Medications    lidocaine (XYLOCAINE) 2 % viscous solution 15 mL (Completed) (Start on 12/19/2022 11:00 AM)    Other Relevant Orders    INITIATE OUTPATIENT WOUND CARE PROTOCOL       Procedure Note  Indications:  Based on my examination of this patient's wound(s)/ulcer(s) today, debridement is not required to promote healing and evaluate the wound base.     Wound Leg Left #1 circumferential 08/08/22 (Active)   Wound Image   12/19/22 1018   Wound Etiology Other (Comment) 12/19/22 1018   Dressing Status Breakthrough drainage noted 12/19/22 1018   Cleansed Cleansed with saline 12/19/22 1018   Wound Length (cm) 15.5 cm 12/19/22 1018   Wound Width (cm) 25.5 cm 12/19/22 1018   Wound Depth (cm) 0.1 cm 12/19/22 1018   Wound Surface Area (cm^2) 395.25 cm^2 12/19/22 1018   Change in Wound Size % 29.42 12/19/22 1018   Wound Volume (cm^3) 39. 525 cm^3 12/19/22 1018   Wound Healing % 29 12/19/22 1018   Post-Procedure Length (cm) 14.5 cm 12/12/22 1022   Post-Procedure Width (cm) 25.5 cm 12/12/22 1022   Post-Procedure Depth (cm) 0.1 cm 12/12/22 1022   Post-Procedure Surface Area (cm^2) 369.75 cm^2 12/12/22 1022   Post-Procedure Volume (cm^3) 36.975 cm^3 12/12/22 1022   Wound Assessment Granulation tissue;Slough 12/19/22 1018   Drainage Amount Large 12/19/22 1018   Drainage Description Serosanguinous 12/19/22 1018   Wound Odor Moderate 12/19/22 1018   Dinorah-Wound/Incision Assessment Hyperpigmented; Maceration 12/19/22 1018   Edges Attached edges 12/19/22 1018   Wound Thickness Description Full thickness 12/19/22 1018   Number of days: 133       Wound Leg Right #2 circumferential 10/31/22 (Active)   Wound Image   12/19/22 1018   Wound Etiology Other (Comment) 12/19/22 1018   Dressing Status Breakthrough drainage noted 12/19/22 1018   Cleansed Cleansed with saline 12/19/22 1018   Wound Length (cm) 11.5 cm 12/19/22 1018   Wound Width (cm) 13 cm 12/19/22 1018   Wound Depth (cm) 0.1 cm 12/19/22 1018   Wound Surface Area (cm^2) 149.5 cm^2 12/19/22 1018   Change in Wound Size % 43.8 12/19/22 1018   Wound Volume (cm^3) 14.95 cm^3 12/19/22 1018   Wound Healing % 44 12/19/22 1018   Post-Procedure Length (cm) 9 cm 12/12/22 1022   Post-Procedure Width (cm) 13 cm 12/12/22 1022   Post-Procedure Depth (cm) 0.1 cm 12/12/22 1022   Post-Procedure Surface Area (cm^2) 117 cm^2 12/12/22 1022   Post-Procedure Volume (cm^3) 11.7 cm^3 12/12/22 1022   Wound Assessment Slough;Granulation tissue 12/19/22 1018   Drainage Amount Large 12/19/22 1018   Drainage Description Serosanguinous 12/19/22 1018   Wound Odor Moderate 12/19/22 1018   Dinorah-Wound/Incision Assessment Dry/flaky; Intact 12/19/22 1018   Edges Attached edges 12/19/22 1018   Wound Thickness Description Full thickness 12/19/22 1018   Number of days: 49        Plan:   Switch back to silvercel, drawtex, Ace  Check culture    Treatment Note please see attached Discharge Instructions    Written patient dismissal instructions given to patient or POA.          Electronically signed by Santiago Farias MD on 12/19/2022 at 10:41 AM

## 2022-12-19 NOTE — DISCHARGE INSTRUCTIONS
Discharge Instructions for  [address] [place] [address], [address] [place] [address]  Telephone: [phone]    NAME:  Alize Wang. YOB: 1945  MEDICAL RECORD NUMBER:  [id number]  DATE:  12/19/2022      Return Appointment:  [] Dressing supply provider:   [x] Home Healthcare: [facility]   [x] Return Appointment: 3 Week(s)  [] Nurse Visit: [facility])    [] Discharge from [facility]  [x] Ordered tests: culture obtained in clinic from L leg  [x] Referrals: Please see a podiatrist for toe nails  [] Rx:   [x] Other: Use Lymphedema pump twice a day x 1 hour each    Wound Cleansing:   Do not scrub or use excessive force. Cleanse wound prior to applying a clean dressing with:  [] Normal Saline   [x] Mild Soap & Water/Baby Shampoo   [] Keep Wound Dry in Shower  [] Other:      Topical Treatments:  Do not apply lotions, creams, or ointments to wound bed unless directed. [x] Apply moisturizing lotion to skin surrounding the wound prior to dressing change.  [] Apply antifungal ointment to skin surrounding the wound prior to dressing change.  [] Apply thin film of moisture barrier/zinc ointment to skin immediately around wound.   [] Other:       Dressings:           Wound Location L & R leg   [x] Apply Primary Dressing:       [] MediHoney Gel    [] MediHoney Alginate               [] Calcium Alginate      [x] Calcium Alginate with Silver   [] Collagen                   [] Collagen with Silver                [] Santyl with Moistened saline gauze              [] Hydrofera Blue (cut to size and moistened)  [] Hydrofera Blue Ready (Cut to size)   [] Vashe wet to dry    [] Betadine wet to dry              [] Hydrogel                [] Mepitel     [] Mepilex Transfer                [x] Other: Drawtex    [x] Cover and Secure with:     [x] Gauze [] Yaan [x] Kerlix   [] Foam [] Super Absorbant [x] ABD     [x] ACE Wrap            [] Other:    Limit contact of tape with skin. [] Change dressing: [] Daily    [] Every Other Day [] Once per week   [] Twice per week   [x] Three times per week every week  [] Do Not Change Dressing   [] Other:     Edema Control:  Apply: [] Compression Stocking:  mmHg  []Right Leg []Left Leg   [] Tubigrip []Right Leg Double Layer []Left Leg Double Layer      []Right Leg Single Layer []Left Leg Single Layer     [x] Elevate leg(s) above the level of the heart when sitting. [x] Avoid prolonged standing in one place. Compression:  Apply: [] Multilayer Compression Wrap: []RightLeg []Left Leg                                 []Coflex   []Coflex Lite             []Unna Lite     [] Do not get leg(s) with wrap wet. If wraps become too tight call the center or completely remove the wrap. [] Elevate leg(s) above the level of the heart when sitting. [] Avoid prolonged standing in one place. Dietary:  [x] Diet as tolerated: [] Calorie Diabetic Diet: [x] No Added Salt:  [x] Increase Protein: [] Other:     Activity:  [x] Activity as tolerated:    [] Patient has no activity restrictions       [] Strict Bedrest:   [] Remain off Work:       [] May return to full duty work:                                     [] Return to work with restrictions:               [address] Information: Should you experience any significant changes in your wound(s) or have questions about your wound care, please contact Errol Barry at [place] [address] 8:00 am - 4:30. If you need help with your wound outside of these hours and cannot wait until we are again available, contact your PCP or go to the hospital emergency room. PLEASE NOTE: IF YOU ARE UNABLE TO OBTAIN WOUND SUPPLIES, CONTINUE TO USE THE SUPPLIES YOU HAVE AVAILABLE UNTIL YOU ARE ABLE TO REACH US. IT IS MOST IMPORTANT TO KEEP THE WOUND COVERED AT ALL TIMES.     [x] Dr. Teresa Perez   [] Dr. Yaniv Jones  [] Dr. Jeannie Andre

## 2022-12-24 LAB
BACTERIA SPEC CULT: NORMAL
GRAM STN SPEC: NORMAL
SPECIAL REQUESTS,SREQ: NORMAL

## 2023-01-09 ENCOUNTER — HOSPITAL ENCOUNTER (OUTPATIENT)
Dept: WOUND CARE | Age: 78
Discharge: HOME | End: 2023-01-09
Payer: MEDICARE

## 2023-01-09 VITALS
TEMPERATURE: 95.9 F | HEART RATE: 84 BPM | DIASTOLIC BLOOD PRESSURE: 77 MMHG | SYSTOLIC BLOOD PRESSURE: 133 MMHG | RESPIRATION RATE: 20 BRPM

## 2023-01-09 DIAGNOSIS — L97.822 NON-PRESSURE CHRONIC ULCER OF OTHER PART OF LEFT LOWER LEG WITH FAT LAYER EXPOSED (HCC): ICD-10-CM

## 2023-01-09 DIAGNOSIS — L97.512 ULCER OF FOOT, CHRONIC, RIGHT, WITH FAT LAYER EXPOSED (HCC): Primary | ICD-10-CM

## 2023-01-09 DIAGNOSIS — L02.416 CELLULITIS AND ABSCESS OF LEFT LOWER EXTREMITY: ICD-10-CM

## 2023-01-09 DIAGNOSIS — L03.116 CELLULITIS AND ABSCESS OF LEFT LOWER EXTREMITY: ICD-10-CM

## 2023-01-09 DIAGNOSIS — L97.812 NON-PRESSURE CHRONIC ULCER OF OTHER PART OF RIGHT LOWER LEG WITH FAT LAYER EXPOSED (HCC): ICD-10-CM

## 2023-01-09 PROCEDURE — 11042 DBRDMT SUBQ TIS 1ST 20SQCM/<: CPT

## 2023-01-09 PROCEDURE — 11045 DBRDMT SUBQ TISS EACH ADDL: CPT | Performed by: SPECIALIST

## 2023-01-09 PROCEDURE — 11045 DBRDMT SUBQ TISS EACH ADDL: CPT

## 2023-01-09 RX ORDER — LIDOCAINE HYDROCHLORIDE 20 MG/ML
15 SOLUTION OROPHARYNGEAL ONCE
OUTPATIENT
Start: 2023-01-09 | End: 2023-01-09

## 2023-01-09 RX ORDER — SILVER SULFADIAZINE 10 G/1000G
CREAM TOPICAL ONCE
OUTPATIENT
Start: 2023-01-09 | End: 2023-01-09

## 2023-01-09 RX ORDER — MUPIROCIN 20 MG/G
OINTMENT TOPICAL ONCE
OUTPATIENT
Start: 2023-01-09 | End: 2023-01-09

## 2023-01-09 NOTE — WOUND CARE
[address]   Progress Note and Procedure Note     Glo Matos. MEDICAL RECORD NUMBER:  [id number]  AGE: 65 y.o. RACE BLACK/  GENDER: male  : 1945  EPISODE DATE:  2023    Subjective: In a chair, not sleeping in bed  Using lymphedema pump only once a day    Chief Complaint   Patient presents with    Wound Check     R & L lower legs           HISTORY of PRESENT ILLNESS HPI    Glo Pang is a 65 y.o. male who presents today for wound/ulcer evaluation. History of Wound Context: BLE  Wound/Ulcer Pain Timing/Severity: none  Quality of pain: aching  Severity:  1 / 10   Modifying Factors: None  Associated Signs/Symptoms: edema    Ulcer Identification:  Ulcer Type: lymphedema    Contributing Factors: lymphedema    Wound: BLE         PAST MEDICAL HISTORY    Past Medical History:   Diagnosis Date    Dyslipidemia     HTN (hypertension)         PAST SURGICAL HISTORY    Past Surgical History:   Procedure Laterality Date    HX CHOLECYSTECTOMY         FAMILY HISTORY    Family History   Problem Relation Age of Onset    Diabetes Mother     Diabetes Brother     Diabetes Son        SOCIAL HISTORY    Social History     Tobacco Use    Smoking status: Never    Smokeless tobacco: Never   Vaping Use    Vaping Use: Never used   Substance Use Topics    Alcohol use: Never    Drug use: Never       ALLERGIES    No Known Allergies    MEDICATIONS    Current Outpatient Medications on File Prior to Encounter   Medication Sig Dispense Refill    multivitamin (ONE A DAY) tablet Take 1 Tablet by mouth in the morning. furosemide (LASIX) 40 mg tablet Take 40 mg by mouth two (2) times a day. vitamin E, dl,tocopheryl acet, (vitamin E acetate) 400 unit capsule Take 400 Units by mouth daily. aspirin (ASPIRIN) 325 mg tablet Take 325 mg by mouth daily. No current facility-administered medications on file prior to encounter.        REVIEW OF SYSTEMS    Pertinent items are noted in HPI. Objective:     Visit Vitals  /77 (BP 1 Location: Left upper arm, BP Patient Position: At rest;Sitting)   Pulse 84   Temp (!) 95.9 °F (35.5 °C)   Resp 20       Wt Readings from Last 3 Encounters:   08/08/22 122.5 kg (270 lb)   07/21/22 130.6 kg (288 lb)   02/21/22 126 kg (277 lb 12.8 oz)       PHYSICAL EXAM  Left and right leg wounds measure significantly larger, moderate slough debrided, no evidence of active infection  Pertinent items are noted in HPI.     Assessment:   Significant increase in size of left and right leg wounds  Problem List Items Addressed This Visit       Cellulitis and abscess of left lower extremity    Relevant Orders    INITIATE OUTPATIENT WOUND CARE PROTOCOL    Non-pressure chronic ulcer of other part of right lower leg with 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 ([address]    Relevant Orders    INITIATE OUTPATIENT WOUND CARE PROTOCOL    Non-pressure chronic ulcer of other part of left lower leg with 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 ([address]    Relevant Orders    INITIATE OUTPATIENT WOUND CARE PROTOCOL    Ulcer of foot, chronic, right, with 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 ([address] - Primary    Relevant Orders    INITIATE OUTPATIENT WOUND CARE PROTOCOL       Wound Leg Left #1 circumferential 08/08/22 (Active)   Wound Image   01/09/23 0958   Wound Etiology Other (Comment) 01/09/23 0958   Dressing Status Breakthrough drainage noted 01/09/23 0958   Cleansed Soap and water 01/09/23 0958   Wound Length (cm) 17 cm 01/09/23 0958   Wound Width (cm) 14.5 cm 01/09/23 0958   Wound Depth (cm) 0.1 cm 01/09/23 0958   Wound Surface Area (cm^2) 246.5 cm^2 01/09/23 0958   Change in Wound Size % 55.98 01/09/23 0958   Wound Volume (cm^3) 24.65 cm^3 01/09/23 0958   Wound Healing % 56 01/09/23 0958   Post-Procedure Length (cm) 17 cm 01/09/23 1021   Post-Procedure Width (cm) 14.5 cm 01/09/23 1021   Post-Procedure Depth (cm) 0.1 cm 01/09/23 1021   Post-Procedure Surface Area (cm^2) 246.5 cm^2 01/09/23 1021   Post-Procedure Volume (cm^3) 24.65 cm^3 01/09/23 1021   Wound Assessment Granulation tissue;Slough 01/09/23 0958   Drainage Amount Copious 01/09/23 0958   Drainage Description Serosanguinous 01/09/23 0958   Wound Odor Moderate 01/09/23 0958   Dinorah-Wound/Incision Assessment Hyperpigmented; Maceration 01/09/23 0958   Edges Attached edges 01/09/23 0958   Wound Thickness Description Full thickness 01/09/23 0958   Number of days: 154       Wound Leg Right #2 circumferential 10/31/22 (Active)   Wound Image   01/09/23 0958   Wound Etiology Other (Comment) 01/09/23 0958   Dressing Status Clean; Intact;Dry 01/09/23 0958   Cleansed Soap and water 01/09/23 0958   Wound Length (cm) 13.5 cm 01/09/23 0958   Wound Width (cm) 17 cm 01/09/23 0958   Wound Depth (cm) 0.1 cm 01/09/23 0958   Wound Surface Area (cm^2) 229.5 cm^2 01/09/23 0958   Change in Wound Size % 13.72 01/09/23 0958   Wound Volume (cm^3) 22.95 cm^3 01/09/23 0958   Wound Healing % 14 01/09/23 0958   Post-Procedure Length (cm) 13.5 cm 01/09/23 1022   Post-Procedure Width (cm) 17 cm 01/09/23 1022   Post-Procedure Depth (cm) 0.1 cm 01/09/23 1022   Post-Procedure Surface Area (cm^2) 229.5 cm^2 01/09/23 1022   Post-Procedure Volume (cm^3) 22.95 cm^3 01/09/23 1022   Wound Assessment Slough;Granulation tissue 01/09/23 0958   Drainage Amount Large 01/09/23 0958   Drainage Description Serosanguinous 01/09/23 0958   Wound Odor Moderate 01/09/23 0958   Dinorah-Wound/Incision Assessment Dry/flaky 01/09/23 0958   Edges Attached edges 01/09/23 0958   Wound Thickness Description Full thickness 01/09/23 0958   Number of days: 70       POP IN PROCEDURE TYPE (DEBRIDEMENT, BIOPSY, I&D, SKIN SUB, CHEMICAL CAUERTY)     Plan:   Silvercel, drawtex, bilateral Unna boot  Lymphedema pumping twice a day  Leg elevation, sleep in bed    Treatment Note please see attached Discharge Instructions    Written patient dismissal instructions given to patient or POA.          Electronically signed by Ann-Marie Amador MD on 1/9/2023 at 10:25 AM               Debridement Wound Care        Problem List Items Addressed This Visit       Cellulitis and abscess of left lower extremity    Relevant Orders    INITIATE OUTPATIENT WOUND CARE PROTOCOL    Non-pressure chronic ulcer of other part of right lower leg with 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 ([address]    Relevant Orders    INITIATE OUTPATIENT WOUND CARE PROTOCOL    Non-pressure chronic ulcer of other part of left lower leg with 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 ([address]    Relevant Orders    INITIATE OUTPATIENT WOUND CARE PROTOCOL    Ulcer of foot, chronic, right, with 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 ([address] - Primary    Relevant Orders    INITIATE OUTPATIENT WOUND CARE PROTOCOL       Procedure Note  Indications:  Based on my examination of this patient's wound(s)/ulcer(s) today, debridement is required to promote healing and evaluate the wound base.     Performed by: Jason Lopez MD    Consent obtained: Yes    Time out taken: Yes    Debridement: Excisional    Using curette the wound(s)/ulcer(s) was/were sharply debrided down through and including the removal of    subcutaneous tissue    Devitalized Tissue Debrided: slough    Pre Debridement Measurements:  Are located in the [place]  Documentation Flow Sheet    Non-Pressure ulcer, 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    Wound/Ulcer #: 1 and 2    Post Debridement Measurements:  Wound/Ulcer Descriptions are Pre Debridement except measurements:    Wound Leg Left #1 circumferential 08/08/22 (Active)   Wound Image   01/09/23 0958   Wound Etiology Other (Comment) 01/09/23 0958   Dressing Status Breakthrough drainage noted 01/09/23 0958   Cleansed Soap and water 01/09/23 0958   Wound Length (cm) 17 cm 01/09/23 0958   Wound Width (cm) 14.5 cm 01/09/23 0958   Wound Depth (cm) 0.1 cm 01/09/23 0958   Wound Surface Area (cm^2) 246.5 cm^2 01/09/23 0958   Change in Wound Size % 55.98 01/09/23 0958   Wound Volume (cm^3) 24.65 cm^3 01/09/23 0958   Wound Healing % 56 01/09/23 0958   Post-Procedure Length (cm) 17 cm 01/09/23 1021   Post-Procedure Width (cm) 14.5 cm 01/09/23 1021   Post-Procedure Depth (cm) 0.1 cm 01/09/23 1021   Post-Procedure Surface Area (cm^2) 246.5 cm^2 01/09/23 1021   Post-Procedure Volume (cm^3) 24.65 cm^3 01/09/23 1021   Wound Assessment Granulation tissue;Slough 01/09/23 0958   Drainage Amount Copious 01/09/23 0958   Drainage Description Serosanguinous 01/09/23 0958   Wound Odor Moderate 01/09/23 0958   Dinorah-Wound/Incision Assessment Hyperpigmented; Maceration 01/09/23 0958   Edges Attached edges 01/09/23 0958   Wound Thickness Description Full thickness 01/09/23 0958   Number of days: 154       Wound Leg Right #2 circumferential 10/31/22 (Active)   Wound Image   01/09/23 0958   Wound Etiology Other (Comment) 01/09/23 0958   Dressing Status Clean; Intact;Dry 01/09/23 0958   Cleansed Soap and water 01/09/23 0958   Wound Length (cm) 13.5 cm 01/09/23 0958   Wound Width (cm) 17 cm 01/09/23 0958   Wound Depth (cm) 0.1 cm 01/09/23 0958   Wound Surface Area (cm^2) 229.5 cm^2 01/09/23 0958   Change in Wound Size % 13.72 01/09/23 0958   Wound Volume (cm^3) 22.95 cm^3 01/09/23 0958   Wound Healing % 14 01/09/23 0958   Post-Procedure Length (cm) 13.5 cm 01/09/23 1022   Post-Procedure Width (cm) 17 cm 01/09/23 1022   Post-Procedure Depth (cm) 0.1 cm 01/09/23 1022   Post-Procedure Surface Area (cm^2) 229.5 cm^2 01/09/23 1022   Post-Procedure Volume (cm^3) 22.95 cm^3 01/09/23 1022   Wound Assessment Slough;Granulation tissue 01/09/23 0958   Drainage Amount Large 01/09/23 0958   Drainage Description Serosanguinous 01/09/23 0958   Wound Odor Moderate 01/09/23 0958   Dinorah-Wound/Incision Assessment Dry/flaky 01/09/23 0958   Edges Attached edges 01/09/23 0958   Wound Thickness Description Full thickness 01/09/23 0958   Number of days: 70        Total Surface Area Debrided:  576 sq cm     Estimated Blood Loss:  None    Hemostasis Achieved: Pressure    Procedural Pain: 1 / 10     Post Procedural Pain: 0 / 10     Response to treatment: Well tolerated by patient

## 2023-01-09 NOTE — DISCHARGE INSTRUCTIONS
Discharge Instructions for  [address] [place] [address], [address] [place] [address]  Telephone: [phone]    NAME:  Lisaa . YOB: 1945  MEDICAL RECORD NUMBER:  [id number]  DATE:  1/9/2023      Return Appointment:  [] Dressing supply provider:   [x] Home Healthcare: [facility]   [x] Return Appointment: 1 Week(s)  [] Nurse Visit: Week(s)    [] Discharge from [facility]  [] Ordered tests:   [x] Referrals: Please see a podiatrist for toe nails  [] Rx:   [x] Other: Use Lymphedema pump twice a day x 1 hour each    Wound Cleansing:   Do not scrub or use excessive force. Cleanse wound prior to applying a clean dressing with:  [] Normal Saline   [x] Mild Soap & Water/Baby Shampoo   [] Keep Wound Dry in Shower  [] Other:      Topical Treatments:  Do not apply lotions, creams, or ointments to wound bed unless directed. [x] Apply moisturizing lotion to skin surrounding the wound prior to dressing change.  [] Apply antifungal ointment to skin surrounding the wound prior to dressing change.  [] Apply thin film of moisture barrier/zinc ointment to skin immediately around wound. [] Other:       Dressings:           Wound Location L & R leg   [x] Apply Primary Dressing:       [] MediHoney Gel    [] MediHoney Alginate               [] Calcium Alginate      [x] Calcium Alginate with Silver   [] Collagen                   [] Collagen with Silver                [] Santyl with Moistened saline gauze              [] Hydrofera Blue (cut to size and moistened)  [] Hydrofera Blue Ready (Cut to size)   [] Vashe wet to dry    [] Betadine wet to dry              [] Hydrogel                [] Mepitel     [] Mepilex Transfer                [x] Other: Drawtex    [x] Cover and Secure with:     [x] Gauze [] Hai Clamp [] Kerlix   [] Foam [] Super Absorbant [x] ABD     [] ACE Wrap            [] Other:    Limit contact of tape with skin.     [x] Change dressing: [] Daily [] Every Other Day [] Once per week   [] Twice per week   [x] Three times per week every week  [] Do Not Change Dressing   [] Other:     Edema Control:  Apply: [] Compression Stocking:  mmHg  []Right Leg []Left Leg   [] Tubigrip []Right Leg Double Layer []Left Leg Double Layer      []Right Leg Single Layer []Left Leg Single Layer     [] Elevate leg(s) above the level of the heart when sitting. [] Avoid prolonged standing in one place. Compression:  Apply: [x] Multilayer Compression Wrap: [x]RightLeg [x]Left Leg                                 []Coflex   []Coflex Lite             [x]Unna Lite     [x] Do not get leg(s) with wrap wet. If wraps become too tight call the center or completely remove the wrap. [x] Elevate leg(s) above the level of the heart when sitting. [x] Avoid prolonged standing in one place. Dietary:  [x] Diet as tolerated: [] Calorie Diabetic Diet: [x] No Added Salt:  [x] Increase Protein: [] Other:     Activity:  [x] Activity as tolerated:    [] Patient has no activity restrictions       [] Strict Bedrest:   [] Remain off Work:       [] May return to full duty work:                                     [] Return to work with restrictions:               [address] Information: Should you experience any significant changes in your wound(s) or have questions about your wound care, please contact Errol Barry at [place] [address] 8:00 am - 4:30. If you need help with your wound outside of these hours and cannot wait until we are again available, contact your PCP or go to the hospital emergency room. PLEASE NOTE: IF YOU ARE UNABLE TO OBTAIN WOUND SUPPLIES, CONTINUE TO USE THE SUPPLIES YOU HAVE AVAILABLE UNTIL YOU ARE ABLE TO REACH US. IT IS MOST IMPORTANT TO KEEP THE WOUND COVERED AT ALL TIMES.     [x] Dr. Teresa Perez   [] Dr. Yaniv Joens  [] Dr. Jeannie Andre

## 2023-01-09 NOTE — WOUND CARE
Discharge Instructions for  [address] [place] [address], [address] [place] [address]  Telephone: [phone]    NAME:  Flako Loera. YOB: 1945  MEDICAL RECORD NUMBER:  [id number]  DATE:  1/9/2023      Return Appointment:  [] Dressing supply provider:   [x] Home Healthcare: [facility]   [x] Return Appointment: 1 Week(s)  [] Nurse Visit: Week(s)    [] Discharge from [facility]  [] Ordered tests:   [x] Referrals: Please see a podiatrist for toe nails  [] Rx:   [x] Other: Use Lymphedema pump twice a day x 1 hour each    Wound Cleansing:   Do not scrub or use excessive force. Cleanse wound prior to applying a clean dressing with:  [] Normal Saline   [x] Mild Soap & Water/Baby Shampoo   [] Keep Wound Dry in Shower  [] Other:      Topical Treatments:  Do not apply lotions, creams, or ointments to wound bed unless directed. [x] Apply moisturizing lotion to skin surrounding the wound prior to dressing change.  [] Apply antifungal ointment to skin surrounding the wound prior to dressing change.  [] Apply thin film of moisture barrier/zinc ointment to skin immediately around wound. [] Other:       Dressings:           Wound Location L & R leg   [x] Apply Primary Dressing:       [] MediHoney Gel    [] MediHoney Alginate               [] Calcium Alginate      [x] Calcium Alginate with Silver   [] Collagen                   [] Collagen with Silver                [] Santyl with Moistened saline gauze              [] Hydrofera Blue (cut to size and moistened)  [] Hydrofera Blue Ready (Cut to size)   [] Vashe wet to dry    [] Betadine wet to dry              [] Hydrogel                [] Mepitel     [] Mepilex Transfer                [x] Other: Drawtex    [x] Cover and Secure with:     [x] Gauze [] Kenisha Meager [] Kerlix   [] Foam [] Super Absorbant [x] ABD     [] ACE Wrap            [] Other:    Limit contact of tape with skin.     [x] Change dressing: [] Daily [] Every Other Day [] Once per week   [] Twice per week   [x] Three times per week every week  [] Do Not Change Dressing   [] Other:     Edema Control:  Apply: [] Compression Stocking:  mmHg  []Right Leg []Left Leg   [] Tubigrip []Right Leg Double Layer []Left Leg Double Layer      []Right Leg Single Layer []Left Leg Single Layer     [] Elevate leg(s) above the level of the heart when sitting. [] Avoid prolonged standing in one place. Compression:  Apply: [x] Multilayer Compression Wrap: [x]RightLeg [x]Left Leg                                 []Coflex   []Coflex Lite             [x]Unna Lite     [x] Do not get leg(s) with wrap wet. If wraps become too tight call the center or completely remove the wrap. [x] Elevate leg(s) above the level of the heart when sitting. [x] Avoid prolonged standing in one place. Dietary:  [x] Diet as tolerated: [] Calorie Diabetic Diet: [x] No Added Salt:  [x] Increase Protein: [] Other:     Activity:  [x] Activity as tolerated:    [] Patient has no activity restrictions       [] Strict Bedrest:   [] Remain off Work:       [] May return to full duty work:                                     [] Return to work with restrictions:               [address] Information: Should you experience any significant changes in your wound(s) or have questions about your wound care, please contact Errol Barry at [place] [address] 8:00 am - 4:30. If you need help with your wound outside of these hours and cannot wait until we are again available, contact your PCP or go to the hospital emergency room. PLEASE NOTE: IF YOU ARE UNABLE TO OBTAIN WOUND SUPPLIES, CONTINUE TO USE THE SUPPLIES YOU HAVE AVAILABLE UNTIL YOU ARE ABLE TO REACH US. IT IS MOST IMPORTANT TO KEEP THE WOUND COVERED AT ALL TIMES.     [x] Dr. Cinda Castillo   [] Dr. Cleve Gonzalez  [] Dr. Zana De La Torre

## 2023-01-09 NOTE — WOUND CARE
[facility] Application   Below Knee    NAME:  Tonie Rodriguez. YOB: 1945  MEDICAL RECORD NUMBER:  [id number]  DATE:  1/9/2023    Appied primary and secondary dressing as ordered. Applied Unna roll from toes to knee overlapping each time. Applied ace wrap or coban from toes to below the knee. Secured with tape and/or metal clips covered with tape. Instructed patient/caregiver to keep dressing dry and intact. DO NOT REMOVE DRESSING. Instructed pt/family/caregiver to report excessive draining, loose bandage, wet dressing, severe pain or tingling in toes. Applied [employer] dressing below the knee to bilateral lower legs. Unna Boot(s) were applied per  Guidelines.     Response to treatment: Well tolerated by patient      Electronically signed by Catalina English LPN on 4/3/0483 at 49:65 AM

## 2023-01-16 ENCOUNTER — HOSPITAL ENCOUNTER (OUTPATIENT)
Dept: WOUND CARE | Age: 78
Discharge: HOME | End: 2023-01-16
Payer: MEDICARE

## 2023-01-16 VITALS
DIASTOLIC BLOOD PRESSURE: 88 MMHG | SYSTOLIC BLOOD PRESSURE: 150 MMHG | TEMPERATURE: 95.9 F | HEART RATE: 84 BPM | RESPIRATION RATE: 20 BRPM

## 2023-01-16 DIAGNOSIS — L02.416 CELLULITIS AND ABSCESS OF LEFT LOWER EXTREMITY: ICD-10-CM

## 2023-01-16 DIAGNOSIS — L97.822 NON-PRESSURE CHRONIC ULCER OF OTHER PART OF LEFT LOWER LEG WITH FAT LAYER EXPOSED (HCC): ICD-10-CM

## 2023-01-16 DIAGNOSIS — L97.512 ULCER OF FOOT, CHRONIC, RIGHT, WITH FAT LAYER EXPOSED (HCC): ICD-10-CM

## 2023-01-16 DIAGNOSIS — L97.812 NON-PRESSURE CHRONIC ULCER OF OTHER PART OF RIGHT LOWER LEG WITH FAT LAYER EXPOSED (HCC): Primary | ICD-10-CM

## 2023-01-16 DIAGNOSIS — L03.116 CELLULITIS AND ABSCESS OF LEFT LOWER EXTREMITY: ICD-10-CM

## 2023-01-16 PROCEDURE — 11042 DBRDMT SUBQ TIS 1ST 20SQCM/<: CPT

## 2023-01-16 PROCEDURE — 74011000250 HC RX REV CODE- 250: Performed by: SPECIALIST

## 2023-01-16 PROCEDURE — 11045 DBRDMT SUBQ TISS EACH ADDL: CPT

## 2023-01-16 RX ORDER — LIDOCAINE HYDROCHLORIDE 20 MG/ML
15 SOLUTION OROPHARYNGEAL ONCE
OUTPATIENT
Start: 2023-01-16 | End: 2023-01-16

## 2023-01-16 RX ORDER — LIDOCAINE HYDROCHLORIDE 20 MG/ML
15 SOLUTION OROPHARYNGEAL ONCE
Status: COMPLETED | OUTPATIENT
Start: 2023-01-16 | End: 2023-01-16

## 2023-01-16 RX ORDER — MUPIROCIN 20 MG/G
OINTMENT TOPICAL ONCE
OUTPATIENT
Start: 2023-01-16 | End: 2023-01-16

## 2023-01-16 RX ORDER — SILVER SULFADIAZINE 10 G/1000G
CREAM TOPICAL ONCE
OUTPATIENT
Start: 2023-01-16 | End: 2023-01-16

## 2023-01-16 RX ADMIN — LIDOCAINE HYDROCHLORIDE 15 ML: 20 SOLUTION ORAL; TOPICAL at 10:27

## 2023-01-16 NOTE — WOUND CARE
Discharge Instructions for  7 [facility], [address] [place] [address]  Telephone: [phone]    NAME:  Oscar Bailey. YOB: 1945  MEDICAL RECORD NUMBER:  [id number]  DATE:  1/16/2023      Return Appointment:  [] Dressing supply provider:   [x] Home Healthcare: [facility]   [x] Return Appointment: 1 Week(s)  [] Nurse Visit: Week(s)    [] Discharge from [facility]  [] Ordered tests:   [x] Referrals: Please see a podiatrist for toe nails  [] Rx:   [x] Other: Use Lymphedema pump twice a day x 1 hour each    Wound Cleansing:   Do not scrub or use excessive force. Cleanse wound prior to applying a clean dressing with:  [] Normal Saline   [x] Mild Soap & Water/Baby Shampoo   [] Keep Wound Dry in Shower  [] Other:      Topical Treatments:  Do not apply lotions, creams, or ointments to wound bed unless directed. [x] Apply moisturizing lotion to skin surrounding the wound prior to dressing change.  [] Apply antifungal ointment to skin surrounding the wound prior to dressing change.  [] Apply thin film of moisture barrier/zinc ointment to skin immediately around wound. [] Other:       Dressings:           Wound Location L & R leg   [x] Apply Primary Dressing:       [] MediHoney Gel    [] MediHoney Alginate               [] Calcium Alginate      [] Calcium Alginate with Silver   [] Collagen                   [] Collagen with Silver                [] Santyl with Moistened saline gauze              [] Hydrofera Blue (cut to size and moistened)  [] Hydrofera Blue Ready (Cut to size)   [] Vashe wet to dry    [] Betadine wet to dry              [] Hydrogel                [] Mepitel     [] Mepilex Transfer                [x] Other: Drawtex    [x] Cover and Secure with:     [x] Gauze [] Ardelle Cos [] Kerlix   [] Foam [] Super Absorbant [x] ABD     [] ACE Wrap            [] Other:    Limit contact of tape with skin.     [x] Change dressing: [] Daily [] Every Other Day [] Once per week   [] Twice per week   [x] Three times per week every week  [] Do Not Change Dressing   [] Other:     Edema Control:  Apply: [] Compression Stocking:  mmHg  []Right Leg []Left Leg   [] Tubigrip []Right Leg Double Layer []Left Leg Double Layer      []Right Leg Single Layer []Left Leg Single Layer     [] Elevate leg(s) above the level of the heart when sitting. [] Avoid prolonged standing in one place. Compression:  Apply: [x] Multilayer Compression Wrap: [x]RightLeg [x]Left Leg                                 []Coflex   []Coflex Lite             [x]Unna Lite     [x] Do not get leg(s) with wrap wet. If wraps become too tight call the center or completely remove the wrap. [x] Elevate leg(s) above the level of the heart when sitting. [x] Avoid prolonged standing in one place. Dietary:  [x] Diet as tolerated: [] Calorie Diabetic Diet: [x] No Added Salt:  [x] Increase Protein: [] Other:     Activity:  [x] Activity as tolerated:    [] Patient has no activity restrictions       [] Strict Bedrest:   [] Remain off Work:       [] May return to full duty work:                                     [] Return to work with restrictions:               [address] Information: Should you experience any significant changes in your wound(s) or have questions about your wound care, please contact Errol Barry at [place] [address] 8:00 am - 4:30. If you need help with your wound outside of these hours and cannot wait until we are again available, contact your PCP or go to the hospital emergency room. PLEASE NOTE: IF YOU ARE UNABLE TO OBTAIN WOUND SUPPLIES, CONTINUE TO USE THE SUPPLIES YOU HAVE AVAILABLE UNTIL YOU ARE ABLE TO REACH US. IT IS MOST IMPORTANT TO KEEP THE WOUND COVERED AT ALL TIMES.     [x] Dr. Anahy Patel   [] Dr. Geri Tee  [] Dr. Pelon Garcia

## 2023-01-16 NOTE — WOUND CARE
[address]   Progress Note and Procedure Note     Kacey Correia MEDICAL RECORD NUMBER:  [id number]  AGE: 65 y.o. RACE BLACK/  GENDER: male  : 1945  EPISODE DATE:  2023    Subjective:   Using lymphedema pump regularly    Chief Complaint   Patient presents with    Wound Check     Bilateral legs         HISTORY of PRESENT ILLNESS HPI    Kacey Correia is a 65 y.o. male who presents today for wound/ulcer evaluation. History of Wound Context: BLE  Wound/Ulcer Pain Timing/Severity: mild  Quality of pain: aching  Severity:  1 / 10   Modifying Factors: None  Associated Signs/Symptoms: edema    Ulcer Identification:  Ulcer Type: venous    Contributing Factors: lymphedema    Wound: BLE         PAST MEDICAL HISTORY    Past Medical History:   Diagnosis Date    Dyslipidemia     HTN (hypertension)         PAST SURGICAL HISTORY    Past Surgical History:   Procedure Laterality Date    HX CHOLECYSTECTOMY         FAMILY HISTORY    Family History   Problem Relation Age of Onset    Diabetes Mother     Diabetes Brother     Diabetes Son        SOCIAL HISTORY    Social History     Tobacco Use    Smoking status: Never    Smokeless tobacco: Never   Vaping Use    Vaping Use: Never used   Substance Use Topics    Alcohol use: Never    Drug use: Never       ALLERGIES    No Known Allergies    MEDICATIONS    Current Outpatient Medications on File Prior to Encounter   Medication Sig Dispense Refill    multivitamin (ONE A DAY) tablet Take 1 Tablet by mouth in the morning. furosemide (LASIX) 40 mg tablet Take 40 mg by mouth two (2) times a day. vitamin E, dl,tocopheryl acet, (vitamin E acetate) 400 unit capsule Take 400 Units by mouth daily. aspirin (ASPIRIN) 325 mg tablet Take 325 mg by mouth daily. No current facility-administered medications on file prior to encounter. REVIEW OF SYSTEMS    Pertinent items are noted in HPI.     Objective:     Visit Vitals  BP (!) 150/88 (BP 1 Location: Left arm, BP Patient Position: At rest;Sitting)   Pulse 84   Temp (!) 95.9 °F (35.5 °C)   Resp 20       Wt Readings from Last 3 Encounters:   08/08/22 122.5 kg (270 lb)   07/21/22 130.6 kg (288 lb)   02/21/22 126 kg (277 lb 12.8 oz)       PHYSICAL EXAM  The right and left lateral calf wounds are both smaller in area, much less slough, overall improved  Pertinent items are noted in HPI. Assessment:   Significant improvement with lymphedema pumping  Problem List Items Addressed This Visit       Cellulitis and abscess of left lower extremity    Relevant Medications    lidocaine (XYLOCAINE) 2 % viscous solution 15 mL (Completed) (Start on 1/16/2023 11:00 AM)    Other Relevant Orders    INITIATE OUTPATIENT WOUND CARE PROTOCOL    Non-pressure chronic ulcer of other part of right lower leg with 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 ([address] - Primary    Relevant Medications    lidocaine (XYLOCAINE) 2 % viscous solution 15 mL (Completed) (Start on 1/16/2023 11:00 AM)    Other Relevant Orders    INITIATE OUTPATIENT WOUND CARE PROTOCOL    Non-pressure chronic ulcer of other part of left lower leg with 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 ([address]    Relevant Medications    lidocaine (XYLOCAINE) 2 % viscous solution 15 mL (Completed) (Start on 1/16/2023 11:00 AM)    Other Relevant Orders    INITIATE OUTPATIENT WOUND CARE PROTOCOL    Ulcer of foot, chronic, right, with 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 ([address]    Relevant Medications    lidocaine (XYLOCAINE) 2 % viscous solution 15 mL (Completed) (Start on 1/16/2023 11:00 AM)    Other Relevant Orders    INITIATE OUTPATIENT WOUND CARE PROTOCOL       Wound Leg Left #1 circumferential 08/08/22 (Active)   Wound Image   01/16/23 1031   Wound Etiology Other (Comment) 01/16/23 1031   Dressing Status Intact; Clean;Dry 01/16/23 1031   Cleansed Soap and water 01/16/23 1031   Wound Length (cm) 13.5 cm 01/16/23 1031   Wound Width (cm) 10.5 cm 01/16/23 1031   Wound Depth (cm) 0.1 cm 01/16/23 1031   Wound Surface Area (cm^2) 141.75 cm^2 01/16/23 1031   Change in Wound Size % 74.69 01/16/23 1031   Wound Volume (cm^3) 14.175 cm^3 01/16/23 1031   Wound Healing % 75 01/16/23 1031   Post-Procedure Length (cm) 13.5 cm 01/16/23 1041   Post-Procedure Width (cm) 10.5 cm 01/16/23 1041   Post-Procedure Depth (cm) 0.1 cm 01/16/23 1041   Post-Procedure Surface Area (cm^2) 141.75 cm^2 01/16/23 1041   Post-Procedure Volume (cm^3) 14.175 cm^3 01/16/23 1041   Wound Assessment Granulation tissue;Slough 01/16/23 1031   Drainage Amount Large 01/16/23 1031   Drainage Description Serosanguinous 01/16/23 1031   Wound Odor Mild 01/16/23 1031   Dinorah-Wound/Incision Assessment Hyperpigmented; Maceration 01/16/23 1031   Edges Attached edges; Undefined edges 01/16/23 1031   Wound Thickness Description Full thickness 01/16/23 1031   Number of days: 161       Wound Leg Right #2 circumferential 10/31/22 (Active)   Wound Image   01/16/23 1032   Wound Etiology Other (Comment) 01/16/23 1032   Dressing Status Clean; Intact;Dry 01/16/23 1032   Cleansed Soap and water 01/16/23 1032   Wound Length (cm) 9 cm 01/16/23 1032   Wound Width (cm) 10.5 cm 01/16/23 1032   Wound Depth (cm) 0.1 cm 01/16/23 1032   Wound Surface Area (cm^2) 94.5 cm^2 01/16/23 1032   Change in Wound Size % 64.47 01/16/23 1032   Wound Volume (cm^3) 9.45 cm^3 01/16/23 1032   Wound Healing % 64 01/16/23 1032   Post-Procedure Length (cm) 9 cm 01/16/23 1041   Post-Procedure Width (cm) 10.5 cm 01/16/23 1041   Post-Procedure Depth (cm) 0.1 cm 01/16/23 1041   Post-Procedure Surface Area (cm^2) 94.5 cm^2 01/16/23 1041   Post-Procedure Volume (cm^3) 9.45 cm^3 01/16/23 1041   Wound Assessment Slough;Granulation tissue 01/16/23 1032   Drainage Amount Large 01/16/23 1032   Drainage Description Serosanguinous 01/16/23 1032   Wound Odor Moderate 01/16/23 1032   Dinorah-Wound/Incision Assessment Dry/flaky 01/16/23 1032   Edges Attached edges; Undefined edges 01/16/23 1032   Wound Thickness Description Full thickness 01/16/23 1032   Number of days: 77       POP IN PROCEDURE TYPE (DEBRIDEMENT, BIOPSY, I&D, SKIN SUB, CHEMICAL CAUERTY)     Plan:   Stop silvercel, start drawtex  Unna lite bilaterally    Treatment Note please see attached Discharge Instructions    Written patient dismissal instructions given to patient or POA. Electronically signed by John Laboy MD on 1/16/2023 at 10:47 AM               Debridement Wound Care        Problem List Items Addressed This Visit       Cellulitis and abscess of left lower extremity    Relevant Medications    lidocaine (XYLOCAINE) 2 % viscous solution 15 mL (Completed) (Start on 1/16/2023 11:00 AM)    Other Relevant Orders    INITIATE OUTPATIENT WOUND CARE PROTOCOL    Non-pressure chronic ulcer of other part of right lower leg with 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 ([address] - Primary    Relevant Medications    lidocaine (XYLOCAINE) 2 % viscous solution 15 mL (Completed) (Start on 1/16/2023 11:00 AM)    Other Relevant Orders    INITIATE OUTPATIENT WOUND CARE PROTOCOL    Non-pressure chronic ulcer of other part of left lower leg with 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 ([address]    Relevant Medications    lidocaine (XYLOCAINE) 2 % viscous solution 15 mL (Completed) (Start on 1/16/2023 11:00 AM)    Other Relevant Orders    INITIATE OUTPATIENT WOUND CARE PROTOCOL    Ulcer of foot, chronic, right, with 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 ([address]    Relevant Medications    lidocaine (XYLOCAINE) 2 % viscous solution 15 mL (Completed) (Start on 1/16/2023 11:00 AM)    Other Relevant Orders    INITIATE OUTPATIENT WOUND CARE PROTOCOL       Procedure Note  Indications:  Based on my examination of this patient's wound(s)/ulcer(s) today, debridement is required to promote healing and evaluate the wound base.     Performed by: John Laboy MD    Consent obtained: Yes    Time out taken: Yes    Debridement: Excisional    Using curette the wound(s)/ulcer(s) was/were sharply debrided down through and including the removal of    subcutaneous tissue    Devitalized Tissue Debrided: slough    Pre Debridement Measurements:  Are located in the Wound/Ulcer Documentation Flow Sheet    Non-Pressure ulcer, 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    Wound/Ulcer #: 1 and 2    Post Debridement Measurements:  Wound/Ulcer Descriptions are Pre Debridement except measurements:    Wound Leg Left #1 circumferential 08/08/22 (Active)   Wound Image   01/16/23 1031   Wound Etiology Other (Comment) 01/16/23 1031   Dressing Status Intact; Clean;Dry 01/16/23 1031   Cleansed Soap and water 01/16/23 1031   Wound Length (cm) 13.5 cm 01/16/23 1031   Wound Width (cm) 10.5 cm 01/16/23 1031   Wound Depth (cm) 0.1 cm 01/16/23 1031   Wound Surface Area (cm^2) 141.75 cm^2 01/16/23 1031   Change in Wound Size % 74.69 01/16/23 1031   Wound Volume (cm^3) 14.175 cm^3 01/16/23 1031   Wound Healing % 75 01/16/23 1031   Post-Procedure Length (cm) 13.5 cm 01/16/23 1041   Post-Procedure Width (cm) 10.5 cm 01/16/23 1041   Post-Procedure Depth (cm) 0.1 cm 01/16/23 1041   Post-Procedure Surface Area (cm^2) 141.75 cm^2 01/16/23 1041   Post-Procedure Volume (cm^3) 14.175 cm^3 01/16/23 1041   Wound Assessment Granulation tissue;Slough 01/16/23 1031   Drainage Amount Large 01/16/23 1031   Drainage Description Serosanguinous 01/16/23 1031   Wound Odor Mild 01/16/23 1031   Dinorah-Wound/Incision Assessment Hyperpigmented; Maceration 01/16/23 1031   Edges Attached edges; Undefined edges 01/16/23 1031   Wound Thickness Description Full thickness 01/16/23 1031   Number of days: 161       Wound Leg Right #2 circumferential 10/31/22 (Active)   Wound Image   01/16/23 1032   Wound Etiology Other (Comment) 01/16/23 1032   Dressing Status Clean; Intact;Dry 01/16/23 1032   Cleansed Soap and water 01/16/23 1032   Wound Length (cm) 9 cm 01/16/23 1032   Wound Width (cm) 10.5 cm 01/16/23 1032   Wound Depth (cm) 0.1 cm 01/16/23 1032   Wound Surface Area (cm^2) 94.5 cm^2 01/16/23 1032   Change in Wound Size % 64.47 01/16/23 1032   Wound Volume (cm^3) 9.45 cm^3 01/16/23 1032   Wound Healing % 64 01/16/23 1032   Post-Procedure Length (cm) 9 cm 01/16/23 1041   Post-Procedure Width (cm) 10.5 cm 01/16/23 1041   Post-Procedure Depth (cm) 0.1 cm 01/16/23 1041   Post-Procedure Surface Area (cm^2) 94.5 cm^2 01/16/23 1041   Post-Procedure Volume (cm^3) 9.45 cm^3 01/16/23 1041   Wound Assessment Slough;Granulation tissue 01/16/23 1032   Drainage Amount Large 01/16/23 1032   Drainage Description Serosanguinous 01/16/23 1032   Wound Odor Moderate 01/16/23 1032   Dinorah-Wound/Incision Assessment Dry/flaky 01/16/23 1032   Edges Attached edges; Undefined edges 01/16/23 1032   Wound Thickness Description Full thickness 01/16/23 1032   Number of days: 77        Total Surface Area Debrided:  236.25 sq cm     Estimated Blood Loss:  None    Hemostasis Achieved: Pressure    Procedural Pain: 1 / 10     Post Procedural Pain: 0 / 10     Response to treatment: Well tolerated by patient

## 2023-01-16 NOTE — DISCHARGE INSTRUCTIONS
Discharge Instructions for  7 [facility], [facility]  Telephone: [phone]    NAME:  Dru Moeller. YOB: 1945  MEDICAL RECORD NUMBER:  [id number]  DATE:  1/16/2023      Return Appointment:  [] Dressing supply provider:   [x] Home Healthcare: [facility]   [x] Return Appointment: 1 Week(s)  [] Nurse Visit: Week(s)    [] Discharge from [facility]  [] Ordered tests:   [x] Referrals: Please see a podiatrist for toe nails  [] Rx:   [x] Other: Use Lymphedema pump twice a day x 1 hour each    Wound Cleansing:   Do not scrub or use excessive force. Cleanse wound prior to applying a clean dressing with:  [] Normal Saline   [x] Mild Soap & Water/Baby Shampoo   [] Keep Wound Dry in Shower  [] Other:      Topical Treatments:  Do not apply lotions, creams, or ointments to wound bed unless directed. [x] Apply moisturizing lotion to skin surrounding the wound prior to dressing change.  [] Apply antifungal ointment to skin surrounding the wound prior to dressing change.  [] Apply thin film of moisture barrier/zinc ointment to skin immediately around wound. [] Other:       Dressings:           Wound Location L & R leg   [x] Apply Primary Dressing:       [] MediHoney Gel    [] MediHoney Alginate               [] Calcium Alginate      [] Calcium Alginate with Silver   [] Collagen                   [] Collagen with Silver                [] Santyl with Moistened saline gauze              [] Hydrofera Blue (cut to size and moistened)  [] Hydrofera Blue Ready (Cut to size)   [] Vashe wet to dry    [] Betadine wet to dry              [] Hydrogel                [] Mepitel     [] Mepilex Transfer                [x] Other: Drawtex    [x] Cover and Secure with:     [x] Gauze [] Laban Bridges [] Kerlix   [] Foam [] Super Absorbant [x] ABD     [] ACE Wrap            [] Other:    Limit contact of tape with skin.     [x] Change dressing: [] Daily [] Every Other Day [] Once per week   [] Twice per week   [x] Three times per week every week  [] Do Not Change Dressing   [] Other:     Edema Control:  Apply: [] Compression Stocking:  mmHg  []Right Leg []Left Leg   [] Tubigrip []Right Leg Double Layer []Left Leg Double Layer      []Right Leg Single Layer []Left Leg Single Layer     [] Elevate leg(s) above the level of the heart when sitting. [] Avoid prolonged standing in one place. Compression:  Apply: [x] Multilayer Compression Wrap: [x]RightLeg [x]Left Leg                                 []Coflex   []Coflex Lite             [x]Unna Lite     [x] Do not get leg(s) with wrap wet. If wraps become too tight call the center or completely remove the wrap. [x] Elevate leg(s) above the level of the heart when sitting. [x] Avoid prolonged standing in one place. Dietary:  [x] Diet as tolerated: [] Calorie Diabetic Diet: [x] No Added Salt:  [x] Increase Protein: [] Other:     Activity:  [x] Activity as tolerated:    [] Patient has no activity restrictions       [] Strict Bedrest:   [] Remain off Work:       [] May return to full duty work:                                     [] Return to work with restrictions:               [address] Information: Should you experience any significant changes in your wound(s) or have questions about your wound care, please contact Errol Barry at [place] [address] 8:00 am - 4:30. If you need help with your wound outside of these hours and cannot wait until we are again available, contact your PCP or go to the hospital emergency room. PLEASE NOTE: IF YOU ARE UNABLE TO OBTAIN WOUND SUPPLIES, CONTINUE TO USE THE SUPPLIES YOU HAVE AVAILABLE UNTIL YOU ARE ABLE TO REACH US. IT IS MOST IMPORTANT TO KEEP THE WOUND COVERED AT ALL TIMES.     [x] Dr. Hodan Sosa   [] Dr. Angelica Green  [] Dr. Erinn Judge

## 2023-01-16 NOTE — WOUND CARE
[employer] Application   Below Knee    NAME:  Cory Luna. YOB: 1945  MEDICAL RECORD NUMBER:  [id number]  DATE:  1/16/2023    Remove old [employer] or Boots. Applied moisturizing agent to dry skin as needed. Appied primary and secondary dressing as ordered. Applied Unna roll from toes to knee overlapping each time. Applied ace wrap or coban from toes to below the knee. Secured with tape and/or metal clips covered with tape. Instructed patient/caregiver to keep dressing dry and intact. DO NOT REMOVE DRESSING. Instructed pt/family/caregiver to report excessive draining, loose bandage, wet dressing, severe pain or tingling in toes. Applied [employer] dressing below the knee to bilateral lower legs. Unna Boot(s) were applied per  Guidelines.     Response to treatment: Well tolerated by patient      Electronically signed by Allan Amato RN on 1/16/2023 at 10:55 AM

## 2023-01-23 ENCOUNTER — HOSPITAL ENCOUNTER (OUTPATIENT)
Dept: WOUND CARE | Age: 78
Discharge: HOME | End: 2023-01-23
Payer: MEDICARE

## 2023-01-23 VITALS
DIASTOLIC BLOOD PRESSURE: 66 MMHG | RESPIRATION RATE: 20 BRPM | SYSTOLIC BLOOD PRESSURE: 124 MMHG | TEMPERATURE: 97.6 F | HEART RATE: 84 BPM

## 2023-01-23 DIAGNOSIS — L97.812 NON-PRESSURE CHRONIC ULCER OF OTHER PART OF RIGHT LOWER LEG WITH FAT LAYER EXPOSED (HCC): Primary | ICD-10-CM

## 2023-01-23 DIAGNOSIS — L97.512 ULCER OF FOOT, CHRONIC, RIGHT, WITH FAT LAYER EXPOSED (HCC): ICD-10-CM

## 2023-01-23 DIAGNOSIS — L03.116 CELLULITIS AND ABSCESS OF LEFT LOWER EXTREMITY: ICD-10-CM

## 2023-01-23 DIAGNOSIS — L02.416 CELLULITIS AND ABSCESS OF LEFT LOWER EXTREMITY: ICD-10-CM

## 2023-01-23 DIAGNOSIS — L97.822 NON-PRESSURE CHRONIC ULCER OF OTHER PART OF LEFT LOWER LEG WITH FAT LAYER EXPOSED (HCC): ICD-10-CM

## 2023-01-23 PROCEDURE — 29580 STRAPPING UNNA BOOT: CPT

## 2023-01-23 PROCEDURE — 74011000250 HC RX REV CODE- 250: Performed by: SPECIALIST

## 2023-01-23 RX ORDER — LIDOCAINE HYDROCHLORIDE 20 MG/ML
15 SOLUTION OROPHARYNGEAL ONCE
Status: COMPLETED | OUTPATIENT
Start: 2023-01-23 | End: 2023-01-23

## 2023-01-23 RX ORDER — LIDOCAINE HYDROCHLORIDE 20 MG/ML
15 SOLUTION OROPHARYNGEAL ONCE
OUTPATIENT
Start: 2023-01-23 | End: 2023-01-23

## 2023-01-23 RX ORDER — MUPIROCIN 20 MG/G
OINTMENT TOPICAL ONCE
OUTPATIENT
Start: 2023-01-23 | End: 2023-01-23

## 2023-01-23 RX ORDER — SILVER SULFADIAZINE 10 G/1000G
CREAM TOPICAL ONCE
OUTPATIENT
Start: 2023-01-23 | End: 2023-01-23

## 2023-01-23 RX ADMIN — LIDOCAINE HYDROCHLORIDE 15 ML: 20 SOLUTION ORAL; TOPICAL at 10:01

## 2023-01-23 NOTE — WOUND CARE
[address]   Progress Note and Procedure Note   NO Procedure      Sadi Felix MEDICAL RECORD NUMBER:  [id number]  AGE: 65 y.o. RACE BLACK/  GENDER: male  : 1945  EPISODE DATE:  2023    Subjective:   Continues to use lymphedema pump twice a day  Drainage is less    Chief Complaint   Patient presents with    Wound Check     R and L leg         HISTORY of PRESENT ILLNESS HPI    Sadi Felix is a 65 y.o. male who presents today for wound/ulcer evaluation. History of Wound Context: BLE  Wound/Ulcer Pain Timing/Severity: none  Quality of pain: dull  Severity:  0 / 10   Modifying Factors: None  Associated Signs/Symptoms: edema    Ulcer Identification:  Ulcer Type: venous    Contributing Factors: lymphedema    Wound: BLE         PAST MEDICAL HISTORY    Past Medical History:   Diagnosis Date    Dyslipidemia     HTN (hypertension)         PAST SURGICAL HISTORY    Past Surgical History:   Procedure Laterality Date    HX CHOLECYSTECTOMY         FAMILY HISTORY    Family History   Problem Relation Age of Onset    Diabetes Mother     Diabetes Brother     Diabetes Son        SOCIAL HISTORY    Social History     Tobacco Use    Smoking status: Never    Smokeless tobacco: Never   Vaping Use    Vaping Use: Never used   Substance Use Topics    Alcohol use: Never    Drug use: Never       ALLERGIES    No Known Allergies    MEDICATIONS    Current Outpatient Medications on File Prior to Encounter   Medication Sig Dispense Refill    multivitamin (ONE A DAY) tablet Take 1 Tablet by mouth in the morning. furosemide (LASIX) 40 mg tablet Take 40 mg by mouth two (2) times a day. vitamin E, dl,tocopheryl acet, (vitamin E acetate) 400 unit capsule Take 400 Units by mouth daily. aspirin (ASPIRIN) 325 mg tablet Take 325 mg by mouth daily. No current facility-administered medications on file prior to encounter.        REVIEW OF SYSTEMS    Pertinent items are noted in HPI. Objective:     Visit Vitals  /66 (BP 1 Location: Right upper arm, BP Patient Position: At rest;Sitting)   Pulse 84   Temp 97.6 °F (36.4 °C)   Resp 20       Wt Readings from Last 3 Encounters:   08/08/22 122.5 kg (270 lb)   07/21/22 130.6 kg (288 lb)   02/21/22 126 kg (277 lb 12.8 oz)       PHYSICAL EXAM  Left and right lateral leg wounds measure smaller in area, drainage is dramatically less, there is no evidence of active infection  Pertinent items are noted in HPI. Assessment:   Significantimprovement with lymphedema pumping  Problem List Items Addressed This Visit       Cellulitis and abscess of left lower extremity    Relevant Medications    lidocaine (XYLOCAINE) 2 % viscous solution 15 mL (Completed) (Start on 1/23/2023 11:00 AM)    Other Relevant Orders    INITIATE OUTPATIENT WOUND CARE PROTOCOL    Non-pressure chronic ulcer of other part of right lower leg with 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 ([address] - Primary    Relevant Medications    lidocaine (XYLOCAINE) 2 % viscous solution 15 mL (Completed) (Start on 1/23/2023 11:00 AM)    Other Relevant Orders    INITIATE OUTPATIENT WOUND CARE PROTOCOL    Non-pressure chronic ulcer of other part of left lower leg with 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 ([address]    Relevant Medications    lidocaine (XYLOCAINE) 2 % viscous solution 15 mL (Completed) (Start on 1/23/2023 11:00 AM)    Other Relevant Orders    INITIATE OUTPATIENT WOUND CARE PROTOCOL    Ulcer of foot, chronic, right, with 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 ([address]    Relevant Medications    lidocaine (XYLOCAINE) 2 % viscous solution 15 mL (Completed) (Start on 1/23/2023 11:00 AM)    Other Relevant Orders    INITIATE OUTPATIENT WOUND CARE PROTOCOL       Procedure Note  Indications:  Based on my examination of this patient's wound(s)/ulcer(s) today, debridement is not required to promote healing and evaluate the wound base.     Wound Leg Left #1 circumferential 08/08/22 (Active)   Wound Image   01/23/23 0956   Wound Etiology Other (Comment) 01/23/23 0956   Dressing Status Clean;Dry; Intact 01/23/23 0956   Cleansed Soap and water 01/23/23 0956   Wound Length (cm) 13 cm 01/23/23 0956   Wound Width (cm) 9 cm 01/23/23 0956   Wound Depth (cm) 0.1 cm 01/23/23 0956   Wound Surface Area (cm^2) 117 cm^2 01/23/23 0956   Change in Wound Size % 79.11 01/23/23 0956   Wound Volume (cm^3) 11.7 cm^3 01/23/23 0956   Wound Healing % 79 01/23/23 0956   Post-Procedure Length (cm) 13.5 cm 01/16/23 1041   Post-Procedure Width (cm) 10.5 cm 01/16/23 1041   Post-Procedure Depth (cm) 0.1 cm 01/16/23 1041   Post-Procedure Surface Area (cm^2) 141.75 cm^2 01/16/23 1041   Post-Procedure Volume (cm^3) 14.175 cm^3 01/16/23 1041   Wound Assessment Granulation tissue;Slough 01/23/23 0956   Drainage Amount Large 01/23/23 0956   Drainage Description Serosanguinous 01/23/23 0956   Wound Odor Mild 01/23/23 0956   Dinorah-Wound/Incision Assessment Hyperpigmented; Maceration;Dry/flaky 01/23/23 0956   Edges Undefined edges 01/23/23 0956   Wound Thickness Description Full thickness 01/23/23 0956   Number of days: 168       Wound Leg Right #2 circumferential 10/31/22 (Active)   Wound Image   01/23/23 0956   Wound Etiology Other (Comment) 01/23/23 0956   Dressing Status Clean; Intact;Dry 01/23/23 0956   Cleansed Soap and water 01/23/23 0956   Wound Length (cm) 8.4 cm 01/23/23 0956   Wound Width (cm) 6 cm 01/23/23 0956   Wound Depth (cm) 0.1 cm 01/23/23 0956   Wound Surface Area (cm^2) 50.4 cm^2 01/23/23 0956   Change in Wound Size % 81.05 01/23/23 0956   Wound Volume (cm^3) 5.04 cm^3 01/23/23 0956   Wound Healing % 81 01/23/23 0956   Post-Procedure Length (cm) 9 cm 01/16/23 1041   Post-Procedure Width (cm) 10.5 cm 01/16/23 1041   Post-Procedure Depth (cm) 0.1 cm 01/16/23 1041   Post-Procedure Surface Area (cm^2) 94.5 cm^2 01/16/23 1041   Post-Procedure Volume (cm^3) 9.45 cm^3 01/16/23 1041   Wound Assessment Slough;Granulation tissue 01/23/23 0956   Drainage Amount Large 01/23/23 0956 Drainage Description Serosanguinous 01/23/23 0956   Wound Odor Mild 01/23/23 0956   Dinorah-Wound/Incision Assessment Maceration; Hyperpigmented;Dry/flaky 01/23/23 0956   Edges Undefined edges 01/23/23 0956   Wound Thickness Description Full thickness 01/23/23 0956   Number of days: 84        Plan:   Continue drawtex, absorbent pads, lymphedema pumping, leg elevation    Treatment Note please see attached Discharge Instructions    Written patient dismissal instructions given to patient or POA.          Electronically signed by Stephanie Alonso MD on 1/23/2023 at 10:17 AM

## 2023-01-23 NOTE — DISCHARGE INSTRUCTIONS
Discharge Instructions for  [address] [place] [address], [address] [place] [address]  Telephone: [phone]    NAME:  Vi Valderrama. YOB: 1945  MEDICAL RECORD NUMBER:  [id number]  DATE:  1/23/2023      Return Appointment:  [] Dressing supply provider:   [x] Home Healthcare: Robb [facility]   [x] Return Appointment: 1 Week(s)  [] Nurse Visit: Beck Guerrero    [] Discharge from [facility]  [] Ordered tests:   [x] Referrals: Please see a podiatrist for toe nails  [x] Rx: measured and orderd Juxtalite stockings BLE  [x] Other: Use Lymphedema pump twice a day x 1 hour each    Wound Cleansing:   Do not scrub or use excessive force. Cleanse wound prior to applying a clean dressing with:  [] Normal Saline   [x] Mild Soap & Water/Baby Shampoo   [] Keep Wound Dry in Shower  [] Other:      Topical Treatments:  Do not apply lotions, creams, or ointments to wound bed unless directed. [x] Apply moisturizing lotion to skin surrounding the wound prior to dressing change.  [] Apply antifungal ointment to skin surrounding the wound prior to dressing change.  [] Apply thin film of moisture barrier/zinc ointment to skin immediately around wound.   [] Other:       Dressings:           Wound Location L & R leg   [x] Apply Primary Dressing:       [] MediHoney Gel    [] MediHoney Alginate               [] Calcium Alginate      [] Calcium Alginate with Silver   [] Collagen                   [] Collagen with Silver                [] Santyl with Moistened saline gauze              [] Hydrofera Blue (cut to size and moistened)  [] Hydrofera Blue Ready (Cut to size)   [] Vashe wet to dry    [] Betadine wet to dry              [] Hydrogel                [] Mepitel     [] Mepilex Transfer                [x] Other: Drawtex    [x] Cover and Secure with:     [x] Gauze [] Rannie Chu [] Kerlix   [] Foam [] Super Absorbant [x] ABD     [] ACE Wrap            [] Other:    Limit contact of tape with skin. [x] Change dressing: [] Daily    [] Every Other Day [] Once per week   [] Twice per week   [x] Three times per week every week  [] Do Not Change Dressing   [] Other:     Edema Control:  Apply: [] Compression Stocking:  mmHg  []Right Leg []Left Leg   [] Tubigrip []Right Leg Double Layer []Left Leg Double Layer      []Right Leg Single Layer []Left Leg Single Layer     [] Elevate leg(s) above the level of the heart when sitting. [] Avoid prolonged standing in one place. Compression:  Apply: [x] Multilayer Compression Wrap: [x]RightLeg [x]Left Leg                                 []Coflex   []Coflex Lite             [x]Unna Lite     [x] Do not get leg(s) with wrap wet. If wraps become too tight call the center or completely remove the wrap. [x] Elevate leg(s) above the level of the heart when sitting. [x] Avoid prolonged standing in one place. Dietary:  [x] Diet as tolerated: [] Calorie Diabetic Diet: [x] No Added Salt:  [x] Increase Protein: [] Other:     Activity:  [x] Activity as tolerated:    [] Patient has no activity restrictions       [] Strict Bedrest:   [] Remain off Work:       [] May return to full duty work:                                     [] Return to work with restrictions:               [address] Information: Should you experience any significant changes in your wound(s) or have questions about your wound care, please contact Errol Barry at [place] [address] 8:00 am - 4:30. If you need help with your wound outside of these hours and cannot wait until we are again available, contact your PCP or go to the hospital emergency room. PLEASE NOTE: IF YOU ARE UNABLE TO OBTAIN WOUND SUPPLIES, CONTINUE TO USE THE SUPPLIES YOU HAVE AVAILABLE UNTIL YOU ARE ABLE TO REACH US. IT IS MOST IMPORTANT TO KEEP THE WOUND COVERED AT ALL TIMES.     [x] Dr. Tae Johns   [] Dr. Nilesh Mcgarry  [] Dr. Naseem Arvizu

## 2023-01-23 NOTE — WOUND CARE
[employer] Application   Below Knee    NAME:  Vi Valderrama. YOB: 1945  MEDICAL RECORD NUMBER:  [id number]  DATE:  1/23/2023    Remove old [employer] or Boots. Applied moisturizing agent to dry skin as needed. Appied primary and secondary dressing as ordered. Applied Unna roll from toes to knee overlapping each time. Applied ace wrap or coban from toes to below the knee. Secured with tape and/or metal clips covered with tape. Instructed patient/caregiver to keep dressing dry and intact. DO NOT REMOVE DRESSING. Instructed pt/family/caregiver to report excessive draining, loose bandage, wet dressing, severe pain or tingling in toes. Applied [employer] dressing below the knee to bilateral lower legs. Unna Boot(s) were applied per  Guidelines.     Response to treatment: Well tolerated by patient      Electronically signed by Anthony Levy RN on 1/23/2023 at 10:37 AM

## 2023-01-23 NOTE — WOUND CARE
Compression [address] f: [phone] f: [phone] [email or web address]     Ordering Center:     [address] [place] [address] OP WOUND CARE  [address] [facility] [address]  [phone]  [address] [phone]  FAX NUMBER: [phone]    Patient Information:      Camron FERRIS [address] [facility] [address]   [unfilled]   : 1945  AGE: 65 y.o. GENDER: male   TODAYS DATE:  2023    Insurance:      PRIMARY INSURANCE:  [id number] - (Medicare)    Payer/Plan Subscr  Sex Relation Sub. Ins. ID Effective Group Num   1.  [id number] * 1945 Male Self [id number]                                   [address] [address]       Patient Wound Information:      Problem List Items Addressed This Visit          Other    Cellulitis and abscess of left lower extremity    Relevant Medications    lidocaine (XYLOCAINE) 2 % viscous solution 15 mL (Completed) (Start on 2023 11:00 AM)    Other Relevant Orders    INITIATE OUTPATIENT WOUND CARE PROTOCOL    Non-pressure chronic ulcer of other part of right lower leg with 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 ([address] - Primary    Relevant Medications    lidocaine (XYLOCAINE) 2 % viscous solution 15 mL (Completed) (Start on 2023 11:00 AM)    Other Relevant Orders    INITIATE OUTPATIENT WOUND CARE PROTOCOL    Non-pressure chronic ulcer of other part of left lower leg with 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 ([address]    Relevant Medications    lidocaine (XYLOCAINE) 2 % viscous solution 15 mL (Completed) (Start on 2023 11:00 AM)    Other Relevant Orders    INITIATE OUTPATIENT WOUND CARE PROTOCOL    Ulcer of foot, chronic, right, with 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 ([address]    Relevant Medications    lidocaine (XYLOCAINE) 2 % viscous solution 15 mL (Completed) (Start on 2023 11:00 AM)    Other Relevant Orders    INITIATE OUTPATIENT WOUND CARE PROTOCOL WOUNDS REQUIRING DRESSING SUPPLIES:     Wound Leg Left #1 circumferential 08/08/22 (Active)   Wound Image   01/23/23 0956   Wound Etiology Other (Comment) 01/23/23 0956   Dressing Status Clean;Dry; Intact 01/23/23 0956   Cleansed Soap and water 01/23/23 0956   Wound Length (cm) 13 cm 01/23/23 0956   Wound Width (cm) 9 cm 01/23/23 0956   Wound Depth (cm) 0.1 cm 01/23/23 0956   Wound Surface Area (cm^2) 117 cm^2 01/23/23 0956   Change in Wound Size % 79.11 01/23/23 0956   Wound Volume (cm^3) 11.7 cm^3 01/23/23 0956   Wound Healing % 79 01/23/23 0956   Post-Procedure Length (cm) 13.5 cm 01/16/23 1041   Post-Procedure Width (cm) 10.5 cm 01/16/23 1041   Post-Procedure Depth (cm) 0.1 cm 01/16/23 1041   Post-Procedure Surface Area (cm^2) 141.75 cm^2 01/16/23 1041   Post-Procedure Volume (cm^3) 14.175 cm^3 01/16/23 1041   Wound Assessment Granulation tissue;Slough 01/23/23 0956   Drainage Amount Large 01/23/23 0956   Drainage Description Serosanguinous 01/23/23 0956   Wound Odor Mild 01/23/23 0956   Dinorah-Wound/Incision Assessment Hyperpigmented; Maceration;Dry/flaky 01/23/23 0956   Edges Undefined edges 01/23/23 0956   Wound Thickness Description Full thickness 01/23/23 0956   Number of days: 168       Wound Leg Right #2 circumferential 10/31/22 (Active)   Wound Image   01/23/23 0956   Wound Etiology Other (Comment) 01/23/23 0956   Dressing Status Clean; Intact;Dry 01/23/23 0956   Cleansed Soap and water 01/23/23 0956   Wound Length (cm) 8.4 cm 01/23/23 0956   Wound Width (cm) 6 cm 01/23/23 0956   Wound Depth (cm) 0.1 cm 01/23/23 0956   Wound Surface Area (cm^2) 50.4 cm^2 01/23/23 0956   Change in Wound Size % 81.05 01/23/23 0956   Wound Volume (cm^3) 5.04 cm^3 01/23/23 0956   Wound Healing % 81 01/23/23 0956   Post-Procedure Length (cm) 9 cm 01/16/23 1041   Post-Procedure Width (cm) 10.5 cm 01/16/23 1041   Post-Procedure Depth (cm) 0.1 cm 01/16/23 1041   Post-Procedure Surface Area (cm^2) 94.5 cm^2 01/16/23 1041 Post-Procedure Volume (cm^3) 9.45 cm^3 01/16/23 1041   Wound Assessment Slough;Granulation tissue 01/23/23 0956   Drainage Amount Large 01/23/23 0956   Drainage Description Serosanguinous 01/23/23 0956   Wound Odor Mild 01/23/23 0956   Dinorah-Wound/Incision Assessment Maceration; Hyperpigmented;Dry/flaky 01/23/23 0956   Edges Undefined edges 01/23/23 0956   Wound Thickness Description Full thickness 01/23/23 0956   Number of days: 84        Right Leg Measurements: (ALL measurements are in cm)  Calf: 53cm  Ankle: 36cm  From knee to bottom of foot: 50cm       Left Leg Measurements: (ALL measurements are in cm)  Calf: 52cm  Ankle:36cm  From knee to bottom of foot: 51cm        Supplies Requested :     Medicare Requirements  Patient must have a qualifying Active Venus Ulcer if ordering Bilateral Compression Wounds MUST be present on both legs for Medicare Coverage. The patient can Not be on home health or have had a Medicare part A stay in the past 24 hours.     Patient Wound(s) Debrided: [x] Yes if yes please add date 1/16/2023   [] No    Debribement Type: Excisional/Sharp    Patient currently being seen by Home Health: [] Yes   [x] No     Compression Type: Circaid Juxtalite, Original, 30-40 mm/Hg, BILATERAL lower legs     Provider Information:      PROVIDER'S NAME: Dr. Kaley Knutson

## 2023-01-30 ENCOUNTER — HOSPITAL ENCOUNTER (OUTPATIENT)
Dept: WOUND CARE | Age: 78
Discharge: HOME | End: 2023-01-30
Payer: MEDICARE

## 2023-01-30 VITALS
HEART RATE: 84 BPM | DIASTOLIC BLOOD PRESSURE: 80 MMHG | TEMPERATURE: 96.8 F | RESPIRATION RATE: 20 BRPM | SYSTOLIC BLOOD PRESSURE: 126 MMHG

## 2023-01-30 DIAGNOSIS — L97.512 ULCER OF FOOT, CHRONIC, RIGHT, WITH FAT LAYER EXPOSED (HCC): Primary | ICD-10-CM

## 2023-01-30 DIAGNOSIS — L97.812 NON-PRESSURE CHRONIC ULCER OF OTHER PART OF RIGHT LOWER LEG WITH FAT LAYER EXPOSED (HCC): ICD-10-CM

## 2023-01-30 DIAGNOSIS — L02.416 CELLULITIS AND ABSCESS OF LEFT LOWER EXTREMITY: ICD-10-CM

## 2023-01-30 DIAGNOSIS — L03.116 CELLULITIS AND ABSCESS OF LEFT LOWER EXTREMITY: ICD-10-CM

## 2023-01-30 DIAGNOSIS — L97.822 NON-PRESSURE CHRONIC ULCER OF OTHER PART OF LEFT LOWER LEG WITH FAT LAYER EXPOSED (HCC): ICD-10-CM

## 2023-01-30 PROCEDURE — 11045 DBRDMT SUBQ TISS EACH ADDL: CPT | Performed by: SPECIALIST

## 2023-01-30 PROCEDURE — 11045 DBRDMT SUBQ TISS EACH ADDL: CPT

## 2023-01-30 PROCEDURE — 11042 DBRDMT SUBQ TIS 1ST 20SQCM/<: CPT

## 2023-01-30 PROCEDURE — 74011000250 HC RX REV CODE- 250: Performed by: SPECIALIST

## 2023-01-30 RX ORDER — LIDOCAINE HYDROCHLORIDE 20 MG/ML
15 SOLUTION OROPHARYNGEAL ONCE
OUTPATIENT
Start: 2023-01-30 | End: 2023-01-30

## 2023-01-30 RX ORDER — LIDOCAINE HYDROCHLORIDE 20 MG/ML
15 SOLUTION OROPHARYNGEAL ONCE
Status: COMPLETED | OUTPATIENT
Start: 2023-01-30 | End: 2023-01-30

## 2023-01-30 RX ORDER — MUPIROCIN 20 MG/G
OINTMENT TOPICAL ONCE
OUTPATIENT
Start: 2023-01-30 | End: 2023-01-30

## 2023-01-30 RX ORDER — SILVER SULFADIAZINE 10 G/1000G
CREAM TOPICAL ONCE
OUTPATIENT
Start: 2023-01-30 | End: 2023-01-30

## 2023-01-30 RX ADMIN — LIDOCAINE HYDROCHLORIDE 15 ML: 20 SOLUTION ORAL; TOPICAL at 13:30

## 2023-01-30 NOTE — WOUND CARE
[address]   Progress Note and Procedure Note     Miguelina Gorman. MEDICAL RECORD NUMBER:  [id number]  AGE: 65 y.o. RACE BLACK/  GENDER: male  : 1945  EPISODE DATE:  2023    Subjective:   No new problems reported  Chief Complaint   Patient presents with    Wound Check     R & L leg         HISTORY of PRESENT ILLNESS HPI    Miguelina Mack is a 65 y.o. male who presents today for wound/ulcer evaluation. History of Wound Context: BLE  Wound/Ulcer Pain Timing/Severity: none  Quality of pain: aching  Severity:  1 / 10   Modifying Factors: None  Associated Signs/Symptoms: edema    Ulcer Identification:  Ulcer Type: venous    Contributing Factors: lymphedema    Wound: BLE         PAST MEDICAL HISTORY    Past Medical History:   Diagnosis Date    Dyslipidemia     HTN (hypertension)         PAST SURGICAL HISTORY    Past Surgical History:   Procedure Laterality Date    HX CHOLECYSTECTOMY         FAMILY HISTORY    Family History   Problem Relation Age of Onset    Diabetes Mother     Diabetes Brother     Diabetes Son        SOCIAL HISTORY    Social History     Tobacco Use    Smoking status: Never    Smokeless tobacco: Never   Vaping Use    Vaping Use: Never used   Substance Use Topics    Alcohol use: Never    Drug use: Never       ALLERGIES    No Known Allergies    MEDICATIONS    Current Outpatient Medications on File Prior to Encounter   Medication Sig Dispense Refill    multivitamin (ONE A DAY) tablet Take 1 Tablet by mouth in the morning. furosemide (LASIX) 40 mg tablet Take 40 mg by mouth two (2) times a day. vitamin E, dl,tocopheryl acet, (vitamin E acetate) 400 unit capsule Take 400 Units by mouth daily. aspirin (ASPIRIN) 325 mg tablet Take 325 mg by mouth daily. No current facility-administered medications on file prior to encounter. REVIEW OF SYSTEMS    Pertinent items are noted in HPI.     Objective:     Visit Vitals  BP 126/80 (BP 1 Location: Right upper arm, BP Patient Position: At rest;Sitting)   Pulse 84   Temp 96.8 °F (36 °C)   Resp 20       Wt Readings from Last 3 Encounters:   08/08/22 122.5 kg (270 lb)   07/21/22 130.6 kg (288 lb)   02/21/22 126 kg (277 lb 12.8 oz)       PHYSICAL EXAM  The right leg wound measures smaller, slough debrided, no evidence of infection  Left leg wound measures slightly larger, slough debrided, no evidence of active infection    Pertinent items are noted in HPI. Assessment:   Right improving, left slightly larger  Problem List Items Addressed This Visit       Cellulitis and abscess of left lower extremity    Relevant Medications    lidocaine (XYLOCAINE) 2 % viscous solution 15 mL (Completed) (Start on 1/30/2023  2:00 PM)    Other Relevant Orders    INITIATE OUTPATIENT WOUND CARE PROTOCOL    Non-pressure chronic ulcer of other part of right lower leg with 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 ([address]    Relevant Medications    lidocaine (XYLOCAINE) 2 % viscous solution 15 mL (Completed) (Start on 1/30/2023  2:00 PM)    Other Relevant Orders    INITIATE OUTPATIENT WOUND CARE PROTOCOL    Non-pressure chronic ulcer of other part of left lower leg with 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 ([address]    Relevant Medications    lidocaine (XYLOCAINE) 2 % viscous solution 15 mL (Completed) (Start on 1/30/2023  2:00 PM)    Other Relevant Orders    INITIATE OUTPATIENT WOUND CARE PROTOCOL    Ulcer of foot, chronic, right, with 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 ([address] - Primary    Relevant Medications    lidocaine (XYLOCAINE) 2 % viscous solution 15 mL (Completed) (Start on 1/30/2023  2:00 PM)    Other Relevant Orders    INITIATE OUTPATIENT WOUND CARE PROTOCOL       Wound Leg Left #1 circumferential 08/08/22 (Active)   Wound Image   01/30/23 1325   Wound Etiology Other (Comment) 01/30/23 1325   Dressing Status Clean;Dry; Intact 01/30/23 1325   Cleansed Soap and water 01/30/23 1325   Wound Length (cm) 14 cm 01/30/23 1325   Wound Width (cm) 18 cm 01/30/23 1325   Wound Depth (cm) 0.1 cm 01/30/23 1325   Wound Surface Area (cm^2) 252 cm^2 01/30/23 1325   Change in Wound Size % 55 01/30/23 1325   Wound Volume (cm^3) 25.2 cm^3 01/30/23 1325   Wound Healing % 55 01/30/23 1325   Post-Procedure Length (cm) 14 cm 01/30/23 1343   Post-Procedure Width (cm) 18 cm 01/30/23 1343   Post-Procedure Depth (cm) 0.1 cm 01/30/23 1343   Post-Procedure Surface Area (cm^2) 252 cm^2 01/30/23 1343   Post-Procedure Volume (cm^3) 25.2 cm^3 01/30/23 1343   Wound Assessment Granulation tissue;Slough 01/30/23 1325   Drainage Amount Large 01/30/23 1325   Drainage Description Serosanguinous 01/30/23 1325   Wound Odor Mild 01/30/23 1325   Dinorah-Wound/Incision Assessment Hyperpigmented; Maceration;Dry/flaky 01/30/23 1325   Edges Undefined edges 01/30/23 1325   Wound Thickness Description Full thickness 01/30/23 1325   Number of days: 175       Wound Leg Right #2 circumferential 10/31/22 (Active)   Wound Image   01/30/23 1325   Wound Etiology Other (Comment) 01/30/23 1325   Dressing Status Clean; Intact;Dry 01/30/23 1325   Cleansed Soap and water 01/30/23 1325   Wound Length (cm) 7 cm 01/30/23 1325   Wound Width (cm) 7 cm 01/30/23 1325   Wound Depth (cm) 0.1 cm 01/30/23 1325   Wound Surface Area (cm^2) 49 cm^2 01/30/23 1325   Change in Wound Size % 81.58 01/30/23 1325   Wound Volume (cm^3) 4.9 cm^3 01/30/23 1325   Wound Healing % 82 01/30/23 1325   Post-Procedure Length (cm) 7 cm 01/30/23 1343   Post-Procedure Width (cm) 7 cm 01/30/23 1343   Post-Procedure Depth (cm) 0.1 cm 01/30/23 1343   Post-Procedure Surface Area (cm^2) 49 cm^2 01/30/23 1343   Post-Procedure Volume (cm^3) 4.9 cm^3 01/30/23 1343   Wound Assessment Slough;Granulation tissue 01/30/23 1325   Drainage Amount Moderate 01/30/23 1325   Drainage Description Serosanguinous 01/30/23 1325   Wound Odor Mild 01/30/23 1325   Dinorah-Wound/Incision Assessment Maceration; Hyperpigmented;Dry/flaky 01/30/23 1325   Edges Undefined edges 01/30/23 1325   Wound Thickness Description Full thickness 01/30/23 1325   Number of days: 91       POP IN PROCEDURE TYPE (DEBRIDEMENT, BIOPSY, I&D, SKIN SUB, CHEMICAL CAUERTY)     Plan:   Continue drawtex, unna boots    Treatment Note please see attached Discharge Instructions    Written patient dismissal instructions given to patient or POA. Electronically signed by April Vuong MD on 1/30/2023 at 1:59 PM               Debridement Wound Care        Problem List Items Addressed This Visit       Cellulitis and abscess of left lower extremity    Relevant Medications    lidocaine (XYLOCAINE) 2 % viscous solution 15 mL (Completed) (Start on 1/30/2023  2:00 PM)    Other Relevant Orders    INITIATE OUTPATIENT WOUND CARE PROTOCOL    Non-pressure chronic ulcer of other part of right lower leg with 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 ([address]    Relevant Medications    lidocaine (XYLOCAINE) 2 % viscous solution 15 mL (Completed) (Start on 1/30/2023  2:00 PM)    Other Relevant Orders    INITIATE OUTPATIENT WOUND CARE PROTOCOL    Non-pressure chronic ulcer of other part of left lower leg with 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 ([address]    Relevant Medications    lidocaine (XYLOCAINE) 2 % viscous solution 15 mL (Completed) (Start on 1/30/2023  2:00 PM)    Other Relevant Orders    INITIATE OUTPATIENT WOUND CARE PROTOCOL    Ulcer of foot, chronic, right, with 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 ([address] - Primary    Relevant Medications    lidocaine (XYLOCAINE) 2 % viscous solution 15 mL (Completed) (Start on 1/30/2023  2:00 PM)    Other Relevant Orders    INITIATE OUTPATIENT WOUND CARE PROTOCOL       Procedure Note  Indications:  Based on my examination of this patient's wound(s)/ulcer(s) today, debridement is required to promote healing and evaluate the wound base.     Performed by: April Vuong MD    Consent obtained: Yes    Time out taken: Yes    Debridement: Excisional    Using curette the wound(s)/ulcer(s) was/were sharply debrided down through and including the removal of    subcutaneous tissue    Devitalized Tissue Debrided: slough    Pre Debridement Measurements:  Are located in the Wound/Ulcer Documentation Flow Sheet    Non-Pressure ulcer, 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    Wound/Ulcer #: 1 and 2    Post Debridement Measurements:  Wound/Ulcer Descriptions are Pre Debridement except measurements:    Wound Leg Left #1 circumferential 08/08/22 (Active)   Wound Image   01/30/23 1325   Wound Etiology Other (Comment) 01/30/23 1325   Dressing Status Clean;Dry; Intact 01/30/23 1325   Cleansed Soap and water 01/30/23 1325   Wound Length (cm) 14 cm 01/30/23 1325   Wound Width (cm) 18 cm 01/30/23 1325   Wound Depth (cm) 0.1 cm 01/30/23 1325   Wound Surface Area (cm^2) 252 cm^2 01/30/23 1325   Change in Wound Size % 55 01/30/23 1325   Wound Volume (cm^3) 25.2 cm^3 01/30/23 1325   Wound Healing % 55 01/30/23 1325   Post-Procedure Length (cm) 14 cm 01/30/23 1343   Post-Procedure Width (cm) 18 cm 01/30/23 1343   Post-Procedure Depth (cm) 0.1 cm 01/30/23 1343   Post-Procedure Surface Area (cm^2) 252 cm^2 01/30/23 1343   Post-Procedure Volume (cm^3) 25.2 cm^3 01/30/23 1343   Wound Assessment Granulation tissue;Slough 01/30/23 1325   Drainage Amount Large 01/30/23 1325   Drainage Description Serosanguinous 01/30/23 1325   Wound Odor Mild 01/30/23 1325   Dinorah-Wound/Incision Assessment Hyperpigmented; Maceration;Dry/flaky 01/30/23 1325   Edges Undefined edges 01/30/23 1325   Wound Thickness Description Full thickness 01/30/23 1325   Number of days: 175       Wound Leg Right #2 circumferential 10/31/22 (Active)   Wound Image   01/30/23 1325   Wound Etiology Other (Comment) 01/30/23 1325   Dressing Status Clean; Intact;Dry 01/30/23 1325   Cleansed Soap and water 01/30/23 1325   Wound Length (cm) 7 cm 01/30/23 1325   Wound Width (cm) 7 cm 01/30/23 1325   Wound Depth (cm) 0.1 cm 01/30/23 1325   Wound Surface Area (cm^2) 49 cm^2 01/30/23 1325   Change in Wound Size % 81.58 01/30/23 1325   Wound Volume (cm^3) 4.9 cm^3 01/30/23 1325 Wound Healing % 82 01/30/23 1325   Post-Procedure Length (cm) 7 cm 01/30/23 1343   Post-Procedure Width (cm) 7 cm 01/30/23 1343   Post-Procedure Depth (cm) 0.1 cm 01/30/23 1343   Post-Procedure Surface Area (cm^2) 49 cm^2 01/30/23 1343   Post-Procedure Volume (cm^3) 4.9 cm^3 01/30/23 1343   Wound Assessment Slough;Granulation tissue 01/30/23 1325   Drainage Amount Moderate 01/30/23 1325   Drainage Description Serosanguinous 01/30/23 1325   Wound Odor Mild 01/30/23 1325   Dinorah-Wound/Incision Assessment Maceration; Hyperpigmented;Dry/flaky 01/30/23 1325   Edges Undefined edges 01/30/23 1325   Wound Thickness Description Full thickness 01/30/23 1325   Number of days: 91        Total Surface Area Debrided:  301 sq cm     Estimated Blood Loss:  None    Hemostasis Achieved: Pressure    Procedural Pain: 2 / 10     Post Procedural Pain: 1 / 10     Response to treatment: Well tolerated by patient

## 2023-01-30 NOTE — WOUND CARE
Discharge Instructions for  [facility], [facility]  Telephone: [phone]    NAME:  Glo Matos. YOB: 1945  MEDICAL RECORD NUMBER:  [id number]  DATE:  1/30/2023      Return Appointment:  [] Dressing supply provider:   [x] Home Healthcare: [facility]   [x] Return Appointment: 1 Week(s)  [] Nurse Visit: Week(s)    [] Discharge from [facility]  [] Ordered tests:   [x] Referrals: Please see a podiatrist for toe nails  [] Rx:   [x] Other: Use Lymphedema pump twice a day x 1 hour each    Wound Cleansing:   Do not scrub or use excessive force. Cleanse wound prior to applying a clean dressing with:  [] Normal Saline   [x] Mild Soap & Water/Baby Shampoo   [] Keep Wound Dry in Shower  [] Other:      Topical Treatments:  Do not apply lotions, creams, or ointments to wound bed unless directed. [x] Apply moisturizing lotion to skin surrounding the wound prior to dressing change.  [] Apply antifungal ointment to skin surrounding the wound prior to dressing change.  [] Apply thin film of moisture barrier/zinc ointment to skin immediately around wound. [] Other:       Dressings:           Wound Location L & R leg   [x] Apply Primary Dressing:       [] MediHoney Gel    [] MediHoney Alginate               [] Calcium Alginate      [] Calcium Alginate with Silver   [] Collagen                   [] Collagen with Silver                [] Santyl with Moistened saline gauze              [] Hydrofera Blue (cut to size and moistened)  [] Hydrofera Blue Ready (Cut to size)   [] Vashe wet to dry    [] Betadine wet to dry              [] Hydrogel                [] Mepitel     [] Mepilex Transfer                [x] Other: Drawtex    [x] Cover and Secure with:     [x] Gauze [] Estela Tino [] Kerlix   [] Foam [] Super Absorbant [x] ABD     [] ACE Wrap            [] Other:    Limit contact of tape with skin.     [x] Change dressing: [] Daily [] Every Other Day [] Once per week   [] Twice per week   [x] Three times per week every week  [] Do Not Change Dressing   [] Other:     Edema Control:  Apply: [] Compression Stocking:  mmHg  []Right Leg []Left Leg   [] Tubigrip []Right Leg Double Layer []Left Leg Double Layer      []Right Leg Single Layer []Left Leg Single Layer     [] Elevate leg(s) above the level of the heart when sitting. [] Avoid prolonged standing in one place. Compression:  Apply: [x] Multilayer Compression Wrap: [x]RightLeg [x]Left Leg                                 []Coflex   []Coflex Lite             [x]Unna Lite     [x] Do not get leg(s) with wrap wet. If wraps become too tight call the center or completely remove the wrap. [x] Elevate leg(s) above the level of the heart when sitting. [x] Avoid prolonged standing in one place. Dietary:  [x] Diet as tolerated: [] Calorie Diabetic Diet: [x] No Added Salt:  [x] Increase Protein: [] Other:     Activity:  [x] Activity as tolerated:    [] Patient has no activity restrictions       [] Strict Bedrest:   [] Remain off Work:       [] May return to full duty work:                                     [] Return to work with restrictions:               [address] Information: Should you experience any significant changes in your wound(s) or have questions about your wound care, please contact Errol Barry at [place] [address] 8:00 am - 4:30. If you need help with your wound outside of these hours and cannot wait until we are again available, contact your PCP or go to the hospital emergency room. PLEASE NOTE: IF YOU ARE UNABLE TO OBTAIN WOUND SUPPLIES, CONTINUE TO USE THE SUPPLIES YOU HAVE AVAILABLE UNTIL YOU ARE ABLE TO REACH US. IT IS MOST IMPORTANT TO KEEP THE WOUND COVERED AT ALL TIMES.     [x] Dr. Barbara Sinha   [] Dr. Helen Mayberry  [] Dr. Sienna Gomez

## 2023-01-30 NOTE — WOUND CARE
[employer] Application   Below Knee    NAME:  Nathan Brasher. YOB: 1945  MEDICAL RECORD NUMBER:  [id number]  DATE:  1/30/2023    Remove old [employer] or Boots. Appied primary and secondary dressing as ordered. Applied Unna roll from toes to knee overlapping each time. Applied ace wrap or coban from toes to below the knee. Secured with tape and/or metal clips covered with tape. Instructed patient/caregiver to keep dressing dry and intact. DO NOT REMOVE DRESSING. Instructed pt/family/caregiver to report excessive draining, loose bandage, wet dressing, severe pain or tingling in toes. Applied [employer] dressing below the knee to bilateral lower legs. Unna Boot(s) were applied per  Guidelines.     Response to treatment: Well tolerated by patient      Electronically signed by Alina Ludwig RN on 1/30/2023 at 1:51 PM

## 2023-01-30 NOTE — DISCHARGE INSTRUCTIONS
Discharge Instructions for  [address] [place] [address], [address] [place] [address]  Telephone: [phone]    NAME:  Kendell Acharya. YOB: 1945  MEDICAL RECORD NUMBER:  [id number]  DATE:  1/30/2023      Return Appointment:  [] Dressing supply provider:   [x] Home Healthcare: [facility]   [x] Return Appointment: 1 Week(s)  [] Nurse Visit: Week(s)    [] Discharge from [facility]  [] Ordered tests:   [x] Referrals: Please see a podiatrist for toe nails  [] Rx:   [x] Other: Use Lymphedema pump twice a day x 1 hour each    Wound Cleansing:   Do not scrub or use excessive force. Cleanse wound prior to applying a clean dressing with:  [] Normal Saline   [x] Mild Soap & Water/Baby Shampoo   [] Keep Wound Dry in Shower  [] Other:      Topical Treatments:  Do not apply lotions, creams, or ointments to wound bed unless directed. [x] Apply moisturizing lotion to skin surrounding the wound prior to dressing change.  [] Apply antifungal ointment to skin surrounding the wound prior to dressing change.  [] Apply thin film of moisture barrier/zinc ointment to skin immediately around wound. [] Other:       Dressings:           Wound Location L & R leg   [x] Apply Primary Dressing:       [] MediHoney Gel    [] MediHoney Alginate               [] Calcium Alginate      [] Calcium Alginate with Silver   [] Collagen                   [] Collagen with Silver                [] Santyl with Moistened saline gauze              [] Hydrofera Blue (cut to size and moistened)  [] Hydrofera Blue Ready (Cut to size)   [] Vashe wet to dry    [] Betadine wet to dry              [] Hydrogel                [] Mepitel     [] Mepilex Transfer                [x] Other: Drawtex    [x] Cover and Secure with:     [x] Gauze [] Jan Nieves [] Kerlix   [] Foam [] Super Absorbant [x] ABD     [] ACE Wrap            [] Other:    Limit contact of tape with skin.     [x] Change dressing: [] Daily [] Every Other Day [] Once per week   [] Twice per week   [x] Three times per week every week  [] Do Not Change Dressing   [] Other:     Edema Control:  Apply: [] Compression Stocking:  mmHg  []Right Leg []Left Leg   [] Tubigrip []Right Leg Double Layer []Left Leg Double Layer      []Right Leg Single Layer []Left Leg Single Layer     [] Elevate leg(s) above the level of the heart when sitting. [] Avoid prolonged standing in one place. Compression:  Apply: [x] Multilayer Compression Wrap: [x]RightLeg [x]Left Leg                                 []Coflex   []Coflex Lite             [x]Unna Lite     [x] Do not get leg(s) with wrap wet. If wraps become too tight call the center or completely remove the wrap. [x] Elevate leg(s) above the level of the heart when sitting. [x] Avoid prolonged standing in one place. Dietary:  [x] Diet as tolerated: [] Calorie Diabetic Diet: [x] No Added Salt:  [x] Increase Protein: [] Other:     Activity:  [x] Activity as tolerated:    [] Patient has no activity restrictions       [] Strict Bedrest:   [] Remain off Work:       [] May return to full duty work:                                     [] Return to work with restrictions:               [address] Information: Should you experience any significant changes in your wound(s) or have questions about your wound care, please contact Errol Barry at [place] [address] 8:00 am - 4:30. If you need help with your wound outside of these hours and cannot wait until we are again available, contact your PCP or go to the hospital emergency room. PLEASE NOTE: IF YOU ARE UNABLE TO OBTAIN WOUND SUPPLIES, CONTINUE TO USE THE SUPPLIES YOU HAVE AVAILABLE UNTIL YOU ARE ABLE TO REACH US. IT IS MOST IMPORTANT TO KEEP THE WOUND COVERED AT ALL TIMES.     [x] Dr. Allison Roberson   [] Dr. Arleen De La Cruz  [] Dr. Anthony Fernandez

## 2023-02-06 ENCOUNTER — HOSPITAL ENCOUNTER (OUTPATIENT)
Dept: WOUND CARE | Age: 78
Discharge: HOME | End: 2023-02-06
Payer: MEDICARE

## 2023-02-06 VITALS
RESPIRATION RATE: 20 BRPM | HEART RATE: 91 BPM | TEMPERATURE: 98.5 F | SYSTOLIC BLOOD PRESSURE: 128 MMHG | DIASTOLIC BLOOD PRESSURE: 74 MMHG

## 2023-02-06 DIAGNOSIS — L97.812 NON-PRESSURE CHRONIC ULCER OF OTHER PART OF RIGHT LOWER LEG WITH FAT LAYER EXPOSED (HCC): Primary | ICD-10-CM

## 2023-02-06 DIAGNOSIS — L03.116 CELLULITIS AND ABSCESS OF LEFT LOWER EXTREMITY: ICD-10-CM

## 2023-02-06 DIAGNOSIS — L02.416 CELLULITIS AND ABSCESS OF LEFT LOWER EXTREMITY: ICD-10-CM

## 2023-02-06 DIAGNOSIS — L97.512 ULCER OF FOOT, CHRONIC, RIGHT, WITH FAT LAYER EXPOSED (HCC): ICD-10-CM

## 2023-02-06 DIAGNOSIS — L97.822 NON-PRESSURE CHRONIC ULCER OF OTHER PART OF LEFT LOWER LEG WITH FAT LAYER EXPOSED (HCC): ICD-10-CM

## 2023-02-06 PROCEDURE — 11042 DBRDMT SUBQ TIS 1ST 20SQCM/<: CPT

## 2023-02-06 PROCEDURE — 11045 DBRDMT SUBQ TISS EACH ADDL: CPT

## 2023-02-06 PROCEDURE — 11045 DBRDMT SUBQ TISS EACH ADDL: CPT | Performed by: SPECIALIST

## 2023-02-06 PROCEDURE — 74011000250 HC RX REV CODE- 250: Performed by: SPECIALIST

## 2023-02-06 RX ORDER — LIDOCAINE HYDROCHLORIDE 20 MG/ML
15 SOLUTION OROPHARYNGEAL ONCE
OUTPATIENT
Start: 2023-02-06 | End: 2023-02-06

## 2023-02-06 RX ORDER — SILVER SULFADIAZINE 10 G/1000G
CREAM TOPICAL ONCE
OUTPATIENT
Start: 2023-02-06 | End: 2023-02-06

## 2023-02-06 RX ORDER — MUPIROCIN 20 MG/G
OINTMENT TOPICAL ONCE
OUTPATIENT
Start: 2023-02-06 | End: 2023-02-06

## 2023-02-06 RX ORDER — LIDOCAINE HYDROCHLORIDE 20 MG/ML
15 SOLUTION OROPHARYNGEAL ONCE
Status: COMPLETED | OUTPATIENT
Start: 2023-02-06 | End: 2023-02-06

## 2023-02-06 RX ADMIN — LIDOCAINE HYDROCHLORIDE 15 ML: 20 SOLUTION ORAL; TOPICAL at 11:30

## 2023-02-06 NOTE — WOUND CARE
Discharge Instructions for  [address] [place] [address], [address] [place] [address]  Telephone: [phone]    NAME:  Radha Rosas. YOB: 1945  MEDICAL RECORD NUMBER:  [id number]  DATE:  2/6/2023      Return Appointment:  [] Dressing supply provider:   [x] Home Healthcare: [facility]   [x] Return Appointment: 1 Week(s)  [] Nurse Visit: Week(s)    [] Discharge from [facility]  [] Ordered tests:   [x] Referrals: Please see a podiatrist for toe nails  [] Rx:   [x] Other: Use Lymphedema pump twice a day x 1 hour each    Wound Cleansing:   Do not scrub or use excessive force. Cleanse wound prior to applying a clean dressing with:  [] Normal Saline   [x] Mild Soap & Water/Baby Shampoo   [] Keep Wound Dry in Shower  [] Other:      Topical Treatments:  Do not apply lotions, creams, or ointments to wound bed unless directed. [x] Apply moisturizing lotion to skin surrounding the wound prior to dressing change.  [] Apply antifungal ointment to skin surrounding the wound prior to dressing change.  [] Apply thin film of moisture barrier/zinc ointment to skin immediately around wound. [] Other:       Dressings:           Wound Location L & R leg   [x] Apply Primary Dressing:       [] MediHoney Gel    [] MediHoney Alginate               [] Calcium Alginate      [] Calcium Alginate with Silver   [] Collagen                   [] Collagen with Silver                [] Santyl with Moistened saline gauze              [] Hydrofera Blue (cut to size and moistened)  [] Hydrofera Blue Ready (Cut to size)   [] Vashe wet to dry    [] Betadine wet to dry              [] Hydrogel                [] Mepitel     [] Mepilex Transfer                [x] Other: Drawtex    [x] Cover and Secure with:     [x] Gauze [] Luretha Nichols [] Kerlix   [] Foam [] Super Absorbant [x] ABD     [] ACE Wrap            [] Other:    Limit contact of tape with skin.     [x] Change dressing: [] Daily [] Every Other Day [] Once per week   [] Twice per week   [x] Three times per week every week  [] Do Not Change Dressing   [] Other:     Edema Control:  Apply: [] Compression Stocking:  mmHg  []Right Leg []Left Leg   [] Tubigrip []Right Leg Double Layer []Left Leg Double Layer      []Right Leg Single Layer []Left Leg Single Layer     [] Elevate leg(s) above the level of the heart when sitting. [] Avoid prolonged standing in one place. Compression:  Apply: [x] Multilayer Compression Wrap: [x]RightLeg [x]Left Leg                                 []Coflex   []Coflex Lite             [x]Unna Lite     [x] Do not get leg(s) with wrap wet. If wraps become too tight call the center or completely remove the wrap. [x] Elevate leg(s) above the level of the heart when sitting. [x] Avoid prolonged standing in one place. Dietary:  [x] Diet as tolerated: [] Calorie Diabetic Diet: [x] No Added Salt:  [x] Increase Protein: [] Other:     Activity:  [x] Activity as tolerated:    [] Patient has no activity restrictions       [] Strict Bedrest:   [] Remain off Work:       [] May return to full duty work:                                     [] Return to work with restrictions:               [address] Information: Should you experience any significant changes in your wound(s) or have questions about your wound care, please contact Errol Barry at [place] [address] 8:00 am - 4:30. If you need help with your wound outside of these hours and cannot wait until we are again available, contact your PCP or go to the hospital emergency room. PLEASE NOTE: IF YOU ARE UNABLE TO OBTAIN WOUND SUPPLIES, CONTINUE TO USE THE SUPPLIES YOU HAVE AVAILABLE UNTIL YOU ARE ABLE TO REACH US. IT IS MOST IMPORTANT TO KEEP THE WOUND COVERED AT ALL TIMES.     [x] Dr. Emilee Soulier   [] Dr. Ivette Britt  [] Dr. Carlos Mejía

## 2023-02-06 NOTE — DISCHARGE INSTRUCTIONS
Discharge Instructions for  [address] [place] [address], [address] [place] [address]  Telephone: [phone]    NAME:  Shakila Arias. YOB: 1945  MEDICAL RECORD NUMBER:  [id number]  DATE:  2/6/2023      Return Appointment:  [] Dressing supply provider:   [x] Home Healthcare: [facility]   [x] Return Appointment: 1 Week(s)  [] Nurse Visit: Week(s)    [] Discharge from [facility]  [] Ordered tests:   [x] Referrals: Please see a podiatrist for toe nails  [] Rx:   [x] Other: Use Lymphedema pump twice a day x 1 hour each    Wound Cleansing:   Do not scrub or use excessive force. Cleanse wound prior to applying a clean dressing with:  [] Normal Saline   [x] Mild Soap & Water/Baby Shampoo   [] Keep Wound Dry in Shower  [] Other:      Topical Treatments:  Do not apply lotions, creams, or ointments to wound bed unless directed. [x] Apply moisturizing lotion to skin surrounding the wound prior to dressing change.  [] Apply antifungal ointment to skin surrounding the wound prior to dressing change.  [] Apply thin film of moisture barrier/zinc ointment to skin immediately around wound. [] Other:       Dressings:           Wound Location L & R leg   [x] Apply Primary Dressing:       [] MediHoney Gel    [] MediHoney Alginate               [] Calcium Alginate      [] Calcium Alginate with Silver   [] Collagen                   [] Collagen with Silver                [] Santyl with Moistened saline gauze              [] Hydrofera Blue (cut to size and moistened)  [] Hydrofera Blue Ready (Cut to size)   [] Vashe wet to dry    [] Betadine wet to dry              [] Hydrogel                [] Mepitel     [] Mepilex Transfer                [x] Other: Drawtex    [x] Cover and Secure with:     [x] Gauze [] Arno Press [] Kerlix   [] Foam [] Super Absorbant [x] ABD     [] ACE Wrap            [] Other:    Limit contact of tape with skin.     [x] Change dressing: [] Daily [] Every Other Day [] Once per week   [] Twice per week   [x] Three times per week every week  [] Do Not Change Dressing   [] Other:     Edema Control:  Apply: [] Compression Stocking:  mmHg  []Right Leg []Left Leg   [] Tubigrip []Right Leg Double Layer []Left Leg Double Layer      []Right Leg Single Layer []Left Leg Single Layer     [] Elevate leg(s) above the level of the heart when sitting. [] Avoid prolonged standing in one place. Compression:  Apply: [x] Multilayer Compression Wrap: [x]RightLeg [x]Left Leg                                 []Coflex   []Coflex Lite             [x]Unna Lite     [x] Do not get leg(s) with wrap wet. If wraps become too tight call the center or completely remove the wrap. [x] Elevate leg(s) above the level of the heart when sitting. [x] Avoid prolonged standing in one place. Dietary:  [x] Diet as tolerated: [] Calorie Diabetic Diet: [x] No Added Salt:  [x] Increase Protein: [] Other:     Activity:  [x] Activity as tolerated:    [] Patient has no activity restrictions       [] Strict Bedrest:   [] Remain off Work:       [] May return to full duty work:                                     [] Return to work with restrictions:               [address] Information: Should you experience any significant changes in your wound(s) or have questions about your wound care, please contact Errol Barry at [place] [address] 8:00 am - 4:30. If you need help with your wound outside of these hours and cannot wait until we are again available, contact your PCP or go to the hospital emergency room. PLEASE NOTE: IF YOU ARE UNABLE TO OBTAIN WOUND SUPPLIES, CONTINUE TO USE THE SUPPLIES YOU HAVE AVAILABLE UNTIL YOU ARE ABLE TO REACH US. IT IS MOST IMPORTANT TO KEEP THE WOUND COVERED AT ALL TIMES.     [x] Dr. Amando Cox   [] Dr. Augustine Carl  [] Dr. Nikita Cooley

## 2023-02-06 NOTE — WOUND CARE
[address]   Progress Note and Procedure Note     Lawrence Epperson. MEDICAL RECORD NUMBER:  [id number]  AGE: 65 y.o. RACE BLACK/  GENDER: male  : 1945  EPISODE DATE:  2023    Subjective:   Concedes that he was too active this week    Chief Complaint   Patient presents with    Wound Check     R leg and L leg         HISTORY of PRESENT ILLNESS HPI    Lawrence Holt is a 65 y.o. male who presents today for wound/ulcer evaluation. History of Wound Context: BLE  Wound/Ulcer Pain Timing/Severity: mild  Quality of pain: burning  Severity:  1 / 10   Modifying Factors: None  Associated Signs/Symptoms: edema    Ulcer Identification:  Ulcer Type: venous    Contributing Factors: lymphedema    Wound: BLE         PAST MEDICAL HISTORY    Past Medical History:   Diagnosis Date    Dyslipidemia     HTN (hypertension)         PAST SURGICAL HISTORY    Past Surgical History:   Procedure Laterality Date    HX CHOLECYSTECTOMY         FAMILY HISTORY    Family History   Problem Relation Age of Onset    Diabetes Mother     Diabetes Brother     Diabetes Son        SOCIAL HISTORY    Social History     Tobacco Use    Smoking status: Never    Smokeless tobacco: Never   Vaping Use    Vaping Use: Never used   Substance Use Topics    Alcohol use: Never    Drug use: Never       ALLERGIES    No Known Allergies    MEDICATIONS    Current Outpatient Medications on File Prior to Encounter   Medication Sig Dispense Refill    multivitamin (ONE A DAY) tablet Take 1 Tablet by mouth in the morning. furosemide (LASIX) 40 mg tablet Take 40 mg by mouth two (2) times a day. vitamin E, dl,tocopheryl acet, (vitamin E acetate) 400 unit capsule Take 400 Units by mouth daily. aspirin (ASPIRIN) 325 mg tablet Take 325 mg by mouth daily. No current facility-administered medications on file prior to encounter. REVIEW OF SYSTEMS    Pertinent items are noted in HPI.     Objective: Visit Vitals  /74 (BP 1 Location: Left upper arm, BP Patient Position: At rest;Sitting)   Pulse 91   Temp 98.5 °F (36.9 °C)   Resp 20       Wt Readings from Last 3 Encounters:   08/08/22 122.5 kg (270 lb)   07/21/22 130.6 kg (288 lb)   02/21/22 126 kg (277 lb 12.8 oz)       PHYSICAL EXAM  The right leg lateral is not significantly changed in area measurement, slough debrided, no evidence of infection  Left leg lateral wound measures significantly larger, slough debrided, no evidence of active infection  Pertinent items are noted in HPI. Assessment:   Wound slightly worse, due to noncompliance  Problem List Items Addressed This Visit       Cellulitis and abscess of left lower extremity    Relevant Medications    lidocaine (XYLOCAINE) 2 % viscous solution 15 mL (Completed) (Start on 2/6/2023 12:00 PM)    Other Relevant Orders    INITIATE OUTPATIENT WOUND CARE PROTOCOL    Non-pressure chronic ulcer of other part of right lower leg with 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 ([address] - Primary    Relevant Medications    lidocaine (XYLOCAINE) 2 % viscous solution 15 mL (Completed) (Start on 2/6/2023 12:00 PM)    Other Relevant Orders    INITIATE OUTPATIENT WOUND CARE PROTOCOL    Non-pressure chronic ulcer of other part of left lower leg with 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 ([address]    Relevant Medications    lidocaine (XYLOCAINE) 2 % viscous solution 15 mL (Completed) (Start on 2/6/2023 12:00 PM)    Other Relevant Orders    INITIATE OUTPATIENT WOUND CARE PROTOCOL    Ulcer of foot, chronic, right, with 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 (HCC)    Relevant Medications    lidocaine (XYLOCAINE) 2 % viscous solution 15 mL (Completed) (Start on 2/6/2023 12:00 PM)    Other Relevant Orders    INITIATE OUTPATIENT WOUND CARE PROTOCOL       Wound Leg Left #1 circumferential 08/08/22 (Active)   Wound Image   02/06/23 1116   Wound Etiology Other (Comment) 02/06/23 1116   Dressing Status Clean;Dry; Intact 02/06/23 1116   Cleansed Soap and water 02/06/23 1116   Wound Length (cm) 19 cm 02/06/23 1116   Wound Width (cm) 14 cm 02/06/23 1116   Wound Depth (cm) 0.1 cm 02/06/23 1116   Wound Surface Area (cm^2) 266 cm^2 02/06/23 1116   Change in Wound Size % 52.5 02/06/23 1116   Wound Volume (cm^3) 26.6 cm^3 02/06/23 1116   Wound Healing % 53 02/06/23 1116   Post-Procedure Length (cm) 19 cm 02/06/23 1136   Post-Procedure Width (cm) 14 cm 02/06/23 1136   Post-Procedure Depth (cm) 0.1 cm 02/06/23 1136   Post-Procedure Surface Area (cm^2) 266 cm^2 02/06/23 1136   Post-Procedure Volume (cm^3) 26.6 cm^3 02/06/23 1136   Wound Assessment Slough;Granulation tissue 02/06/23 1116   Drainage Amount Large 02/06/23 1116   Drainage Description Serosanguinous 02/06/23 1116   Wound Odor Mild 02/06/23 1116   Dinorah-Wound/Incision Assessment Maceration; Hyperpigmented;Dry/flaky 02/06/23 1116   Edges Undefined edges 02/06/23 1116   Wound Thickness Description Full thickness 02/06/23 1116   Number of days: 182       Wound Leg Right #2 circumferential 10/31/22 (Active)   Wound Image   02/06/23 1116   Wound Etiology Other (Comment) 02/06/23 1116   Dressing Status Clean;Dry; Intact 02/06/23 1116   Cleansed Soap and water 02/06/23 1116   Wound Length (cm) 9 cm 02/06/23 1116   Wound Width (cm) 7.8 cm 02/06/23 1116   Wound Depth (cm) 0.1 cm 02/06/23 1116   Wound Surface Area (cm^2) 70.2 cm^2 02/06/23 1116   Change in Wound Size % 73.61 02/06/23 1116   Wound Volume (cm^3) 7.02 cm^3 02/06/23 1116   Wound Healing % 74 02/06/23 1116   Post-Procedure Length (cm) 9 cm 02/06/23 1136   Post-Procedure Width (cm) 7.8 cm 02/06/23 1136   Post-Procedure Depth (cm) 0.1 cm 02/06/23 1136   Post-Procedure Surface Area (cm^2) 70.2 cm^2 02/06/23 1136   Post-Procedure Volume (cm^3) 7.02 cm^3 02/06/23 1136   Wound Assessment Slough;Granulation tissue 02/06/23 1116   Drainage Amount Moderate 02/06/23 1116   Drainage Description Serosanguinous 02/06/23 1116   Wound Odor Mild 02/06/23 1116   Dinorah-Wound/Incision Assessment Maceration; Hyperpigmented;Dry/flaky 02/06/23 1116   Edges Undefined edges 02/06/23 1116   Wound Thickness Description Full thickness 02/06/23 1116   Number of days: 98       POP IN PROCEDURE TYPE (DEBRIDEMENT, BIOPSY, I&D, SKIN SUB, CHEMICAL CAUERTY)     Plan:   Continue drawtex, Unna boots  Less activity, more rest with leg elevation    Treatment Note please see attached Discharge Instructions    Written patient dismissal instructions given to patient or POA. Electronically signed by Carson Carrel, MD on 2/6/2023 at 11:45 AM               Debridement Wound Care        Problem List Items Addressed This Visit       Cellulitis and abscess of left lower extremity    Relevant Medications    lidocaine (XYLOCAINE) 2 % viscous solution 15 mL (Completed) (Start on 2/6/2023 12:00 PM)    Other Relevant Orders    INITIATE OUTPATIENT WOUND CARE PROTOCOL    Non-pressure chronic ulcer of other part of right lower leg with 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 ([address] - Primary    Relevant Medications    lidocaine (XYLOCAINE) 2 % viscous solution 15 mL (Completed) (Start on 2/6/2023 12:00 PM)    Other Relevant Orders    INITIATE OUTPATIENT WOUND CARE PROTOCOL    Non-pressure chronic ulcer of other part of left lower leg with 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 ([address]    Relevant Medications    lidocaine (XYLOCAINE) 2 % viscous solution 15 mL (Completed) (Start on 2/6/2023 12:00 PM)    Other Relevant Orders    INITIATE OUTPATIENT WOUND CARE PROTOCOL    Ulcer of foot, chronic, right, with 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 (HCC)    Relevant Medications    lidocaine (XYLOCAINE) 2 % viscous solution 15 mL (Completed) (Start on 2/6/2023 12:00 PM)    Other Relevant Orders    INITIATE OUTPATIENT WOUND CARE PROTOCOL       Procedure Note  Indications:  Based on my examination of this patient's wound(s)/ulcer(s) today, debridement is required to promote healing and evaluate the wound base.     Performed by: Carson Carrel, MD    Consent obtained: Yes    Time out taken: Yes    Debridement: Excisional    Using curette the wound(s)/ulcer(s) was/were sharply debrided down through and including the removal of    subcutaneous tissue    Devitalized Tissue Debrided: slough    Pre Debridement Measurements:  Are located in the Wound/Ulcer Documentation Flow Sheet    Non-Pressure ulcer, 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    Wound/Ulcer #: 1 and 2    Post Debridement Measurements:  Wound/Ulcer Descriptions are Pre Debridement except measurements:    Wound Leg Left #1 circumferential 08/08/22 (Active)   Wound Image   02/06/23 1116   Wound Etiology Other (Comment) 02/06/23 1116   Dressing Status Clean;Dry; Intact 02/06/23 1116   Cleansed Soap and water 02/06/23 1116   Wound Length (cm) 19 cm 02/06/23 1116   Wound Width (cm) 14 cm 02/06/23 1116   Wound Depth (cm) 0.1 cm 02/06/23 1116   Wound Surface Area (cm^2) 266 cm^2 02/06/23 1116   Change in Wound Size % 52.5 02/06/23 1116   Wound Volume (cm^3) 26.6 cm^3 02/06/23 1116   Wound Healing % 53 02/06/23 1116   Post-Procedure Length (cm) 19 cm 02/06/23 1136   Post-Procedure Width (cm) 14 cm 02/06/23 1136   Post-Procedure Depth (cm) 0.1 cm 02/06/23 1136   Post-Procedure Surface Area (cm^2) 266 cm^2 02/06/23 1136   Post-Procedure Volume (cm^3) 26.6 cm^3 02/06/23 1136   Wound Assessment Slough;Granulation tissue 02/06/23 1116   Drainage Amount Large 02/06/23 1116   Drainage Description Serosanguinous 02/06/23 1116   Wound Odor Mild 02/06/23 1116   Dinorah-Wound/Incision Assessment Maceration; Hyperpigmented;Dry/flaky 02/06/23 1116   Edges Undefined edges 02/06/23 1116   Wound Thickness Description Full thickness 02/06/23 1116   Number of days: 182       Wound Leg Right #2 circumferential 10/31/22 (Active)   Wound Image   02/06/23 1116   Wound Etiology Other (Comment) 02/06/23 1116   Dressing Status Clean;Dry; Intact 02/06/23 1116   Cleansed Soap and water 02/06/23 1116   Wound Length (cm) 9 cm 02/06/23 1116   Wound Width (cm) 7.8 cm 02/06/23 1116   Wound Depth (cm) 0.1 cm 02/06/23 1116   Wound Surface Area (cm^2) 70.2 cm^2 02/06/23 1116   Change in Wound Size % 73.61 02/06/23 1116   Wound Volume (cm^3) 7.02 cm^3 02/06/23 1116   Wound Healing % 74 02/06/23 1116   Post-Procedure Length (cm) 9 cm 02/06/23 1136   Post-Procedure Width (cm) 7.8 cm 02/06/23 1136   Post-Procedure Depth (cm) 0.1 cm 02/06/23 1136   Post-Procedure Surface Area (cm^2) 70.2 cm^2 02/06/23 1136   Post-Procedure Volume (cm^3) 7.02 cm^3 02/06/23 1136   Wound Assessment Slough;Granulation tissue 02/06/23 1116   Drainage Amount Moderate 02/06/23 1116   Drainage Description Serosanguinous 02/06/23 1116   Wound Odor Mild 02/06/23 1116   Dinorah-Wound/Incision Assessment Maceration; Hyperpigmented;Dry/flaky 02/06/23 1116   Edges Undefined edges 02/06/23 1116   Wound Thickness Description Full thickness 02/06/23 1116   Number of days: 98        Total Surface Area Debrided:  336 sq cm     Estimated Blood Loss:  None    Hemostasis Achieved: Pressure    Procedural Pain: 1 / 10     Post Procedural Pain: 0 / 10     Response to treatment: Well tolerated by patient

## 2023-02-06 NOTE — WOUND CARE
[facility] Application   Below Knee    NAME:  Wendy Negro. YOB: 1945  MEDICAL RECORD NUMBER:  [id number]  DATE:  2/6/2023    Appied primary and secondary dressing as ordered. Applied Unna roll from toes to knee overlapping each time. Applied ace wrap or coban from toes to below the knee. Secured with tape and/or metal clips covered with tape. Instructed patient/caregiver to keep dressing dry and intact. DO NOT REMOVE DRESSING. Instructed pt/family/caregiver to report excessive draining, loose bandage, wet dressing, severe pain or tingling in toes. Applied [employer] dressing below the knee to bilateral lower legs. Unna Boot(s) were applied per  Guidelines.     Response to treatment: Well tolerated by patient      Electronically signed by Marta Driscoll LPN on 6/1/5749 at 91:44 PM

## 2023-02-13 ENCOUNTER — HOSPITAL ENCOUNTER (OUTPATIENT)
Dept: WOUND CARE | Age: 78
Discharge: HOME | End: 2023-02-13
Payer: MEDICARE

## 2023-02-13 VITALS
RESPIRATION RATE: 20 BRPM | TEMPERATURE: 96.2 F | HEART RATE: 89 BPM | DIASTOLIC BLOOD PRESSURE: 88 MMHG | SYSTOLIC BLOOD PRESSURE: 158 MMHG

## 2023-02-13 DIAGNOSIS — L97.812 NON-PRESSURE CHRONIC ULCER OF OTHER PART OF RIGHT LOWER LEG WITH FAT LAYER EXPOSED (HCC): Primary | ICD-10-CM

## 2023-02-13 DIAGNOSIS — L03.116 CELLULITIS AND ABSCESS OF LEFT LOWER EXTREMITY: ICD-10-CM

## 2023-02-13 DIAGNOSIS — L02.416 CELLULITIS AND ABSCESS OF LEFT LOWER EXTREMITY: ICD-10-CM

## 2023-02-13 DIAGNOSIS — L97.512 ULCER OF FOOT, CHRONIC, RIGHT, WITH FAT LAYER EXPOSED (HCC): ICD-10-CM

## 2023-02-13 DIAGNOSIS — L97.822 NON-PRESSURE CHRONIC ULCER OF OTHER PART OF LEFT LOWER LEG WITH FAT LAYER EXPOSED (HCC): ICD-10-CM

## 2023-02-13 PROCEDURE — 74011000250 HC RX REV CODE- 250: Performed by: SPECIALIST

## 2023-02-13 PROCEDURE — 11045 DBRDMT SUBQ TISS EACH ADDL: CPT | Performed by: SPECIALIST

## 2023-02-13 PROCEDURE — 11042 DBRDMT SUBQ TIS 1ST 20SQCM/<: CPT

## 2023-02-13 PROCEDURE — 11045 DBRDMT SUBQ TISS EACH ADDL: CPT

## 2023-02-13 RX ORDER — MUPIROCIN 20 MG/G
OINTMENT TOPICAL ONCE
OUTPATIENT
Start: 2023-02-13 | End: 2023-02-13

## 2023-02-13 RX ORDER — LIDOCAINE HYDROCHLORIDE 20 MG/ML
15 SOLUTION OROPHARYNGEAL ONCE
Status: COMPLETED | OUTPATIENT
Start: 2023-02-13 | End: 2023-02-13

## 2023-02-13 RX ORDER — LIDOCAINE HYDROCHLORIDE 20 MG/ML
15 SOLUTION OROPHARYNGEAL ONCE
OUTPATIENT
Start: 2023-02-13 | End: 2023-02-13

## 2023-02-13 RX ORDER — SILVER SULFADIAZINE 10 G/1000G
CREAM TOPICAL ONCE
OUTPATIENT
Start: 2023-02-13 | End: 2023-02-13

## 2023-02-13 RX ADMIN — LIDOCAINE HYDROCHLORIDE 15 ML: 20 SOLUTION ORAL; TOPICAL at 11:04

## 2023-02-13 NOTE — WOUND CARE
Discharge Instructions for  [address] [place] [address], [address] [place] [address]  Telephone: [phone]    NAME:  Gabriel Carr. YOB: 1945  MEDICAL RECORD NUMBER:  [id number]  DATE:  2/13/2023      Return Appointment:  [] Dressing supply provider:   [x] Home Healthcare: [facility]   [x] Return Appointment: 1 Week(s)  [] Nurse Visit: Week(s)    [] Discharge from [facility]  [] Ordered tests:   [x] Referrals: Please see a podiatrist for toe nails  [] Rx:   [x] Other: Use Lymphedema pump twice a day x 1 hour each    Wound Cleansing:   Do not scrub or use excessive force. Cleanse wound prior to applying a clean dressing with:  [] Normal Saline   [x] Mild Soap & Water/Baby Shampoo   [] Keep Wound Dry in Shower  [] Other:      Topical Treatments:  Do not apply lotions, creams, or ointments to wound bed unless directed. [x] Apply moisturizing lotion to skin surrounding the wound prior to dressing change.  [] Apply antifungal ointment to skin surrounding the wound prior to dressing change.  [] Apply thin film of moisture barrier/zinc ointment to skin immediately around wound. [] Other:       Dressings:           Wound Location L & R leg   [x] Apply Primary Dressing:       [] MediHoney Gel    [] MediHoney Alginate               [] Calcium Alginate      [] Calcium Alginate with Silver   [] Collagen                   [] Collagen with Silver                [] Santyl with Moistened saline gauze              [] Hydrofera Blue (cut to size and moistened)  [] Hydrofera Blue Ready (Cut to size)   [] Vashe wet to dry    [] Betadine wet to dry              [] Hydrogel                [] Mepitel     [] Mepilex Transfer                [x] Other: Drawtex    [x] Cover and Secure with:     [x] Gauze [] Del Abel [] Kerlix   [] Foam [] Super Absorbant [x] ABD     [] ACE Wrap            [] Other:    Limit contact of tape with skin.     [x] Change dressing: [] Daily [] Every Other Day [] Once per week   [] Twice per week   [x] Three times per week every week  [] Do Not Change Dressing   [] Other:     Edema Control:  Apply: [] Compression Stocking:  mmHg  []Right Leg []Left Leg   [] Tubigrip []Right Leg Double Layer []Left Leg Double Layer      []Right Leg Single Layer []Left Leg Single Layer     [] Elevate leg(s) above the level of the heart when sitting. [] Avoid prolonged standing in one place. Compression:  Apply: [x] Multilayer Compression Wrap: [x]RightLeg [x]Left Leg                                 []Coflex   []Coflex Lite             [x]Unna Lite     [x] Do not get leg(s) with wrap wet. If wraps become too tight call the center or completely remove the wrap. [x] Elevate leg(s) above the level of the heart when sitting. [x] Avoid prolonged standing in one place. Dietary:  [x] Diet as tolerated: [] Calorie Diabetic Diet: [x] No Added Salt:  [x] Increase Protein: [] Other:     Activity:  [x] Activity as tolerated:    [] Patient has no activity restrictions       [] Strict Bedrest:   [] Remain off Work:       [] May return to full duty work:                                     [] Return to work with restrictions:               [address] Information: Should you experience any significant changes in your wound(s) or have questions about your wound care, please contact Errol Barry at [place] [address] 8:00 am - 4:30. If you need help with your wound outside of these hours and cannot wait until we are again available, contact your PCP or go to the hospital emergency room. PLEASE NOTE: IF YOU ARE UNABLE TO OBTAIN WOUND SUPPLIES, CONTINUE TO USE THE SUPPLIES YOU HAVE AVAILABLE UNTIL YOU ARE ABLE TO REACH US. IT IS MOST IMPORTANT TO KEEP THE WOUND COVERED AT ALL TIMES.     [x] Dr. Marta Meléndez   [] Dr. Iban Hernandez  [] Dr. Luli Lacey

## 2023-02-13 NOTE — WOUND CARE
[employer] Application   Below Knee    NAME:  Jahaira Pappas. YOB: 1945  MEDICAL RECORD NUMBER:  [id number]  DATE:  2/13/2023    Remove old [employer] or Boots. Applied moisturizing agent to dry skin as needed. Appied primary and secondary dressing as ordered. Applied Unna roll from toes to knee overlapping each time. Applied ace wrap or coban from toes to below the knee. Secured with tape and/or metal clips covered with tape. Instructed patient/caregiver to keep dressing dry and intact. DO NOT REMOVE DRESSING. Instructed pt/family/caregiver to report excessive draining, loose bandage, wet dressing, severe pain or tingling in toes. Applied [employer] dressing below the knee to bilateral lower legs. Unna Boot(s) were applied per  Guidelines.     Response to treatment: Well tolerated by patient      Electronically signed by Darshan Hernandez RN on 2/13/2023 at 11:40 AM

## 2023-02-13 NOTE — WOUND CARE
[address]   Progress Note and Procedure Note     Chyna Lua. MEDICAL RECORD NUMBER:  [id number]  AGE: 65 y.o. RACE BLACK/  GENDER: male  : 1945  EPISODE DATE:  2023    Subjective:   Less active, more leg elevation, using lymphedema pump  Chief Complaint   Patient presents with    Wound Check     R and L leg         HISTORY of PRESENT ILLNESS HPI    Chyna Laboy is a 65 y.o. male who presents today for wound/ulcer evaluation. History of Wound Context: BLE  Wound/Ulcer Pain Timing/Severity: mild  Quality of pain: aching  Severity:  1 / 10   Modifying Factors: Pain is relieved/improved with rest  Associated Signs/Symptoms: edema    Ulcer Identification:  Ulcer Type: lymphedema    Contributing Factors: lymphedema    Wound: BLE         PAST MEDICAL HISTORY    Past Medical History:   Diagnosis Date    Dyslipidemia     HTN (hypertension)         PAST SURGICAL HISTORY    Past Surgical History:   Procedure Laterality Date    HX CHOLECYSTECTOMY         FAMILY HISTORY    Family History   Problem Relation Age of Onset    Diabetes Mother     Diabetes Brother     Diabetes Son        SOCIAL HISTORY    Social History     Tobacco Use    Smoking status: Never    Smokeless tobacco: Never   Vaping Use    Vaping Use: Never used   Substance Use Topics    Alcohol use: Never    Drug use: Never       ALLERGIES    No Known Allergies    MEDICATIONS    Current Outpatient Medications on File Prior to Encounter   Medication Sig Dispense Refill    multivitamin (ONE A DAY) tablet Take 1 Tablet by mouth in the morning. furosemide (LASIX) 40 mg tablet Take 40 mg by mouth two (2) times a day. vitamin E, dl,tocopheryl acet, (vitamin E acetate) 400 unit capsule Take 400 Units by mouth daily. aspirin (ASPIRIN) 325 mg tablet Take 325 mg by mouth daily. No current facility-administered medications on file prior to encounter.        REVIEW OF SYSTEMS    Pertinent items are noted in HPI. Objective:     Visit Vitals  BP (!) 158/88 (BP 1 Location: Left upper arm, BP Patient Position: At rest;Sitting)   Pulse 89   Temp (!) 96.2 °F (35.7 °C)   Resp 20       Wt Readings from Last 3 Encounters:   08/08/22 122.5 kg (270 lb)   07/21/22 130.6 kg (288 lb)   02/21/22 126 kg (277 lb 12.8 oz)       PHYSICAL EXAM  L & R leg wounds measure smaller, slough debrided, no evidence of active infection  Pertinent items are noted in HPI. Assessment:    Some improvement    Problem List Items Addressed This Visit       Cellulitis and abscess of left lower extremity    Relevant Medications    lidocaine (XYLOCAINE) 2 % viscous solution 15 mL (Completed) (Start on 2/13/2023 12:00 PM)    Other Relevant Orders    INITIATE OUTPATIENT WOUND CARE PROTOCOL    Non-pressure chronic ulcer of other part of right lower leg with 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 ([address] - Primary    Relevant Medications    lidocaine (XYLOCAINE) 2 % viscous solution 15 mL (Completed) (Start on 2/13/2023 12:00 PM)    Other Relevant Orders    INITIATE OUTPATIENT WOUND CARE PROTOCOL    Non-pressure chronic ulcer of other part of left lower leg with 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 ([address]    Relevant Medications    lidocaine (XYLOCAINE) 2 % viscous solution 15 mL (Completed) (Start on 2/13/2023 12:00 PM)    Other Relevant Orders    INITIATE OUTPATIENT WOUND CARE PROTOCOL    Ulcer of foot, chronic, right, with 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 ([facility])    Relevant Medications    lidocaine (XYLOCAINE) 2 % viscous solution 15 mL (Completed) (Start on 2/13/2023 12:00 PM)    Other Relevant Orders    INITIATE OUTPATIENT WOUND CARE PROTOCOL       Wound Leg Left #1 circumferential 08/08/22 (Active)   Wound Image   02/06/23 1116   Wound Etiology Other (Comment) 02/13/23 1059   Dressing Status Clean;Dry; Intact 02/13/23 1059   Cleansed Soap and water 02/13/23 1059   Wound Length (cm) 15 cm 02/13/23 1059   Wound Width (cm) 19 cm 02/13/23 1059   Wound Depth (cm) 0.1 cm 02/13/23 1059   Wound Surface Area (cm^2) 285 cm^2 02/13/23 1059   Change in Wound Size % 49.11 02/13/23 1059   Wound Volume (cm^3) 28.5 cm^3 02/13/23 1059   Wound Healing % 49 02/13/23 1059   Post-Procedure Length (cm) 15 cm 02/13/23 1119   Post-Procedure Width (cm) 19 cm 02/13/23 1119   Post-Procedure Depth (cm) 0.1 cm 02/13/23 1119   Post-Procedure Surface Area (cm^2) 285 cm^2 02/13/23 1119   Post-Procedure Volume (cm^3) 28.5 cm^3 02/13/23 1119   Wound Assessment Slough;Granulation tissue 02/13/23 1059   Drainage Amount Large 02/13/23 1059   Drainage Description Serosanguinous 02/13/23 1059   Wound Odor Mild 02/13/23 1059   Dinorah-Wound/Incision Assessment Maceration; Hyperpigmented;Dry/flaky 02/13/23 1059   Edges Undefined edges 02/13/23 1059   Wound Thickness Description Full thickness 02/13/23 1059   Number of days: 189       Wound Leg Right #2 circumferential 10/31/22 (Active)   Wound Image   02/06/23 1116   Wound Etiology Other (Comment) 02/13/23 1059   Dressing Status Clean;Dry; Intact 02/13/23 1059   Cleansed Soap and water 02/13/23 1059   Wound Length (cm) 8 cm 02/13/23 1059   Wound Width (cm) 7 cm 02/13/23 1059   Wound Depth (cm) 0.1 cm 02/13/23 1059   Wound Surface Area (cm^2) 56 cm^2 02/13/23 1059   Change in Wound Size % 78.95 02/13/23 1059   Wound Volume (cm^3) 5.6 cm^3 02/13/23 1059   Wound Healing % 79 02/13/23 1059   Post-Procedure Length (cm) 8 cm 02/13/23 1119   Post-Procedure Width (cm) 7 cm 02/13/23 1119   Post-Procedure Depth (cm) 0.1 cm 02/13/23 1119   Post-Procedure Surface Area (cm^2) 56 cm^2 02/13/23 1119   Post-Procedure Volume (cm^3) 5.6 cm^3 02/13/23 1119   Wound Assessment Slough;Granulation tissue 02/13/23 1059   Drainage Amount Moderate 02/13/23 1059   Drainage Description Serosanguinous 02/13/23 1059   Wound Odor Mild 02/13/23 1059   Dinorah-Wound/Incision Assessment Maceration; Hyperpigmented;Dry/flaky 02/13/23 1059   Edges Undefined edges 02/13/23 1059   Wound Thickness Description Full thickness 02/13/23 1059   Number of days: 105       POP IN PROCEDURE TYPE (DEBRIDEMENT, BIOPSY, I&D, SKIN SUB, CHEMICAL CAUERTY)     Plan:   Continue drawtex, Unna    Treatment Note please see attached Discharge Instructions    Written patient dismissal instructions given to patient or POA. Electronically signed by Cheryl Enriquez MD on 2/13/2023 at 11:54 AM               Debridement Wound Care        Problem List Items Addressed This Visit       Cellulitis and abscess of left lower extremity    Relevant Medications    lidocaine (XYLOCAINE) 2 % viscous solution 15 mL (Completed) (Start on 2/13/2023 12:00 PM)    Other Relevant Orders    INITIATE OUTPATIENT WOUND CARE PROTOCOL    Non-pressure chronic ulcer of other part of right lower leg with 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 ([address] - Primary    Relevant Medications    lidocaine (XYLOCAINE) 2 % viscous solution 15 mL (Completed) (Start on 2/13/2023 12:00 PM)    Other Relevant Orders    INITIATE OUTPATIENT WOUND CARE PROTOCOL    Non-pressure chronic ulcer of other part of left lower leg with 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 ([address]    Relevant Medications    lidocaine (XYLOCAINE) 2 % viscous solution 15 mL (Completed) (Start on 2/13/2023 12:00 PM)    Other Relevant Orders    INITIATE OUTPATIENT WOUND CARE PROTOCOL    Ulcer of foot, chronic, right, with 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 ([address]    Relevant Medications    lidocaine (XYLOCAINE) 2 % viscous solution 15 mL (Completed) (Start on 2/13/2023 12:00 PM)    Other Relevant Orders    INITIATE OUTPATIENT WOUND CARE PROTOCOL       Procedure Note  Indications:  Based on my examination of this patient's wound(s)/ulcer(s) today, debridement is required to promote healing and evaluate the wound base.     Performed by: Cheryl Enriquez MD    Consent obtained: Yes    Time out taken: Yes    Debridement: Excisional    Using curette the wound(s)/ulcer(s) was/were sharply debrided down through and including the removal of    subcutaneous tissue    Devitalized Tissue Debrided: slough    Pre Debridement Measurements:  Are located in the Wound/Ulcer Documentation Flow Sheet    Non-Pressure ulcer, 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    Wound/Ulcer #: 1 and 2    Post Debridement Measurements:  Wound/Ulcer Descriptions are Pre Debridement except measurements:    Wound Leg Left #1 circumferential 08/08/22 (Active)   Wound Image   02/06/23 1116   Wound Etiology Other (Comment) 02/13/23 1059   Dressing Status Clean;Dry; Intact 02/13/23 1059   Cleansed Soap and water 02/13/23 1059   Wound Length (cm) 15 cm 02/13/23 1059   Wound Width (cm) 19 cm 02/13/23 1059   Wound Depth (cm) 0.1 cm 02/13/23 1059   Wound Surface Area (cm^2) 285 cm^2 02/13/23 1059   Change in Wound Size % 49.11 02/13/23 1059   Wound Volume (cm^3) 28.5 cm^3 02/13/23 1059   Wound Healing % 49 02/13/23 1059   Post-Procedure Length (cm) 15 cm 02/13/23 1119   Post-Procedure Width (cm) 19 cm 02/13/23 1119   Post-Procedure Depth (cm) 0.1 cm 02/13/23 1119   Post-Procedure Surface Area (cm^2) 285 cm^2 02/13/23 1119   Post-Procedure Volume (cm^3) 28.5 cm^3 02/13/23 1119   Wound Assessment Slough;Granulation tissue 02/13/23 1059   Drainage Amount Large 02/13/23 1059   Drainage Description Serosanguinous 02/13/23 1059   Wound Odor Mild 02/13/23 1059   Dinorah-Wound/Incision Assessment Maceration; Hyperpigmented;Dry/flaky 02/13/23 1059   Edges Undefined edges 02/13/23 1059   Wound Thickness Description Full thickness 02/13/23 1059   Number of days: 189       Wound Leg Right #2 circumferential 10/31/22 (Active)   Wound Image   02/06/23 1116   Wound Etiology Other (Comment) 02/13/23 1059   Dressing Status Clean;Dry; Intact 02/13/23 1059   Cleansed Soap and water 02/13/23 1059   Wound Length (cm) 8 cm 02/13/23 1059   Wound Width (cm) 7 cm 02/13/23 1059   Wound Depth (cm) 0.1 cm 02/13/23 1059   Wound Surface Area (cm^2) 56 cm^2 02/13/23 1059   Change in Wound Size % 78.95 02/13/23 1059   Wound Volume (cm^3) 5.6 cm^3 02/13/23 1059   Wound Healing % 79 02/13/23 1059   Post-Procedure Length (cm) 8 cm 02/13/23 1119   Post-Procedure Width (cm) 7 cm 02/13/23 1119   Post-Procedure Depth (cm) 0.1 cm 02/13/23 1119   Post-Procedure Surface Area (cm^2) 56 cm^2 02/13/23 1119   Post-Procedure Volume (cm^3) 5.6 cm^3 02/13/23 1119   Wound Assessment Slough;Granulation tissue 02/13/23 1059   Drainage Amount Moderate 02/13/23 1059   Drainage Description Serosanguinous 02/13/23 1059   Wound Odor Mild 02/13/23 1059   Dinorah-Wound/Incision Assessment Maceration; Hyperpigmented;Dry/flaky 02/13/23 1059   Edges Undefined edges 02/13/23 1059   Wound Thickness Description Full thickness 02/13/23 1059   Number of days: 105        Total Surface Area Debrided:  341 sq cm     Estimated Blood Loss:  None    Hemostasis Achieved: Pressure    Procedural Pain: 2 / 10     Post Procedural Pain: 1 / 10     Response to treatment: Well tolerated by patient

## 2023-02-13 NOTE — DISCHARGE INSTRUCTIONS
Discharge Instructions for  [address] [place] [address], [address] [place] [address]  Telephone: [phone]    NAME:  Alfreda Tovar. YOB: 1945  MEDICAL RECORD NUMBER:  [id number]  DATE:  2/13/2023      Return Appointment:  [] Dressing supply provider:   [x] Home Healthcare: [facility]   [x] Return Appointment: 1 Week(s)  [] Nurse Visit: Week(s)    [] Discharge from [facility]  [] Ordered tests:   [x] Referrals: Please see a podiatrist for toe nails  [] Rx:   [x] Other: Use Lymphedema pump twice a day x 1 hour each    Wound Cleansing:   Do not scrub or use excessive force. Cleanse wound prior to applying a clean dressing with:  [] Normal Saline   [x] Mild Soap & Water/Baby Shampoo   [] Keep Wound Dry in Shower  [] Other:      Topical Treatments:  Do not apply lotions, creams, or ointments to wound bed unless directed. [x] Apply moisturizing lotion to skin surrounding the wound prior to dressing change.  [] Apply antifungal ointment to skin surrounding the wound prior to dressing change.  [] Apply thin film of moisture barrier/zinc ointment to skin immediately around wound. [] Other:       Dressings:           Wound Location L & R leg   [x] Apply Primary Dressing:       [] MediHoney Gel    [] MediHoney Alginate               [] Calcium Alginate      [] Calcium Alginate with Silver   [] Collagen                   [] Collagen with Silver                [] Santyl with Moistened saline gauze              [] Hydrofera Blue (cut to size and moistened)  [] Hydrofera Blue Ready (Cut to size)   [] Vashe wet to dry    [] Betadine wet to dry              [] Hydrogel                [] Mepitel     [] Mepilex Transfer                [x] Other: Drawtex    [x] Cover and Secure with:     [x] Gauze [] Devin Babe [] Kerlix   [] Foam [] Super Absorbant [x] ABD     [] ACE Wrap            [] Other:    Limit contact of tape with skin.     [x] Change dressing: [] Daily [] Every Other Day [] Once per week   [] Twice per week   [x] Three times per week every week  [] Do Not Change Dressing   [] Other:     Edema Control:  Apply: [] Compression Stocking:  mmHg  []Right Leg []Left Leg   [] Tubigrip []Right Leg Double Layer []Left Leg Double Layer      []Right Leg Single Layer []Left Leg Single Layer     [] Elevate leg(s) above the level of the heart when sitting. [] Avoid prolonged standing in one place. Compression:  Apply: [x] Multilayer Compression Wrap: [x]RightLeg [x]Left Leg                                 []Coflex   []Coflex Lite             [x]Unna Lite     [x] Do not get leg(s) with wrap wet. If wraps become too tight call the center or completely remove the wrap. [x] Elevate leg(s) above the level of the heart when sitting. [x] Avoid prolonged standing in one place. Dietary:  [x] Diet as tolerated: [] Calorie Diabetic Diet: [x] No Added Salt:  [x] Increase Protein: [] Other:     Activity:  [x] Activity as tolerated:    [] Patient has no activity restrictions       [] Strict Bedrest:   [] Remain off Work:       [] May return to full duty work:                                     [] Return to work with restrictions:               [address] Information: Should you experience any significant changes in your wound(s) or have questions about your wound care, please contact Errol Barry at [place] [address] 8:00 am - 4:30. If you need help with your wound outside of these hours and cannot wait until we are again available, contact your PCP or go to the hospital emergency room. PLEASE NOTE: IF YOU ARE UNABLE TO OBTAIN WOUND SUPPLIES, CONTINUE TO USE THE SUPPLIES YOU HAVE AVAILABLE UNTIL YOU ARE ABLE TO REACH US. IT IS MOST IMPORTANT TO KEEP THE WOUND COVERED AT ALL TIMES.     [x] Dr. Nathalie Pritchard   [] Dr. Gurpreet Valles  [] Dr. Anh Pacheco

## 2023-02-20 ENCOUNTER — HOSPITAL ENCOUNTER (OUTPATIENT)
Dept: WOUND CARE | Age: 78
Discharge: HOME | End: 2023-02-20
Payer: MEDICARE

## 2023-02-20 VITALS
SYSTOLIC BLOOD PRESSURE: 139 MMHG | RESPIRATION RATE: 20 BRPM | HEART RATE: 68 BPM | TEMPERATURE: 98.2 F | DIASTOLIC BLOOD PRESSURE: 80 MMHG

## 2023-02-20 DIAGNOSIS — L02.416 CELLULITIS AND ABSCESS OF LEFT LOWER EXTREMITY: ICD-10-CM

## 2023-02-20 DIAGNOSIS — L97.822 NON-PRESSURE CHRONIC ULCER OF OTHER PART OF LEFT LOWER LEG WITH FAT LAYER EXPOSED (HCC): ICD-10-CM

## 2023-02-20 DIAGNOSIS — L03.116 CELLULITIS AND ABSCESS OF LEFT LOWER EXTREMITY: ICD-10-CM

## 2023-02-20 DIAGNOSIS — L97.812 NON-PRESSURE CHRONIC ULCER OF OTHER PART OF RIGHT LOWER LEG WITH FAT LAYER EXPOSED (HCC): Primary | ICD-10-CM

## 2023-02-20 DIAGNOSIS — L97.512 ULCER OF FOOT, CHRONIC, RIGHT, WITH FAT LAYER EXPOSED (HCC): ICD-10-CM

## 2023-02-20 PROCEDURE — 11045 DBRDMT SUBQ TISS EACH ADDL: CPT

## 2023-02-20 PROCEDURE — 74011000250 HC RX REV CODE- 250: Performed by: SPECIALIST

## 2023-02-20 PROCEDURE — 11042 DBRDMT SUBQ TIS 1ST 20SQCM/<: CPT

## 2023-02-20 PROCEDURE — 11045 DBRDMT SUBQ TISS EACH ADDL: CPT | Performed by: SPECIALIST

## 2023-02-20 RX ORDER — LIDOCAINE HYDROCHLORIDE 20 MG/ML
15 SOLUTION OROPHARYNGEAL ONCE
OUTPATIENT
Start: 2023-02-20 | End: 2023-02-20

## 2023-02-20 RX ORDER — MUPIROCIN 20 MG/G
OINTMENT TOPICAL ONCE
OUTPATIENT
Start: 2023-02-20 | End: 2023-02-20

## 2023-02-20 RX ORDER — LIDOCAINE HYDROCHLORIDE 20 MG/ML
15 SOLUTION OROPHARYNGEAL ONCE
Status: COMPLETED | OUTPATIENT
Start: 2023-02-20 | End: 2023-02-20

## 2023-02-20 RX ORDER — SILVER SULFADIAZINE 10 G/1000G
CREAM TOPICAL ONCE
OUTPATIENT
Start: 2023-02-20 | End: 2023-02-20

## 2023-02-20 RX ADMIN — LIDOCAINE HYDROCHLORIDE 15 ML: 20 SOLUTION ORAL; TOPICAL at 10:32

## 2023-02-20 NOTE — DISCHARGE INSTRUCTIONS
Discharge Instructions for  [address] [place] [address], [address] [place] [address]  Telephone: [phone]    NAME:  Daryl Rodríguez. YOB: 1945  MEDICAL RECORD NUMBER:  [id number]  DATE:  2/20/2023      Return Appointment:  [] Dressing supply provider:   [x] Home Healthcare: [facility]   [x] Return Appointment: 1 Week(s)  [] Nurse Visit: Week(s)    [] Discharge from [facility]  [] Ordered tests:   [x] Referrals: Please see a podiatrist for toe nails  [] Rx:   [x] Other: Use Lymphedema pump twice a day x 1 hour each    Wound Cleansing:   Do not scrub or use excessive force. Cleanse wound prior to applying a clean dressing with:  [] Normal Saline   [x] Mild Soap & Water/Baby Shampoo   [] Keep Wound Dry in Shower  [] Other:      Topical Treatments:  Do not apply lotions, creams, or ointments to wound bed unless directed. [x] Apply moisturizing lotion to skin surrounding the wound prior to dressing change.  [] Apply antifungal ointment to skin surrounding the wound prior to dressing change.  [] Apply thin film of moisture barrier/zinc ointment to skin immediately around wound. [] Other:       Dressings:           Wound Location L & R leg   [x] Apply Primary Dressing:       [] MediHoney Gel    [] MediHoney Alginate               [] Calcium Alginate      [] Calcium Alginate with Silver   [] Collagen                   [] Collagen with Silver                [] Santyl with Moistened saline gauze              [] Hydrofera Blue (cut to size and moistened)  [] Hydrofera Blue Ready (Cut to size)   [] Vashe wet to dry    [] Betadine wet to dry              [] Hydrogel                [] Mepitel     [] Mepilex Transfer                [] Other: Xeroform    [x] Cover and Secure with:     [x] Gauze [] Marea Bream [] Kerlix   [] Foam [] Super Absorbant [x] ABD     [] ACE Wrap            [] Other:    Limit contact of tape with skin.     [x] Change dressing: [] Daily [] Every Other Day [] Once per week   [] Twice per week   [x] Three times per week every week  [] Do Not Change Dressing   [] Other:     Edema Control:  Apply: [] Compression Stocking:  mmHg  []Right Leg []Left Leg   [] Tubigrip []Right Leg Double Layer []Left Leg Double Layer      []Right Leg Single Layer []Left Leg Single Layer     [] Elevate leg(s) above the level of the heart when sitting. [] Avoid prolonged standing in one place. Compression:  Apply: [x] Multilayer Compression Wrap: [x]RightLeg [x]Left Leg                                 []Coflex   []Coflex Lite             [x]Unna Lite     [x] Do not get leg(s) with wrap wet. If wraps become too tight call the center or completely remove the wrap. [x] Elevate leg(s) above the level of the heart when sitting. [x] Avoid prolonged standing in one place. Dietary:  [x] Diet as tolerated: [] Calorie Diabetic Diet: [x] No Added Salt:  [x] Increase Protein: [] Other:     Activity:  [x] Activity as tolerated:    [] Patient has no activity restrictions       [] Strict Bedrest:   [] Remain off Work:       [] May return to full duty work:                                     [] Return to work with restrictions:               [address] Information: Should you experience any significant changes in your wound(s) or have questions about your wound care, please contact Errol Barry at [place] [address] 8:00 am - 4:30. If you need help with your wound outside of these hours and cannot wait until we are again available, contact your PCP or go to the hospital emergency room. PLEASE NOTE: IF YOU ARE UNABLE TO OBTAIN WOUND SUPPLIES, CONTINUE TO USE THE SUPPLIES YOU HAVE AVAILABLE UNTIL YOU ARE ABLE TO REACH US. IT IS MOST IMPORTANT TO KEEP THE WOUND COVERED AT ALL TIMES.     [x] Dr. Pankaj Nunez   [] Dr. Analy Carr  [] Dr. Chris Tay

## 2023-02-20 NOTE — WOUND CARE
Discharge Instructions for  [address] [place] [address], [address] [place] [address]  Telephone: [phone]    NAME:  Laisha Spence. YOB: 1945  MEDICAL RECORD NUMBER:  [id number]  DATE:  2/20/2023      Return Appointment:  [] Dressing supply provider:   [x] Home Healthcare: [facility]   [x] Return Appointment: 1 Week(s)  [] Nurse Visit: Week(s)    [] Discharge from [facility]  [] Ordered tests:   [x] Referrals: Please see a podiatrist for toe nails  [] Rx:   [x] Other: Use Lymphedema pump twice a day x 1 hour each    Wound Cleansing:   Do not scrub or use excessive force. Cleanse wound prior to applying a clean dressing with:  [] Normal Saline   [x] Mild Soap & Water/Baby Shampoo   [] Keep Wound Dry in Shower  [] Other:      Topical Treatments:  Do not apply lotions, creams, or ointments to wound bed unless directed. [x] Apply moisturizing lotion to skin surrounding the wound prior to dressing change.  [] Apply antifungal ointment to skin surrounding the wound prior to dressing change.  [] Apply thin film of moisture barrier/zinc ointment to skin immediately around wound. [] Other:       Dressings:           Wound Location L & R leg   [x] Apply Primary Dressing:       [] MediHoney Gel    [] MediHoney Alginate               [] Calcium Alginate      [] Calcium Alginate with Silver   [] Collagen                   [] Collagen with Silver                [] Santyl with Moistened saline gauze              [] Hydrofera Blue (cut to size and moistened)  [] Hydrofera Blue Ready (Cut to size)   [] Vashe wet to dry    [] Betadine wet to dry              [] Hydrogel                [] Mepitel     [] Mepilex Transfer                [] Other: Xeroform    [x] Cover and Secure with:     [x] Gauze [] [place] [] Kerlix   [] Foam [] Super Absorbant [x] ABD     [] ACE Wrap            [] Other:    Limit contact of tape with skin.     [x] Change dressing: [] Daily [] Every Other Day [] Once per week   [] Twice per week   [x] Three times per week every week  [] Do Not Change Dressing   [] Other:     Edema Control:  Apply: [] Compression Stocking:  mmHg  []Right Leg []Left Leg   [] Tubigrip []Right Leg Double Layer []Left Leg Double Layer      []Right Leg Single Layer []Left Leg Single Layer     [] Elevate leg(s) above the level of the heart when sitting. [] Avoid prolonged standing in one place. Compression:  Apply: [x] Multilayer Compression Wrap: [x]RightLeg [x]Left Leg                                 []Coflex   []Coflex Lite             [x]Unna Lite     [x] Do not get leg(s) with wrap wet. If wraps become too tight call the center or completely remove the wrap. [x] Elevate leg(s) above the level of the heart when sitting. [x] Avoid prolonged standing in one place. Dietary:  [x] Diet as tolerated: [] Calorie Diabetic Diet: [x] No Added Salt:  [x] Increase Protein: [] Other:     Activity:  [x] Activity as tolerated:    [] Patient has no activity restrictions       [] Strict Bedrest:   [] Remain off Work:       [] May return to full duty work:                                     [] Return to work with restrictions:               [address] Information: Should you experience any significant changes in your wound(s) or have questions about your wound care, please contact Errol Barry at [place] [address] 8:00 am - 4:30. If you need help with your wound outside of these hours and cannot wait until we are again available, contact your PCP or go to the hospital emergency room. PLEASE NOTE: IF YOU ARE UNABLE TO OBTAIN WOUND SUPPLIES, CONTINUE TO USE THE SUPPLIES YOU HAVE AVAILABLE UNTIL YOU ARE ABLE TO REACH US. IT IS MOST IMPORTANT TO KEEP THE WOUND COVERED AT ALL TIMES.     [x] Dr. Dinah Ackerman   [] Dr. Jazmin Louise  [] Dr. Jojo Tirado

## 2023-02-20 NOTE — WOUND CARE
[employer] Application   Below Knee    NAME:  Daryl Rodríguez. YOB: 1945  MEDICAL RECORD NUMBER:  [id number]  DATE:  2/20/2023    Remove old [employer] or Boots. Applied moisturizing agent to dry skin as needed. Appied primary and secondary dressing as ordered. Applied Unna roll from toes to knee overlapping each time. Applied ace wrap or coban from toes to below the knee. Secured with tape and/or metal clips covered with tape. Instructed patient/caregiver to keep dressing dry and intact. DO NOT REMOVE DRESSING. Instructed pt/family/caregiver to report excessive draining, loose bandage, wet dressing, severe pain or tingling in toes. Applied [employer] dressing below the knee to bilateral lower legs. Unna Boot(s) were applied per  Guidelines.     Response to treatment: Well tolerated by patient      Electronically signed by Soila Yost RN on 2/20/2023 at 11:16 AM

## 2023-02-20 NOTE — WOUND CARE
[address]   Progress Note and Procedure Note     Jluis Hein MEDICAL RECORD NUMBER:  [id number]  AGE: 65 y.o. RACE BLACK/  GENDER: male  : 1945  EPISODE DATE:  2023    Subjective:   Using lymphedema pump three times a day    Chief Complaint   Patient presents with    Wound Check     BILATERAL LEGS         HISTORY of PRESENT ILLNESS HPI    Jluis Hein is a 65 y.o. male who presents today for wound/ulcer evaluation. History of Wound Context: BLE  Wound/Ulcer Pain Timing/Severity: none  Quality of pain: dull  Severity:  1 / 10   Modifying Factors: None  Associated Signs/Symptoms: edema    Ulcer Identification:  Ulcer Type: venous    Contributing Factors: lymphedema    Wound: BLE         PAST MEDICAL HISTORY    Past Medical History:   Diagnosis Date    Dyslipidemia     HTN (hypertension)         PAST SURGICAL HISTORY    Past Surgical History:   Procedure Laterality Date    HX CHOLECYSTECTOMY         FAMILY HISTORY    Family History   Problem Relation Age of Onset    Diabetes Mother     Diabetes Brother     Diabetes Son        SOCIAL HISTORY    Social History     Tobacco Use    Smoking status: Never    Smokeless tobacco: Never   Vaping Use    Vaping Use: Never used   Substance Use Topics    Alcohol use: Never    Drug use: Never       ALLERGIES    No Known Allergies    MEDICATIONS    Current Outpatient Medications on File Prior to Encounter   Medication Sig Dispense Refill    multivitamin (ONE A DAY) tablet Take 1 Tablet by mouth in the morning. furosemide (LASIX) 40 mg tablet Take 40 mg by mouth two (2) times a day. vitamin E, dl,tocopheryl acet, (vitamin E acetate) 400 unit capsule Take 400 Units by mouth daily. aspirin (ASPIRIN) 325 mg tablet Take 325 mg by mouth daily. No current facility-administered medications on file prior to encounter. REVIEW OF SYSTEMS    Pertinent items are noted in HPI.     Objective: Visit Vitals  /80 (BP 1 Location: Left arm, BP Patient Position: At rest;Sitting)   Pulse 68   Temp 98.2 °F (36.8 °C)   Resp 20       Wt Readings from Last 3 Encounters:   08/08/22 122.5 kg (270 lb)   07/21/22 130.6 kg (288 lb)   02/21/22 126 kg (277 lb 12.8 oz)       PHYSICAL EXAM  R lateral leg wound slightly smaller, no evidence of infection  L lateral leg wound slightly smaller, no infection  Pertinent items are noted in HPI.     Assessment:   Some improvement  Problem List Items Addressed This Visit       Cellulitis and abscess of left lower extremity    Relevant Medications    lidocaine (XYLOCAINE) 2 % viscous solution 15 mL (Completed)    Other Relevant Orders    INITIATE OUTPATIENT WOUND CARE PROTOCOL    Non-pressure chronic ulcer of other part of right lower leg with 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 ([address] - Primary    Relevant Medications    lidocaine (XYLOCAINE) 2 % viscous solution 15 mL (Completed)    Other Relevant Orders    INITIATE OUTPATIENT WOUND CARE PROTOCOL    Non-pressure chronic ulcer of other part of left lower leg with 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 ([address]    Relevant Medications    lidocaine (XYLOCAINE) 2 % viscous solution 15 mL (Completed)    Other Relevant Orders    INITIATE OUTPATIENT WOUND CARE PROTOCOL    Ulcer of foot, chronic, right, with 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 ([facility]CC)    Relevant Medications    lidocaine (XYLOCAINE) 2 % viscous solution 15 mL (Completed)    Other Relevant Orders    INITIATE OUTPATIENT WOUND CARE PROTOCOL       Wound Leg Left #1 circumferential 08/08/22 (Active)   Wound Image   02/20/23 1030   Wound Etiology Other (Comment) 02/20/23 1030   Dressing Status Breakthrough drainage noted 02/20/23 1030   Cleansed Soap and water 02/20/23 1030   Wound Length (cm) 15 cm 02/20/23 1030   Wound Width (cm) 13 cm 02/20/23 1030   Wound Depth (cm) 0.1 cm 02/20/23 1030   Wound Surface Area (cm^2) 195 cm^2 02/20/23 1030   Change in Wound Size % 65.18 02/20/23 1030   Wound Volume (cm^3) 19.5 cm^3 02/20/23 1030 Wound Healing % 65 02/20/23 1030   Post-Procedure Length (cm) 15 cm 02/20/23 1049   Post-Procedure Width (cm) 13 cm 02/20/23 1049   Post-Procedure Depth (cm) 0.1 cm 02/20/23 1049   Post-Procedure Surface Area (cm^2) 195 cm^2 02/20/23 1049   Post-Procedure Volume (cm^3) 19.5 cm^3 02/20/23 1049   Wound Assessment Slough;Granulation tissue 02/20/23 1030   Drainage Amount Large 02/20/23 1030   Drainage Description Serosanguinous 02/20/23 1030   Wound Odor Moderate 02/20/23 1030   Dinorah-Wound/Incision Assessment Maceration; Hyperpigmented;Dry/flaky 02/20/23 1030   Edges Undefined edges 02/20/23 1030   Wound Thickness Description Full thickness 02/20/23 1030   Number of days: 196       Wound Leg Right #2 circumferential 10/31/22 (Active)   Wound Image   02/20/23 1029   Wound Etiology Other (Comment) 02/20/23 1029   Dressing Status Clean;Dry; Intact 02/20/23 1029   Cleansed Soap and water 02/20/23 1029   Wound Length (cm) 8.5 cm 02/20/23 1029   Wound Width (cm) 10 cm 02/20/23 1029   Wound Depth (cm) 0.1 cm 02/20/23 1029   Wound Surface Area (cm^2) 85 cm^2 02/20/23 1029   Change in Wound Size % 68.05 02/20/23 1029   Wound Volume (cm^3) 8.5 cm^3 02/20/23 1029   Wound Healing % 68 02/20/23 1029   Post-Procedure Length (cm) 8.5 cm 02/20/23 1049   Post-Procedure Width (cm) 10 cm 02/20/23 1049   Post-Procedure Depth (cm) 0.1 cm 02/20/23 1049   Post-Procedure Surface Area (cm^2) 85 cm^2 02/20/23 1049   Post-Procedure Volume (cm^3) 8.5 cm^3 02/20/23 1049   Wound Assessment Slough;Granulation tissue 02/20/23 1029   Drainage Amount Moderate 02/20/23 1029   Drainage Description Serosanguinous 02/20/23 1029   Wound Odor Moderate 02/20/23 1029   Dinorah-Wound/Incision Assessment Maceration; Hyperpigmented;Dry/flaky 02/20/23 1029   Edges Undefined edges 02/20/23 1029   Wound Thickness Description Full thickness 02/20/23 1029   Number of days: 112       POP IN PROCEDURE TYPE (DEBRIDEMENT, BIOPSY, I&D, SKIN SUB, CHEMICAL CAUERTY)     Plan: Add xeroform, continue drawtex, continue Unna boot  Continue lymphedema pumping    Treatment Note please see attached Discharge Instructions    Written patient dismissal instructions given to patient or POA. Electronically signed by Wili Barnett MD on 2/20/2023 at 11:01 AM               Debridement Wound Care        Problem List Items Addressed This Visit       Cellulitis and abscess of left lower extremity    Relevant Medications    lidocaine (XYLOCAINE) 2 % viscous solution 15 mL (Completed)    Other Relevant Orders    INITIATE OUTPATIENT WOUND CARE PROTOCOL    Non-pressure chronic ulcer of other part of right lower leg with 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 ([address] - Primary    Relevant Medications    lidocaine (XYLOCAINE) 2 % viscous solution 15 mL (Completed)    Other Relevant Orders    INITIATE OUTPATIENT WOUND CARE PROTOCOL    Non-pressure chronic ulcer of other part of left lower leg with 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 ([address]    Relevant Medications    lidocaine (XYLOCAINE) 2 % viscous solution 15 mL (Completed)    Other Relevant Orders    INITIATE OUTPATIENT WOUND CARE PROTOCOL    Ulcer of foot, chronic, right, with 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 (HCC)    Relevant Medications    lidocaine (XYLOCAINE) 2 % viscous solution 15 mL (Completed)    Other Relevant Orders    INITIATE OUTPATIENT WOUND CARE PROTOCOL       Procedure Note  Indications:  Based on my examination of this patient's wound(s)/ulcer(s) today, debridement is required to promote healing and evaluate the wound base.     Performed by: Wili Barnett MD    Consent obtained: Yes    Time out taken: Yes    Debridement: Excisional    Using curette the wound(s)/ulcer(s) was/were sharply debrided down through and including the removal of    subcutaneous tissue    Devitalized Tissue Debrided: slough    Pre Debridement Measurements:  Are located in the Wound/Ulcer Documentation Flow Sheet    Non-Pressure ulcer, 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    Wound/Ulcer #: 1 and 2    Post Debridement Measurements:  Wound/Ulcer Descriptions are Pre Debridement except measurements:    Wound Leg Left #1 circumferential 08/08/22 (Active)   Wound Image   02/20/23 1030   Wound Etiology Other (Comment) 02/20/23 1030   Dressing Status Breakthrough drainage noted 02/20/23 1030   Cleansed Soap and water 02/20/23 1030   Wound Length (cm) 15 cm 02/20/23 1030   Wound Width (cm) 13 cm 02/20/23 1030   Wound Depth (cm) 0.1 cm 02/20/23 1030   Wound Surface Area (cm^2) 195 cm^2 02/20/23 1030   Change in Wound Size % 65.18 02/20/23 1030   Wound Volume (cm^3) 19.5 cm^3 02/20/23 1030   Wound Healing % 65 02/20/23 1030   Post-Procedure Length (cm) 15 cm 02/20/23 1049   Post-Procedure Width (cm) 13 cm 02/20/23 1049   Post-Procedure Depth (cm) 0.1 cm 02/20/23 1049   Post-Procedure Surface Area (cm^2) 195 cm^2 02/20/23 1049   Post-Procedure Volume (cm^3) 19.5 cm^3 02/20/23 1049   Wound Assessment Slough;Granulation tissue 02/20/23 1030   Drainage Amount Large 02/20/23 1030   Drainage Description Serosanguinous 02/20/23 1030   Wound Odor Moderate 02/20/23 1030   Dinorah-Wound/Incision Assessment Maceration; Hyperpigmented;Dry/flaky 02/20/23 1030   Edges Undefined edges 02/20/23 1030   Wound Thickness Description Full thickness 02/20/23 1030   Number of days: 196       Wound Leg Right #2 circumferential 10/31/22 (Active)   Wound Image   02/20/23 1029   Wound Etiology Other (Comment) 02/20/23 1029   Dressing Status Clean;Dry; Intact 02/20/23 1029   Cleansed Soap and water 02/20/23 1029   Wound Length (cm) 8.5 cm 02/20/23 1029   Wound Width (cm) 10 cm 02/20/23 1029   Wound Depth (cm) 0.1 cm 02/20/23 1029   Wound Surface Area (cm^2) 85 cm^2 02/20/23 1029   Change in Wound Size % 68.05 02/20/23 1029   Wound Volume (cm^3) 8.5 cm^3 02/20/23 1029   Wound Healing % 68 02/20/23 1029   Post-Procedure Length (cm) 8.5 cm 02/20/23 1049   Post-Procedure Width (cm) 10 cm 02/20/23 1049   Post-Procedure Depth (cm) 0.1 cm 02/20/23 1049   Post-Procedure Surface Area (cm^2) 85 cm^2 02/20/23 1049   Post-Procedure Volume (cm^3) 8.5 cm^3 02/20/23 1049   Wound Assessment Slough;Granulation tissue 02/20/23 1029   Drainage Amount Moderate 02/20/23 1029   Drainage Description Serosanguinous 02/20/23 1029   Wound Odor Moderate 02/20/23 1029   Dinorah-Wound/Incision Assessment Maceration; Hyperpigmented;Dry/flaky 02/20/23 1029   Edges Undefined edges 02/20/23 1029   Wound Thickness Description Full thickness 02/20/23 1029   Number of days: 112        Total Surface Area Debrided:  280 sq cm     Estimated Blood Loss:  Minimal     Hemostasis Achieved: Pressure    Procedural Pain: 1 / 10     Post Procedural Pain: 1 / 10     Response to treatment: Well tolerated by patient

## 2023-02-27 ENCOUNTER — HOSPITAL ENCOUNTER (OUTPATIENT)
Dept: WOUND CARE | Age: 78
Discharge: HOME | End: 2023-02-27
Payer: MEDICARE

## 2023-02-27 VITALS
DIASTOLIC BLOOD PRESSURE: 75 MMHG | TEMPERATURE: 96 F | SYSTOLIC BLOOD PRESSURE: 132 MMHG | HEART RATE: 80 BPM | RESPIRATION RATE: 18 BRPM

## 2023-02-27 DIAGNOSIS — L02.416 CELLULITIS AND ABSCESS OF LEFT LOWER EXTREMITY: ICD-10-CM

## 2023-02-27 DIAGNOSIS — L03.116 CELLULITIS AND ABSCESS OF LEFT LOWER EXTREMITY: ICD-10-CM

## 2023-02-27 DIAGNOSIS — L97.512 ULCER OF FOOT, CHRONIC, RIGHT, WITH FAT LAYER EXPOSED (HCC): ICD-10-CM

## 2023-02-27 DIAGNOSIS — L97.822 NON-PRESSURE CHRONIC ULCER OF OTHER PART OF LEFT LOWER LEG WITH FAT LAYER EXPOSED (HCC): ICD-10-CM

## 2023-02-27 DIAGNOSIS — L97.812 NON-PRESSURE CHRONIC ULCER OF OTHER PART OF RIGHT LOWER LEG WITH FAT LAYER EXPOSED (HCC): Primary | ICD-10-CM

## 2023-02-27 PROCEDURE — 29580 STRAPPING UNNA BOOT: CPT

## 2023-02-27 PROCEDURE — 74011000250 HC RX REV CODE- 250: Performed by: SPECIALIST

## 2023-02-27 RX ORDER — LIDOCAINE HYDROCHLORIDE 20 MG/ML
15 SOLUTION OROPHARYNGEAL ONCE
Status: COMPLETED | OUTPATIENT
Start: 2023-02-27 | End: 2023-02-27

## 2023-02-27 RX ORDER — LIDOCAINE HYDROCHLORIDE 20 MG/ML
15 SOLUTION OROPHARYNGEAL ONCE
OUTPATIENT
Start: 2023-02-27 | End: 2023-02-27

## 2023-02-27 RX ORDER — MUPIROCIN 20 MG/G
OINTMENT TOPICAL ONCE
OUTPATIENT
Start: 2023-02-27 | End: 2023-02-27

## 2023-02-27 RX ORDER — SILVER SULFADIAZINE 10 G/1000G
CREAM TOPICAL ONCE
OUTPATIENT
Start: 2023-02-27 | End: 2023-02-27

## 2023-02-27 RX ADMIN — LIDOCAINE HYDROCHLORIDE 15 ML: 20 SOLUTION ORAL; TOPICAL at 11:03

## 2023-02-27 NOTE — WOUND CARE
[employer] Application   Below Knee    NAME:  Betty Early. YOB: 1945  MEDICAL RECORD NUMBER:  [id number]  DATE:  2/27/2023    Remove old [employer] or Boots. Appied primary and secondary dressing as ordered. Applied Unna roll from toes to knee overlapping each time. Applied ace wrap or coban from toes to below the knee. Secured with tape and/or metal clips covered with tape. Instructed patient/caregiver to keep dressing dry and intact. DO NOT REMOVE DRESSING. Instructed pt/family/caregiver to report excessive draining, loose bandage, wet dressing, severe pain or tingling in toes. Applied [employer] dressing below the knee to bilateral lower legs. Unna Boot(s) were applied per  Guidelines.     Response to treatment: Well tolerated by patient      Electronically signed by Brynn Kelly LPN on 7/87/8216 at 39:62 AM

## 2023-02-27 NOTE — DISCHARGE INSTRUCTIONS
Discharge Instructions for  [address] [place] [address], [address] [place] [address]  Telephone: [phone]    NAME:  Gina Easton. YOB: 1945  MEDICAL RECORD NUMBER:  [id number]  DATE:  2/27/2023      Return Appointment:  [] Dressing supply provider:   [x] Home Healthcare: [facility]   [x] Return Appointment: 1 Week(s)  [] Nurse Visit: Week(s)    [] Discharge from [facility]  [] Ordered tests:   [x] Referrals: Please see a podiatrist for toe nails  [] Rx:   [x] Other: Use Lymphedema pump twice a day x 1 hour each    Wound Cleansing:   Do not scrub or use excessive force. Cleanse wound prior to applying a clean dressing with:  [] Normal Saline   [x] Mild Soap & Water/Baby Shampoo   [] Keep Wound Dry in Shower  [] Other:      Topical Treatments:  Do not apply lotions, creams, or ointments to wound bed unless directed. [x] Apply moisturizing lotion to skin surrounding the wound prior to dressing change.  [] Apply antifungal ointment to skin surrounding the wound prior to dressing change.  [] Apply thin film of moisture barrier/zinc ointment to skin immediately around wound. [] Other:       Dressings:           Wound Location L leg   [x] Apply Primary Dressing:       [] MediHoney Gel    [] MediHoney Alginate               [] Calcium Alginate      [] Calcium Alginate with Silver   [] Collagen                   [] Collagen with Silver                [] Santyl with Moistened saline gauze              [] Hydrofera Blue (cut to size and moistened)  [] Hydrofera Blue Ready (Cut to size)   [] Vashe wet to dry    [] Betadine wet to dry              [] Hydrogel                [] Mepitel     [] Mepilex Transfer                [x] Other: Drawtex    [x] Cover and Secure with:     [x] Gauze [] Gwinda Tashi [] Kerlix   [] Foam [] Super Absorbant [x] ABD     [] ACE Wrap            [] Other:    Limit contact of tape with skin.     [x] Change dressing: [] Daily [] Every Other Day [] Once per week   [] Twice per week   [x] Three times per week every week  [] Do Not Change Dressing   [] Other:     Dressings:           Wound Location  R leg   [x] Apply Primary Dressing:       [] MediHoney Gel    [] MediHoney Alginate               [] Calcium Alginate      [] Calcium Alginate with Silver   [] Collagen                   [] Collagen with Silver                [] Santyl with Moistened saline gauze              [] Hydrofera Blue (cut to size and moistened)  [] Hydrofera Blue Ready (Cut to size)   [] Vashe wet to dry    [] Betadine wet to dry              [] Hydrogel                [] Mepitel     [] Mepilex Transfer                [x] Other: Xeroform    [x] Cover and Secure with:     [x] Gauze [] Jeannetta [place] [] Kerlix   [] Foam [x] Super Absorbant [x] ABD     [] ACE Wrap            [] Other:    Limit contact of tape with skin. [x] Change dressing: [] Daily    [] Every Other Day [] Once per week   [] Twice per week   [x] Three times per week every week  [] Do Not Change Dressing   [] Other:    Edema Control:  Apply: [] Compression Stocking:  mmHg  []Right Leg []Left Leg   [] Tubigrip []Right Leg Double Layer []Left Leg Double Layer      []Right Leg Single Layer []Left Leg Single Layer     [] Elevate leg(s) above the level of the heart when sitting. [] Avoid prolonged standing in one place. Compression:  Apply: [x] Multilayer Compression Wrap: [x]RightLeg [x]Left Leg                                 []Coflex   []Coflex Lite             [x]Unna Lite     [x] Do not get leg(s) with wrap wet. If wraps become too tight call the center or completely remove the wrap. [x] Elevate leg(s) above the level of the heart when sitting. [x] Avoid prolonged standing in one place.     Dietary:  [x] Diet as tolerated: [] Calorie Diabetic Diet: [x] No Added Salt:  [x] Increase Protein: [] Other:     Activity:  [x] Activity as tolerated:    [] Patient has no activity restrictions       [] Strict Bedrest:   [] Remain off Work:       [] May return to full duty work:                                     [] Return to work with restrictions:               [address] Information: Should you experience any significant changes in your wound(s) or have questions about your wound care, please contact [address] at [place] [id number] 8:00 am - 4:30. If you need help with your wound outside of these hours and cannot wait until we are again available, contact your PCP or go to the hospital emergency room. PLEASE NOTE: IF YOU ARE UNABLE TO OBTAIN WOUND SUPPLIES, CONTINUE TO USE THE SUPPLIES YOU HAVE AVAILABLE UNTIL YOU ARE ABLE TO REACH US. IT IS MOST IMPORTANT TO KEEP THE WOUND COVERED AT ALL TIMES.     [x] Dr. Jannette Gamboa   [] Dr. Juli Barnett  [] Dr. Mateo Pérez

## 2023-02-27 NOTE — WOUND CARE
Discharge Instructions for  [address] [place] [address], [address] [place] [address]  Telephone: [phone]    NAME:  Toribio Marquez. YOB: 1945  MEDICAL RECORD NUMBER:  [id number]  DATE:  2/27/2023      Return Appointment:  [] Dressing supply provider:   [x] Home Healthcare: [facility]   [x] Return Appointment: 1 Week(s)  [] Nurse Visit: Week(s)    [] Discharge from [facility]  [] Ordered tests:   [x] Referrals: Please see a podiatrist for toe nails  [] Rx:   [x] Other: Use Lymphedema pump twice a day x 1 hour each    Wound Cleansing:   Do not scrub or use excessive force. Cleanse wound prior to applying a clean dressing with:  [] Normal Saline   [x] Mild Soap & Water/Baby Shampoo   [] Keep Wound Dry in Shower  [] Other:      Topical Treatments:  Do not apply lotions, creams, or ointments to wound bed unless directed. [x] Apply moisturizing lotion to skin surrounding the wound prior to dressing change.  [] Apply antifungal ointment to skin surrounding the wound prior to dressing change.  [] Apply thin film of moisture barrier/zinc ointment to skin immediately around wound. [] Other:       Dressings:           Wound Location L leg   [x] Apply Primary Dressing:       [] MediHoney Gel    [] MediHoney Alginate               [] Calcium Alginate      [] Calcium Alginate with Silver   [] Collagen                   [] Collagen with Silver                [] Santyl with Moistened saline gauze              [] Hydrofera Blue (cut to size and moistened)  [] Hydrofera Blue Ready (Cut to size)   [] Vashe wet to dry    [] Betadine wet to dry              [] Hydrogel                [] Mepitel     [] Mepilex Transfer                [x] Other: Drawtex    [x] Cover and Secure with:     [x] Gauze [] Janece Donath [] Kerlix   [] Foam [] Super Absorbant [x] ABD     [] ACE Wrap            [] Other:    Limit contact of tape with skin.     [x] Change dressing: [] Daily [] Every Other Day [] Once per week   [] Twice per week   [x] Three times per week every week  [] Do Not Change Dressing   [] Other:     Dressings:           Wound Location  R leg   [x] Apply Primary Dressing:       [] MediHoney Gel    [] MediHoney Alginate               [] Calcium Alginate      [] Calcium Alginate with Silver   [] Collagen                   [] Collagen with Silver                [] Santyl with Moistened saline gauze              [] Hydrofera Blue (cut to size and moistened)  [] Hydrofera Blue Ready (Cut to size)   [] Vashe wet to dry    [] Betadine wet to dry              [] Hydrogel                [] Mepitel     [] Mepilex Transfer                [x] Other: Xeroform    [x] Cover and Secure with:     [x] Gauze [] Gladys Gary [] Kerlix   [] Foam [x] Super Absorbant [x] ABD     [] ACE Wrap            [] Other:    Limit contact of tape with skin. [x] Change dressing: [] Daily    [] Every Other Day [] Once per week   [] Twice per week   [x] Three times per week every week  [] Do Not Change Dressing   [] Other:    Edema Control:  Apply: [] Compression Stocking:  mmHg  []Right Leg []Left Leg   [] Tubigrip []Right Leg Double Layer []Left Leg Double Layer      []Right Leg Single Layer []Left Leg Single Layer     [] Elevate leg(s) above the level of the heart when sitting. [] Avoid prolonged standing in one place. Compression:  Apply: [x] Multilayer Compression Wrap: [x]RightLeg [x]Left Leg                                 []Coflex   []Coflex Lite             [x]Unna Lite     [x] Do not get leg(s) with wrap wet. If wraps become too tight call the center or completely remove the wrap. [x] Elevate leg(s) above the level of the heart when sitting. [x] Avoid prolonged standing in one place.     Dietary:  [x] Diet as tolerated: [] Calorie Diabetic Diet: [x] No Added Salt:  [x] Increase Protein: [] Other:     Activity:  [x] Activity as tolerated:    [] Patient has no activity restrictions       [] Strict Bedrest:   [] Remain off Work:       [] May return to full duty work:                                     [] Return to work with restrictions:               [address] Information: Should you experience any significant changes in your wound(s) or have questions about your wound care, please contact Errol Pe[address] at [place] [phone] 8:00 am - 4:30. If you need help with your wound outside of these hours and cannot wait until we are again available, contact your PCP or go to the hospital emergency room. PLEASE NOTE: IF YOU ARE UNABLE TO OBTAIN WOUND SUPPLIES, CONTINUE TO USE THE SUPPLIES YOU HAVE AVAILABLE UNTIL YOU ARE ABLE TO REACH US. IT IS MOST IMPORTANT TO KEEP THE WOUND COVERED AT ALL TIMES.     [x] Dr. Anna Troy   [] Dr. Armen Knox  [] Dr. Sandro Dumont

## 2023-02-27 NOTE — WOUND CARE
[address]   Progress Note and Procedure Note   NO Procedure      Erick Bautista MEDICAL RECORD NUMBER:  [id number]  AGE: 65 y.o. RACE BLACK/  GENDER: male  : 1945  EPISODE DATE:  2023    Subjective:   Patient reports that the wound care provider is not wrapping the legs as he believes that they should be wrapped  He reports moderate drainage especially from the left leg wound    Chief Complaint   Patient presents with    Wound Check     Bilateral legs         HISTORY of PRESENT ILLNESS HPI    Erick Bautista is a 65 y.o. male who presents today for wound/ulcer evaluation. History of Wound Context: BLE  Wound/Ulcer Pain Timing/Severity: mild  Quality of pain: aching  Severity:  1 / 10   Modifying Factors: Pain is relieved/improved with rest  Associated Signs/Symptoms: edema    Ulcer Identification:  Ulcer Type: venous    Contributing Factors: edema    Wound: BLE         PAST MEDICAL HISTORY    Past Medical History:   Diagnosis Date    Dyslipidemia     HTN (hypertension)         PAST SURGICAL HISTORY    Past Surgical History:   Procedure Laterality Date    HX CHOLECYSTECTOMY         FAMILY HISTORY    Family History   Problem Relation Age of Onset    Diabetes Mother     Diabetes Brother     Diabetes Son        SOCIAL HISTORY    Social History     Tobacco Use    Smoking status: Never    Smokeless tobacco: Never   Vaping Use    Vaping Use: Never used   Substance Use Topics    Alcohol use: Never    Drug use: Never       ALLERGIES    No Known Allergies    MEDICATIONS    Current Outpatient Medications on File Prior to Encounter   Medication Sig Dispense Refill    multivitamin (ONE A DAY) tablet Take 1 Tablet by mouth in the morning. furosemide (LASIX) 40 mg tablet Take 40 mg by mouth two (2) times a day. vitamin E, dl,tocopheryl acet, (vitamin E acetate) 400 unit capsule Take 400 Units by mouth daily.       aspirin (ASPIRIN) 325 mg tablet Take 325 mg by mouth daily. No current facility-administered medications on file prior to encounter. REVIEW OF SYSTEMS    Pertinent items are noted in HPI. Objective:     Visit Vitals  /75 (BP 1 Location: Left arm, BP Patient Position: At rest;Sitting)   Pulse 80   Temp (!) 96 °F (35.6 °C)   Resp 18       Wt Readings from Last 3 Encounters:   08/08/22 122.5 kg (270 lb)   07/21/22 130.6 kg (288 lb)   02/21/22 126 kg (277 lb 12.8 oz)       PHYSICAL EXAM  Right lateral leg wound is smaller, less drainage, no infection  The left leg wound has moderate drainage, measures larger, no infection  Pertinent items are noted in HPI. Assessment:   Right leg improving, left leg not improving    Problem List Items Addressed This Visit       Cellulitis and abscess of left lower extremity    Relevant Medications    lidocaine (XYLOCAINE) 2 % viscous solution 15 mL (Completed)    Other Relevant Orders    INITIATE OUTPATIENT WOUND CARE PROTOCOL    Non-pressure chronic ulcer of other part of right lower leg with 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 ([address] - Primary    Relevant Medications    lidocaine (XYLOCAINE) 2 % viscous solution 15 mL (Completed)    Other Relevant Orders    INITIATE OUTPATIENT WOUND CARE PROTOCOL    Non-pressure chronic ulcer of other part of left lower leg with 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 ([address]    Relevant Medications    lidocaine (XYLOCAINE) 2 % viscous solution 15 mL (Completed)    Other Relevant Orders    INITIATE OUTPATIENT WOUND CARE PROTOCOL    Ulcer of foot, chronic, right, with 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 (HCC)    Relevant Medications    lidocaine (XYLOCAINE) 2 % viscous solution 15 mL (Completed)    Other Relevant Orders    INITIATE OUTPATIENT WOUND CARE PROTOCOL       Procedure Note  Indications:  Based on my examination of this patient's wound(s)/ulcer(s) today, debridement is not required to promote healing and evaluate the wound base.     Wound Leg Left #1 circumferential 08/08/22 (Active)   Wound Image   02/27/23 1115 Wound Etiology Other (Comment) 02/27/23 1114   Dressing Status Breakthrough drainage noted 02/27/23 1114   Cleansed Soap and water 02/27/23 1114   Wound Length (cm) 16.5 cm 02/27/23 1114   Wound Width (cm) 16.5 cm 02/27/23 1114   Wound Depth (cm) 0.1 cm 02/27/23 1114   Wound Surface Area (cm^2) 272.25 cm^2 02/27/23 1114   Change in Wound Size % 51.38 02/27/23 1114   Wound Volume (cm^3) 27.225 cm^3 02/27/23 1114   Wound Healing % 51 02/27/23 1114   Post-Procedure Length (cm) 15 cm 02/20/23 1049   Post-Procedure Width (cm) 13 cm 02/20/23 1049   Post-Procedure Depth (cm) 0.1 cm 02/20/23 1049   Post-Procedure Surface Area (cm^2) 195 cm^2 02/20/23 1049   Post-Procedure Volume (cm^3) 19.5 cm^3 02/20/23 1049   Wound Assessment Slough;Granulation tissue 02/27/23 1114   Drainage Amount Large 02/27/23 1114   Drainage Description Serosanguinous 02/27/23 1114   Wound Odor Moderate 02/27/23 1114   Dinorah-Wound/Incision Assessment Maceration; Hyperpigmented;Dry/flaky 02/27/23 1114   Edges Undefined edges 02/27/23 1114   Wound Thickness Description Full thickness 02/27/23 1114   Number of days: 203       Wound Leg Right #2 circumferential 10/31/22 (Active)   Wound Image   02/27/23 1113   Wound Etiology Other (Comment) 02/27/23 1113   Dressing Status Clean;Dry; Intact 02/27/23 1113   Cleansed Soap and water 02/20/23 1029   Wound Length (cm) 9 cm 02/27/23 1113   Wound Width (cm) 8.3 cm 02/27/23 1113   Wound Depth (cm) 0.1 cm 02/27/23 1113   Wound Surface Area (cm^2) 74.7 cm^2 02/27/23 1113   Change in Wound Size % 71.92 02/27/23 1113   Wound Volume (cm^3) 7.47 cm^3 02/27/23 1113   Wound Healing % 72 02/27/23 1113   Post-Procedure Length (cm) 8.5 cm 02/20/23 1049   Post-Procedure Width (cm) 10 cm 02/20/23 1049   Post-Procedure Depth (cm) 0.1 cm 02/20/23 1049   Post-Procedure Surface Area (cm^2) 85 cm^2 02/20/23 1049   Post-Procedure Volume (cm^3) 8.5 cm^3 02/20/23 1049   Wound Assessment Slough;Granulation tissue 02/27/23 1113 Drainage Amount Moderate 02/27/23 1113   Drainage Description Serosanguinous 02/27/23 1113   Wound Odor Moderate 02/27/23 1113   Dinorah-Wound/Incision Assessment Maceration; Hyperpigmented;Dry/flaky 02/27/23 1113   Edges Undefined edges 02/27/23 1113   Wound Thickness Description Full thickness 02/27/23 1113   Number of days: 119        Plan:   Xeroform to the right lateral leg wound, double drawtex to the left lateral leg wound  Bilateral Unna boot     Treatment Note please see attached Discharge Instructions    Written patient dismissal instructions given to patient or POA.          Electronically signed by John Laboy MD on 2/27/2023 at 12:13 PM

## 2023-03-06 ENCOUNTER — HOSPITAL ENCOUNTER (OUTPATIENT)
Dept: WOUND CARE | Age: 78
Discharge: HOME | End: 2023-03-06
Payer: MEDICARE

## 2023-03-06 VITALS
DIASTOLIC BLOOD PRESSURE: 81 MMHG | HEART RATE: 82 BPM | TEMPERATURE: 96.4 F | RESPIRATION RATE: 18 BRPM | SYSTOLIC BLOOD PRESSURE: 142 MMHG

## 2023-03-06 DIAGNOSIS — L02.416 CELLULITIS AND ABSCESS OF LEFT LOWER EXTREMITY: ICD-10-CM

## 2023-03-06 DIAGNOSIS — L97.812 NON-PRESSURE CHRONIC ULCER OF OTHER PART OF RIGHT LOWER LEG WITH FAT LAYER EXPOSED (HCC): Primary | ICD-10-CM

## 2023-03-06 DIAGNOSIS — L97.512 ULCER OF FOOT, CHRONIC, RIGHT, WITH FAT LAYER EXPOSED (HCC): ICD-10-CM

## 2023-03-06 DIAGNOSIS — L03.116 CELLULITIS AND ABSCESS OF LEFT LOWER EXTREMITY: ICD-10-CM

## 2023-03-06 DIAGNOSIS — L97.822 NON-PRESSURE CHRONIC ULCER OF OTHER PART OF LEFT LOWER LEG WITH FAT LAYER EXPOSED (HCC): ICD-10-CM

## 2023-03-06 PROCEDURE — 11045 DBRDMT SUBQ TISS EACH ADDL: CPT

## 2023-03-06 PROCEDURE — 11042 DBRDMT SUBQ TIS 1ST 20SQCM/<: CPT

## 2023-03-06 PROCEDURE — 11045 DBRDMT SUBQ TISS EACH ADDL: CPT | Performed by: SPECIALIST

## 2023-03-06 PROCEDURE — 74011000250 HC RX REV CODE- 250: Performed by: SPECIALIST

## 2023-03-06 RX ORDER — MUPIROCIN 20 MG/G
OINTMENT TOPICAL ONCE
OUTPATIENT
Start: 2023-03-06 | End: 2023-03-06

## 2023-03-06 RX ORDER — LIDOCAINE HYDROCHLORIDE 20 MG/ML
15 SOLUTION OROPHARYNGEAL ONCE
OUTPATIENT
Start: 2023-03-06 | End: 2023-03-06

## 2023-03-06 RX ORDER — SILVER SULFADIAZINE 10 G/1000G
CREAM TOPICAL ONCE
OUTPATIENT
Start: 2023-03-06 | End: 2023-03-06

## 2023-03-06 RX ORDER — LIDOCAINE HYDROCHLORIDE 20 MG/ML
15 SOLUTION OROPHARYNGEAL ONCE
Status: COMPLETED | OUTPATIENT
Start: 2023-03-06 | End: 2023-03-06

## 2023-03-06 RX ADMIN — LIDOCAINE HYDROCHLORIDE 15 ML: 20 SOLUTION ORAL; TOPICAL at 11:20

## 2023-03-06 NOTE — WOUND CARE
[address]   Progress Note and Procedure Note     Valarie Bond. MEDICAL RECORD NUMBER:  [id number]  AGE: 65 y.o. RACE BLACK/  GENDER: male  : 1945  EPISODE DATE:  3/6/2023    Subjective:   C only coming once a week  Still using lymphedema pump daily  Chief Complaint   Patient presents with    Wound Check     R and L leg          HISTORY of PRESENT ILLNESS HPI    Valarie Ortiz is a 65 y.o. male who presents today for wound/ulcer evaluation. History of Wound Context: BLE  Wound/Ulcer Pain Timing/Severity: mild  Quality of pain: aching  Severity:  1 / 10   Modifying Factors: None  Associated Signs/Symptoms: edema    Ulcer Identification:  Ulcer Type: venous    Contributing Factors: lymphedema    Wound: BLE         PAST MEDICAL HISTORY    Past Medical History:   Diagnosis Date    Dyslipidemia     HTN (hypertension)         PAST SURGICAL HISTORY    Past Surgical History:   Procedure Laterality Date    HX CHOLECYSTECTOMY         FAMILY HISTORY    Family History   Problem Relation Age of Onset    Diabetes Mother     Diabetes Brother     Diabetes Son        SOCIAL HISTORY    Social History     Tobacco Use    Smoking status: Never    Smokeless tobacco: Never   Vaping Use    Vaping Use: Never used   Substance Use Topics    Alcohol use: Never    Drug use: Never       ALLERGIES    No Known Allergies    MEDICATIONS    Current Outpatient Medications on File Prior to Encounter   Medication Sig Dispense Refill    multivitamin (ONE A DAY) tablet Take 1 Tablet by mouth in the morning. furosemide (LASIX) 40 mg tablet Take 40 mg by mouth two (2) times a day. vitamin E, dl,tocopheryl acet, (vitamin E acetate) 400 unit capsule Take 400 Units by mouth daily. aspirin (ASPIRIN) 325 mg tablet Take 325 mg by mouth daily. No current facility-administered medications on file prior to encounter.        REVIEW OF SYSTEMS    Pertinent items are noted in HPI.    Objective:     Visit Vitals  BP (!) 142/81 (BP 1 Location: Left arm, BP Patient Position: At rest;Sitting)   Pulse 82   Temp (!) 96.4 °F (35.8 °C)   Resp 18       Wt Readings from Last 3 Encounters:   08/08/22 122.5 kg (270 lb)   07/21/22 130.6 kg (288 lb)   02/21/22 126 kg (277 lb 12.8 oz)       PHYSICAL EXAM  R & L lateral leg wound measurements not significantly changed, slough debrided, no evidence of infection  Pertinent items are noted in HPI.     Assessment:    Minimal change  Problem List Items Addressed This Visit       Cellulitis and abscess of left lower extremity    Relevant Medications    lidocaine (XYLOCAINE) 2 % viscous solution 15 mL (Completed) (Start on 3/6/2023 12:00 PM)    Other Relevant Orders    INITIATE OUTPATIENT WOUND CARE PROTOCOL    Non-pressure chronic ulcer of other part of right lower leg with 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 ([address] - Primary    Relevant Medications    lidocaine (XYLOCAINE) 2 % viscous solution 15 mL (Completed) (Start on 3/6/2023 12:00 PM)    Other Relevant Orders    INITIATE OUTPATIENT WOUND CARE PROTOCOL    Non-pressure chronic ulcer of other part of left lower leg with 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 ([address]    Relevant Medications    lidocaine (XYLOCAINE) 2 % viscous solution 15 mL (Completed) (Start on 3/6/2023 12:00 PM)    Other Relevant Orders    INITIATE OUTPATIENT WOUND CARE PROTOCOL    Ulcer of foot, chronic, right, with 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 (HCC)    Relevant Medications    lidocaine (XYLOCAINE) 2 % viscous solution 15 mL (Completed) (Start on 3/6/2023 12:00 PM)    Other Relevant Orders    INITIATE OUTPATIENT WOUND CARE PROTOCOL       Wound Leg Left #1 circumferential 08/08/22 (Active)   Wound Image   03/06/23 1109   Wound Etiology Other (Comment) 03/06/23 1109   Dressing Status Breakthrough drainage noted 03/06/23 1109   Cleansed Soap and water 03/06/23 1109   Wound Length (cm) 17 cm 03/06/23 1109   Wound Width (cm) 19 cm 03/06/23 1109   Wound Depth (cm) 0.1 cm 03/06/23 1109   Wound Surface Area (cm^2) 323 cm^2 03/06/23 1109   Change in Wound Size % 42.32 03/06/23 1109   Wound Volume (cm^3) 32.3 cm^3 03/06/23 1109   Wound Healing % 42 03/06/23 1109   Post-Procedure Length (cm) 17 cm 03/06/23 1124   Post-Procedure Width (cm) 19 cm 03/06/23 1124   Post-Procedure Depth (cm) 0.1 cm 03/06/23 1124   Post-Procedure Surface Area (cm^2) 323 cm^2 03/06/23 1124   Post-Procedure Volume (cm^3) 32.3 cm^3 03/06/23 1124   Wound Assessment Slough;Granulation tissue 03/06/23 1109   Drainage Amount Large 03/06/23 1109   Drainage Description Serosanguinous 03/06/23 1109   Wound Odor Moderate 03/06/23 1109   Dinorah-Wound/Incision Assessment Maceration; Hyperpigmented;Dry/flaky 03/06/23 1109   Edges Undefined edges 03/06/23 1109   Wound Thickness Description Full thickness 03/06/23 1109   Number of days: 210       Wound Leg Right #2 circumferential 10/31/22 (Active)   Wound Image   03/06/23 1109   Wound Etiology Other (Comment) 03/06/23 1109   Dressing Status Clean;Dry; Intact 03/06/23 1109   Cleansed Soap and water 03/06/23 1109   Wound Length (cm) 8.5 cm 03/06/23 1109   Wound Width (cm) 11.5 cm 03/06/23 1109   Wound Depth (cm) 0.1 cm 03/06/23 1109   Wound Surface Area (cm^2) 97.75 cm^2 03/06/23 1109   Change in Wound Size % 63.25 03/06/23 1109   Wound Volume (cm^3) 9.775 cm^3 03/06/23 1109   Wound Healing % 63 03/06/23 1109   Post-Procedure Length (cm) 8.5 cm 03/06/23 1124   Post-Procedure Width (cm) 11.5 cm 03/06/23 1124   Post-Procedure Depth (cm) 0.1 cm 03/06/23 1124   Post-Procedure Surface Area (cm^2) 97.75 cm^2 03/06/23 1124   Post-Procedure Volume (cm^3) 9.775 cm^3 03/06/23 1124   Wound Assessment Slough;Granulation tissue 03/06/23 1109   Drainage Amount Moderate 03/06/23 1109   Drainage Description Serosanguinous 03/06/23 1109   Wound Odor Moderate 03/06/23 1109   Dinorah-Wound/Incision Assessment Hyperpigmented; Maceration 03/06/23 1109   Edges Undefined edges 03/06/23 1109   Wound Thickness Description Full thickness 03/06/23 1109   Number of days: 126       POP IN PROCEDURE TYPE (DEBRIDEMENT, BIOPSY, I&D, SKIN SUB, CHEMICAL CAUERTY)     Plan:   R - xeroform; L - silvercel, drawtex; bilateral Unna boot  Treatment Note please see attached Discharge Instructions    Written patient dismissal instructions given to patient or POA. Electronically signed by Zachary wOens MD on 3/6/2023 at 11:42 AM               Debridement Wound Care        Problem List Items Addressed This Visit       Cellulitis and abscess of left lower extremity    Relevant Medications    lidocaine (XYLOCAINE) 2 % viscous solution 15 mL (Completed) (Start on 3/6/2023 12:00 PM)    Other Relevant Orders    INITIATE OUTPATIENT WOUND CARE PROTOCOL    Non-pressure chronic ulcer of other part of right lower leg with 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 ([address] - Primary    Relevant Medications    lidocaine (XYLOCAINE) 2 % viscous solution 15 mL (Completed) (Start on 3/6/2023 12:00 PM)    Other Relevant Orders    INITIATE OUTPATIENT WOUND CARE PROTOCOL    Non-pressure chronic ulcer of other part of left lower leg with 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 ([address]    Relevant Medications    lidocaine (XYLOCAINE) 2 % viscous solution 15 mL (Completed) (Start on 3/6/2023 12:00 PM)    Other Relevant Orders    INITIATE OUTPATIENT WOUND CARE PROTOCOL    Ulcer of foot, chronic, right, with 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 ([address]    Relevant Medications    lidocaine (XYLOCAINE) 2 % viscous solution 15 mL (Completed) (Start on 3/6/2023 12:00 PM)    Other Relevant Orders    INITIATE OUTPATIENT WOUND CARE PROTOCOL       Procedure Note  Indications:  Based on my examination of this patient's wound(s)/ulcer(s) today, debridement is required to promote healing and evaluate the wound base.     Performed by: Zachary Owens MD    Consent obtained: Yes    Time out taken: Yes    Debridement: Excisional    Using curette the wound(s)/ulcer(s) was/were sharply debrided down through and including the removal of subcutaneous tissue    Devitalized Tissue Debrided: slough    Pre Debridement Measurements:  Are located in the Wound/Ulcer Documentation Flow Sheet    Non-Pressure ulcer, 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    Wound/Ulcer #: 1 and 2    Post Debridement Measurements:  Wound/Ulcer Descriptions are Pre Debridement except measurements:    Wound Leg Left #1 circumferential 08/08/22 (Active)   Wound Image   03/06/23 1109   Wound Etiology Other (Comment) 03/06/23 1109   Dressing Status Breakthrough drainage noted 03/06/23 1109   Cleansed Soap and water 03/06/23 1109   Wound Length (cm) 17 cm 03/06/23 1109   Wound Width (cm) 19 cm 03/06/23 1109   Wound Depth (cm) 0.1 cm 03/06/23 1109   Wound Surface Area (cm^2) 323 cm^2 03/06/23 1109   Change in Wound Size % 42.32 03/06/23 1109   Wound Volume (cm^3) 32.3 cm^3 03/06/23 1109   Wound Healing % 42 03/06/23 1109   Post-Procedure Length (cm) 17 cm 03/06/23 1124   Post-Procedure Width (cm) 19 cm 03/06/23 1124   Post-Procedure Depth (cm) 0.1 cm 03/06/23 1124   Post-Procedure Surface Area (cm^2) 323 cm^2 03/06/23 1124   Post-Procedure Volume (cm^3) 32.3 cm^3 03/06/23 1124   Wound Assessment Slough;Granulation tissue 03/06/23 1109   Drainage Amount Large 03/06/23 1109   Drainage Description Serosanguinous 03/06/23 1109   Wound Odor Moderate 03/06/23 1109   Dinorah-Wound/Incision Assessment Maceration; Hyperpigmented;Dry/flaky 03/06/23 1109   Edges Undefined edges 03/06/23 1109   Wound Thickness Description Full thickness 03/06/23 1109   Number of days: 210       Wound Leg Right #2 circumferential 10/31/22 (Active)   Wound Image   03/06/23 1109   Wound Etiology Other (Comment) 03/06/23 1109   Dressing Status Clean;Dry; Intact 03/06/23 1109   Cleansed Soap and water 03/06/23 1109   Wound Length (cm) 8.5 cm 03/06/23 1109   Wound Width (cm) 11.5 cm 03/06/23 1109   Wound Depth (cm) 0.1 cm 03/06/23 1109   Wound Surface Area (cm^2) 97.75 cm^2 03/06/23 1109   Change in Wound Size % 63.25 03/06/23 1109   Wound Volume (cm^3) 9.775 cm^3 03/06/23 1109   Wound Healing % 63 03/06/23 1109   Post-Procedure Length (cm) 8.5 cm 03/06/23 1124   Post-Procedure Width (cm) 11.5 cm 03/06/23 1124   Post-Procedure Depth (cm) 0.1 cm 03/06/23 1124   Post-Procedure Surface Area (cm^2) 97.75 cm^2 03/06/23 1124   Post-Procedure Volume (cm^3) 9.775 cm^3 03/06/23 1124   Wound Assessment Slough;Granulation tissue 03/06/23 1109   Drainage Amount Moderate 03/06/23 1109   Drainage Description Serosanguinous 03/06/23 1109   Wound Odor Moderate 03/06/23 1109   Dinorah-Wound/Incision Assessment Hyperpigmented; Maceration 03/06/23 1109   Edges Undefined edges 03/06/23 1109   Wound Thickness Description Full thickness 03/06/23 1109   Number of days: 126        Total Surface Area Debrided:  420 sq cm     Estimated Blood Loss:  None    Hemostasis Achieved: Pressure    Procedural Pain: 2 / 10     Post Procedural Pain: 1 / 10     Response to treatment: Well tolerated by patient

## 2023-03-06 NOTE — DISCHARGE INSTRUCTIONS
Discharge Instructions for  [address] [place] [address], [address] [place] [address]  Telephone: [phone]    NAME:  Sinan Mike. YOB: 1945  MEDICAL RECORD NUMBER:  [id number]  DATE:  3/6/2023      Return Appointment:  [] Dressing supply provider:   [x] Home Healthcare: [facility]   [x] Return Appointment: 1 Week(s)  [] Nurse Visit: Week(s)    [] [facility]charge from [facility]  [] Ordered tests:   [x] Referrals: Please see a podiatrist for toe nails  [] Rx:   [x] Other: Use Lymphedema pump twice a day x 1 hour each    Wound Cleansing:   Do not scrub or use excessive force. Cleanse wound prior to applying a clean dressing with:  [] Normal Saline   [x] Mild Soap & Water/Baby Shampoo   [] Keep Wound Dry in Shower  [] Other:      Topical Treatments:  Do not apply lotions, creams, or ointments to wound bed unless directed. [x] Apply moisturizing lotion to skin surrounding the wound prior to dressing change.  [] Apply antifungal ointment to skin surrounding the wound prior to dressing change.  [] Apply thin film of moisture barrier/zinc ointment to skin immediately around wound. [] Other:       Dressings:           Wound Location L leg   [x] Apply Primary Dressing:       [] MediHoney Gel    [] MediHoney Alginate               [] Calcium Alginate      [] Calcium Alginate with Silver   [] Collagen                   [] Collagen with Silver                [] Santyl with Moistened saline gauze              [] Hydrofera Blue (cut to size and moistened)  [] Hydrofera Blue Ready (Cut to size)   [] Vashe wet to dry    [] Betadine wet to dry              [] Hydrogel                [] Mepitel     [] Mepilex Transfer                [x] Other: Drawtex    [x] Cover and Secure with:     [x] Gauze [] Jona Smith [] Kerlix   [] Foam [] Super Absorbant [x] ABD     [] ACE Wrap            [] Other:    Limit contact of tape with skin.     [x] Change dressing: [] Daily [] Every Other Day [] Once per week   [] Twice per week   [x] Three times per week every week  [] Do Not Change Dressing   [] Other:     Dressings:           Wound Location  R leg   [x] Apply Primary Dressing:       [] MediHoney Gel    [] MediHoney Alginate               [] Calcium Alginate      [] Calcium Alginate with Silver   [] Collagen                   [] Collagen with Silver                [] Santyl with Moistened saline gauze              [] Hydrofera Blue (cut to size and moistened)  [] Hydrofera Blue Ready (Cut to size)   [] Vashe wet to dry    [] Betadine wet to dry              [] Hydrogel                [] Mepitel     [] Mepilex Transfer                [x] Other: Xeroform    [x] Cover and Secure with:     [x] Gauze [] Ab Sieve [] Kerlix   [] Foam [x] Super Absorbant [x] ABD     [] ACE Wrap            [] Other:    Limit contact of tape with skin. [x] Change dressing: [] Daily    [] Every Other Day [] Once per week   [] Twice per week   [x] Three times per week every week  [] Do Not Change Dressing   [] Other:    Edema Control:  Apply: [] Compression Stocking:  mmHg  []Right Leg []Left Leg   [] Tubigrip []Right Leg Double Layer []Left Leg Double Layer      []Right Leg Single Layer []Left Leg Single Layer     [] Elevate leg(s) above the level of the heart when sitting. [] Avoid prolonged standing in one place. Compression:  Apply: [x] Multilayer Compression Wrap: [x]RightLeg [x]Left Leg                                 []Coflex   []Coflex Lite             [x]Unna Lite     [x] Do not get leg(s) with wrap wet. If wraps become too tight call the center or completely remove the wrap. [x] Elevate leg(s) above the level of the heart when sitting. [x] Avoid prolonged standing in one place.     Dietary:  [x] Diet as tolerated: [] Calorie Diabetic Diet: [x] No Added Salt:  [x] Increase Protein: [] Other:     Activity:  [x] Activity as tolerated:    [] Patient has no activity restrictions       [] Strict Bedrest:   [] Remain off Work:       [] May return to full duty work:                                     [] Return to work with restrictions:               [address] Information: Should you experience any significant changes in your wound(s) or have questions about your wound care, please contact [address] at [place] [address] 8:00 am - 4:30. If you need help with your wound outside of these hours and cannot wait until we are again available, contact your PCP or go to the hospital emergency room. PLEASE NOTE: IF YOU ARE UNABLE TO OBTAIN WOUND SUPPLIES, CONTINUE TO USE THE SUPPLIES YOU HAVE AVAILABLE UNTIL YOU ARE ABLE TO REACH US. IT IS MOST IMPORTANT TO KEEP THE WOUND COVERED AT ALL TIMES.     [x] Dr. Carlos Stark   [] Dr. Mony Jeffries  [] Dr. Telma Acosta

## 2023-03-06 NOTE — WOUND CARE
Discharge Instructions for  [address] [place] [address], [address] [place] [address]  Telephone: [phone]    NAME:  Juan C Merchant. YOB: 1945  MEDICAL RECORD NUMBER:  [id number]  DATE:  3/6/2023      Return Appointment:  [] Dressing supply provider:   [x] Home Healthcare: [facility]   [x] Return Appointment: 1 Week(s)  [] Nurse Visit: Week(s)    [] Discharge from [facility]  [] Ordered tests:   [x] Referrals: Please see a podiatrist for toe nails  [] Rx:   [x] Other: Use Lymphedema pump twice a day x 1 hour each    Wound Cleansing:   Do not scrub or use excessive force. Cleanse wound prior to applying a clean dressing with:  [] Normal Saline   [x] Mild Soap & Water/Baby Shampoo   [] Keep Wound Dry in Shower  [] Other:      Topical Treatments:  Do not apply lotions, creams, or ointments to wound bed unless directed. [x] Apply moisturizing lotion to skin surrounding the wound prior to dressing change.  [] Apply antifungal ointment to skin surrounding the wound prior to dressing change.  [] Apply thin film of moisture barrier/zinc ointment to skin immediately around wound. [] Other:       Dressings:           Wound Location L leg   [x] Apply Primary Dressing:       [] MediHoney Gel    [] MediHoney Alginate               [] Calcium Alginate      [] Calcium Alginate with Silver   [] Collagen                   [] Collagen with Silver                [] Santyl with Moistened saline gauze              [] Hydrofera Blue (cut to size and moistened)  [] Hydrofera Blue Ready (Cut to size)   [] Vashe wet to dry    [] Betadine wet to dry              [] Hydrogel                [] Mepitel     [] Mepilex Transfer                [x] Other: Drawtex    [x] Cover and Secure with:     [x] Gauze [] Palmer Meline [] Kerlix   [] Foam [] Super Absorbant [x] ABD     [] ACE Wrap            [] Other:    Limit contact of tape with skin.     [x] Change dressing: [] Daily [] Every Other Day [] Once per week   [] Twice per week   [x] Three times per week every week  [] Do Not Change Dressing   [] Other:     Dressings:           Wound Location  R leg   [x] Apply Primary Dressing:       [] MediHoney Gel    [] MediHoney Alginate               [] Calcium Alginate      [] Calcium Alginate with Silver   [] Collagen                   [] Collagen with Silver                [] Santyl with Moistened saline gauze              [] Hydrofera Blue (cut to size and moistened)  [] Hydrofera Blue Ready (Cut to size)   [] Vashe wet to dry    [] Betadine wet to dry              [] Hydrogel                [] Mepitel     [] Mepilex Transfer                [x] Other: Xeroform    [x] Cover and Secure with:     [x] Gauze [] Kaleen Joan [] Kerlix   [] Foam [x] Super Absorbant [x] ABD     [] ACE Wrap            [] Other:    Limit contact of tape with skin. [x] Change dressing: [] Daily    [] Every Other Day [] Once per week   [] Twice per week   [x] Three times per week every week  [] Do Not Change Dressing   [] Other:    Edema Control:  Apply: [] Compression Stocking:  mmHg  []Right Leg []Left Leg   [] Tubigrip []Right Leg Double Layer []Left Leg Double Layer      []Right Leg Single Layer []Left Leg Single Layer     [] Elevate leg(s) above the level of the heart when sitting. [] Avoid prolonged standing in one place. Compression:  Apply: [x] Multilayer Compression Wrap: [x]RightLeg [x]Left Leg                                 []Coflex   []Coflex Lite             [x]Unna Lite     [x] Do not get leg(s) with wrap wet. If wraps become too tight call the center or completely remove the wrap. [x] Elevate leg(s) above the level of the heart when sitting. [x] Avoid prolonged standing in one place.     Dietary:  [x] Diet as tolerated: [] Calorie Diabetic Diet: [x] No Added Salt:  [x] Increase Protein: [] Other:     Activity:  [x] Activity as tolerated:    [] Patient has no activity restrictions       [] Strict Bedrest:   [] Remain off Work:       [] May return to full duty work:                                     [] Return to work with restrictions:               [address] Information: Should you experience any significant changes in your wound(s) or have questions about your wound care, please contact Errol Pereira 26 at [place] [id number] 8:00 am - 4:30. If you need help with your wound outside of these hours and cannot wait until we are again available, contact your PCP or go to the hospital emergency room. PLEASE NOTE: IF YOU ARE UNABLE TO OBTAIN WOUND SUPPLIES, CONTINUE TO USE THE SUPPLIES YOU HAVE AVAILABLE UNTIL YOU ARE ABLE TO REACH US. IT IS MOST IMPORTANT TO KEEP THE WOUND COVERED AT ALL TIMES.     [x] Dr. Feliz Stauffer   [] Dr. Colleen Vogt  [] Dr. Janet Hoyoss

## 2023-03-06 NOTE — WOUND CARE
[employer] Application   Below Knee    NAME:  Funmi Pop. YOB: 1945  MEDICAL RECORD NUMBER:  [id number]  DATE:  3/6/2023    Remove old [employer] or Boots. Appied primary and secondary dressing as ordered. Applied Unna roll from toes to knee overlapping each time. Applied ace wrap or coban from toes to below the knee. Secured with tape and/or metal clips covered with tape. Instructed patient/caregiver to keep dressing dry and intact. DO NOT REMOVE DRESSING. Instructed pt/family/caregiver to report excessive draining, loose bandage, wet dressing, severe pain or tingling in toes. Applied [employer] dressing below the knee to bilateral lower legs. Unna Boot(s) were applied per  Guidelines.     Response to treatment: Well tolerated by patient      Electronically signed by Ami Artis RN on 3/6/2023 at 11:39 AM

## 2023-03-13 ENCOUNTER — HOSPITAL ENCOUNTER (OUTPATIENT)
Dept: WOUND CARE | Age: 78
Discharge: HOME | End: 2023-03-13
Payer: MEDICARE

## 2023-03-13 VITALS
DIASTOLIC BLOOD PRESSURE: 92 MMHG | RESPIRATION RATE: 20 BRPM | TEMPERATURE: 95.9 F | HEART RATE: 90 BPM | SYSTOLIC BLOOD PRESSURE: 155 MMHG

## 2023-03-13 DIAGNOSIS — L03.116 CELLULITIS AND ABSCESS OF LEFT LOWER EXTREMITY: ICD-10-CM

## 2023-03-13 DIAGNOSIS — L97.822 NON-PRESSURE CHRONIC ULCER OF OTHER PART OF LEFT LOWER LEG WITH FAT LAYER EXPOSED (HCC): ICD-10-CM

## 2023-03-13 DIAGNOSIS — L97.812 NON-PRESSURE CHRONIC ULCER OF OTHER PART OF RIGHT LOWER LEG WITH FAT LAYER EXPOSED (HCC): Primary | ICD-10-CM

## 2023-03-13 DIAGNOSIS — L97.512 ULCER OF FOOT, CHRONIC, RIGHT, WITH FAT LAYER EXPOSED (HCC): ICD-10-CM

## 2023-03-13 DIAGNOSIS — L02.416 CELLULITIS AND ABSCESS OF LEFT LOWER EXTREMITY: ICD-10-CM

## 2023-03-13 PROCEDURE — 74011000250 HC RX REV CODE- 250: Performed by: SPECIALIST

## 2023-03-13 PROCEDURE — 11045 DBRDMT SUBQ TISS EACH ADDL: CPT | Performed by: SPECIALIST

## 2023-03-13 PROCEDURE — 11042 DBRDMT SUBQ TIS 1ST 20SQCM/<: CPT

## 2023-03-13 PROCEDURE — 11045 DBRDMT SUBQ TISS EACH ADDL: CPT

## 2023-03-13 RX ORDER — LIDOCAINE HYDROCHLORIDE 20 MG/ML
15 SOLUTION OROPHARYNGEAL ONCE
Status: COMPLETED | OUTPATIENT
Start: 2023-03-13 | End: 2023-03-13

## 2023-03-13 RX ORDER — SILVER SULFADIAZINE 10 G/1000G
CREAM TOPICAL ONCE
OUTPATIENT
Start: 2023-03-13 | End: 2023-03-13

## 2023-03-13 RX ORDER — MUPIROCIN 20 MG/G
OINTMENT TOPICAL ONCE
OUTPATIENT
Start: 2023-03-13 | End: 2023-03-13

## 2023-03-13 RX ORDER — LIDOCAINE HYDROCHLORIDE 20 MG/ML
15 SOLUTION OROPHARYNGEAL ONCE
OUTPATIENT
Start: 2023-03-13 | End: 2023-03-13

## 2023-03-13 RX ADMIN — LIDOCAINE HYDROCHLORIDE 15 ML: 20 SOLUTION ORAL; TOPICAL at 13:42

## 2023-03-13 NOTE — WOUND CARE
Discharge Instructions for  [address] [place] [address], [address] [place] [address]  Telephone: [phone]    NAME:  Melva Garcia. YOB: 1945  MEDICAL RECORD NUMBER:  [id number]  DATE:  3/13/2023      Return Appointment:  [] Dressing supply provider:   [x] Home Healthcare: [facility]   [x] Return Appointment: 2 Week(s)  [] Nurse Visit: Week(s)    [] Discharge from [facility]  [] Ordered tests:   [] Referrals:  [] Rx:   [x] Other: Use Lymphedema pump 3x a day x 1 hour each    Wound Cleansing:   Do not scrub or use excessive force. Cleanse wound prior to applying a clean dressing with:  [] Normal Saline   [x] Mild Soap & Water/Baby Shampoo   [] Keep Wound Dry in Shower  [] Other:      Topical Treatments:  Do not apply lotions, creams, or ointments to wound bed unless directed. [x] Apply moisturizing lotion to skin surrounding the wound prior to dressing change.  [] Apply antifungal ointment to skin surrounding the wound prior to dressing change.  [] Apply thin film of moisture barrier/zinc ointment to skin immediately around wound. [] Other:       Dressings:           Wound Location L leg   [x] Apply Primary Dressing:       [] MediHoney Gel    [] MediHoney Alginate               [] Calcium Alginate      [] Calcium Alginate with Silver   [] Collagen                   [] Collagen with Silver                [] Santyl with Moistened saline gauze              [] Hydrofera Blue (cut to size and moistened)  [] Hydrofera Blue Ready (Cut to size)   [] Vashe wet to dry    [] Betadine wet to dry              [] Hydrogel                [] Mepitel     [] Mepilex Transfer                [x] Other: Drawtex    [x] Cover and Secure with:     [x] Gauze [] Kt Sale [] Kerlix   [] Foam [] Super Absorbant [x] ABD     [] ACE Wrap            [] Other:    Limit contact of tape with skin.     [x] Change dressing: [] Daily    [] Every Other Day [] Once per week   [] Twice per week   [x] Three times per week every week  [] Do Not Change Dressing   [] Other:     Dressings:           Wound Location  R leg   [x] Apply Primary Dressing:       [] MediHoney Gel    [] MediHoney Alginate               [] Calcium Alginate      [] Calcium Alginate with Silver   [] Collagen                   [] Collagen with Silver                [] Santyl with Moistened saline gauze              [] Hydrofera Blue (cut to size and moistened)  [] Hydrofera Blue Ready (Cut to size)   [] Vashe wet to dry    [] Betadine wet to dry              [] Hydrogel                [] Mepitel     [] Mepilex Transfer                [x] Other: Xeroform    [x] Cover and Secure with:     [x] Gauze [] Shannan Lash [] Kerlix   [] Foam [x] Super Absorbant [x] ABD     [] ACE Wrap            [] Other:    Limit contact of tape with skin. [x] Change dressing: [] Daily    [] Every Other Day [] Once per week   [] Twice per week   [x] Three times per week every week  [] Do Not Change Dressing   [] Other:    Edema Control:  Apply: [] Compression Stocking:  mmHg  []Right Leg []Left Leg   [] Tubigrip []Right Leg Double Layer []Left Leg Double Layer      []Right Leg Single Layer []Left Leg Single Layer     [] Elevate leg(s) above the level of the heart when sitting. [] Avoid prolonged standing in one place. Compression:  Apply: [x] Multilayer Compression Wrap: [x]RightLeg [x]Left Leg                                 []Coflex   []Coflex Lite             [x]Unna Lite     [x] Do not get leg(s) with wrap wet. If wraps become too tight call the center or completely remove the wrap. [x] Elevate leg(s) above the level of the heart when sitting. [x] Avoid prolonged standing in one place.     Dietary:  [x] Diet as tolerated: [] Calorie Diabetic Diet: [x] No Added Salt:  [x] Increase Protein: [] Other:     Activity:  [x] Activity as tolerated:    [] Patient has no activity restrictions       [] Strict Bedrest:   [] Remain off Work: [] May return to full duty work:                                     [] Return to work with restrictions:               [address] Information: Should you experience any significant changes in your wound(s) or have questions about your wound care, please contact Errol Pereira 26 at [place] [address] 8:00 am - 4:30. If you need help with your wound outside of these hours and cannot wait until we are again available, contact your PCP or go to the hospital emergency room. PLEASE NOTE: IF YOU ARE UNABLE TO OBTAIN WOUND SUPPLIES, CONTINUE TO USE THE SUPPLIES YOU HAVE AVAILABLE UNTIL YOU ARE ABLE TO REACH US. IT IS MOST IMPORTANT TO KEEP THE WOUND COVERED AT ALL TIMES.     [x] Dr. Lois Aguilar   [] Dr. Rafal Recio  [] Dr. Abdullahi Mendenhall

## 2023-03-13 NOTE — DISCHARGE INSTRUCTIONS
Discharge Instructions for  [address] [place] [address], [address] [place] [address]  Telephone: [phone]    NAME:  Jennifer Hopkins. YOB: 1945  MEDICAL RECORD NUMBER:  [id number]  DATE:  3/13/2023      Return Appointment:  [] Dressing supply provider:   [x] Home Healthcare: [facility]   [x] Return Appointment: 2 Week(s)  [] Nurse Visit: Week(s)    [] Discharge from [facility]  [] Ordered tests:   [] Referrals:  [] Rx:   [x] Other: Use Lymphedema pump 3x a day x 1 hour each    Wound Cleansing:   Do not scrub or use excessive force. Cleanse wound prior to applying a clean dressing with:  [] Normal Saline   [x] Mild Soap & Water/Baby Shampoo   [] Keep Wound Dry in Shower  [] Other:      Topical Treatments:  Do not apply lotions, creams, or ointments to wound bed unless directed. [x] Apply moisturizing lotion to skin surrounding the wound prior to dressing change.  [] Apply antifungal ointment to skin surrounding the wound prior to dressing change.  [] Apply thin film of moisture barrier/zinc ointment to skin immediately around wound. [] Other:       Dressings:           Wound Location L leg   [x] Apply Primary Dressing:       [] MediHoney Gel    [] MediHoney Alginate               [] Calcium Alginate      [] Calcium Alginate with Silver   [] Collagen                   [] Collagen with Silver                [] Santyl with Moistened saline gauze              [] Hydrofera Blue (cut to size and moistened)  [] Hydrofera Blue Ready (Cut to size)   [] Vashe wet to dry    [] Betadine wet to dry              [] Hydrogel                [] Mepitel     [] Mepilex Transfer                [x] Other: Drawtex    [x] Cover and Secure with:     [x] Gauze [] Emery Daina [] Kerlix   [] Foam [] Super Absorbant [x] ABD     [] ACE Wrap            [] Other:    Limit contact of tape with skin.     [x] Change dressing: [] Daily    [] Every Other Day [] Once per week   [] Twice per week   [x] Three times per week every week  [] Do Not Change Dressing   [] Other:     Dressings:           Wound Location  R leg   [x] Apply Primary Dressing:       [] MediHoney Gel    [] MediHoney Alginate               [] Calcium Alginate      [] Calcium Alginate with Silver   [] Collagen                   [] Collagen with Silver                [] Santyl with Moistened saline gauze              [] Hydrofera Blue (cut to size and moistened)  [] Hydrofera Blue Ready (Cut to size)   [] Vashe wet to dry    [] Betadine wet to dry              [] Hydrogel                [] Mepitel     [] Mepilex Transfer                [x] Other: Xeroform    [x] Cover and Secure with:     [x] Gauze [] [place] Crisp [] Kerlix   [] Foam [x] Super Absorbant [x] ABD     [] ACE Wrap            [] Other:    Limit contact of tape with skin. [x] Change dressing: [] Daily    [] Every Other Day [] Once per week   [] Twice per week   [x] Three times per week every week  [] Do Not Change Dressing   [] Other:    Edema Control:  Apply: [] Compression Stocking:  mmHg  []Right Leg []Left Leg   [] Tubigrip []Right Leg Double Layer []Left Leg Double Layer      []Right Leg Single Layer []Left Leg Single Layer     [] Elevate leg(s) above the level of the heart when sitting. [] Avoid prolonged standing in one place. Compression:  Apply: [x] Multilayer Compression Wrap: [x]RightLeg [x]Left Leg                                 []Coflex   []Coflex Lite             [x]Unna Lite     [x] Do not get leg(s) with wrap wet. If wraps become too tight call the center or completely remove the wrap. [x] Elevate leg(s) above the level of the heart when sitting. [x] Avoid prolonged standing in one place.     Dietary:  [x] Diet as tolerated: [] Calorie Diabetic Diet: [x] No Added Salt:  [x] Increase Protein: [] Other:     Activity:  [x] Activity as tolerated:    [] Patient has no activity restrictions       [] Strict Bedrest:   [] Remain off Work: [] May return to full duty work:                                     [] Return to work with restrictions:               [address] Information: Should you experience any significant changes in your wound(s) or have questions about your wound care, please contact Errol Pereira 26 at [place] [address] 8:00 am - 4:30. If you need help with your wound outside of these hours and cannot wait until we are again available, contact your PCP or go to the hospital emergency room. PLEASE NOTE: IF YOU ARE UNABLE TO OBTAIN WOUND SUPPLIES, CONTINUE TO USE THE SUPPLIES YOU HAVE AVAILABLE UNTIL YOU ARE ABLE TO REACH US. IT IS MOST IMPORTANT TO KEEP THE WOUND COVERED AT ALL TIMES.     [x] Dr. Idalia Larson   [] Dr. Brittany Gasca  [] Dr. Syeda Alexander

## 2023-03-13 NOTE — WOUND CARE
[employer] Application   Below Knee    NAME:  Severiano Dubois. YOB: 1945  MEDICAL RECORD NUMBER:  [id number]  DATE:  3/13/2023    Remove old [employer] or Boots. Applied moisturizing agent to dry skin as needed. Appied primary and secondary dressing as ordered. Applied Unna roll from toes to knee overlapping each time. Applied ace wrap or coban from toes to below the knee. Secured with tape and/or metal clips covered with tape. Instructed patient/caregiver to keep dressing dry and intact. DO NOT REMOVE DRESSING. Instructed pt/family/caregiver to report excessive draining, loose bandage, wet dressing, severe pain or tingling in toes. Applied [employer] dressing below the knee to bilateral lower legs. Unna Boot(s) were applied per  Guidelines.     Response to treatment: Well tolerated by patient      Electronically signed by Malina Gordon LPN on 9/82/7674 at 3:99 PM

## 2023-03-13 NOTE — WOUND CARE
[address]   Progress Note and Procedure Note     Daryl Rodríguez. MEDICAL RECORD NUMBER:  [id number]  AGE: 65 y.o. RACE BLACK/  GENDER: male  : 1945  EPISODE DATE:  3/13/2023    Subjective:   Patient reports that he is using lymphedema pump twice a day up to an hour and a half at each session    Chief Complaint   Patient presents with    Wound Check     Bilateral legs         HISTORY of PRESENT ILLNESS HPI    Daryl Rodríguez. is a 65 y.o. male who presents today for wound/ulcer evaluation. History of Wound Context: BLE  Wound/Ulcer Pain Timing/Severity: none  Quality of pain: dull  Severity:  0 / 10   Modifying Factors: None  Associated Signs/Symptoms: edema    Ulcer Identification:  Ulcer Type: venous    Contributing Factors: lymphedema    Wound: BLE         PAST MEDICAL HISTORY    Past Medical History:   Diagnosis Date    Dyslipidemia     HTN (hypertension)         PAST SURGICAL HISTORY    Past Surgical History:   Procedure Laterality Date    HX CHOLECYSTECTOMY         FAMILY HISTORY    Family History   Problem Relation Age of Onset    Diabetes Mother     Diabetes Brother     Diabetes Son        SOCIAL HISTORY    Social History     Tobacco Use    Smoking status: Never    Smokeless tobacco: Never   Vaping Use    Vaping Use: Never used   Substance Use Topics    Alcohol use: Never    Drug use: Never       ALLERGIES    No Known Allergies    MEDICATIONS    Current Outpatient Medications on File Prior to Encounter   Medication Sig Dispense Refill    multivitamin (ONE A DAY) tablet Take 1 Tablet by mouth in the morning. furosemide (LASIX) 40 mg tablet Take 40 mg by mouth two (2) times a day. vitamin E, dl,tocopheryl acet, (vitamin E acetate) 400 unit capsule Take 400 Units by mouth daily. aspirin (ASPIRIN) 325 mg tablet Take 325 mg by mouth daily. No current facility-administered medications on file prior to encounter.        REVIEW OF SYSTEMS    Pertinent items are noted in HPI. Objective:     Visit Vitals  BP (!) 155/92 (BP 1 Location: Right arm, BP Patient Position: At rest;Sitting)   Pulse 90   Temp (!) 95.9 °F (35.5 °C)   Resp 20       Wt Readings from Last 3 Encounters:   08/08/22 122.5 kg (270 lb)   07/21/22 130.6 kg (288 lb)   02/21/22 126 kg (277 lb 12.8 oz)       PHYSICAL EXAM  The right lateral leg wound measurements are not significantly changed, slough debrided, no infection  The left lateral leg wound measures slightly larger, slough debrided, no evidence of active infection    Pertinent items are noted in HPI.     Assessment:   No significant change    Problem List Items Addressed This Visit       Cellulitis and abscess of left lower extremity    Relevant Medications    lidocaine (XYLOCAINE) 2 % viscous solution 15 mL (Completed)    Other Relevant Orders    INITIATE OUTPATIENT WOUND CARE PROTOCOL    Non-pressure chronic ulcer of other part of right lower leg with 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 ([address] - Primary    Relevant Medications    lidocaine (XYLOCAINE) 2 % viscous solution 15 mL (Completed)    Other Relevant Orders    INITIATE OUTPATIENT WOUND CARE PROTOCOL    Non-pressure chronic ulcer of other part of left lower leg with 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 ([address]    Relevant Medications    lidocaine (XYLOCAINE) 2 % viscous solution 15 mL (Completed)    Other Relevant Orders    INITIATE OUTPATIENT WOUND CARE PROTOCOL    Ulcer of foot, chronic, right, with 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 (HCC)    Relevant Medications    lidocaine (XYLOCAINE) 2 % viscous solution 15 mL (Completed)    Other Relevant Orders    INITIATE OUTPATIENT WOUND CARE PROTOCOL       Wound Leg Left #1 circumferential 08/08/22 (Active)   Wound Image   03/13/23 1338   Wound Etiology Other (Comment) 03/06/23 1109   Dressing Status Breakthrough drainage noted 03/13/23 1338   Cleansed Soap and water 03/13/23 1338   Wound Length (cm) 16 cm 03/13/23 1338   Wound Width (cm) 18.5 cm 03/13/23 1338   Wound Depth (cm) 0.1 cm 03/13/23 1338   Wound Surface Area (cm^2) 296 cm^2 03/13/23 1338   Change in Wound Size % 47.14 03/13/23 1338   Wound Volume (cm^3) 29.6 cm^3 03/13/23 1338   Wound Healing % 47 03/13/23 1338   Post-Procedure Length (cm) 16 cm 03/13/23 1356   Post-Procedure Width (cm) 18.5 cm 03/13/23 1356   Post-Procedure Depth (cm) 0.1 cm 03/13/23 1356   Post-Procedure Surface Area (cm^2) 296 cm^2 03/13/23 1356   Post-Procedure Volume (cm^3) 29.6 cm^3 03/13/23 1356   Wound Assessment Slough;Granulation tissue 03/13/23 1338   Drainage Amount Copious 03/13/23 1338   Drainage Description Serosanguinous 03/13/23 1338   Wound Odor Mild 03/13/23 1338   Dinorah-Wound/Incision Assessment Maceration; Hyperpigmented;Dry/flaky 03/13/23 1338   Edges Undefined edges 03/13/23 1338   Wound Thickness Description Full thickness 03/13/23 1338   Number of days: 217       Wound Leg Right #2 circumferential 10/31/22 (Active)   Wound Image   03/13/23 1341   Wound Etiology Other (Comment) 03/13/23 1341   Dressing Status Clean; Intact;Dry 03/13/23 1341   Cleansed Soap and water 03/13/23 1341   Wound Length (cm) 9.5 cm 03/13/23 1341   Wound Width (cm) 11.5 cm 03/13/23 1341   Wound Depth (cm) 0.1 cm 03/13/23 1341   Wound Surface Area (cm^2) 109.25 cm^2 03/13/23 1341   Change in Wound Size % 58.93 03/13/23 1341   Wound Volume (cm^3) 10.925 cm^3 03/13/23 1341   Wound Healing % 59 03/13/23 1341   Post-Procedure Length (cm) 9.5 cm 03/13/23 1354   Post-Procedure Width (cm) 11.5 cm 03/13/23 1354   Post-Procedure Depth (cm) 0.1 cm 03/13/23 1354   Post-Procedure Surface Area (cm^2) 109.25 cm^2 03/13/23 1354   Post-Procedure Volume (cm^3) 10.925 cm^3 03/13/23 1354   Wound Assessment Slough;Granulation tissue 03/13/23 1341   Drainage Amount Moderate 03/13/23 1341   Drainage Description Serosanguinous 03/13/23 1341   Wound Odor Mild 03/13/23 1341   Dinorah-Wound/Incision Assessment Hyperpigmented; Maceration;Dry/flaky 03/13/23 1341   Edges Undefined edges 03/13/23 1341   Wound Thickness Description Full thickness 03/13/23 1341   Number of days: 133       POP IN PROCEDURE TYPE (DEBRIDEMENT, BIOPSY, I&D, SKIN SUB, CHEMICAL CAUERTY)     Plan:   Continue Xeroform to the right leg wound, double drawtex to the left leg wound, bilateral Unna boots  Hold off on vascular referral at this time per patient request  Consider [facility] at [facility] AND [facility] referral    Treatment Note please see attached Discharge Instructions    Written patient dismissal instructions given to patient or POA. Electronically signed by Santana Rojas MD on 3/13/2023 at 2:31 PM               Debridement Wound Care        Problem List Items Addressed This Visit       Cellulitis and abscess of left lower extremity    Relevant Medications    lidocaine (XYLOCAINE) 2 % viscous solution 15 mL (Completed)    Other Relevant Orders    INITIATE OUTPATIENT WOUND CARE PROTOCOL    Non-pressure chronic ulcer of other part of right lower leg with 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 ([address] - Primary    Relevant Medications    lidocaine (XYLOCAINE) 2 % viscous solution 15 mL (Completed)    Other Relevant Orders    INITIATE OUTPATIENT WOUND CARE PROTOCOL    Non-pressure chronic ulcer of other part of left lower leg with 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 ([address]    Relevant Medications    lidocaine (XYLOCAINE) 2 % viscous solution 15 mL (Completed)    Other Relevant Orders    INITIATE OUTPATIENT WOUND CARE PROTOCOL    Ulcer of foot, chronic, right, with 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 (HCC)    Relevant Medications    lidocaine (XYLOCAINE) 2 % viscous solution 15 mL (Completed)    Other Relevant Orders    INITIATE OUTPATIENT WOUND CARE PROTOCOL       Procedure Note  Indications:  Based on my examination of this patient's wound(s)/ulcer(s) today, debridement is required to promote healing and evaluate the wound base.     Performed by: Santana Rojas MD    Consent obtained: Yes    Time out taken: Yes    Debridement: Excisional    Using curette the wound(s)/ulcer(s) was/were sharply debrided down through and including the removal of    subcutaneous tissue    Devitalized Tissue Debrided: slough    Pre Debridement Measurements:  Are located in the Wound/Ulcer Documentation Flow Sheet    Non-Pressure ulcer, 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    Wound/Ulcer #: 1 and 2    Post Debridement Measurements:  Wound/Ulcer Descriptions are Pre Debridement except measurements:    Wound Leg Left #1 circumferential 08/08/22 (Active)   Wound Image   03/13/23 1338   Wound Etiology Other (Comment) 03/06/23 1109   Dressing Status Breakthrough drainage noted 03/13/23 1338   Cleansed Soap and water 03/13/23 1338   Wound Length (cm) 16 cm 03/13/23 1338   Wound Width (cm) 18.5 cm 03/13/23 1338   Wound Depth (cm) 0.1 cm 03/13/23 1338   Wound Surface Area (cm^2) 296 cm^2 03/13/23 1338   Change in Wound Size % 47.14 03/13/23 1338   Wound Volume (cm^3) 29.6 cm^3 03/13/23 1338   Wound Healing % 47 03/13/23 1338   Post-Procedure Length (cm) 16 cm 03/13/23 1356   Post-Procedure Width (cm) 18.5 cm 03/13/23 1356   Post-Procedure Depth (cm) 0.1 cm 03/13/23 1356   Post-Procedure Surface Area (cm^2) 296 cm^2 03/13/23 1356   Post-Procedure Volume (cm^3) 29.6 cm^3 03/13/23 1356   Wound Assessment Slough;Granulation tissue 03/13/23 1338   Drainage Amount Copious 03/13/23 1338   Drainage Description Serosanguinous 03/13/23 1338   Wound Odor Mild 03/13/23 1338   Dinorah-Wound/Incision Assessment Maceration; Hyperpigmented;Dry/flaky 03/13/23 1338   Edges Undefined edges 03/13/23 1338   Wound Thickness Description Full thickness 03/13/23 1338   Number of days: 217       Wound Leg Right #2 circumferential 10/31/22 (Active)   Wound Image   03/13/23 1341   Wound Etiology Other (Comment) 03/13/23 1341   Dressing Status Clean; Intact;Dry 03/13/23 1341   Cleansed Soap and water 03/13/23 1341   Wound Length (cm) 9.5 cm 03/13/23 1341   Wound Width (cm) 11.5 cm 03/13/23 1341   Wound Depth (cm) 0.1 cm 03/13/23 1341   Wound Surface Area (cm^2) 109.25 cm^2 03/13/23 1341   Change in Wound Size % 58.93 03/13/23 1341   Wound Volume (cm^3) 10.925 cm^3 03/13/23 1341   Wound Healing % 59 03/13/23 1341   Post-Procedure Length (cm) 9.5 cm 03/13/23 1354   Post-Procedure Width (cm) 11.5 cm 03/13/23 1354   Post-Procedure Depth (cm) 0.1 cm 03/13/23 1354   Post-Procedure Surface Area (cm^2) 109.25 cm^2 03/13/23 1354   Post-Procedure Volume (cm^3) 10.925 cm^3 03/13/23 1354   Wound Assessment Slough;Granulation tissue 03/13/23 1341   Drainage Amount Moderate 03/13/23 1341   Drainage Description Serosanguinous 03/13/23 1341   Wound Odor Mild 03/13/23 1341   Dinorah-Wound/Incision Assessment Hyperpigmented; Maceration;Dry/flaky 03/13/23 1341   Edges Undefined edges 03/13/23 1341   Wound Thickness Description Full thickness 03/13/23 1341   Number of days: 133        Total Surface Area Debrided:  405 sq cm     Estimated Blood Loss:  Minimal     Hemostasis Achieved: Pressure    Procedural Pain: 1 / 10     Post Procedural Pain: 0 / 10     Response to treatment: Well tolerated by patient

## 2023-03-27 ENCOUNTER — HOSPITAL ENCOUNTER (OUTPATIENT)
Dept: WOUND CARE | Age: 78
Discharge: HOME | End: 2023-03-27
Payer: MEDICARE

## 2023-03-27 VITALS
DIASTOLIC BLOOD PRESSURE: 83 MMHG | HEART RATE: 86 BPM | SYSTOLIC BLOOD PRESSURE: 145 MMHG | TEMPERATURE: 98.3 F | RESPIRATION RATE: 20 BRPM

## 2023-03-27 DIAGNOSIS — L97.512 ULCER OF FOOT, CHRONIC, RIGHT, WITH FAT LAYER EXPOSED (HCC): ICD-10-CM

## 2023-03-27 DIAGNOSIS — L03.116 CELLULITIS AND ABSCESS OF LEFT LOWER EXTREMITY: ICD-10-CM

## 2023-03-27 DIAGNOSIS — L02.416 CELLULITIS AND ABSCESS OF LEFT LOWER EXTREMITY: ICD-10-CM

## 2023-03-27 DIAGNOSIS — L97.822 NON-PRESSURE CHRONIC ULCER OF OTHER PART OF LEFT LOWER LEG WITH FAT LAYER EXPOSED (HCC): ICD-10-CM

## 2023-03-27 DIAGNOSIS — L97.812 NON-PRESSURE CHRONIC ULCER OF OTHER PART OF RIGHT LOWER LEG WITH FAT LAYER EXPOSED (HCC): Primary | ICD-10-CM

## 2023-03-27 PROCEDURE — 29580 STRAPPING UNNA BOOT: CPT

## 2023-03-27 PROCEDURE — 74011000250 HC RX REV CODE- 250: Performed by: SPECIALIST

## 2023-03-27 RX ORDER — LIDOCAINE HYDROCHLORIDE 20 MG/ML
15 SOLUTION OROPHARYNGEAL ONCE
Status: COMPLETED | OUTPATIENT
Start: 2023-03-27 | End: 2023-03-27

## 2023-03-27 RX ORDER — SILVER SULFADIAZINE 10 G/1000G
CREAM TOPICAL ONCE
OUTPATIENT
Start: 2023-03-27 | End: 2023-03-27

## 2023-03-27 RX ORDER — MUPIROCIN 20 MG/G
OINTMENT TOPICAL ONCE
OUTPATIENT
Start: 2023-03-27 | End: 2023-03-27

## 2023-03-27 RX ORDER — LIDOCAINE HYDROCHLORIDE 20 MG/ML
15 SOLUTION OROPHARYNGEAL ONCE
OUTPATIENT
Start: 2023-03-27 | End: 2023-03-27

## 2023-03-27 RX ADMIN — LIDOCAINE HYDROCHLORIDE 15 ML: 20 SOLUTION ORAL; TOPICAL at 10:22

## 2023-03-27 NOTE — WOUND CARE
[employer] Application   Below Knee    NAME:  Rowena Shah. YOB: 1945  MEDICAL RECORD NUMBER:  [id number]  DATE:  3/27/2023    Remove old [employer] or Boots. Appied primary and secondary dressing as ordered. Applied Unna roll from toes to knee overlapping each time. Applied ace wrap or coban from toes to below the knee. Secured with tape and/or metal clips covered with tape. Instructed patient/caregiver to keep dressing dry and intact. DO NOT REMOVE DRESSING. Instructed pt/family/caregiver to report excessive draining, loose bandage, wet dressing, severe pain or tingling in toes. Applied [employer] dressing below the knee to bilateral lower legs. Unna Boot(s) were applied per  Guidelines.     Response to treatment: Well tolerated by patient      Electronically signed by Armando Brewster RN on 3/27/2023 at 11:04 AM

## 2023-03-27 NOTE — DISCHARGE INSTRUCTIONS
Discharge Instructions for  [address] [place] [address], [facility]  Telephone: [phone]    NAME:  Jahaira Pappas. YOB: 1945  MEDICAL RECORD NUMBER:  [id number]  DATE:  3/27/2023      Return Appointment:  [] Dressing supply provider:   [x] Home Healthcare: [facility]   [x] Return Appointment: 2 Week(s)  [] Nurse Visit: Week(s)    [] Discharge from [facility]  [] Ordered tests:   [] Referrals:  [] Rx:   [x] Other: Use Lymphedema pump 3x a day x 1 hour each    Wound Cleansing:   Do not scrub or use excessive force. Cleanse wound prior to applying a clean dressing with:  [] Normal Saline   [x] Mild Soap & Water/Baby Shampoo   [] Keep Wound Dry in Shower  [] Other:      Topical Treatments:  Do not apply lotions, creams, or ointments to wound bed unless directed. [x] Apply moisturizing lotion to skin surrounding the wound prior to dressing change.  [] Apply antifungal ointment to skin surrounding the wound prior to dressing change.  [] Apply thin film of moisture barrier/zinc ointment to skin immediately around wound. [] Other:       Dressings:           Wound Location L leg   [x] Apply Primary Dressing:       [] MediHoney Gel    [] MediHoney Alginate               [] Calcium Alginate      [] Calcium Alginate with Silver   [] Collagen                   [] Collagen with Silver                [] Santyl with Moistened saline gauze              [] Hydrofera Blue (cut to size and moistened)  [] Hydrofera Blue Ready (Cut to size)   [] Vashe wet to dry    [] Betadine wet to dry              [] Hydrogel                [] Mepitel     [] Mepilex Transfer                [x] Other: Drawtex    [x] Cover and Secure with:     [x] Gauze [] Aundra Fester [] Kerlix   [] Foam [] Super Absorbant [x] ABD     [] ACE Wrap            [] Other:    Limit contact of tape with skin.     [x] Change dressing: [] Daily    [] Every Other Day [] Once per week   [] Twice per week   [x] Three times per week every week  [] Do Not Change Dressing   [] Other:     Dressings:           Wound Location  R leg   [x] Apply Primary Dressing:       [] MediHoney Gel    [] MediHoney Alginate               [] Calcium Alginate      [] Calcium Alginate with Silver   [] Collagen                   [] Collagen with Silver                [] Santyl with Moistened saline gauze              [] Hydrofera Blue (cut to size and moistened)  [] Hydrofera Blue Ready (Cut to size)   [] Vashe wet to dry    [] Betadine wet to dry              [] Hydrogel                [] Mepitel     [] Mepilex Transfer                [x] Other: Xeroform    [x] Cover and Secure with:     [x] Gauze [] Nan Maillard [] Kerlix   [] Foam [x] Super Absorbant [x] ABD     [] ACE Wrap            [] Other:    Limit contact of tape with skin. [x] Change dressing: [] Daily    [] Every Other Day [] Once per week   [] Twice per week   [x] Three times per week every week  [] Do Not Change Dressing   [] Other:    Edema Control:  Apply: [] Compression Stocking:  mmHg  []Right Leg []Left Leg   [] Tubigrip []Right Leg Double Layer []Left Leg Double Layer      []Right Leg Single Layer []Left Leg Single Layer     [] Elevate leg(s) above the level of the heart when sitting. [] Avoid prolonged standing in one place. Compression:  Apply: [x] Multilayer Compression Wrap: [x]RightLeg [x]Left Leg                                 []Coflex   []Coflex Lite             [x]Unna Lite     [x] Do not get leg(s) with wrap wet. If wraps become too tight call the center or completely remove the wrap. [x] Elevate leg(s) above the level of the heart when sitting. [x] Avoid prolonged standing in one place.     Dietary:  [x] Diet as tolerated: [] Calorie Diabetic Diet: [x] No Added Salt:  [x] Increase Protein: [] Other:     Activity:  [x] Activity as tolerated:    [] Patient has no activity restrictions       [] Strict Bedrest:   [] Remain off Work: [] May return to full duty work:                                     [] Return to work with restrictions:               [address] Information: Should you experience any significant changes in your wound(s) or have questions about your wound care, please contact Errol [address] at [place] [address] 8:00 am - 4:30. If you need help with your wound outside of these hours and cannot wait until we are again available, contact your PCP or go to the hospital emergency room. PLEASE NOTE: IF YOU ARE UNABLE TO OBTAIN WOUND SUPPLIES, CONTINUE TO USE THE SUPPLIES YOU HAVE AVAILABLE UNTIL YOU ARE ABLE TO REACH US. IT IS MOST IMPORTANT TO KEEP THE WOUND COVERED AT ALL TIMES.     [x] Dr. Marta Meléndez   [] Dr. Iban Hernandez  [] Dr. Rockwell Goes

## 2023-03-27 NOTE — WOUND CARE
Discharge Instructions for  [address] [place] [address], [address] [place] [address]  Telephone: [phone]    NAME:  Rosi Garcia. YOB: 1945  MEDICAL RECORD NUMBER:  [id number]  DATE:  3/27/2023      Return Appointment:  [] Dressing supply provider:   [x] Home Healthcare: [facility]   [x] Return Appointment: 2 Week(s)  [] Nurse Visit: Week(s)    [] Discharge from [facility]  [] Ordered tests:   [] Referrals:  [] Rx:   [x] Other: Use Lymphedema pump 3x a day x 1 hour each    Wound Cleansing:   Do not scrub or use excessive force. Cleanse wound prior to applying a clean dressing with:  [] Normal Saline   [x] Mild Soap & Water/Baby Shampoo   [] Keep Wound Dry in Shower  [] Other:      Topical Treatments:  Do not apply lotions, creams, or ointments to wound bed unless directed. [x] Apply moisturizing lotion to skin surrounding the wound prior to dressing change.  [] Apply antifungal ointment to skin surrounding the wound prior to dressing change.  [] Apply thin film of moisture barrier/zinc ointment to skin immediately around wound. [] Other:       Dressings:           Wound Location L leg   [x] Apply Primary Dressing:       [] MediHoney Gel    [] MediHoney Alginate               [] Calcium Alginate      [] Calcium Alginate with Silver   [] Collagen                   [] Collagen with Silver                [] Santyl with Moistened saline gauze              [] Hydrofera Blue (cut to size and moistened)  [] Hydrofera Blue Ready (Cut to size)   [] Vashe wet to dry    [] Betadine wet to dry              [] Hydrogel                [] Mepitel     [] Mepilex Transfer                [x] Other: Drawtex    [x] Cover and Secure with:     [x] Gauze [] Bobie Holiday [] Kerlix   [] Foam [] Super Absorbant [x] ABD     [] ACE Wrap            [] Other:    Limit contact of tape with skin.     [x] Change dressing: [] Daily    [] Every Other Day [] Once per week   [] Twice per week   [x] Three times per week every week  [] Do Not Change Dressing   [] Other:     Dressings:           Wound Location  R leg   [x] Apply Primary Dressing:       [] MediHoney Gel    [] MediHoney Alginate               [] Calcium Alginate      [] Calcium Alginate with Silver   [] Collagen                   [] Collagen with Silver                [] Santyl with Moistened saline gauze              [] Hydrofera Blue (cut to size and moistened)  [] Hydrofera Blue Ready (Cut to size)   [] Vashe wet to dry    [] Betadine wet to dry              [] Hydrogel                [] Mepitel     [] Mepilex Transfer                [x] Other: Xeroform    [x] Cover and Secure with:     [x] Gauze [] [place] Yuniel [] Kerlix   [] Foam [x] Super Absorbant [x] ABD     [] ACE Wrap            [] Other:    Limit contact of tape with skin. [x] Change dressing: [] Daily    [] Every Other Day [] Once per week   [] Twice per week   [x] Three times per week every week  [] Do Not Change Dressing   [] Other:    Edema Control:  Apply: [] Compression Stocking:  mmHg  []Right Leg []Left Leg   [] Tubigrip []Right Leg Double Layer []Left Leg Double Layer      []Right Leg Single Layer []Left Leg Single Layer     [] Elevate leg(s) above the level of the heart when sitting. [] Avoid prolonged standing in one place. Compression:  Apply: [x] Multilayer Compression Wrap: [x]RightLeg [x]Left Leg                                 []Coflex   []Coflex Lite             [x]Unna Lite     [x] Do not get leg(s) with wrap wet. If wraps become too tight call the center or completely remove the wrap. [x] Elevate leg(s) above the level of the heart when sitting. [x] Avoid prolonged standing in one place.     Dietary:  [x] Diet as tolerated: [] Calorie Diabetic Diet: [x] No Added Salt:  [x] Increase Protein: [] Other:     Activity:  [x] Activity as tolerated:    [] Patient has no activity restrictions       [] Strict Bedrest:   [] Remain off Work: [] May return to full duty work:                                     [] Return to work with restrictions:               [address] Information: Should you experience any significant changes in your wound(s) or have questions about your wound care, please contact Errol Pereira [address] at [place] [address] 8:00 am - 4:30. If you need help with your wound outside of these hours and cannot wait until we are again available, contact your PCP or go to the hospital emergency room. PLEASE NOTE: IF YOU ARE UNABLE TO OBTAIN WOUND SUPPLIES, CONTINUE TO USE THE SUPPLIES YOU HAVE AVAILABLE UNTIL YOU ARE ABLE TO REACH US. IT IS MOST IMPORTANT TO KEEP THE WOUND COVERED AT ALL TIMES.     [x] Dr. Omer Amado   [] Dr. Ashley Haile  [] Dr. Chucky Jorgensen

## 2023-03-27 NOTE — WOUND CARE
[address]   Progress Note and Procedure Note   NO Procedure      Jennie Mathews MEDICAL RECORD NUMBER:  [id number]  AGE: 65 y.o. RACE BLACK/  GENDER: male  : 1945  EPISODE DATE:  3/27/2023    Subjective:   No new problems  Chief Complaint   Patient presents with    Wound Check     Right and left leg         HISTORY of PRESENT ILLNESS HPI    Jennie Mathews is a 65 y.o. male who presents today for wound/ulcer evaluation. History of Wound Context: BLLE  Wound/Ulcer Pain Timing/Severity: mild  Quality of pain: aching  Severity:  1 / 10   Modifying Factors: Pain is relieved/improved with rest  Associated Signs/Symptoms: edema    Ulcer Identification:  Ulcer Type: venous    Contributing Factors: edema    Wound: BLE         PAST MEDICAL HISTORY    Past Medical History:   Diagnosis Date    Dyslipidemia     HTN (hypertension)         PAST SURGICAL HISTORY    Past Surgical History:   Procedure Laterality Date    HX CHOLECYSTECTOMY         FAMILY HISTORY    Family History   Problem Relation Age of Onset    Diabetes Mother     Diabetes Brother     Diabetes Son        SOCIAL HISTORY    Social History     Tobacco Use    Smoking status: Never    Smokeless tobacco: Never   Vaping Use    Vaping Use: Never used   Substance Use Topics    Alcohol use: Never    Drug use: Never       ALLERGIES    No Known Allergies    MEDICATIONS    Current Outpatient Medications on File Prior to Encounter   Medication Sig Dispense Refill    multivitamin (ONE A DAY) tablet Take 1 Tablet by mouth in the morning. furosemide (LASIX) 40 mg tablet Take 40 mg by mouth two (2) times a day. vitamin E, dl,tocopheryl acet, (vitamin E acetate) 400 unit capsule Take 400 Units by mouth daily. aspirin (ASPIRIN) 325 mg tablet Take 325 mg by mouth daily. No current facility-administered medications on file prior to encounter.        REVIEW OF SYSTEMS    Pertinent items are noted in HPI.    Objective:     Visit Vitals  BP (!) 145/83 (BP 1 Location: Right arm, BP Patient Position: At rest;Sitting)   Pulse 86   Temp 98.3 °F (36.8 °C)   Resp 20       Wt Readings from Last 3 Encounters:   08/08/22 122.5 kg (270 lb)   07/21/22 130.6 kg (288 lb)   02/21/22 126 kg (277 lb 12.8 oz)       PHYSICAL EXAM  No significant change in wound measurements of the right or left leg wounds, no evidence of infection  Pertinent items are noted in HPI. Assessment:   Little change  Problem List Items Addressed This Visit       Cellulitis and abscess of left lower extremity    Relevant Medications    lidocaine (XYLOCAINE) 2 % viscous solution 15 mL (Completed) (Start on 3/27/2023 11:00 AM)    Other Relevant Orders    INITIATE OUTPATIENT WOUND CARE PROTOCOL    Non-pressure chronic ulcer of other part of right lower leg with 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 ([address] - Primary    Relevant Medications    lidocaine (XYLOCAINE) 2 % viscous solution 15 mL (Completed) (Start on 3/27/2023 11:00 AM)    Other Relevant Orders    INITIATE OUTPATIENT WOUND CARE PROTOCOL    Non-pressure chronic ulcer of other part of left lower leg with 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 ([address]    Relevant Medications    lidocaine (XYLOCAINE) 2 % viscous solution 15 mL (Completed) (Start on 3/27/2023 11:00 AM)    Other Relevant Orders    INITIATE OUTPATIENT WOUND CARE PROTOCOL    Ulcer of foot, chronic, right, with 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 ([address]    Relevant Medications    lidocaine (XYLOCAINE) 2 % viscous solution 15 mL (Completed) (Start on 3/27/2023 11:00 AM)    Other Relevant Orders    INITIATE OUTPATIENT WOUND CARE PROTOCOL       Procedure Note  Indications:  Based on my examination of this patient's wound(s)/ulcer(s) today, debridement is not required to promote healing and evaluate the wound base. Wound Leg Left #1 circumferential 08/08/22 (Active)   Wound Image   03/27/23 1011   Wound Etiology Other (Comment) 03/27/23 1011   Dressing Status Clean;Dry; Intact 03/27/23 1011 Cleansed Soap and water 03/27/23 1011   Wound Length (cm) 15.5 cm 03/27/23 1011   Wound Width (cm) 18 cm 03/27/23 1011   Wound Depth (cm) 0.1 cm 03/27/23 1011   Wound Surface Area (cm^2) 279 cm^2 03/27/23 1011   Change in Wound Size % 50.18 03/27/23 1011   Wound Volume (cm^3) 27.9 cm^3 03/27/23 1011   Wound Healing % 50 03/27/23 1011   Post-Procedure Length (cm) 16 cm 03/13/23 1356   Post-Procedure Width (cm) 18.5 cm 03/13/23 1356   Post-Procedure Depth (cm) 0.1 cm 03/13/23 1356   Post-Procedure Surface Area (cm^2) 296 cm^2 03/13/23 1356   Post-Procedure Volume (cm^3) 29.6 cm^3 03/13/23 1356   Wound Assessment Slough;Granulation tissue 03/27/23 1011   Drainage Amount Large 03/27/23 1011   Drainage Description Serosanguinous 03/27/23 1011   Wound Odor Mild 03/27/23 1011   Dinorah-Wound/Incision Assessment Hyperpigmented;Dry/flaky 03/27/23 1011   Edges Undefined edges 03/27/23 1011   Wound Thickness Description Full thickness 03/27/23 1011   Number of days: 231       Wound Leg Right #2 circumferential 10/31/22 (Active)   Wound Image   03/27/23 1011   Wound Etiology Other (Comment) 03/27/23 1011   Dressing Status Clean; Intact;Dry 03/27/23 1011   Cleansed Soap and water 03/27/23 1011   Wound Length (cm) 9 cm 03/27/23 1011   Wound Width (cm) 11.5 cm 03/27/23 1011   Wound Depth (cm) 0.1 cm 03/27/23 1011   Wound Surface Area (cm^2) 103.5 cm^2 03/27/23 1011   Change in Wound Size % 61.09 03/27/23 1011   Wound Volume (cm^3) 10.35 cm^3 03/27/23 1011   Wound Healing % 61 03/27/23 1011   Post-Procedure Length (cm) 9.5 cm 03/13/23 1354   Post-Procedure Width (cm) 11.5 cm 03/13/23 1354   Post-Procedure Depth (cm) 0.1 cm 03/13/23 1354   Post-Procedure Surface Area (cm^2) 109.25 cm^2 03/13/23 1354   Post-Procedure Volume (cm^3) 10.925 cm^3 03/13/23 1354   Wound Assessment Slough;Granulation tissue 03/27/23 1011   Drainage Amount Moderate 03/27/23 1011   Drainage Description Serosanguinous 03/27/23 1011   Wound Odor Mild 03/27/23 1011   Dinorah-Wound/Incision Assessment Hyperpigmented;Dry/flaky 03/27/23 1011   Edges Undefined edges 03/27/23 1011   Wound Thickness Description Full thickness 03/27/23 1011   Number of days: 147        Plan:   Continue Xeroform to the right leg wound, drawtex to the left leg wound and bilateral Unna boots  Treatment Note please see attached Discharge Instructions    Written patient dismissal instructions given to patient or POA.          Electronically signed by Lorraine Lima MD on 3/27/2023 at 10:37 AM

## 2023-04-24 ENCOUNTER — HOSPITAL ENCOUNTER (OUTPATIENT)
Dept: WOUND CARE | Age: 78
Discharge: HOME | End: 2023-04-24
Payer: MEDICARE

## 2023-04-24 VITALS
SYSTOLIC BLOOD PRESSURE: 134 MMHG | RESPIRATION RATE: 20 BRPM | DIASTOLIC BLOOD PRESSURE: 70 MMHG | TEMPERATURE: 98.1 F | HEART RATE: 88 BPM

## 2023-04-24 DIAGNOSIS — L97.812 NON-PRESSURE CHRONIC ULCER OF OTHER PART OF RIGHT LOWER LEG WITH FAT LAYER EXPOSED (HCC): Primary | ICD-10-CM

## 2023-04-24 DIAGNOSIS — L97.512 ULCER OF FOOT, CHRONIC, RIGHT, WITH FAT LAYER EXPOSED (HCC): ICD-10-CM

## 2023-04-24 DIAGNOSIS — L97.822 NON-PRESSURE CHRONIC ULCER OF OTHER PART OF LEFT LOWER LEG WITH FAT LAYER EXPOSED (HCC): ICD-10-CM

## 2023-04-24 DIAGNOSIS — L03.116 CELLULITIS AND ABSCESS OF LEFT LOWER EXTREMITY: ICD-10-CM

## 2023-04-24 DIAGNOSIS — L02.416 CELLULITIS AND ABSCESS OF LEFT LOWER EXTREMITY: ICD-10-CM

## 2023-04-24 PROCEDURE — 11042 DBRDMT SUBQ TIS 1ST 20SQCM/<: CPT

## 2023-04-24 PROCEDURE — 11045 DBRDMT SUBQ TISS EACH ADDL: CPT

## 2023-04-24 PROCEDURE — 74011000250 HC RX REV CODE- 250: Performed by: SPECIALIST

## 2023-04-24 RX ORDER — SILVER SULFADIAZINE 10 G/1000G
CREAM TOPICAL ONCE
OUTPATIENT
Start: 2023-04-24 | End: 2023-04-24

## 2023-04-24 RX ORDER — MUPIROCIN 20 MG/G
OINTMENT TOPICAL ONCE
OUTPATIENT
Start: 2023-04-24 | End: 2023-04-24

## 2023-04-24 RX ORDER — LIDOCAINE HYDROCHLORIDE 20 MG/ML
15 SOLUTION OROPHARYNGEAL ONCE
OUTPATIENT
Start: 2023-04-24 | End: 2023-04-24

## 2023-04-24 RX ORDER — LIDOCAINE HYDROCHLORIDE 20 MG/ML
15 SOLUTION OROPHARYNGEAL ONCE
Status: COMPLETED | OUTPATIENT
Start: 2023-04-24 | End: 2023-04-24

## 2023-04-24 RX ADMIN — LIDOCAINE HYDROCHLORIDE 15 ML: 20 SOLUTION ORAL; TOPICAL at 11:32

## 2023-04-24 NOTE — DISCHARGE INSTRUCTIONS
Discharge Instructions for  [address] [place] [address], [address] [place] [address]  Telephone: [phone]    NAME:  Marie Dalton. YOB: 1945  MEDICAL RECORD NUMBER:  [id number]  DATE:  4/24/2023      Return Appointment:  [] Dressing supply provider:   [x] Home Healthcare: [facility]  [x] Return Appointment: 2 Week(s)  [] Nurse Visit: Week(s)    [] Discharge from [facility]  [] Ordered tests:   [] Referrals:  [] Rx:   [x] Other: Use Lymphedema pump 3x a day x 1 hour each    Wound Cleansing:   Do not scrub or use excessive force. Cleanse wound prior to applying a clean dressing with:  [] Normal Saline   [x] Mild Soap & Water/Baby Shampoo   [] Keep Wound Dry in Shower  [] Other:      Topical Treatments:  Do not apply lotions, creams, or ointments to wound bed unless directed. [x] Apply moisturizing lotion to skin surrounding the wound prior to dressing change.  [] Apply antifungal ointment to skin surrounding the wound prior to dressing change.  [] Apply thin film of moisture barrier/zinc ointment to skin immediately around wound. [] Other:       Dressings:           Wound Location L leg   [x] Apply Primary Dressing:       [] MediHoney Gel    [] MediHoney Alginate               [] Calcium Alginate      [] Calcium Alginate with Silver   [] Collagen                   [] Collagen with Silver                [] Santyl with Moistened saline gauze              [] Hydrofera Blue (cut to size and moistened)  [] Hydrofera Blue Ready (Cut to size)   [] Vashe wet to dry    [] Betadine wet to dry              [] Hydrogel                [] Mepitel     [] Mepilex Transfer                [x] Other: Drawtex    [x] Cover and Secure with:     [x] Gauze [] Rebbeca Linen [] Kerlix   [] Foam [x] Super Absorbant [x] ABD     [] ACE Wrap            [] Other:    Limit contact of tape with skin.     [x] Change dressing: [] Daily    [] Every Other Day [] Once per week   [] Twice per week   [x] Three times per week every week  [] Do Not Change Dressing   [] Other:     Dressings:           Wound Location  R leg   [x] Apply Primary Dressing:       [] MediHoney Gel    [] MediHoney Alginate               [] Calcium Alginate      [] Calcium Alginate with Silver   [] Collagen                   [] Collagen with Silver                [] Santyl with Moistened saline gauze              [] Hydrofera Blue (cut to size and moistened)  [] Hydrofera Blue Ready (Cut to size)   [] Vashe wet to dry    [] Betadine wet to dry              [] Hydrogel                [] Mepitel     [] Mepilex Transfer                [x] Other: Drawtex    [x] Cover and Secure with:     [x] Gauze [] Henrieville Crisp [] Kerlix   [] Foam [x] Super Absorbant [x] ABD     [] ACE Wrap            [] Other:    Limit contact of tape with skin. [x] Change dressing: [] Daily    [] Every Other Day [] Once per week   [] Twice per week   [x] Three times per week every week  [] Do Not Change Dressing   [] Other:    Edema Control:  Apply: [] Compression Stocking:  mmHg  []Right Leg []Left Leg   [] Tubigrip []Right Leg Double Layer []Left Leg Double Layer      []Right Leg Single Layer []Left Leg Single Layer     [] Elevate leg(s) above the level of the heart when sitting. [] Avoid prolonged standing in one place. Compression:  Apply: [x] Multilayer Compression Wrap: [x]RightLeg [x]Left Leg                                 []Coflex   []Coflex Lite             [x]Unna Lite     [x] Do not get leg(s) with wrap wet. If wraps become too tight call the center or completely remove the wrap. [x] Elevate leg(s) above the level of the heart when sitting. [x] Avoid prolonged standing in one place.     Dietary:  [x] Diet as tolerated: [] Calorie Diabetic Diet: [x] No Added Salt:  [x] Increase Protein: [] Other:     Activity:  [x] Activity as tolerated:    [] Patient has no activity restrictions       [] Strict Bedrest:   [] Remain off Work:       [] May return to full duty work:                                     [] Return to work with restrictions:               [address]: Should you experience any significant changes in your wound(s) or have questions about your wound care, please contact Er[facility] [address] at [place] [phone] 8:00 am - 4:30. If you need help with your wound outside of these hours and cannot wait until we are again available, contact your PCP or go to the hospital emergency room. PLEASE NOTE: IF YOU ARE UNABLE TO OBTAIN WOUND SUPPLIES, CONTINUE TO USE THE SUPPLIES YOU HAVE AVAILABLE UNTIL YOU ARE ABLE TO REACH US. IT IS MOST IMPORTANT TO KEEP THE WOUND COVERED AT ALL TIMES.     [x] Dr. Domenic Ackerman   [] Dr. Fouzia Rubi  [] Dr. Nena Raymond

## 2023-04-24 NOTE — WOUND CARE
Discharge Instructions for  [address] [place] [address], [address] [place] [address]  Telephone: [phone]    NAME:  Martínez Salgado. YOB: 1945  MEDICAL RECORD NUMBER:  [id number]  DATE:  4/24/2023      Return Appointment:  [] Dressing supply provider:   [x] Home Healthcare: [facility]  [x] Return Appointment: 2 Week(s)  [] Nurse Visit: Week(s)    [] Discharge from [facility]  [] Ordered tests:   [] Referrals:  [] Rx:   [x] Other: Use Lymphedema pump 3x a day x 1 hour each    Wound Cleansing:   Do not scrub or use excessive force. Cleanse wound prior to applying a clean dressing with:  [] Normal Saline   [x] Mild Soap & Water/Baby Shampoo   [] Keep Wound Dry in Shower  [] Other:      Topical Treatments:  Do not apply lotions, creams, or ointments to wound bed unless directed. [x] Apply moisturizing lotion to skin surrounding the wound prior to dressing change.  [] Apply antifungal ointment to skin surrounding the wound prior to dressing change.  [] Apply thin film of moisture barrier/zinc ointment to skin immediately around wound. [] Other:       Dressings:           Wound Location L leg   [x] Apply Primary Dressing:       [] MediHoney Gel    [] MediHoney Alginate               [] Calcium Alginate      [] Calcium Alginate with Silver   [] Collagen                   [] Collagen with Silver                [] Santyl with Moistened saline gauze              [] Hydrofera Blue (cut to size and moistened)  [] Hydrofera Blue Ready (Cut to size)   [] Vashe wet to dry    [] Betadine wet to dry              [] Hydrogel                [] Mepitel     [] Mepilex Transfer                [x] Other: Drawtex    [x] Cover and Secure with:     [x] Gauze [] Orval  [] Kerlix   [] Foam [x] Super Absorbant [x] ABD     [] ACE Wrap            [] Other:    Limit contact of tape with skin.     [x] Change dressing: [] Daily    [] Every Other Day [] Once per week   [] Twice per week   [x] Three times per week every week  [] Do Not Change Dressing   [] Other:     Dressings:           Wound Location  R leg   [x] Apply Primary Dressing:       [] MediHoney Gel    [] MediHoney Alginate               [] Calcium Alginate      [] Calcium Alginate with Silver   [] Collagen                   [] Collagen with Silver                [] Santyl with Moistened saline gauze              [] Hydrofera Blue (cut to size and moistened)  [] Hydrofera Blue Ready (Cut to size)   [] Vashe wet to dry    [] Betadine wet to dry              [] Hydrogel                [] Mepitel     [] Mepilex Transfer                [x] Other: Drawtex    [x] Cover and Secure with:     [x] Gauze [] Radha Bruins [] Kerlix   [] Foam [x] Super Absorbant [x] ABD     [] ACE Wrap            [] Other:    Limit contact of tape with skin. [x] Change dressing: [] Daily    [] Every Other Day [] Once per week   [] Twice per week   [x] Three times per week every week  [] Do Not Change Dressing   [] Other:    Edema Control:  Apply: [] Compression Stocking:  mmHg  []Right Leg []Left Leg   [] Tubigrip []Right Leg Double Layer []Left Leg Double Layer      []Right Leg Single Layer []Left Leg Single Layer     [] Elevate leg(s) above the level of the heart when sitting. [] Avoid prolonged standing in one place. Compression:  Apply: [x] Multilayer Compression Wrap: [x]RightLeg [x]Left Leg                                 []Coflex   []Coflex Lite             [x]Unna Lite     [x] Do not get leg(s) with wrap wet. If wraps become too tight call the center or completely remove the wrap. [x] Elevate leg(s) above the level of the heart when sitting. [x] Avoid prolonged standing in one place.     Dietary:  [x] Diet as tolerated: [] Calorie Diabetic Diet: [x] No Added Salt:  [x] Increase Protein: [] Other:     Activity:  [x] Activity as tolerated:    [] Patient has no activity restrictions       [] Strict Bedrest:   [] Remain off Work:       [] May return to full duty work:                                     [] Return to work with restrictions:               Bri[address]: Should you experience any significant changes in your wound(s) or have questions about your wound care, please contact Errol [address] at [place] [phone] 8:00 am - 4:30. If you need help with your wound outside of these hours and cannot wait until we are again available, contact your PCP or go to the hospital emergency room. PLEASE NOTE: IF YOU ARE UNABLE TO OBTAIN WOUND SUPPLIES, CONTINUE TO USE THE SUPPLIES YOU HAVE AVAILABLE UNTIL YOU ARE ABLE TO REACH US. IT IS MOST IMPORTANT TO KEEP THE WOUND COVERED AT ALL TIMES.     [x] Dr. Lois Aguilar   [] Dr. Rafal Recio  [] Dr. Abdullahi Mendenhall

## 2023-04-24 NOTE — WOUND CARE
[employer] Application   Below Knee    NAME:  Antione Tejada. YOB: 1945  MEDICAL RECORD NUMBER:  [id number]  DATE:  4/24/2023    Remove old [employer] or Boots. Appied primary and secondary dressing as ordered. Applied Unna roll from toes to knee overlapping each time. Applied ace wrap or coban from toes to below the knee. Secured with tape and/or metal clips covered with tape. Instructed patient/caregiver to keep dressing dry and intact. DO NOT REMOVE DRESSING. Instructed pt/family/caregiver to report excessive draining, loose bandage, wet dressing, severe pain or tingling in toes. Applied [employer] dressing below the knee to bilateral lower legs. Unna Boot(s) were applied per  Guidelines.     Response to treatment: Well tolerated by patient      Electronically signed by Parviz Marinelli RN on 4/24/2023 at 11:57 AM

## 2023-04-24 NOTE — WOUND CARE
[address]   Progress Note and Procedure Note     Javier Helton. MEDICAL RECORD NUMBER:  [id number]  AGE: 65 y.o. RACE BLACK/  GENDER: male  : 1945  EPISODE DATE:  2023    Subjective:   No new problems  Chief Complaint   Patient presents with    Wound Check     R and L leg         HISTORY of PRESENT ILLNESS HPI    Javier Helton. is a 65 y.o. male who presents today for wound/ulcer evaluation. History of Wound Context: BLE  Wound/Ulcer Pain Timing/Severity: mild  Quality of pain: aching  Severity:  1 / 10   Modifying Factors: Pain is relieved/improved with rest  Associated Signs/Symptoms: edema    Ulcer Identification:  Ulcer Type: venous    Contributing Factors: lymphedema    Wound: BLE         PAST MEDICAL HISTORY    Past Medical History:   Diagnosis Date    Dyslipidemia     HTN (hypertension)         PAST SURGICAL HISTORY    Past Surgical History:   Procedure Laterality Date    HX CHOLECYSTECTOMY         FAMILY HISTORY    Family History   Problem Relation Age of Onset    Diabetes Mother     Diabetes Brother     Diabetes Son        SOCIAL HISTORY    Social History     Tobacco Use    Smoking status: Never    Smokeless tobacco: Never   Vaping Use    Vaping Use: Never used   Substance Use Topics    Alcohol use: Never    Drug use: Never       ALLERGIES    No Known Allergies    MEDICATIONS    Current Outpatient Medications on File Prior to Encounter   Medication Sig Dispense Refill    multivitamin (ONE A DAY) tablet Take 1 Tablet by mouth daily. furosemide (LASIX) 40 mg tablet Take 1 Tablet by mouth two (2) times a day. vitamin E, dl,tocopheryl acet, (vitamin E acetate) 400 unit capsule Take 1 Capsule by mouth daily. aspirin (ASPIRIN) 325 mg tablet Take 1 Tablet by mouth daily. No current facility-administered medications on file prior to encounter. REVIEW OF SYSTEMS    Pertinent items are noted in HPI.     Objective: Visit Vitals  /70 (BP 1 Location: Right upper arm, BP Patient Position: At rest;Sitting)   Pulse 88   Temp 98.1 °F (36.7 °C)   Resp 20       Wt Readings from Last 3 Encounters:   08/08/22 122.5 kg (270 lb)   07/21/22 130.6 kg (288 lb)   02/21/22 126 kg (277 lb 12.8 oz)       PHYSICAL EXAM  R & L lateral leg venous ulcerations not significantly changed in area measurements, no evidence of infection  Pertinent items are noted in HPI. Assessment:   No significant change in BLE wounds     Problem List Items Addressed This Visit       Cellulitis and abscess of left lower extremity    Relevant Medications    lidocaine (XYLOCAINE) 2 % viscous solution 15 mL (Completed) (Start on 4/24/2023 12:00 PM)    Other Relevant Orders    INITIATE OUTPATIENT WOUND CARE PROTOCOL    Non-pressure chronic ulcer of other part of right lower leg with 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 ([address] - Primary    Relevant Medications    lidocaine (XYLOCAINE) 2 % viscous solution 15 mL (Completed) (Start on 4/24/2023 12:00 PM)    Other Relevant Orders    INITIATE OUTPATIENT WOUND CARE PROTOCOL    Non-pressure chronic ulcer of other part of left lower leg with 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 ([address]    Relevant Medications    lidocaine (XYLOCAINE) 2 % viscous solution 15 mL (Completed) (Start on 4/24/2023 12:00 PM)    Other Relevant Orders    INITIATE OUTPATIENT WOUND CARE PROTOCOL    Ulcer of foot, chronic, right, with 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 ([address]    Relevant Medications    lidocaine (XYLOCAINE) 2 % viscous solution 15 mL (Completed) (Start on 4/24/2023 12:00 PM)    Other Relevant Orders    INITIATE OUTPATIENT WOUND CARE PROTOCOL       Wound Leg Left #1 circumferential 08/08/22 (Active)   Wound Image   04/24/23 1115   Wound Etiology Other (Comment) 04/24/23 1115   Dressing Status Clean;Dry; Intact 04/24/23 1115   Cleansed Soap and water 04/24/23 1115   Wound Length (cm) 16 cm 04/24/23 1115   Wound Width (cm) 18 cm 04/24/23 1115   Wound Depth (cm) 0.1 cm 04/24/23 1115   Wound Surface Area (cm^2) 288 cm^2 04/24/23 1115   Change in Wound Size % 48.57 04/24/23 1115   Wound Volume (cm^3) 28.8 cm^3 04/24/23 1115   Wound Healing % 49 04/24/23 1115   Post-Procedure Length (cm) 16 cm 04/24/23 1151   Post-Procedure Width (cm) 18 cm 04/24/23 1151   Post-Procedure Depth (cm) 0.1 cm 04/24/23 1151   Post-Procedure Surface Area (cm^2) 288 cm^2 04/24/23 1151   Post-Procedure Volume (cm^3) 28.8 cm^3 04/24/23 1151   Wound Assessment Slough;Granulation tissue 04/24/23 1115   Drainage Amount Large 04/24/23 1115   Drainage Description Serosanguinous 04/24/23 1115   Wound Odor Moderate 04/24/23 1115   Dinorah-Wound/Incision Assessment Hyperpigmented; Maceration;Dry/flaky 04/24/23 1115   Edges Attached edges 04/24/23 1115   Wound Thickness Description Full thickness 04/24/23 1115   Number of days: 259       Wound Leg Right #2 circumferential 10/31/22 (Active)   Wound Image   04/24/23 1115   Wound Etiology Other (Comment) 04/24/23 1115   Dressing Status Clean;Dry; Intact 04/24/23 1115   Cleansed Soap and water 04/24/23 1115   Wound Length (cm) 9 cm 04/24/23 1115   Wound Width (cm) 15 cm 04/24/23 1115   Wound Depth (cm) 0.1 cm 04/24/23 1115   Wound Surface Area (cm^2) 135 cm^2 04/24/23 1115   Change in Wound Size % 49.25 04/24/23 1115   Wound Volume (cm^3) 13.5 cm^3 04/24/23 1115   Wound Healing % 49 04/24/23 1115   Post-Procedure Length (cm) 9 cm 04/24/23 1151   Post-Procedure Width (cm) 15 cm 04/24/23 1151   Post-Procedure Depth (cm) 0.1 cm 04/24/23 1151   Post-Procedure Surface Area (cm^2) 135 cm^2 04/24/23 1151   Post-Procedure Volume (cm^3) 13.5 cm^3 04/24/23 1151   Wound Assessment Slough;Granulation tissue 04/24/23 1115   Drainage Amount Large 04/24/23 1115   Drainage Description Serosanguinous 04/24/23 1115   Wound Odor Mild 04/24/23 1115   Dinorah-Wound/Incision Assessment Hyperpigmented;Dry/flaky; Maceration 04/24/23 1115   Edges Attached edges 04/24/23 1115   Wound Thickness Description Full thickness 04/24/23 1115   Number of days: 175       POP IN PROCEDURE TYPE (DEBRIDEMENT, BIOPSY, I&D, SKIN SUB, CHEMICAL CAUERTY)     Plan:   Continue drawtex, Unna boots  Treatment Note please see attached Discharge Instructions    Written patient dismissal instructions given to patient or POA. Electronically signed by Suzanne Mosley MD on 4/24/2023 at 11:56 AM               Debridement Wound Care        Problem List Items Addressed This Visit       Cellulitis and abscess of left lower extremity    Relevant Medications    lidocaine (XYLOCAINE) 2 % viscous solution 15 mL (Completed) (Start on 4/24/2023 12:00 PM)    Other Relevant Orders    INITIATE OUTPATIENT WOUND CARE PROTOCOL    Non-pressure chronic ulcer of other part of right lower leg with 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 ([address] - Primary    Relevant Medications    lidocaine (XYLOCAINE) 2 % viscous solution 15 mL (Completed) (Start on 4/24/2023 12:00 PM)    Other Relevant Orders    INITIATE OUTPATIENT WOUND CARE PROTOCOL    Non-pressure chronic ulcer of other part of left lower leg with 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 ([address]    Relevant Medications    lidocaine (XYLOCAINE) 2 % viscous solution 15 mL (Completed) (Start on 4/24/2023 12:00 PM)    Other Relevant Orders    INITIATE OUTPATIENT WOUND CARE PROTOCOL    Ulcer of foot, chronic, right, with 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 ([address]    Relevant Medications    lidocaine (XYLOCAINE) 2 % viscous solution 15 mL (Completed) (Start on 4/24/2023 12:00 PM)    Other Relevant Orders    INITIATE OUTPATIENT WOUND CARE PROTOCOL       Procedure Note  Indications:  Based on my examination of this patient's wound(s)/ulcer(s) today, debridement is required to promote healing and evaluate the wound base.     Performed by: Suzanne Mosley MD    Consent obtained: Yes    Time out taken: Yes    Debridement: Excisional    Using curette the wound(s)/ulcer(s) was/were sharply debrided down through and including the removal of    subcutaneous tissue    Devitalized Tissue Debrided: slough    Pre Debridement Measurements:  Are located in the [place]  Documentation Flow Sheet    Non-Pressure ulcer, 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    Wound/Ulcer #: 1 and 2    Post Debridement Measurements:  Wound/Ulcer Descriptions are Pre Debridement except measurements:    Wound Leg Left #1 circumferential 08/08/22 (Active)   Wound Image   04/24/23 1115   Wound Etiology Other (Comment) 04/24/23 1115   Dressing Status Clean;Dry; Intact 04/24/23 1115   Cleansed Soap and water 04/24/23 1115   Wound Length (cm) 16 cm 04/24/23 1115   Wound Width (cm) 18 cm 04/24/23 1115   Wound Depth (cm) 0.1 cm 04/24/23 1115   Wound Surface Area (cm^2) 288 cm^2 04/24/23 1115   Change in Wound Size % 48.57 04/24/23 1115   Wound Volume (cm^3) 28.8 cm^3 04/24/23 1115   Wound Healing % 49 04/24/23 1115   Post-Procedure Length (cm) 16 cm 04/24/23 1151   Post-Procedure Width (cm) 18 cm 04/24/23 1151   Post-Procedure Depth (cm) 0.1 cm 04/24/23 1151   Post-Procedure Surface Area (cm^2) 288 cm^2 04/24/23 1151   Post-Procedure Volume (cm^3) 28.8 cm^3 04/24/23 1151   Wound Assessment Slough;Granulation tissue 04/24/23 1115   Drainage Amount Large 04/24/23 1115   Drainage Description Serosanguinous 04/24/23 1115   Wound Odor Moderate 04/24/23 1115   Dinorah-Wound/Incision Assessment Hyperpigmented; Maceration;Dry/flaky 04/24/23 1115   Edges Attached edges 04/24/23 1115   Wound Thickness Description Full thickness 04/24/23 1115   Number of days: 259       Wound Leg Right #2 circumferential 10/31/22 (Active)   Wound Image   04/24/23 1115   Wound Etiology Other (Comment) 04/24/23 1115   Dressing Status Clean;Dry; Intact 04/24/23 1115   Cleansed Soap and water 04/24/23 1115   Wound Length (cm) 9 cm 04/24/23 1115   Wound Width (cm) 15 cm 04/24/23 1115   Wound Depth (cm) 0.1 cm 04/24/23 1115   Wound Surface Area (cm^2) 135 cm^2 04/24/23 1115   Change in Wound Size % 49.25 04/24/23 1115   Wound Volume (cm^3) 13.5 cm^3 04/24/23 1115   Wound Healing % 49 04/24/23 1115   Post-Procedure Length (cm) 9 cm 04/24/23 1151   Post-Procedure Width (cm) 15 cm 04/24/23 1151   Post-Procedure Depth (cm) 0.1 cm 04/24/23 1151   Post-Procedure Surface Area (cm^2) 135 cm^2 04/24/23 1151   Post-Procedure Volume (cm^3) 13.5 cm^3 04/24/23 1151   Wound Assessment Slough;Granulation tissue 04/24/23 1115   Drainage Amount Large 04/24/23 1115   Drainage Description Serosanguinous 04/24/23 1115   Wound Odor Mild 04/24/23 1115   Dinorah-Wound/Incision Assessment Hyperpigmented;Dry/flaky; Maceration 04/24/23 1115   Edges Attached edges 04/24/23 1115   Wound Thickness Description Full thickness 04/24/23 1115   Number of days: 175        Total Surface Area Debrided:  423 sq cm   Minimal   Estimated Blood Loss:      Hemostasis Achieved: Pressure    Procedural Pain: 1 / 10     Post Procedural Pain: 0 / 10     Response to treatment: Well tolerated by patient

## 2023-05-08 ENCOUNTER — HOSPITAL ENCOUNTER (OUTPATIENT)
Facility: HOSPITAL | Age: 78
Discharge: HOME | End: 2023-05-08
Payer: MEDICARE

## 2023-05-08 VITALS
TEMPERATURE: 98.3 F | SYSTOLIC BLOOD PRESSURE: 122 MMHG | RESPIRATION RATE: 18 BRPM | DIASTOLIC BLOOD PRESSURE: 74 MMHG | BODY MASS INDEX: 39.4 KG/M2 | WEIGHT: 260 LBS | HEIGHT: 68 IN | HEART RATE: 93 BPM

## 2023-05-08 PROCEDURE — 11042 DBRDMT SUBQ TIS 1ST 20SQCM/<: CPT

## 2023-05-08 PROCEDURE — 11045 DBRDMT SUBQ TISS EACH ADDL: CPT

## 2023-05-08 RX ORDER — LIDOCAINE HYDROCHLORIDE 20 MG/ML
15 SOLUTION OROPHARYNGEAL PRN
Status: DISCONTINUED | OUTPATIENT
Start: 2023-05-08 | End: 2023-05-09 | Stop reason: HOSPADM

## 2023-05-08 ASSESSMENT — PAIN SCALES - GENERAL: PAINLEVEL_OUTOF10: 0

## 2023-05-08 NOTE — DISCHARGE INSTRUCTIONS
Wound Clinic Physician Orders and Discharge Instructions  [facility] 27  [address], [address]  Telephone: [phone]    NAME:  Rudy Fraser. YOB: 1945  MEDICAL RECORD NUMBER:  [id number]  DATE:  5/8/2023      Return Appointment:  [] Dressing Supply Provider:   [x] Home Healthcare: [facility]  [x] Return Appointment: 2 Week(s)  [] Nurse Visit:     [] Discharge from [facility]: [] Healed            [] Refer to Provider:    Follow-up Information:  [] Ordered Tests:   [] Referrals:   [] Rx:   [] Other:       Wound Cleansing:   Do not scrub or use excessive force. Cleanse wound prior to applying a clean dressing with:  [] Normal Saline   [] Keep Wound Dry in Shower     [] Wound Cleanser   [x] Cleanse wound with Mild Soap & Water    [] Other:       Topical Treatments:  Do not apply lotions, creams, or ointments to wound bed unless directed. [] Apply moisturizing lotion to skin surrounding the wound prior to dressing change.  [] Apply antifungal ointment to skin surrounding the wound prior to dressing change.  [] Apply thin film of moisture barrier ointment to skin immediately around wound.   [] Apply Betadine to skim immediately around wound      Dressings:           Wound Location R & L Legs    [x] Apply Primary Dressing:       [] MediHoney Gel [] MediHoney Alginate  [] Calcium Alginate with Silver   [] Calcium Alginate without silver   [] Collagen with silver [] Collagen without Silver    [] Santyl with moist saline gauze     [] Hydrofera Blue (cut to size and moistened with normal saline)  [] Hydrofera Blue Ready (cut to size)      [] Normal Saline wet to dry  [] Betadine wet to dry    [] Hydrogel  [] Mepitel     [] Bactroban/Mupirocin   [] Iodoform Packing Strip [] Plain Packing Strip   [] Skin Sub:   [x] Other:  Drawtex    [x] Cover and Secure with:     [x] Gauze [] Demetrius [] Kerlix   [] Foam  [x] Super

## 2023-05-08 NOTE — WOUND CARE
Wound Clinic Physician Orders and Discharge Instructions  [facility] 27  [address] , [address]  Telephone: [phone]    NAME:  Gamaliel Magana. YOB: 1945  MEDICAL RECORD NUMBER:  [id number]  DATE:  5/8/2023      Return Appointment:  [] Dressing Supply Provider:   [x] [facility]  [x] Return Appointment: 2 Week(s)  [] Nurse Visit:     [] Discharge from [facility]: [] Healed            [] Refer to Provider:    Follow-up Information:  [] Ordered Tests:   [] Referrals:   [] Rx:   [] Other:       Wound Cleansing:   Do not scrub or use excessive force. Cleanse wound prior to applying a clean dressing with:  [] Normal Saline   [] Keep Wound Dry in Shower     [] Wound Cleanser   [x] Cleanse wound with Mild Soap & Water    [] Other:       Topical Treatments:  Do not apply lotions, creams, or ointments to wound bed unless directed. [] Apply moisturizing lotion to skin surrounding the wound prior to dressing change.  [] Apply antifungal ointment to skin surrounding the wound prior to dressing change.  [] Apply thin film of moisture barrier ointment to skin immediately around wound.   [] Apply Betadine to skim immediately around wound      Dressings:           Wound Location R & L Legs    [x] Apply Primary Dressing:       [] MediHoney Gel [] MediHoney Alginate  [] Calcium Alginate with Silver   [] Calcium Alginate without silver   [] Collagen with silver [] Collagen without Silver    [] Santyl with moist saline gauze     [] Hydrofera Blue (cut to size and moistened with normal saline)  [] Hydrofera Blue Ready (cut to size)      [] Normal Saline wet to dry  [] Betadine wet to dry    [] Hydrogel  [] Mepitel     [] Bactroban/Mupirocin   [] Iodoform Packing Strip [] Plain Packing Strip   [] Skin Sub:   [x] Other:  Drawtex    [x] Cover and Secure with:     [x] Gauze [] Demetrius [] Kerlix   [] Foam  [x] Super

## 2023-05-08 NOTE — WOUND CARE
[address]   Progress Note and Procedure Note     Reinaldo Denver. MEDICAL RECORD NUMBER:  [id number]  AGE: 65 y.o. RACE Black /   GENDER: male  : 1945  EPISODE DATE:  2023    Subjective:   Less drainage  Chief Complaint   Patient presents with    Wound Check     Bilateral legs         HISTORY of PRESENT ILLNESS HPI    Reinaldo Denver. is a 65 y.o. male who presents today for wound/ulcer evaluation. History of Wound Context: BLE  Wound/Ulcer Pain Timing/Severity: Pain Assessment Wound: mild  Quality of pain: aching  Severity:  2 / 10   Modifying Factors: Modifying Factors Wound: Pain is relieved/improved with rest  Associated Signs/Symptoms: Associated Symptoms Wound: edema    Ulcer Identification:  Ulcer Type: Wound Type: venous    Contributing Factors: Health Factors: lymphedema      Wound: BLE         PAST MEDICAL HISTORY    Past Medical History:   Diagnosis Date    Dyslipidemia     Dyslipidemia     HTN (hypertension)     HTN (hypertension)         PAST SURGICAL HISTORY    Past Surgical History:   Procedure Laterality Date    CHOLECYSTECTOMY         FAMILY HISTORY    Family History   Problem Relation Age of Onset    Diabetes Mother     Diabetes Brother     Diabetes Son        SOCIAL HISTORY    Social History     Tobacco Use    Smoking status: Never    Smokeless tobacco: Never   Vaping Use    Vaping Use: Never used   Substance Use Topics    Alcohol use: Never    Drug use: Never       ALLERGIES    No Known Allergies    MEDICATIONS    Current Outpatient Medications on File Prior to Encounter   Medication Sig Dispense Refill    aspirin 325 MG tablet Take 1 tablet by mouth daily      furosemide (LASIX) 40 MG tablet Take 1 tablet by mouth 2 times daily      vitamin E 400 UNIT capsule Take 1 capsule by mouth daily       No current facility-administered medications on file prior to encounter.        REVIEW OF SYSTEMS    Pertinent items are noted in

## 2023-05-08 NOTE — WOUND CARE
[employer] Application   Below Knee    NAME:  Desean Lezama. YOB: 1945  MEDICAL RECORD NUMBER:  [id number]  DATE:  5/8/2023    Madhuri Crocker boot: Applied moisturizing agent to dry skin as needed. Appied primary and secondary dressing as ordered. Applied Unna roll from toes to knee overlapping each time. Applied ace wrap or coban from toes to below the knee. Secured with tape and/or metal clips covered with tape. Instructed patient/caregiver to keep dressing dry and intact. DO NOT REMOVE DRESSING. Instructed pt/family/caregiver to report excessive draining, loose bandage, wet dressing, severe pain or tingling in toes. Applied [employer] dressing below the knee to right lower leg. Applied [employer] dressing below the knee to left lower leg. Unna Boot(s) were applied per  Guidelines.      Electronically signed by Flor Galvez RN on 5/8/2023 at 12:02 PM

## 2023-05-22 ENCOUNTER — HOSPITAL ENCOUNTER (OUTPATIENT)
Facility: HOSPITAL | Age: 78
Discharge: HOME | End: 2023-05-22
Payer: MEDICARE

## 2023-05-22 VITALS
SYSTOLIC BLOOD PRESSURE: 146 MMHG | RESPIRATION RATE: 18 BRPM | DIASTOLIC BLOOD PRESSURE: 86 MMHG | HEART RATE: 75 BPM | TEMPERATURE: 98.1 F

## 2023-05-22 DIAGNOSIS — L97.822 NON-PRESSURE CHRONIC ULCER OF OTHER PART OF LEFT LOWER LEG WITH FAT LAYER EXPOSED (HCC): ICD-10-CM

## 2023-05-22 DIAGNOSIS — L97.812 NON-PRESSURE CHRONIC ULCER OF OTHER PART OF RIGHT LOWER LEG WITH FAT LAYER EXPOSED (HCC): Primary | ICD-10-CM

## 2023-05-22 PROCEDURE — 11042 DBRDMT SUBQ TIS 1ST 20SQCM/<: CPT

## 2023-05-22 PROCEDURE — 11045 DBRDMT SUBQ TISS EACH ADDL: CPT

## 2023-05-22 PROCEDURE — 29580 STRAPPING UNNA BOOT: CPT

## 2023-05-22 RX ORDER — SODIUM CHLOR/HYPOCHLOROUS ACID 0.033 %
SOLUTION, IRRIGATION IRRIGATION ONCE
OUTPATIENT
Start: 2023-05-22 | End: 2023-05-22

## 2023-05-22 RX ORDER — LIDOCAINE HYDROCHLORIDE 20 MG/ML
JELLY TOPICAL ONCE
Status: CANCELLED | OUTPATIENT
Start: 2023-05-22 | End: 2023-05-22

## 2023-05-22 RX ORDER — LIDOCAINE HYDROCHLORIDE 20 MG/ML
15 SOLUTION OROPHARYNGEAL PRN
Status: DISCONTINUED | OUTPATIENT
Start: 2023-05-22 | End: 2023-05-23 | Stop reason: HOSPADM

## 2023-05-22 RX ORDER — LIDOCAINE HYDROCHLORIDE 20 MG/ML
JELLY TOPICAL ONCE
OUTPATIENT
Start: 2023-05-22 | End: 2023-05-22

## 2023-05-22 RX ORDER — SODIUM CHLOR/HYPOCHLOROUS ACID 0.033 %
SOLUTION, IRRIGATION IRRIGATION ONCE
Status: CANCELLED | OUTPATIENT
Start: 2023-05-22 | End: 2023-05-22

## 2023-05-22 NOTE — WOUND CARE
[employer] Application   Below Knee    NAME:  Sandra Wetzel. YOB: 1945  MEDICAL RECORD NUMBER:  [id number]  DATE:  5/22/2023    Silvia Lemus boot: Appied primary and secondary dressing as ordered. Applied Unna roll from toes to knee overlapping each time. Applied ace wrap or coban from toes to below the knee. Secured with tape and/or metal clips covered with tape. Instructed patient/caregiver to keep dressing dry and intact. DO NOT REMOVE DRESSING. Instructed pt/family/caregiver to report excessive draining, loose bandage, wet dressing, severe pain or tingling in toes. Applied [employer] dressing below the knee to right lower leg. Applied [employer] dressing below the knee to left lower leg. Unna Boot(s) were applied per  Guidelines.      Electronically signed by Rolanda Nelson RN on 5/22/2023 at 11:28 AM

## 2023-05-22 NOTE — DISCHARGE INSTRUCTIONS
Wound Clinic Physician Orders and Discharge Instructions  [facility] 27  [address] , [address]  Telephone: [phone]    NAME:  Montse Mcmahon. YOB: 1945  MEDICAL RECORD NUMBER:  [id number]  DATE:  5/22/2023      Return Appointment:  [] Dressing Supply Provider:   [x] Home Healthcare: [facility]  [x] Return Appointment: 2 Week(s)  [] Nurse Visit:     [] Discharge from [facility]: [] Healed            [] Refer to Provider:    Follow-up Information:  [] Ordered Tests:   [] Referrals:   [] Rx:   [] Other:       Wound Cleansing:   Do not scrub or use excessive force. Cleanse wound prior to applying a clean dressing with:  [] Normal Saline   [] Keep Wound Dry in Shower     [] Wound Cleanser   [x] Cleanse wound with Mild Soap & Water    [] Other:       Topical Treatments:  Do not apply lotions, creams, or ointments to wound bed unless directed. [] Apply moisturizing lotion to skin surrounding the wound prior to dressing change.  [] Apply antifungal ointment to skin surrounding the wound prior to dressing change.  [] Apply thin film of moisture barrier ointment to skin immediately around wound.   [] Apply Betadine to skim immediately around wound      Dressings:           Wound Location R & L Legs    [x] Apply Primary Dressing:       [] MediHoney Gel [] MediHoney Alginate  [] Calcium Alginate with Silver   [] Calcium Alginate without silver   [] Collagen with silver [] Collagen without Silver    [] Santyl with moist saline gauze     [] Hydrofera Blue (cut to size and moistened with normal saline)  [] Hydrofera Blue Ready (cut to size)      [] Normal Saline wet to dry  [] Betadine wet to dry    [] Hydrogel  [] Mepitel     [] Bactroban/Mupirocin   [] Iodoform Packing Strip [] Plain Packing Strip   [] Skin Sub:   [x] Other:  Drawtex    [x] Cover and Secure with:     [x] Gauze [] Demetrius [] Kerlix   [] Foam  [x] Super

## 2023-05-22 NOTE — WOUND CARE
[address] [facility]   Medical Staff Progress Note     Manny Espinozae. MEDICAL RECORD NUMBER:  [id number]  AGE: 65 y.o. GENDER: male  : 1945  EPISODE DATE:  2023    Chief complaint and reason for visit:   BLE  Chief Complaint   Patient presents with    Wound Check     L and R leg      Patient presenting for follow up evaluation of wound(s) per chief complaint. Subjective: Symptoms, wound related issues, or other pertinent wound history since last visit: no changes     Wound 23 Leg Right #1 (Active)   Wound Image   23 1102   Wound Etiology Venous 23 1102   Dressing Status Clean;Dry; Intact 23 1102   Wound Cleansed Soap and water 23 1102   Wound Length (cm) 7.2 cm 23 1102   Wound Width (cm) 7.2 cm 23 1102   Wound Depth (cm) 0.1 cm 23 1102   Wound Surface Area (cm^2) 51.84 cm^2 23 1102   Change in Wound Size % (l*w) 19 23 1102   Wound Volume (cm^3) 5.184 cm^3 23 1102   Wound Healing % 19 23 1102   Post-Procedure Length (cm) 8 cm 23 1139   Post-Procedure Width (cm) 8 cm 23 1139   Post-Procedure Depth (cm) 0.1 cm 23 1139   Post-Procedure Surface Area (cm^2) 64 cm^2 23 1139   Post-Procedure Volume (cm^3) 6.4 cm^3 23 1139   Wound Assessment Slough;Granulation tissue 23 1102   Drainage Amount Large 23 1102   Drainage Description Serosanguinous 23 1102   Odor Mild 23 1102   Lyla-wound Assessment Maceration 23 1102   Margins Attached edges 23 1102   Wound Thickness Description not for Pressure Injury Full thickness 23 1102   Number of days: 14       Wound 23 Leg Left #2 (Active)   Wound Image   23 1102   Wound Etiology Venous 23 1102   Dressing Status Clean;Dry; Intact 23   Wound Cleansed Soap and water 23   Wound Length (cm) 13.5 cm 23   Wound

## 2023-05-22 NOTE — WOUND CARE
Wound Clinic Physician Orders and Discharge Instructions  [facility] 27  [address] , [address]  Telephone: [phone]    NAME:  Bridget Hendrickson. YOB: 1945  MEDICAL RECORD NUMBER:  [id number]  DATE:  5/22/2023      Return Appointment:  [] Dressing Supply Provider:   [x] Home Healthcare: [facility]  [x] Return Appointment: 2 Week(s)  [] Nurse Visit:     [] Discharge from [facility]: [] Healed            [] Refer to Provider:    Follow-up Information:  [] Ordered Tests:   [] Referrals:   [] Rx:   [] Other:       Wound Cleansing:   Do not scrub or use excessive force. Cleanse wound prior to applying a clean dressing with:  [] Normal Saline   [] Keep Wound Dry in Shower     [] Wound Cleanser   [x] Cleanse wound with Mild Soap & Water    [] Other:       Topical Treatments:  Do not apply lotions, creams, or ointments to wound bed unless directed. [] Apply moisturizing lotion to skin surrounding the wound prior to dressing change.  [] Apply antifungal ointment to skin surrounding the wound prior to dressing change.  [] Apply thin film of moisture barrier ointment to skin immediately around wound.   [] Apply Betadine to skim immediately around wound      Dressings:           Wound Location R & L Legs    [x] Apply Primary Dressing:       [] MediHoney Gel [] MediHoney Alginate  [] Calcium Alginate with Silver   [] Calcium Alginate without silver   [] Collagen with silver [] Collagen without Silver    [] Santyl with moist saline gauze     [] Hydrofera Blue (cut to size and moistened with normal saline)  [] Hydrofera Blue Ready (cut to size)      [] Normal Saline wet to dry  [] Betadine wet to dry    [] Hydrogel  [] Mepitel     [] Bactroban/Mupirocin   [] Iodoform Packing Strip [] Plain Packing Strip   [] Skin Sub:   [x] Other:  Drawtex    [x] Cover and Secure with:     [x] Gauze [] Demetrius [] Kerlix   [] Foam  [x] Super

## 2023-06-05 ENCOUNTER — HOSPITAL ENCOUNTER (OUTPATIENT)
Facility: HOSPITAL | Age: 78
Discharge: HOME | End: 2023-06-05
Payer: MEDICARE

## 2023-06-05 VITALS
RESPIRATION RATE: 22 BRPM | DIASTOLIC BLOOD PRESSURE: 88 MMHG | HEART RATE: 100 BPM | TEMPERATURE: 98 F | SYSTOLIC BLOOD PRESSURE: 156 MMHG

## 2023-06-05 DIAGNOSIS — L97.822 NON-PRESSURE CHRONIC ULCER OF OTHER PART OF LEFT LOWER LEG WITH FAT LAYER EXPOSED (HCC): ICD-10-CM

## 2023-06-05 DIAGNOSIS — L97.812 NON-PRESSURE CHRONIC ULCER OF OTHER PART OF RIGHT LOWER LEG WITH FAT LAYER EXPOSED (HCC): Primary | ICD-10-CM

## 2023-06-05 PROCEDURE — 11045 DBRDMT SUBQ TISS EACH ADDL: CPT

## 2023-06-05 PROCEDURE — 11042 DBRDMT SUBQ TIS 1ST 20SQCM/<: CPT

## 2023-06-05 RX ORDER — SODIUM CHLOR/HYPOCHLOROUS ACID 0.033 %
SOLUTION, IRRIGATION IRRIGATION ONCE
OUTPATIENT
Start: 2023-06-05 | End: 2023-06-05

## 2023-06-05 RX ORDER — LIDOCAINE HYDROCHLORIDE 20 MG/ML
JELLY TOPICAL ONCE
OUTPATIENT
Start: 2023-06-05 | End: 2023-06-05

## 2023-06-05 NOTE — DISCHARGE INSTRUCTIONS
Wound Clinic Physician Orders and Discharge Instructions  [facility]  [address], [address]  Telephone: [phone]    NAME:  Keena Acharya. YOB: 1945  MEDICAL RECORD NUMBER:  [id number]  DATE:  6/5/2023      Return Appointment:  [] Dressing Supply Provider:   [x] Home Healthcare: [facility] [place]  [x] Return Appointment: 2 Week(s)  [] Nurse Visit:     [] Discharge from [facility]: [] Healed            [] Refer to Provider:    Follow-up Information:  [] Ordered Tests:   [] Referrals:   [] Rx:   [] Other:       Wound Cleansing:   Do not scrub or use excessive force. Cleanse wound prior to applying a clean dressing with:  [] Normal Saline   [] Keep Wound Dry in Shower     [] Wound Cleanser   [x] Cleanse wound with Mild Soap & Water    [] Other:       Topical Treatments:  Do not apply lotions, creams, or ointments to wound bed unless directed. [] Apply moisturizing lotion to skin surrounding the wound prior to dressing change.  [] Apply antifungal ointment to skin surrounding the wound prior to dressing change.  [] Apply thin film of moisture barrier ointment to skin immediately around wound.   [] Apply Betadine to skim immediately around wound      Dressings:           Wound Location R & L Legs    [x] Apply Primary Dressing:       [] MediHoney Gel [] MediHoney Alginate  [] Calcium Alginate with Silver   [] Calcium Alginate without silver   [] Collagen with silver [] Collagen without Silver    [] Santyl with moist saline gauze     [] Hydrofera Blue (cut to size and moistened with normal saline)  [] Hydrofera Blue Ready (cut to size)      [] Normal Saline wet to dry  [] Betadine wet to dry    [] Hydrogel  [] Mepitel     [] Bactroban/Mupirocin   [] Iodoform Packing Strip [] Plain Packing Strip   [] Skin Sub:   [x] Other:  Drawtex    [x] Cover and Secure with:     [x] Gauze [] Demetrius [] Kerlix   [] Foam  [x] Super Absorbant []

## 2023-06-05 NOTE — WOUND CARE
[employer] Application   Below Knee    NAME:  China Ortega. YOB: 1945  MEDICAL RECORD NUMBER:  [id number]  DATE:  6/5/2023    Joel Gu boot: Appied primary and secondary dressing as ordered. Applied Unna roll from toes to knee overlapping each time. Applied ace wrap or coban from toes to below the knee. Secured with tape and/or metal clips covered with tape. Instructed patient/caregiver to keep dressing dry and intact. DO NOT REMOVE DRESSING. Instructed pt/family/caregiver to report excessive draining, loose bandage, wet dressing, severe pain or tingling in toes. Applied [employer] dressing below the knee to right lower leg. Applied [employer] dressing below the knee to left lower leg. Unna Boot(s) were applied per  Guidelines.      Electronically signed by Marty Mcduffie LPN on 1/3/9316 at 17:82 AM
Wound Clinic Physician Orders and Discharge Instructions  [facility]  [address], [address]  Telephone: [phone]    NAME:  Romulo Ndiaye. YOB: 1945  MEDICAL RECORD NUMBER:  [id number]  DATE:  6/5/2023      Return Appointment:  [] Dressing Supply Provider:   [x] Home Healthcare: [facility]  [x] Return Appointment: 2 Week(s)  [] Nurse Visit:     [] Discharge from [facility]: [] Healed            [] Refer to Provider:    Follow-up Information:  [] Ordered Tests:   [] Referrals:   [] Rx:   [] Other:       Wound Cleansing:   Do not scrub or use excessive force. Cleanse wound prior to applying a clean dressing with:  [] Normal Saline   [] Keep Wound Dry in Shower     [] Wound Cleanser   [x] Cleanse wound with Mild Soap & Water    [] Other:       Topical Treatments:  Do not apply lotions, creams, or ointments to wound bed unless directed. [] Apply moisturizing lotion to skin surrounding the wound prior to dressing change.  [] Apply antifungal ointment to skin surrounding the wound prior to dressing change.  [] Apply thin film of moisture barrier ointment to skin immediately around wound.   [] Apply Betadine to skim immediately around wound      Dressings:           Wound Location R & L Legs    [x] Apply Primary Dressing:       [] MediHoney Gel [] MediHoney Alginate  [] Calcium Alginate with Silver   [] Calcium Alginate without silver   [] Collagen with silver [] Collagen without Silver    [] Santyl with moist saline gauze     [] Hydrofera Blue (cut to size and moistened with normal saline)  [] Hydrofera Blue Ready (cut to size)      [] Normal Saline wet to dry  [] Betadine wet to dry    [] Hydrogel  [] Mepitel     [] Bactroban/Mupirocin   [] Iodoform Packing Strip [] Plain Packing Strip   [] Skin Sub:   [x] Other:  Drawtex    [x] Cover and Secure with:     [x] Gauze [] Demetrius [] Kerlix   [] Foam  [x] Super
Assessment Maceration;Dry/flaky 06/05/23 1118   Margins Attached edges 06/05/23 1118   Wound Thickness Description not for Pressure Injury Full thickness 06/05/23 1118   Number of days: 28        Total Surface Area Debrided:  200 sq cm   Estimated Blood Loss: Minimal amount blood loss . Hemostasis Achieved:  by pressure  Procedural Pain: 0  / 10   Post Procedural Pain: 0 / 10   Response to treatment: Patient tolerated procedure well with no complaints of pain.

## 2023-06-19 ENCOUNTER — HOSPITAL ENCOUNTER (OUTPATIENT)
Facility: HOSPITAL | Age: 78
Discharge: HOME | End: 2023-06-19
Payer: MEDICARE

## 2023-06-19 VITALS
RESPIRATION RATE: 18 BRPM | HEART RATE: 91 BPM | DIASTOLIC BLOOD PRESSURE: 73 MMHG | SYSTOLIC BLOOD PRESSURE: 120 MMHG | TEMPERATURE: 98.3 F

## 2023-06-19 DIAGNOSIS — L97.822 NON-PRESSURE CHRONIC ULCER OF OTHER PART OF LEFT LOWER LEG WITH FAT LAYER EXPOSED (HCC): ICD-10-CM

## 2023-06-19 DIAGNOSIS — L97.812 NON-PRESSURE CHRONIC ULCER OF OTHER PART OF RIGHT LOWER LEG WITH FAT LAYER EXPOSED (HCC): Primary | ICD-10-CM

## 2023-06-19 PROCEDURE — 11045 DBRDMT SUBQ TISS EACH ADDL: CPT

## 2023-06-19 PROCEDURE — 29580 STRAPPING UNNA BOOT: CPT

## 2023-06-19 PROCEDURE — 11042 DBRDMT SUBQ TIS 1ST 20SQCM/<: CPT

## 2023-06-19 RX ORDER — LIDOCAINE HYDROCHLORIDE 20 MG/ML
15 SOLUTION OROPHARYNGEAL
Status: DISCONTINUED | OUTPATIENT
Start: 2023-06-19 | End: 2023-06-20 | Stop reason: HOSPADM

## 2023-06-19 RX ORDER — SODIUM CHLOR/HYPOCHLOROUS ACID 0.033 %
SOLUTION, IRRIGATION IRRIGATION ONCE
OUTPATIENT
Start: 2023-06-19 | End: 2023-06-19

## 2023-06-19 RX ORDER — LIDOCAINE HYDROCHLORIDE 20 MG/ML
JELLY TOPICAL ONCE
OUTPATIENT
Start: 2023-06-19 | End: 2023-06-19

## 2023-06-19 NOTE — WOUND CARE
[employer] Application   Below Knee    NAME:  Gwendolyn Cardenas. YOB: 1945  MEDICAL RECORD NUMBER:  [id number]  DATE:  6/19/2023    [facility]: Appied primary and secondary dressing as ordered. Applied Unna roll from toes to knee overlapping each time. Applied ace wrap or coban from toes to below the knee. Secured with tape and/or metal clips covered with tape. Instructed patient/caregiver to keep dressing dry and intact. DO NOT REMOVE DRESSING. Instructed pt/family/caregiver to report excessive draining, loose bandage, wet dressing, severe pain or tingling in toes. Applied [employer] dressing below the knee to right lower leg. Applied [employer] dressing below the knee to left lower leg. Unna Boot(s) were applied per  Guidelines.      Electronically signed by Jorge Knox RN on 6/19/2023 at 11:34 AM
Wound Clinic Physician Orders and Discharge Instructions  [facility]  [address] , [address]  Telephone: [phone]    NAME:  Patricio Bolden. YOB: 1945  MEDICAL RECORD NUMBER:  [id number]  DATE:  6/19/2023      Return Appointment:  [] Dressing Supply Provider:   [x] [facility] Healthcare: [facility]  [x] Return Appointment: 3 Week(s)  [] Nurse Visit:     [] Discharge from [facility]: [] Healed            [] Refer to Provider:    Follow-up Information:  [] Ordered Tests:   [] Referrals:   [] Rx:   [] Other:       Wound Cleansing:   Do not scrub or use excessive force. Cleanse wound prior to applying a clean dressing with:  [] Normal Saline   [] Keep Wound Dry in Shower     [] Wound Cleanser   [x] Cleanse wound with Mild Soap & Water    [] Other:       Topical Treatments:  Do not apply lotions, creams, or ointments to wound bed unless directed. [] Apply moisturizing lotion to skin surrounding the wound prior to dressing change.  [] Apply antifungal ointment to skin surrounding the wound prior to dressing change.  [] Apply thin film of moisture barrier ointment to skin immediately around wound.   [] Apply Betadine to skim immediately around wound      Dressings:           Wound Location R & L Legs    [x] Apply Primary Dressing:       [] MediHoney Gel [] MediHoney Alginate  [] Calcium Alginate with Silver   [] Calcium Alginate without silver   [] Collagen with silver [] Collagen without Silver    [] Santyl with moist saline gauze     [] Hydrofera Blue (cut to size and moistened with normal saline)  [] Hydrofera Blue Ready (cut to size)      [] Normal Saline wet to dry  [] Betadine wet to dry    [] Hydrogel  [] Mepitel     [] Bactroban/Mupirocin   [] Iodoform Packing Strip [] Plain Packing Strip   [] Skin Sub:   [x] Other:  Drawtex    [x] Cover and Secure with:     [x] Gauze [] Demetrius [] Kerlix   [] Foam  [x] Super
06/19/23 1110   Wound Etiology Venous 06/19/23 1110   Dressing Status Clean;Dry; Intact 06/19/23 1110   Wound Cleansed Soap and water 06/19/23 1110   Wound Length (cm) 8.5 cm 06/19/23 1110   Wound Width (cm) 8 cm 06/19/23 1110   Wound Depth (cm) 0.1 cm 06/19/23 1110   Wound Surface Area (cm^2) 68 cm^2 06/19/23 1110   Change in Wound Size % (l*w) -6.25 06/19/23 1110   Wound Volume (cm^3) 6.8 cm^3 06/19/23 1110   Wound Healing % -6 06/19/23 1110   Post-Procedure Length (cm) 12 cm 06/05/23 1133   Post-Procedure Width (cm) 46 cm 06/05/23 1133   Post-Procedure Depth (cm) 0.1 cm 06/05/23 1133   Post-Procedure Surface Area (cm^2) 552 cm^2 06/05/23 1133   Post-Procedure Volume (cm^3) 55.2 cm^3 06/05/23 1133   Wound Assessment Slough;Granulation tissue 06/19/23 1110   Drainage Amount Moderate 06/19/23 1110   Drainage Description Serosanguinous 06/19/23 1110   Odor Mild 06/19/23 1110   Lyla-wound Assessment Dry/flaky 06/19/23 1110   Margins Attached edges 06/19/23 1110   Wound Thickness Description not for Pressure Injury Full thickness 06/19/23 1110   Number of days: 42       Wound 05/08/23 Leg Left #2 (Active)   Wound Image   06/19/23 1110   Wound Etiology Venous 06/19/23 1110   Dressing Status Clean;Dry; Intact 06/19/23 1110   Wound Cleansed Soap and water 06/19/23 1110   Wound Length (cm) 16.5 cm 06/19/23 1110   Wound Width (cm) 18.5 cm 06/19/23 1110   Wound Depth (cm) 0.1 cm 06/19/23 1110   Wound Surface Area (cm^2) 305.25 cm^2 06/19/23 1110   Change in Wound Size % (l*w) -118.04 06/19/23 1110   Wound Volume (cm^3) 30. 525 cm^3 06/19/23 1110   Wound Healing % -118 06/19/23 1110   Post-Procedure Length (cm) 16.5 cm 06/19/23 1126   Post-Procedure Width (cm) 18.5 cm 06/19/23 1126   Post-Procedure Depth (cm) 0.1 cm 06/19/23 1126   Post-Procedure Surface Area (cm^2) 305.25 cm^2 06/19/23 1126   Post-Procedure Volume (cm^3) 30. 525 cm^3 06/19/23 1126   Wound Assessment Slough;Granulation tissue 06/19/23 1110   Drainage Amount Large

## 2023-06-19 NOTE — DISCHARGE INSTRUCTIONS
Wound Clinic Physician Orders and Discharge Instructions  [facility]  [address], [address]  Telephone: [phone]    NAME:  Tommy Schwartz. YOB: 1945  MEDICAL RECORD NUMBER:  [id number]  DATE:  6/19/2023      Return Appointment:  [] Dressing Supply Provider:   [x] Home Healthcare: [facility]  [x] Return Appointment: 3 Week(s)  [] Nurse Visit:     [] Discharge from [facility]: [] Healed            [] Refer to Provider:    Follow-up Information:  [] Ordered Tests:   [] Referrals:   [] Rx:   [] Other:       Wound Cleansing:   Do not scrub or use excessive force. Cleanse wound prior to applying a clean dressing with:  [] Normal Saline   [] Keep Wound Dry in Shower     [] Wound Cleanser   [x] Cleanse wound with Mild Soap & Water    [] Other:       Topical Treatments:  Do not apply lotions, creams, or ointments to wound bed unless directed. [] Apply moisturizing lotion to skin surrounding the wound prior to dressing change.  [] Apply antifungal ointment to skin surrounding the wound prior to dressing change.  [] Apply thin film of moisture barrier ointment to skin immediately around wound.   [] Apply Betadine to skim immediately around wound      Dressings:           Wound Location R & L Legs    [x] Apply Primary Dressing:       [] MediHoney Gel [] MediHoney Alginate  [] Calcium Alginate with Silver   [] Calcium Alginate without silver   [] Collagen with silver [] Collagen without Silver    [] Santyl with moist saline gauze     [] Hydrofera Blue (cut to size and moistened with normal saline)  [] Hydrofera Blue Ready (cut to size)      [] Normal Saline wet to dry  [] Betadine wet to dry    [] Hydrogel  [] Mepitel     [] Bactroban/Mupirocin   [] Iodoform Packing Strip [] Plain Packing Strip   [] Skin Sub:   [x] Other:  Drawtex    [x] Cover and Secure with:     [x] Gauze [] Demetrius [] Kerlix   [] Foam  [x] Super

## 2023-07-10 ENCOUNTER — HOSPITAL ENCOUNTER (OUTPATIENT)
Facility: HOSPITAL | Age: 78
Discharge: HOME | End: 2023-07-10
Payer: MEDICARE

## 2023-07-10 VITALS
HEART RATE: 86 BPM | RESPIRATION RATE: 18 BRPM | SYSTOLIC BLOOD PRESSURE: 122 MMHG | DIASTOLIC BLOOD PRESSURE: 69 MMHG | TEMPERATURE: 97.8 F

## 2023-07-10 DIAGNOSIS — L97.812 NON-PRESSURE CHRONIC ULCER OF OTHER PART OF RIGHT LOWER LEG WITH FAT LAYER EXPOSED (HCC): Primary | ICD-10-CM

## 2023-07-10 DIAGNOSIS — I89.0 LYMPHEDEMA: ICD-10-CM

## 2023-07-10 DIAGNOSIS — L97.822 NON-PRESSURE CHRONIC ULCER OF OTHER PART OF LEFT LOWER LEG WITH FAT LAYER EXPOSED (HCC): ICD-10-CM

## 2023-07-10 PROCEDURE — 11042 DBRDMT SUBQ TIS 1ST 20SQCM/<: CPT

## 2023-07-10 PROCEDURE — 11045 DBRDMT SUBQ TISS EACH ADDL: CPT

## 2023-07-10 RX ORDER — LIDOCAINE HYDROCHLORIDE 20 MG/ML
JELLY TOPICAL ONCE
Status: CANCELLED | OUTPATIENT
Start: 2023-07-10 | End: 2023-07-10

## 2023-07-10 RX ORDER — SODIUM CHLOR/HYPOCHLOROUS ACID 0.033 %
SOLUTION, IRRIGATION IRRIGATION ONCE
OUTPATIENT
Start: 2023-07-10 | End: 2023-07-10

## 2023-07-10 RX ORDER — SODIUM CHLOR/HYPOCHLOROUS ACID 0.033 %
SOLUTION, IRRIGATION IRRIGATION ONCE
Status: CANCELLED | OUTPATIENT
Start: 2023-07-10 | End: 2023-07-10

## 2023-07-10 RX ORDER — LIDOCAINE HYDROCHLORIDE 20 MG/ML
JELLY TOPICAL ONCE
OUTPATIENT
Start: 2023-07-10 | End: 2023-07-10

## 2023-07-10 NOTE — WOUND CARE
Wound Clinic Physician Orders and Discharge Instructions  [address] [place] [address] S. 709 [facility], [address] [place] [address]  Telephone: [phone]    NAME:  Gildardo Moore. YOB: 1945  MEDICAL RECORD NUMBER:  [id number]  DATE:  7/10/2023      Return Appointment:  [] Dressing Supply Provider:   [x] Home Healthcare: [address] [place] [address] Patient needs supplies for dressing changes  [x] Return Appointment: 1 Week(s)  [] Nurse Visit:     [] Discharge from [facility] [] Healed            [] Refer to Provider:    Follow-up Information:  [] Ordered Tests:   [x] Referrals: Lymphedema Clinic  [] Rx:   [] Other:       Wound Cleansing:   Do not scrub or use excessive force. Cleanse wound prior to applying a clean dressing with:  [] Normal Saline   [] Keep Wound Dry in Shower     [] Wound Cleanser   [x] Cleanse wound with Mild Soap & Water    [] Other:       Topical Treatments:  Do not apply lotions, creams, or ointments to wound bed unless directed. [] Apply moisturizing lotion to skin surrounding the wound prior to dressing change.  [] Apply antifungal ointment to skin surrounding the wound prior to dressing change.  [] Apply thin film of moisture barrier ointment to skin immediately around wound.   [] Apply Betadine to skim immediately around wound      Dressings:           Wound Location R & L Legs    [x] Apply Primary Dressing:       [] MediHoney Gel [] MediHoney Alginate  [] Calcium Alginate with Silver   [] Calcium Alginate without silver   [] Collagen with silver [] Collagen without Silver    [] Santyl with moist saline gauze     [] Hydrofera Blue (cut to size and moistened with normal saline)  [] Hydrofera Blue Ready (cut to size)      [] Normal Saline wet to dry  [] Betadine wet to dry    [] Hydrogel  [] Mepitel     [] Bactroban/Mupirocin   [] Iodoform Packing Strip [] Plain Packing Strip   [] Skin Sub:   [x] Other:  Drawtex    [x] Cover and Secure with:

## 2023-07-10 NOTE — WOUND CARE
[address] [facility]   Medical Staff Progress Note     Shyann Neri. MEDICAL RECORD NUMBER:  [id number]  AGE: 77 y.o. GENDER: male  : 1945  EPISODE DATE:  7/10/2023    Chief complaint and reason for visit:   BLE  Chief Complaint   Patient presents with    Wound Check     Bilateral legs      Patient presenting for follow up evaluation of wound(s) per chief complaint.       Subjective: Symptoms, wound related issues, or other pertinent wound history since last visit: did not receive supplies     Wound 23 Leg Right #1 (Active)   Wound Image   07/10/23 1140   Wound Etiology Venous 07/10/23 1140   Dressing Status Old drainage noted 07/10/23 1140   Wound Cleansed Soap and water 07/10/23 1140   Wound Length (cm) 14 cm 07/10/23 1140   Wound Width (cm) 12 cm 07/10/23 1140   Wound Depth (cm) 0.1 cm 07/10/23 1140   Wound Surface Area (cm^2) 168 cm^2 07/10/23 1140   Change in Wound Size % (l*w) -162.5 07/10/23 1140   Wound Volume (cm^3) 16.8 cm^3 07/10/23 1140   Wound Healing % -163 07/10/23 1140   Post-Procedure Length (cm) 14 cm 07/10/23 1150   Post-Procedure Width (cm) 12 cm 07/10/23 1150   Post-Procedure Depth (cm) 0.1 cm 07/10/23 1150   Post-Procedure Surface Area (cm^2) 168 cm^2 07/10/23 1150   Post-Procedure Volume (cm^3) 16.8 cm^3 07/10/23 1150   Wound Assessment Slough;Granulation tissue 07/10/23 1140   Drainage Amount Moderate 07/10/23 1140   Drainage Description Serosanguinous 07/10/23 1140   Odor Mild 07/10/23 1140   Lyla-wound Assessment Maceration 07/10/23 1142   Margins Attached edges 07/10/23 1140   Wound Thickness Description not for Pressure Injury Full thickness 07/10/23 1140   Number of days: 63       Wound 23 Leg Left #2 (Active)   Wound Image   07/10/23 1141   Wound Etiology Venous 07/10/23 1141   Dressing Status Old drainage noted;New drainage noted 07/10/23 1141   Wound Cleansed Soap and water 07/10/23 1141

## 2023-07-10 NOTE — WOUND CARE
[employer] Application   Below Knee    NAME:  Mateusz Calvillo YOB: 1945  MEDICAL RECORD NUMBER:  [id number]  DATE:  7/10/2023    Maty hamot: Applied moisturizing agent to dry skin as needed. Appied primary and secondary dressing as ordered. Applied Unna roll from toes to knee overlapping each time. Applied ace wrap or coban from toes to below the knee. Secured with tape and/or metal clips covered with tape. Instructed patient/caregiver to keep dressing dry and intact. DO NOT REMOVE DRESSING. Instructed pt/family/caregiver to report excessive draining, loose bandage, wet dressing, severe pain or tingling in toes. Applied [employer] dressing below the knee to right lower leg. Applied [employer] dressing below the knee to left lower leg. Unna Boot(s) were applied per  Guidelines.      Electronically signed by José Miguel Padilla LPN on 7/57/9896 at 46:12 PM

## 2023-07-10 NOTE — DISCHARGE INSTRUCTIONS
Wound Clinic Physician Orders and Discharge Instructions  [address] [place] [address] S. 709 [facility], [address] [place] [address] Way  Telephone: [phone]    NAME:  Lc Ramos YOB: 1945  MEDICAL RECORD NUMBER:  [id number]  DATE:  7/10/2023      Return Appointment:  [] Dressing Supply Provider:   [x] Home Healthcare: [address] [place] [address] Patient needs supplies for dressing changes  [x] Return Appointment: 1 Week(s)  [] Nurse Visit:     [] Discharge from [facility]: [] Healed            [] Refer to Provider:    Follow-up Information:  [] Ordered Tests:   [x] Referrals: Lymphedema Clinic  [] Rx:   [] Other:       Wound Cleansing:   Do not scrub or use excessive force. Cleanse wound prior to applying a clean dressing with:  [] Normal Saline   [] Keep Wound Dry in Shower     [] Wound Cleanser   [x] Cleanse wound with Mild Soap & Water    [] Other:       Topical Treatments:  Do not apply lotions, creams, or ointments to wound bed unless directed. [] Apply moisturizing lotion to skin surrounding the wound prior to dressing change.  [] Apply antifungal ointment to skin surrounding the wound prior to dressing change.  [] Apply thin film of moisture barrier ointment to skin immediately around wound.   [] Apply Betadine to skim immediately around wound      Dressings:           Wound Location R & L Legs    [x] Apply Primary Dressing:       [] MediHoney Gel [] MediHoney Alginate  [] Calcium Alginate with Silver   [] Calcium Alginate without silver   [] Collagen with silver [] Collagen without Silver    [] Santyl with moist saline gauze     [] Hydrofera Blue (cut to size and moistened with normal saline)  [] Hydrofera Blue Ready (cut to size)      [] Normal Saline wet to dry  [] Betadine wet to dry    [] Hydrogel  [] Mepitel     [] Bactroban/Mupirocin   [] Iodoform Packing Strip [] Plain Packing Strip   [] Skin Sub:   [x] Other:  Drawtex    [x] Cover and Secure with:     [x]

## 2023-07-17 ENCOUNTER — HOSPITAL ENCOUNTER (OUTPATIENT)
Facility: HOSPITAL | Age: 78
Discharge: HOME | End: 2023-07-17
Payer: MEDICARE

## 2023-07-17 VITALS
RESPIRATION RATE: 18 BRPM | DIASTOLIC BLOOD PRESSURE: 77 MMHG | HEART RATE: 100 BPM | SYSTOLIC BLOOD PRESSURE: 126 MMHG | TEMPERATURE: 98.3 F

## 2023-07-17 DIAGNOSIS — L97.822 NON-PRESSURE CHRONIC ULCER OF OTHER PART OF LEFT LOWER LEG WITH FAT LAYER EXPOSED (HCC): ICD-10-CM

## 2023-07-17 DIAGNOSIS — L97.812 NON-PRESSURE CHRONIC ULCER OF OTHER PART OF RIGHT LOWER LEG WITH FAT LAYER EXPOSED (HCC): Primary | ICD-10-CM

## 2023-07-17 PROCEDURE — 29580 STRAPPING UNNA BOOT: CPT

## 2023-07-17 RX ORDER — SODIUM CHLOR/HYPOCHLOROUS ACID 0.033 %
SOLUTION, IRRIGATION IRRIGATION ONCE
OUTPATIENT
Start: 2023-07-17 | End: 2023-07-17

## 2023-07-17 RX ORDER — LIDOCAINE HYDROCHLORIDE 20 MG/ML
JELLY TOPICAL ONCE
OUTPATIENT
Start: 2023-07-17 | End: 2023-07-17

## 2023-07-17 NOTE — DISCHARGE INSTRUCTIONS
[x] Gauze [] Demetrius [] Kerlix   [] Foam  [x] Super Absorbant [] ABD     [] Ace Wrap [] Other:    Limit contact of tape with skin. [x] Change dressing: [] Daily    [] Every Other Day   [] Twice per week   [x] Three times per week   [] Once a week [] Do Not Change Dressing   [] Other:     Pressure Relief:  [] When sitting, shift position or do seat lifts every 15 minutes.  [] Wheelchair cushion [] Specialty Bed/Mattress  [] Turn every 2 hours when in bed. Avoid direct pressure on wound site. Limit side lying to 30 degree tilt. Limit HOB elevation to 30 degrees. Edema Control:  Apply: [] Compression Stocking:  mmHg  []Right Leg []Left Leg   [] Tubigrip []Right Leg Double Layer []Left Leg Double Layer      []Right Leg Single Layer []Left Leg Single Layer      [] Elevate leg(s) above the level of the heart when sitting. [] Avoid prolonged standing in one place. Compression:  Apply: [x] Compression Wrap to [x]RightLeg [x]Left Leg    []  Coflex [] Coflex Lite  [x] Unna with Calamine Lite     [x] Do not get leg(s) with wrap wet. If wrap becomes too tight, call the wound center and remove wrap from leg by unrolling each layer. [x] Elevate leg(s) above the level of the heart when sitting. [x] Avoid prolonged standing in one place. Dietary:  [x] Diet as tolerated: [] Calorie Diabetic Diet: [] No Added Salt:  [x] Increase Protein: [] Other:     Activity:  [x] Activity as tolerated:    [] Patient has no activity restrictions      [] Strict Bedrest   [] Remain off Work      [] May return to full duty work                                     [] Return to work with restrictions     Physician:  [x] Dr. Taco Hernandez  [] Dr. Michelet Robledo  [] Dr. Kay Spence      Nurse Case Manger:  [facility] Information: Should you experience any significant changes in your wound(s) or have questions about your wound care, please contact the  [employer] at [phone].  Our hours are Monday

## 2023-07-17 NOTE — WOUND CARE
[place] Application   Below Knee    NAME:  Antonino Stacy. YOB: 1945  MEDICAL RECORD NUMBER:  [id number]  DATE:  7/17/2023    Hailee Silva boot: Appied primary and secondary dressing as ordered. Applied Unna roll from toes to knee overlapping each time. Applied ace wrap or coban from toes to below the knee. Secured with tape and/or metal clips covered with tape. Instructed patient/caregiver to keep dressing dry and intact. DO NOT REMOVE DRESSING. Instructed pt/family/caregiver to report excessive draining, loose bandage, wet dressing, severe pain or tingling in toes. Applied [place] dressing below the knee to right lower leg. Applied [place] dressing below the knee to left lower leg. Unna Boot(s) were applied per  Guidelines.      Electronically signed by Forrest Howe RN on 7/17/2023 at 11:39 AM
[x] Gauze [] Demetrius [] Kerlix   [] Foam  [x] Super Absorbant [] ABD     [] Ace Wrap [] Other:    Limit contact of tape with skin. [x] Change dressing: [] Daily    [] Every Other Day   [] Twice per week   [x] Three times per week   [] Once a week [] Do Not Change Dressing   [] Other:     Pressure Relief:  [] When sitting, shift position or do seat lifts every 15 minutes.  [] Wheelchair cushion [] Specialty Bed/Mattress  [] Turn every 2 hours when in bed. Avoid direct pressure on wound site. Limit side lying to 30 degree tilt. Limit HOB elevation to 30 degrees. Edema Control:  Apply: [] Compression Stocking:  mmHg  []Right Leg []Left Leg   [] Tubigrip []Right Leg Double Layer []Left Leg Double Layer      []Right Leg Single Layer []Left Leg Single Layer      [] Elevate leg(s) above the level of the heart when sitting. [] Avoid prolonged standing in one place. Compression:  Apply: [x] Compression Wrap to [x]RightLeg [x]Left Leg    []  Coflex [] Coflex Lite  [x] Unna with Calamine Lite     [x] Do not get leg(s) with wrap wet. If wrap becomes too tight, call the wound center and remove wrap from leg by unrolling each layer. [x] Elevate leg(s) above the level of the heart when sitting. [x] Avoid prolonged standing in one place. Dietary:  [x] Diet as tolerated: [] Calorie Diabetic Diet: [] No Added Salt:  [x] Increase Protein: [] Other:     Activity:  [x] Activity as tolerated:    [] Patient has no activity restrictions      [] Strict Bedrest   [] Remain off Work      [] May return to full duty work                                     [] Return to work with restrictions     Physician:  [x] Dr. Vance Guerra  [] Dr. Karl Morel  [] Dr. Y[facility]      Nurse Case Manger:  [address] [facility] Information: Should you experience any significant changes in your wound(s) or have questions about your wound care, please contact the  [facility] at [phone].  Our hours are Monday
tablet by mouth daily      furosemide (LASIX) 40 MG tablet Take 1 tablet by mouth 2 times daily      vitamin E 400 UNIT capsule Take 1 capsule by mouth daily       No current facility-administered medications on file prior to encounter. REVIEW OF SYSTEMS  A comprehensive review of systems was negative except for what has been indicated above and: no changes    ASSESSMENT:  No change    PLAN:  Drawtex, Unna, lymphedema pumping, lymphedema clinic    Written patient dismissal instructions given to patient and signed by patient or POA. Discharge Instructions              Wound Clinic Physician Orders and Discharge Instructions  [address] [place] [address]. [address] [place] [address]  Telephone: [phone]    NAME:  Luz Nichole. YOB: 1945  MEDICAL RECORD NUMBER:  [id number]  DATE:  7/17/2023      Return Appointment:  [] Dressing Supply Provider:   [x] Home Healthcare: [address] [place] [address] Patient needs supplies for dressing changes  [x] Return Appointment: 1 Week(s)  [] Nurse Visit:     [] Discharge from [facility]: [] Healed            [] Refer to Provider:    Follow-up Information:  [] Ordered Tests:   [x] Referrals: Lymphedema Clinic  [] Rx:   [] Other:       Wound Cleansing:   Do not scrub or use excessive force. Cleanse wound prior to applying a clean dressing with:  [] Normal Saline   [] Keep Wound Dry in Shower     [] Wound Cleanser   [x] Cleanse wound with Mild Soap & Water    [] Other:       Topical Treatments:  Do not apply lotions, creams, or ointments to wound bed unless directed. [] Apply moisturizing lotion to skin surrounding the wound prior to dressing change.  [] Apply antifungal ointment to skin surrounding the wound prior to dressing change.  [] Apply thin film of moisture barrier ointment to skin immediately around wound.   [] Apply Betadine to skim immediately around wound      Dressings:           Wound Location R &

## 2023-07-24 ENCOUNTER — HOSPITAL ENCOUNTER (OUTPATIENT)
Facility: HOSPITAL | Age: 78
Discharge: HOME | End: 2023-07-24
Payer: MEDICARE

## 2023-07-24 VITALS
TEMPERATURE: 97.4 F | RESPIRATION RATE: 18 BRPM | DIASTOLIC BLOOD PRESSURE: 76 MMHG | SYSTOLIC BLOOD PRESSURE: 129 MMHG | HEART RATE: 98 BPM

## 2023-07-24 DIAGNOSIS — L97.822 NON-PRESSURE CHRONIC ULCER OF OTHER PART OF LEFT LOWER LEG WITH FAT LAYER EXPOSED (HCC): ICD-10-CM

## 2023-07-24 DIAGNOSIS — L97.812 NON-PRESSURE CHRONIC ULCER OF OTHER PART OF RIGHT LOWER LEG WITH FAT LAYER EXPOSED (HCC): Primary | ICD-10-CM

## 2023-07-24 PROCEDURE — 11045 DBRDMT SUBQ TISS EACH ADDL: CPT

## 2023-07-24 PROCEDURE — 11042 DBRDMT SUBQ TIS 1ST 20SQCM/<: CPT

## 2023-07-24 RX ORDER — LIDOCAINE HYDROCHLORIDE 20 MG/ML
JELLY TOPICAL ONCE
OUTPATIENT
Start: 2023-07-24 | End: 2023-07-24

## 2023-07-24 RX ORDER — SODIUM CHLOR/HYPOCHLOROUS ACID 0.033 %
SOLUTION, IRRIGATION IRRIGATION ONCE
OUTPATIENT
Start: 2023-07-24 | End: 2023-07-24

## 2023-07-24 NOTE — DISCHARGE INSTRUCTIONS
Wound Clinic Physician Orders and Discharge Instructions  [address] [place] [address] S. 709 [facility], [address] [place] [address] Way  Telephone: [phone]    NAME:  Coty Garsia. YOB: 1945  MEDICAL RECORD NUMBER:  [id number]  DATE:  7/24/2023      Return Appointment:  [] Dressing Supply Provider:   [x] Home Healthcare: [address] [place] [address] Patient needs supplies for dressing changes  [x] Return Appointment: 2 Week(s)  [] Nurse Visit:     [] Discharge from [facility]: [] Healed            [] Refer to Provider:    Follow-up Information:  [] Ordered Tests:   [x] Referrals: Lymphedema Clinic  [] Rx:   [] Other:       Wound Cleansing:   Do not scrub or use excessive force. Cleanse wound prior to applying a clean dressing with:  [] Normal Saline   [] Keep Wound Dry in Shower     [] Wound Cleanser   [x] Cleanse wound with Mild Soap & Water    [] Other:       Topical Treatments:  Do not apply lotions, creams, or ointments to wound bed unless directed. [] Apply moisturizing lotion to skin surrounding the wound prior to dressing change.  [] Apply antifungal ointment to skin surrounding the wound prior to dressing change.  [] Apply thin film of moisture barrier ointment to skin immediately around wound.   [] Apply Betadine to skim immediately around wound      Dressings:           Wound Location R & L Legs    [x] Apply Primary Dressing:       [] MediHoney Gel [] MediHoney Alginate  [] Calcium Alginate with Silver   [] Calcium Alginate without silver   [] Collagen with silver [] Collagen without Silver    [] Santyl with moist saline gauze     [] Hydrofera Blue (cut to size and moistened with normal saline)  [] Hydrofera Blue Ready (cut to size)      [] Normal Saline wet to dry  [] Betadine wet to dry    [] Hydrogel  [] Mepitel     [] Bactroban/Mupirocin   [] Iodoform Packing Strip [] Plain Packing Strip   [] Skin Sub:   [x] Other:  Drawtex    [x] Cover and Secure with:

## 2023-07-24 NOTE — WOUND CARE
[place] Application   Below Knee    NAME:  Patricia Gaitan. YOB: 1945  MEDICAL RECORD NUMBER:  [id number]  DATE:  7/24/2023    Rajeev Mercado boot: Appied primary and secondary dressing as ordered. Applied Unna roll from toes to knee overlapping each time. Applied ace wrap or coban from toes to below the knee. Secured with tape and/or metal clips covered with tape. Instructed patient/caregiver to keep dressing dry and intact. DO NOT REMOVE DRESSING. Instructed pt/family/caregiver to report excessive draining, loose bandage, wet dressing, severe pain or tingling in toes. Applied [place] dressing below the knee to right lower leg. Applied [place] dressing below the knee to left lower leg. Unna Boot(s) were applied per  Guidelines.      Electronically signed by Maryjane Lee RN on 7/24/2023 at 11:59 AM
Soap and water 07/24/23 1142   Dressing/Treatment Other (comment) 07/10/23 1221   Wound Length (cm) 13 cm 07/24/23 1142   Wound Width (cm) 11.5 cm 07/24/23 1142   Wound Depth (cm) 0.1 cm 07/24/23 1142   Wound Surface Area (cm^2) 149.5 cm^2 07/24/23 1142   Change in Wound Size % (l*w) -6.79 07/24/23 1142   Wound Volume (cm^3) 14.95 cm^3 07/24/23 1142   Wound Healing % -7 07/24/23 1142   Post-Procedure Length (cm) 13 cm 07/24/23 1148   Post-Procedure Width (cm) 11.5 cm 07/24/23 1148   Post-Procedure Depth (cm) 0.1 cm 07/24/23 1148   Post-Procedure Surface Area (cm^2) 149.5 cm^2 07/24/23 1148   Post-Procedure Volume (cm^3) 14.95 cm^3 07/24/23 1148   Wound Assessment Slough;Granulation tissue 07/24/23 1142   Drainage Amount Large 07/24/23 1142   Drainage Description Serosanguinous 07/24/23 1142   Odor None 07/24/23 1142   Lyla-wound Assessment Maceration;Dry/flaky 07/24/23 1142   Margins Attached edges 07/24/23 1142   Wound Thickness Description not for Pressure Injury Full thickness 07/24/23 1142   Number of days: 77          Procedures done during this encounter:   debridement    TIME: E/M Time spent with patient and/or patient care issues: [] 15-20 min  [] 21-30 min  [] 31-44 min  [] 45 min or more.    This is above the usual time needed to address patient's chief complaint today: [] Yes  [] No  This time includes physician non-face-to-face service time visit on the date of service such as  Preparing to see the patient (eg, review of tests)  Obtaining and/or reviewing separately obtained history  Performing a medically necessary appropriate examination and/or evaluation  Counseling and educating the patient/family/caregiver  Ordering medications, tests, or procedures  Referring and communicating with other health care professionals as needed  Documenting clinical information in the electronic or other health record  Independently interpreting results (not reported separately) and communicating results to the
- Friday 8am - 4:30pm, closed all major holidays. If you need help with your wound outside these hours and cannot wait until we are again available, contact your PCP or go to the hospital emergency room. PLEASE NOTE: IF YOU ARE UNABLE TO OBTAIN WOUND SUPPLIES, CONTINUE TO USE THE SUPPLIES YOU HAVE AVAILABLE UNTIL YOU ARE ABLE TO REACH US. IT IS MOST IMPORTANT TO KEEP THE WOUND COVERED AT ALL TIMES.

## 2023-08-07 ENCOUNTER — HOSPITAL ENCOUNTER (OUTPATIENT)
Facility: HOSPITAL | Age: 78
Discharge: HOME | End: 2023-08-07
Payer: MEDICARE

## 2023-08-07 VITALS
HEART RATE: 93 BPM | SYSTOLIC BLOOD PRESSURE: 146 MMHG | RESPIRATION RATE: 18 BRPM | DIASTOLIC BLOOD PRESSURE: 83 MMHG | TEMPERATURE: 98 F

## 2023-08-07 DIAGNOSIS — L97.822 NON-PRESSURE CHRONIC ULCER OF OTHER PART OF LEFT LOWER LEG WITH FAT LAYER EXPOSED (HCC): ICD-10-CM

## 2023-08-07 DIAGNOSIS — L97.812 NON-PRESSURE CHRONIC ULCER OF OTHER PART OF RIGHT LOWER LEG WITH FAT LAYER EXPOSED (HCC): Primary | ICD-10-CM

## 2023-08-07 PROCEDURE — 29580 STRAPPING UNNA BOOT: CPT

## 2023-08-07 PROCEDURE — 11045 DBRDMT SUBQ TISS EACH ADDL: CPT

## 2023-08-07 PROCEDURE — 11042 DBRDMT SUBQ TIS 1ST 20SQCM/<: CPT

## 2023-08-07 RX ORDER — LIDOCAINE HYDROCHLORIDE 20 MG/ML
JELLY TOPICAL ONCE
OUTPATIENT
Start: 2023-08-07 | End: 2023-08-07

## 2023-08-07 RX ORDER — SODIUM CHLOR/HYPOCHLOROUS ACID 0.033 %
SOLUTION, IRRIGATION IRRIGATION ONCE
OUTPATIENT
Start: 2023-08-07 | End: 2023-08-07

## 2023-08-07 NOTE — DISCHARGE INSTRUCTIONS
Wound Clinic Physician Orders and Discharge Instructions  [address] [place] [address] S. 709 [facility], [address] [place] [address] Way  Telephone: [phone]    NAME:  Loren Elena. YOB: 1945  MEDICAL RECORD NUMBER:  [id number]  DATE:  8/7/2023      Return Appointment:  [] Dressing Supply Provider:   [x] Home Healthcare: [address]  [x] Return Appointment: 2 Week(s)  [] Nurse Visit:     [] Discharge from [facility]: [] Healed            [] Refer to Provider:    Follow-up Information:  [] Ordered Tests:   [x] Referrals: Lymphedema Clinic  [] Rx:   [] Other:       Wound Cleansing:   Do not scrub or use excessive force. Cleanse wound prior to applying a clean dressing with:  [] Normal Saline   [] Keep Wound Dry in Shower     [] Wound Cleanser   [x] Cleanse wound with Mild Soap & Water    [] Other:       Topical Treatments:  Do not apply lotions, creams, or ointments to wound bed unless directed. [] Apply moisturizing lotion to skin surrounding the wound prior to dressing change.  [] Apply antifungal ointment to skin surrounding the wound prior to dressing change.  [] Apply thin film of moisture barrier ointment to skin immediately around wound.   [] Apply Betadine to skim immediately around wound      Dressings:           Wound Location R & L Legs    [x] Apply Primary Dressing:       [] MediHoney Gel [] MediHoney Alginate  [] Calcium Alginate with Silver   [] Calcium Alginate without silver   [] Collagen with silver [] Collagen without Silver    [] Santyl with moist saline gauze     [] Hydrofera Blue (cut to size and moistened with normal saline)  [] Hydrofera Blue Ready (cut to size)      [] Normal Saline wet to dry  [] Betadine wet to dry    [] Hydrogel  [] Mepitel     [] Bactroban/Mupirocin   [] Iodoform Packing Strip [] Plain Packing Strip   [] Skin Sub:   [x] Other:  Drawtex    [x] Cover and Secure with:     [x] Gauze [] Demetrius [] Kerlix   [] Foam  [x]

## 2023-08-07 NOTE — WOUND CARE
[place] Application   Below Knee    NAME:  Puja Boles YOB: 1945  MEDICAL RECORD NUMBER:  [id number]  DATE:  8/7/2023    Laryash Gene boot: Applied moisturizing agent to dry skin as needed. Appied primary and secondary dressing as ordered. Applied Unna roll from toes to knee overlapping each time. Applied ace wrap or coban from toes to below the knee. Secured with tape and/or metal clips covered with tape. Instructed patient/caregiver to keep dressing dry and intact. DO NOT REMOVE DRESSING. Instructed pt/family/caregiver to report excessive draining, loose bandage, wet dressing, severe pain or tingling in toes. Applied [place] dressing below the knee to right lower leg. Applied [place] dressing below the knee to left lower leg. Unna Boot(s) were applied per  Guidelines.      Electronically signed by Tate Orozco LPN on 8/6/0952 at 59:58 AM
Wound Clinic Physician Orders and Discharge Instructions  [address] [place] [address] S. 709 [facility], [address] [place] [address] Way  Telephone: [phone]    NAME:  Jennifer Phipps. YOB: 1945  MEDICAL RECORD NUMBER:  [id number]  DATE:  8/7/2023      Return Appointment:  [] Dressing Supply Provider:   [x] Home Healthcare: [facility]  [x] Return Appointment: 2 Week(s)  [] Nurse Visit:     [] Discharge from [facility]: [] Healed            [] Refer to Provider:    Follow-up Information:  [] Ordered Tests:   [x] Referrals: Lymphedema Clinic  [] Rx:   [] Other:       Wound Cleansing:   Do not scrub or use excessive force. Cleanse wound prior to applying a clean dressing with:  [] Normal Saline   [] Keep Wound Dry in Shower     [] Wound Cleanser   [x] Cleanse wound with Mild Soap & Water    [] Other:       Topical Treatments:  Do not apply lotions, creams, or ointments to wound bed unless directed. [] Apply moisturizing lotion to skin surrounding the wound prior to dressing change.  [] Apply antifungal ointment to skin surrounding the wound prior to dressing change.  [] Apply thin film of moisture barrier ointment to skin immediately around wound.   [] Apply Betadine to skim immediately around wound      Dressings:           Wound Location R & L Legs    [x] Apply Primary Dressing:       [] MediHoney Gel [] MediHoney Alginate  [] Calcium Alginate with Silver   [] Calcium Alginate without silver   [] Collagen with silver [] Collagen without Silver    [] Santyl with moist saline gauze     [] Hydrofera Blue (cut to size and moistened with normal saline)  [] Hydrofera Blue Ready (cut to size)      [] Normal Saline wet to dry  [] Betadine wet to dry    [] Hydrogel  [] Mepitel     [] Bactroban/Mupirocin   [] Iodoform Packing Strip [] Plain Packing Strip   [] Skin Sub:   [x] Other:  Drawtex    [x] Cover and Secure with:     [x] Gauze [] Demetrius [] Kerlix   [] Foam  [x]
Apply Betadine to skim immediately around wound      Dressings:           Wound Location R & L Legs    [x] Apply Primary Dressing:       [] MediHoney Gel [] MediHoney Alginate  [] Calcium Alginate with Silver   [] Calcium Alginate without silver   [] Collagen with silver [] Collagen without Silver    [] Santyl with moist saline gauze     [] Hydrofera Blue (cut to size and moistened with normal saline)  [] Hydrofera Blue Ready (cut to size)      [] Normal Saline wet to dry  [] Betadine wet to dry    [] Hydrogel  [] Mepitel     [] Bactroban/Mupirocin   [] Iodoform Packing Strip [] Plain Packing Strip   [] Skin Sub:   [x] Other:  Drawtex    [x] Cover and Secure with:     [x] Gauze [] Demetrius [] Kerlix   [] Foam  [x] Super Absorbant [] ABD     [] Ace Wrap [] Other:    Limit contact of tape with skin. [x] Change dressing: [] Daily    [] Every Other Day   [] Twice per week   [x] Three times per week   [] Once a week [] Do Not Change Dressing   [] Other:     Pressure Relief:  [] When sitting, shift position or do seat lifts every 15 minutes.  [] Wheelchair cushion [] Specialty Bed/Mattress  [] Turn every 2 hours when in bed. Avoid direct pressure on wound site. Limit side lying to 30 degree tilt. Limit HOB elevation to 30 degrees. Edema Control:  Apply: [] Compression Stocking:  mmHg  []Right Leg []Left Leg   [] Tubigrip []Right Leg Double Layer []Left Leg Double Layer      []Right Leg Single Layer []Left Leg Single Layer      [] Elevate leg(s) above the level of the heart when sitting. [] Avoid prolonged standing in one place. Compression:  Apply: [x] Compression Wrap to [x]RightLeg [x]Left Leg    []  Coflex [] Coflex Lite  [x] Unna with Calamine Lite     [x] Do not get leg(s) with wrap wet. If wrap becomes too tight, call the wound center and remove wrap from leg by unrolling each layer. [x] Elevate leg(s) above the level of the heart when sitting.     [x] Avoid prolonged standing in one

## 2023-08-21 ENCOUNTER — HOSPITAL ENCOUNTER (OUTPATIENT)
Facility: HOSPITAL | Age: 78
Discharge: HOME | End: 2023-08-21
Payer: MEDICARE

## 2023-08-21 VITALS
RESPIRATION RATE: 18 BRPM | DIASTOLIC BLOOD PRESSURE: 82 MMHG | SYSTOLIC BLOOD PRESSURE: 142 MMHG | TEMPERATURE: 98.7 F | HEART RATE: 68 BPM

## 2023-08-21 DIAGNOSIS — L97.822 NON-PRESSURE CHRONIC ULCER OF OTHER PART OF LEFT LOWER LEG WITH FAT LAYER EXPOSED (HCC): ICD-10-CM

## 2023-08-21 DIAGNOSIS — L97.812 NON-PRESSURE CHRONIC ULCER OF OTHER PART OF RIGHT LOWER LEG WITH FAT LAYER EXPOSED (HCC): Primary | ICD-10-CM

## 2023-08-21 PROCEDURE — 11045 DBRDMT SUBQ TISS EACH ADDL: CPT

## 2023-08-21 PROCEDURE — 11042 DBRDMT SUBQ TIS 1ST 20SQCM/<: CPT

## 2023-08-21 RX ORDER — SODIUM CHLOR/HYPOCHLOROUS ACID 0.033 %
SOLUTION, IRRIGATION IRRIGATION ONCE
OUTPATIENT
Start: 2023-08-21 | End: 2023-08-21

## 2023-08-21 RX ORDER — LIDOCAINE HYDROCHLORIDE 20 MG/ML
JELLY TOPICAL ONCE
OUTPATIENT
Start: 2023-08-21 | End: 2023-08-21

## 2023-08-21 ASSESSMENT — PAIN SCALES - GENERAL: PAINLEVEL_OUTOF10: 0

## 2023-08-21 NOTE — WOUND CARE
[address] [facility]   Medical Staff Progress Note     Ignacio Lui. MEDICAL RECORD NUMBER:  [id number]  AGE: 77 y.o. GENDER: male  : 1945  EPISODE DATE:  2023    Chief complaint and reason for visit:   BLE lymphedema with ulcers  Chief Complaint   Patient presents with    Wound Check     Right and Left leg       Patient presenting for follow up evaluation of wound(s) per chief complaint. Subjective: Symptoms, wound related issues, or other pertinent wound history since last visit: no new problems reported     Wound 23 Leg Right #1 (Active)   Wound Image   23 1050   Wound Etiology Venous 23 1050   Dressing Status Clean;Dry; Intact 23 1050   Wound Cleansed Soap and water 23 1050   Dressing/Treatment Other (comment) 23 1136   Wound Length (cm) 8 cm 23 1050   Wound Width (cm) 9 cm 23 1050   Wound Depth (cm) 0.1 cm 23 1050   Wound Surface Area (cm^2) 72 cm^2 23 1050   Change in Wound Size % (l*w) -12.5 23 1050   Wound Volume (cm^3) 7.2 cm^3 23 1050   Wound Healing % -13 23 1050   Post-Procedure Length (cm) 8 cm 23 1106   Post-Procedure Width (cm) 9 cm 23 1106   Post-Procedure Depth (cm) 0.1 cm 23 1106   Post-Procedure Surface Area (cm^2) 72 cm^2 23 1106   Post-Procedure Volume (cm^3) 7.2 cm^3 23 1106   Wound Assessment Slough;Granulation tissue 23 1050   Drainage Amount Moderate (25-50%) 23 1050   Drainage Description Serosanguinous 23 1050   Odor None 23 1050   Lyla-wound Assessment Intact;Dry/flaky; Maceration 23 1050   Margins Attached edges 23 1050   Wound Thickness Description not for Pressure Injury Full thickness 23 1050   Number of days: 105       Wound 23 Leg Left #2 (Active)   Wound Image   23 1050   Wound Etiology Venous 23 1050   Dressing Status
[place] Application   Below Knee    NAME:  Jennifer Phipps. YOB: 1945  MEDICAL RECORD NUMBER:  [id number]  DATE:  8/21/2023    Sonam Ocasio boot: Appied primary and secondary dressing as ordered. Applied Unna roll from toes to knee overlapping each time. Applied ace wrap or coban from toes to below the knee. Secured with tape and/or metal clips covered with tape. Instructed patient/caregiver to keep dressing dry and intact. DO NOT REMOVE DRESSING. Instructed pt/family/caregiver to report excessive draining, loose bandage, wet dressing, severe pain or tingling in toes. Applied [place] dressing below the knee to right lower leg. Applied [place] dressing below the knee to left lower leg. Unna Boot(s) were applied per  Guidelines.      Electronically signed by Marge Hernandez RN on 8/21/2023 at 11:35 AM
Absorbant [] ABD     [] Ace Wrap [] Other:    Limit contact of tape with skin. [x] Change dressing: [] Daily    [] Every Other Day   [] Twice per week   [x] Three times per week   [] Once a week [] Do Not Change Dressing   [] Other:    Dressings:           Wound Location L Leg   [x] Apply Primary Dressing:       [] MediHoney Gel [] MediHoney Alginate  [x] Calcium Alginate with Silver 1st  [] Calcium Alginate without silver   [] Collagen with silver [] Collagen without Silver    [] Santyl with moist saline gauze     [] Hydrofera Blue (cut to size and moistened with normal saline)  [] Hydrofera Blue Ready (cut to size)      [] Normal Saline wet to dry  [] Betadine wet to dry    [] Hydrogel  [] Mepitel     [] Bactroban/Mupirocin   [] Iodoform Packing Strip [] Plain Packing Strip   [] Skin Sub:   [x] Other:  Drawtex 2nd    [x] Cover and Secure with:     [x] Gauze [] Demetrius [] Kerlix   [] Foam  [] Super Absorbant [] ABD     [] Ace Wrap [] Other:    Limit contact of tape with skin. [x] Change dressing: [] Daily    [] Every Other Day   [] Twice per week   [x] Three times per week   [] Once a week [] Do Not Change Dressing   [] Other:     Edema Control:  Apply: [] Compression Stocking:  mmHg  []Right Leg []Left Leg   [] Tubigrip []Right Leg Double Layer []Left Leg Double Layer      []Right Leg Single Layer []Left Leg Single Layer      [] Elevate leg(s) above the level of the heart when sitting. [] Avoid prolonged standing in one place. Compression:  Apply: [x] Compression Wrap to [x]RightLeg [x]Left Leg    []  Coflex [] Coflex Lite  [x] Unna with Calamine Lite     [x] Do not get leg(s) with wrap wet. If wrap becomes too tight, call the wound center and remove wrap from leg by unrolling each layer. [x] Elevate leg(s) above the level of the heart when sitting. [x] Avoid prolonged standing in one place.     Dietary:  [x] Diet as tolerated: [] Calorie Diabetic Diet: [] No Added Salt:  [x] Increase Protein: []

## 2023-08-21 NOTE — DISCHARGE INSTRUCTIONS
Wound Clinic Physician Orders and Discharge Instructions  [address] [place], [place] [address] S. 709 [facility], [address] [place] [address] Way  Telephone: [phone]    NAME:  Sabino Means. YOB: 1945  MEDICAL RECORD NUMBER:  [id number]  DATE:  8/21/2023      Return Appointment:  [] Dressing Supply Provider:   [x] Home Healthcare: Gurpreet Espinoza [address] [place] [address],[address]  [x] Return Appointment: 2 Week(s)  [] Nurse Visit:     [] Discharge from [facility]: [] Healed            [] Refer to Provider:    Follow-up Information:  [] Ordered Tests:   [x] Referrals: Lymphedema Clinic  [] Rx:   [] Other:       Wound Cleansing:   Do not scrub or use excessive force. Cleanse wound prior to applying a clean dressing with:  [] Normal Saline   [] Keep Wound Dry in Shower     [] Wound Cleanser   [x] Cleanse wound with Mild Soap & Water    [] Other:       Topical Treatments:  Do not apply lotions, creams, or ointments to wound bed unless directed. [] Apply moisturizing lotion to skin surrounding the wound prior to dressing change.  [] Apply antifungal ointment to skin surrounding the wound prior to dressing change.  [] Apply thin film of moisture barrier ointment to skin immediately around wound.   [] Apply Betadine to skim immediately around wound      Dressings:           Wound Location R Leg   [x] Apply Primary Dressing:       [] MediHoney Gel [] MediHoney Alginate  [] Calcium Alginate with Silver   [] Calcium Alginate without silver   [] Collagen with silver [] Collagen without Silver    [] Santyl with moist saline gauze     [] Hydrofera Blue (cut to size and moistened with normal saline)  [] Hydrofera Blue Ready (cut to size)      [] Normal Saline wet to dry  [] Betadine wet to dry    [] Hydrogel  [] Mepitel     [] Bactroban/Mupirocin   [] Iodoform Packing Strip [] Plain Packing Strip   [] Skin Sub:   [x] Other:  Drawtex    [x] Cover and Secure with:     [x] Gauze [] Demetrius [] Kerlix   [] Foam  [] Super

## 2023-09-11 ENCOUNTER — HOSPITAL ENCOUNTER (OUTPATIENT)
Facility: HOSPITAL | Age: 78
Discharge: HOME | End: 2023-09-11
Payer: MEDICARE

## 2023-09-11 VITALS
HEART RATE: 79 BPM | DIASTOLIC BLOOD PRESSURE: 76 MMHG | TEMPERATURE: 98 F | RESPIRATION RATE: 18 BRPM | SYSTOLIC BLOOD PRESSURE: 131 MMHG

## 2023-09-11 DIAGNOSIS — L97.822 NON-PRESSURE CHRONIC ULCER OF OTHER PART OF LEFT LOWER LEG WITH FAT LAYER EXPOSED (HCC): ICD-10-CM

## 2023-09-11 DIAGNOSIS — L97.812 NON-PRESSURE CHRONIC ULCER OF OTHER PART OF RIGHT LOWER LEG WITH FAT LAYER EXPOSED (HCC): Primary | ICD-10-CM

## 2023-09-11 PROCEDURE — 11045 DBRDMT SUBQ TISS EACH ADDL: CPT

## 2023-09-11 PROCEDURE — 11042 DBRDMT SUBQ TIS 1ST 20SQCM/<: CPT

## 2023-09-11 RX ORDER — SODIUM CHLOR/HYPOCHLOROUS ACID 0.033 %
SOLUTION, IRRIGATION IRRIGATION ONCE
OUTPATIENT
Start: 2023-09-11 | End: 2023-09-11

## 2023-09-11 RX ORDER — LIDOCAINE HYDROCHLORIDE 20 MG/ML
JELLY TOPICAL ONCE
OUTPATIENT
Start: 2023-09-11 | End: 2023-09-11

## 2023-09-11 NOTE — DISCHARGE INSTRUCTIONS
Foam  [x] Super Absorbant [] ABD     [] Ace Wrap [] Other:    Limit contact of tape with skin. [x] Change dressing: [] Daily    [] Every Other Day   [] Twice per week   [x] Three times per week   [] Once a week [] Do Not Change Dressing   [] Other:    Dressings:           Wound Location L Leg   [x] Apply Primary Dressing:       [] MediHoney Gel [] MediHoney Alginate  [x] Calcium Alginate with Silver  [] Calcium Alginate without silver   [] Collagen with silver [] Collagen without Silver    [] Santyl with moist saline gauze     [] Hydrofera Blue (cut to size and moistened with normal saline)  [] Hydrofera Blue Ready (cut to size)      [] Normal Saline wet to dry  [] Betadine wet to dry    [] Hydrogel  [] Mepitel     [] Bactroban/Mupirocin   [] Iodoform Packing Strip [] Plain Packing Strip   [] Skin Sub:   [] Other:      [x] Cover and Secure with:     [x] Gauze [] Demetrius [] Kerlix   [] Foam  [x] Super Absorbant [] ABD     [] Ace Wrap [] Other:    Limit contact of tape with skin. [x] Change dressing: [] Daily    [] Every Other Day   [] Twice per week   [x] Three times per week   [] Once a week [] Do Not Change Dressing   [] Other:     Edema Control:  Apply: [] Compression Stocking:  mmHg  []Right Leg []Left Leg   [] Tubigrip []Right Leg Double Layer []Left Leg Double Layer      []Right Leg Single Layer []Left Leg Single Layer      [] Elevate leg(s) above the level of the heart when sitting. [] Avoid prolonged standing in one place. Compression:  Apply: [x] Compression Wrap to [x]RightLeg [x]Left Leg    []  Coflex [] Coflex Lite  [x] Unna with Calamine Lite     [x] Do not get leg(s) with wrap wet. If wrap becomes too tight, call the wound center and remove wrap from leg by unrolling each layer. [x] Elevate leg(s) above the level of the heart when sitting. [x] Avoid prolonged standing in one place.     Dietary:  [x] Diet as tolerated: [] Calorie Diabetic Diet: [] No Added Salt:  [x] Increase Protein: []

## 2023-09-11 NOTE — WOUND CARE
[address] [facility]   Medical Staff Progress Note     Gilda Fylnn. MEDICAL RECORD NUMBER:  [id number]  AGE: 77 y.o. GENDER: male  : 1945  EPISODE DATE:  2023    Chief complaint and reason for visit:   BLE  Chief Complaint   Patient presents with    Wound Check     Bilateral legs      Patient presenting for follow up evaluation of wound(s) per chief complaint. Subjective: Symptoms, wound related issues, or other pertinent wound history since last visit:   Reports increased activity levels recently  Using lymphedema pump, [facility] Right #1 (Active)   Wound Image   23 1058   Wound Etiology Venous 23 1058   Dressing Status Clean;Dry; Intact 23 1058   Wound Cleansed Soap and water 23 1058   Dressing/Treatment Other (comment) 23 1134   Wound Length (cm) 11.5 cm 23 1058   Wound Width (cm) 10.5 cm 23 1058   Wound Depth (cm) 0.1 cm 23 1058   Wound Surface Area (cm^2) 120.75 cm^2 23 1058   Change in Wound Size % (l*w) -88.67 23 1058   Wound Volume (cm^3) 12.075 cm^3 23 1058   Wound Healing % -89 23 1058   Post-Procedure Length (cm) 11.5 cm 23 1105   Post-Procedure Width (cm) 10.5 cm 23 1105   Post-Procedure Depth (cm) 0.1 cm 23 1105   Post-Procedure Surface Area (cm^2) 120.75 cm^2 23 1105   Post-Procedure Volume (cm^3) 12.075 cm^3 23 1105   Wound Assessment Slough;Granulation tissue 23 1058   Drainage Amount Moderate (25-50%) 23 1058   Drainage Description Serosanguinous 23 1058   Odor Mild 23 1058   Lyla-wound Assessment Intact;Dry/flaky 23 1058   Margins Attached edges 23 1058   Wound Thickness Description not for Pressure Injury Full thickness 23 1058   Number of days: 126       Wound 23 Leg Left #2 (Active)   Wound Image
[place] Application   Below Knee    NAME:  Barbara Romo. YOB: 1945  MEDICAL RECORD NUMBER:  [id number]  DATE:  9/11/2023    Amedeo Mourning boot: Appied primary and secondary dressing as ordered. Applied Unna roll from toes to knee overlapping each time. Applied ace wrap or coban from toes to below the knee. Secured with tape and/or metal clips covered with tape. Instructed patient/caregiver to keep dressing dry and intact. DO NOT REMOVE DRESSING. Instructed pt/family/caregiver to report excessive draining, loose bandage, wet dressing, severe pain or tingling in toes. Applied [place] dressing below the knee to right lower leg. Applied [place] dressing below the knee to left lower leg. Unna Boot(s) were applied per  Guidelines.      Electronically signed by Marzena Bryant LPN on 3/60/8779 at 79:33 AM
Kerlix   [] Foam  [x] Super Absorbant [] ABD     [] Ace Wrap [] Other:    Limit contact of tape with skin. [x] Change dressing: [] Daily    [] Every Other Day   [] Twice per week   [x] Three times per week   [] Once a week [] Do Not Change Dressing   [] Other:    Dressings:           Wound Location L Leg   [x] Apply Primary Dressing:       [] MediHoney Gel [] MediHoney Alginate  [x] Calcium Alginate with Silver  [] Calcium Alginate without silver   [] Collagen with silver [] Collagen without Silver    [] Santyl with moist saline gauze     [] Hydrofera Blue (cut to size and moistened with normal saline)  [] Hydrofera Blue Ready (cut to size)      [] Normal Saline wet to dry  [] Betadine wet to dry    [] Hydrogel  [] Mepitel     [] Bactroban/Mupirocin   [] Iodoform Packing Strip [] Plain Packing Strip   [] Skin Sub:   [] Other:      [x] Cover and Secure with:     [x] Gauze [] Demetrius [] Kerlix   [] Foam  [x] Super Absorbant [] ABD     [] Ace Wrap [] Other:    Limit contact of tape with skin. [x] Change dressing: [] Daily    [] Every Other Day   [] Twice per week   [x] Three times per week   [] Once a week [] Do Not Change Dressing   [] Other:     Edema Control:  Apply: [] Compression Stocking:  mmHg  []Right Leg []Left Leg   [] Tubigrip []Right Leg Double Layer []Left Leg Double Layer      []Right Leg Single Layer []Left Leg Single Layer      [] Elevate leg(s) above the level of the heart when sitting. [] Avoid prolonged standing in one place. Compression:  Apply: [x] Compression Wrap to [x]RightLeg [x]Left Leg    []  Coflex [] Coflex Lite  [x] Unna with Calamine Lite     [x] Do not get leg(s) with wrap wet. If wrap becomes too tight, call the wound center and remove wrap from leg by unrolling each layer. [x] Elevate leg(s) above the level of the heart when sitting. [x] Avoid prolonged standing in one place.     Dietary:  [x] Diet as tolerated: [] Calorie Diabetic Diet: [] No Added Salt:  [x] Increase

## 2023-09-25 ENCOUNTER — HOSPITAL ENCOUNTER (OUTPATIENT)
Facility: HOSPITAL | Age: 78
Discharge: HOME | End: 2023-09-25
Payer: MEDICARE

## 2023-09-25 VITALS
SYSTOLIC BLOOD PRESSURE: 144 MMHG | DIASTOLIC BLOOD PRESSURE: 84 MMHG | RESPIRATION RATE: 20 BRPM | TEMPERATURE: 99.1 F | HEART RATE: 83 BPM

## 2023-09-25 DIAGNOSIS — L97.812 NON-PRESSURE CHRONIC ULCER OF OTHER PART OF RIGHT LOWER LEG WITH FAT LAYER EXPOSED (HCC): Primary | ICD-10-CM

## 2023-09-25 DIAGNOSIS — L97.822 NON-PRESSURE CHRONIC ULCER OF OTHER PART OF LEFT LOWER LEG WITH FAT LAYER EXPOSED (HCC): ICD-10-CM

## 2023-09-25 PROCEDURE — 11045 DBRDMT SUBQ TISS EACH ADDL: CPT

## 2023-09-25 PROCEDURE — 11042 DBRDMT SUBQ TIS 1ST 20SQCM/<: CPT

## 2023-09-25 RX ORDER — SODIUM CHLOR/HYPOCHLOROUS ACID 0.033 %
SOLUTION, IRRIGATION IRRIGATION ONCE
OUTPATIENT
Start: 2023-09-25 | End: 2023-09-25

## 2023-09-25 RX ORDER — LIDOCAINE HYDROCHLORIDE 20 MG/ML
JELLY TOPICAL ONCE
OUTPATIENT
Start: 2023-09-25 | End: 2023-09-25

## 2023-09-25 RX ORDER — TRIAMCINOLONE ACETONIDE 1 MG/G
OINTMENT TOPICAL ONCE
OUTPATIENT
Start: 2023-09-25 | End: 2023-09-25

## 2023-09-25 NOTE — DISCHARGE INSTRUCTIONS
Added Salt:  [x] Increase Protein: [] Other:     Activity:  [x] Activity as tolerated:    [] Patient has no activity restrictions      [] Strict Bedrest   [] Remain off Work      [] May return to full duty work                                     [] Return to work with restrictions     Physician:  [x] Dr. Chanda Jung  [] Dr. Niyah Harry  [] Dr. Caterina Broussard      Nurse Case Manger:  [address] [facility] Health Information: Should you experience any significant changes in your wound(s) or have questions about your wound care, please contact the [address] [place] [address] at [phone]. Our hours are Monday - Friday 8am - 4:30pm, closed all major holidays. If you need help with your wound outside these hours and cannot wait until we are again available, contact your PCP or go to the hospital emergency room. PLEASE NOTE: IF YOU ARE UNABLE TO OBTAIN WOUND SUPPLIES, CONTINUE TO USE THE SUPPLIES YOU HAVE AVAILABLE UNTIL YOU ARE ABLE TO REACH US. IT IS MOST IMPORTANT TO KEEP THE WOUND COVERED AT ALL TIMES.

## 2023-09-25 NOTE — WOUND CARE
[place] Application   Below Knee    NAME:  Barbara Romo. YOB: 1945  MEDICAL RECORD NUMBER:  [id number]  DATE:  9/25/2023    Amedeo Mourning boot: Appied primary and secondary dressing as ordered. Applied Unna roll from toes to knee overlapping each time. Applied ace wrap or coban from toes to below the knee. Secured with tape and/or metal clips covered with tape. Instructed patient/caregiver to keep dressing dry and intact. DO NOT REMOVE DRESSING. Instructed pt/family/caregiver to report excessive draining, loose bandage, wet dressing, severe pain or tingling in toes. Applied [place] dressing below the knee to right lower leg. Applied [place] dressing below the knee to left lower leg. Unna Boot(s) were applied per  Guidelines.      Electronically signed by Marzena Bryant LPN on 1/82/0751 at 08:24 AM
Wound Clinic Physician Orders and Discharge Instructions  [address] [place] [address] S. 709 [facility], [address] [place] [address] Way  Telephone: [phone]    NAME:  Sabino Means. YOB: 1945  MEDICAL RECORD NUMBER:  [id number]  DATE:  9/25/2023      Return Appointment:  [] Dressing Supply Provider:   [x] Home Healthcare: [facility]  [x] Return Appointment: 2 Week(s)  [] Nurse Visit:     [] Discharge from [facility]: [] Healed            [] Refer to Provider:    Follow-up Information:  [] Ordered Tests:   [x] Referrals: Lymphedema Clinic (Call and get on wait list)  [] Rx:   [x] Other: Follow up with Dr. French Gary:   Do not scrub or use excessive force. Cleanse wound prior to applying a clean dressing with:  [] Normal Saline   [] Keep Wound Dry in Shower     [] Wound Cleanser   [x] Cleanse wound with Mild Soap & Water    [] Other:       Topical Treatments:  Do not apply lotions, creams, or ointments to wound bed unless directed. [] Apply moisturizing lotion to skin surrounding the wound prior to dressing change.  [] Apply antifungal ointment to skin surrounding the wound prior to dressing change.  [] Apply thin film of moisture barrier ointment to skin immediately around wound.   [] Apply Betadine to skim immediately around wound      Dressings:           Wound Location R Leg   [x] Apply Primary Dressing:       [] MediHoney Gel [] MediHoney Alginate  [] Calcium Alginate with Silver   [x] Calcium Alginate without silver   [] Collagen with silver [] Collagen without Silver    [] Santyl with moist saline gauze     [] Hydrofera Blue (cut to size and moistened with normal saline)  [] Hydrofera Blue Ready (cut to size)      [] Normal Saline wet to dry  [] Betadine wet to dry    [] Hydrogel  [] Mepitel     [] Bactroban/Mupirocin   [] Iodoform Packing Strip [] Plain Packing Strip   [] Skin Sub:   [] Other:     [x] Cover and Secure with:
9/25/2023 at 11:39 AM   Procedure Note  Indications: Based on my examination of this patient's wound(s)/ulcer(s) today, debridement is required to promote healing and evaluate the wound base. Debridement: Excisional Debridement    Using: curette the wound(s)/ulcer(s) was/were debrided down through and including the removal of subcutaneous tissue. Performed by: Juwan Starks MD  Consent obtained: Yes  Time out taken: Yes  Pain Control: Anesthetic  Anesthetic: 4% Lidocaine Liquid Topical       Devitalized Tissue Debrided: slough    Pre Debridement Measurements:  Are located in the [facility]  Documentation Flow Sheet    Diabetic/Pressure/Non Pressure Ulcers only:  Ulcer: Non-Pressure ulcer, 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     Wound/Ulcer #: 1 and 2  Post Debridement Measurements:  Wound/Ulcer Descriptions are Pre Debridement except measurements:  Wound 05/08/23 Leg Right #1 (Active)   Wound Image   09/25/23 1055   Wound Etiology Venous 09/25/23 1055   Dressing Status Clean;Dry; Intact 09/25/23 1055   Wound Cleansed Soap and water 09/25/23 1055   Dressing/Treatment Other (comment) 09/25/23 1133   Wound Length (cm) 9 cm 09/25/23 1055   Wound Width (cm) 11.5 cm 09/25/23 1055   Wound Depth (cm) 0.1 cm 09/25/23 1055   Wound Surface Area (cm^2) 103.5 cm^2 09/25/23 1055   Change in Wound Size % (l*w) -61.72 09/25/23 1055   Wound Volume (cm^3) 10.35 cm^3 09/25/23 1055   Wound Healing % -62 09/25/23 1055   Post-Procedure Length (cm) 9 cm 09/25/23 1116   Post-Procedure Width (cm) 11.5 cm 09/25/23 1116   Post-Procedure Depth (cm) 0.1 cm 09/25/23 1116   Post-Procedure Surface Area (cm^2) 103.5 cm^2 09/25/23 1116   Post-Procedure Volume (cm^3) 10.35 cm^3 09/25/23 1116   Wound Assessment Slough;Granulation tissue 09/25/23 1055   Drainage Amount Moderate (25-50%) 09/25/23 1055   Drainage Description Serosanguinous 09/25/23 1055   Odor Mild 09/25/23 1055   Lyla-wound Assessment Intact;Dry/flaky 09/25/23 1055   Margins Attached edges

## 2023-10-09 ENCOUNTER — HOSPITAL ENCOUNTER (OUTPATIENT)
Facility: HOSPITAL | Age: 78
Discharge: HOME | End: 2023-10-09
Attending: SPECIALIST | Admitting: SPECIALIST
Payer: MEDICARE

## 2023-10-09 VITALS
RESPIRATION RATE: 20 BRPM | TEMPERATURE: 97.7 F | HEART RATE: 85 BPM | SYSTOLIC BLOOD PRESSURE: 160 MMHG | DIASTOLIC BLOOD PRESSURE: 86 MMHG

## 2023-10-09 DIAGNOSIS — L97.812 NON-PRESSURE CHRONIC ULCER OF OTHER PART OF RIGHT LOWER LEG WITH FAT LAYER EXPOSED (HCC): Primary | ICD-10-CM

## 2023-10-09 DIAGNOSIS — L97.822 NON-PRESSURE CHRONIC ULCER OF OTHER PART OF LEFT LOWER LEG WITH FAT LAYER EXPOSED (HCC): ICD-10-CM

## 2023-10-09 PROCEDURE — 11042 DBRDMT SUBQ TIS 1ST 20SQCM/<: CPT

## 2023-10-09 PROCEDURE — 11045 DBRDMT SUBQ TISS EACH ADDL: CPT

## 2023-10-09 RX ORDER — TRIAMCINOLONE ACETONIDE 1 MG/G
OINTMENT TOPICAL ONCE
OUTPATIENT
Start: 2023-10-09 | End: 2023-10-09

## 2023-10-09 RX ORDER — SODIUM CHLOR/HYPOCHLOROUS ACID 0.033 %
SOLUTION, IRRIGATION IRRIGATION ONCE
OUTPATIENT
Start: 2023-10-09 | End: 2023-10-09

## 2023-10-09 RX ORDER — LIDOCAINE HYDROCHLORIDE 20 MG/ML
JELLY TOPICAL ONCE
OUTPATIENT
Start: 2023-10-09 | End: 2023-10-09

## 2023-10-09 NOTE — DISCHARGE INSTRUCTIONS
Wound Clinic Physician Orders and Discharge Instructions  [address] [place] [address] S. 709 [facility], [address] [place] [address]  Telephone: [phone]    NAME:  Steve Fair. YOB: 1945  MEDICAL RECORD NUMBER:  [id number]  DATE:  10/9/2023      Return Appointment:  [] Dressing Supply Provider:   [x] Home Healthcare: [facility] [address]  [x] Return Appointment: 2 Week(s)  [] Nurse Visit:     [] Discharge from [facility]: [] Healed            [] Refer to Provider:    Follow-up Information:  [] Ordered Tests:   [x] Referrals: Lymphedema Clinic and Dr. Blake Dixon  [] Rx:   [] Other:     Wound Cleansing:   Do not scrub or use excessive force. Cleanse wound prior to applying a clean dressing with:  [] Normal Saline   [] Keep Wound Dry in Shower     [] Wound Cleanser   [x] Cleanse wound with Mild Soap & Water    [] Other:       Topical Treatments:  Do not apply lotions, creams, or ointments to wound bed unless directed. [] Apply moisturizing lotion to skin surrounding the wound prior to dressing change.  [] Apply antifungal ointment to skin surrounding the wound prior to dressing change.  [] Apply thin film of moisture barrier ointment to skin immediately around wound.   [] Apply Betadine to skim immediately around wound      Dressings:           Wound Location R Leg   [x] Apply Primary Dressing:       [] MediHoney Gel [] MediHoney Alginate  [] Calcium Alginate with Silver   [x] Calcium Alginate without silver   [] Collagen with silver [] Collagen without Silver    [] Santyl with moist saline gauze     [] Hydrofera Blue (cut to size and moistened with normal saline)  [] Hydrofera Blue Ready (cut to size)      [] Normal Saline wet to dry  [] Betadine wet to dry    [] Hydrogel  [] Mepitel     [] Bactroban/Mupirocin   [] Iodoform Packing Strip [] Plain Packing Strip   [] Skin Sub:   [] Other:     [x] Cover and Secure with:     [x] Gauze [] Demetrius [] Kerlix   [] Foam  [x]

## 2023-10-09 NOTE — WOUND CARE
Ulysees Blizzard Application   Below Knee    NAME:  Steve Fair. YOB: 1945  MEDICAL RECORD NUMBER:  [id number]  DATE:  10/9/2023    Madiha Benz boot: Appied primary and secondary dressing as ordered. Applied Unna roll from toes to knee overlapping each time. Applied ace wrap or coban from toes to below the knee. Secured with tape and/or metal clips covered with tape. Instructed patient/caregiver to keep dressing dry and intact. DO NOT REMOVE DRESSING. Instructed pt/family/caregiver to report excessive draining, loose bandage, wet dressing, severe pain or tingling in toes. Applied [place]sees Blizzard dressing below the knee to right lower leg. Applied [place] Blizzard dressing below the knee to left lower leg. Unna Boot(s) were applied per  Guidelines.      Electronically signed by Sandra Spence RN on 10/9/2023 at 8:49 AM
Wound Clinic Physician Orders and Discharge Instructions  [address] [place] [address] S. 709 [facility], [address] [place] [address] Way  Telephone: [phone]    NAME:  Javad Mendes. YOB: 1945  MEDICAL RECORD NUMBER:  [id number]  DATE:  10/9/2023      Return Appointment:  [] Dressing Supply Provider:   [x] Home Healthcare: [facility]  [x] Return Appointment: 2 Week(s)  [] Nurse Visit:     [] Discharge from [facility]: [] Healed            [] Refer to Provider:    Follow-up Information:  [] Ordered Tests:   [x] Referrals: Lymphedema Clinic and Dr. Leticia Batres  [] Rx:   [] Other:     Wound Cleansing:   Do not scrub or use excessive force. Cleanse wound prior to applying a clean dressing with:  [] Normal Saline   [] Keep Wound Dry in Shower     [] Wound Cleanser   [x] Cleanse wound with Mild Soap & Water    [] Other:       Topical Treatments:  Do not apply lotions, creams, or ointments to wound bed unless directed. [] Apply moisturizing lotion to skin surrounding the wound prior to dressing change.  [] Apply antifungal ointment to skin surrounding the wound prior to dressing change.  [] Apply thin film of moisture barrier ointment to skin immediately around wound.   [] Apply Betadine to skim immediately around wound      Dressings:           Wound Location R Leg   [x] Apply Primary Dressing:       [] MediHoney Gel [] MediHoney Alginate  [] Calcium Alginate with Silver   [x] Calcium Alginate without silver   [] Collagen with silver [] Collagen without Silver    [] Santyl with moist saline gauze     [] Hydrofera Blue (cut to size and moistened with normal saline)  [] Hydrofera Blue Ready (cut to size)      [] Normal Saline wet to dry  [] Betadine wet to dry    [] Hydrogel  [] Mepitel     [] Bactroban/Mupirocin   [] Iodoform Packing Strip [] Plain Packing Strip   [] Skin Sub:   [] Other:     [x] Cover and Secure with:     [x] Gauze [] Demetrius [] Kerlix   [] Foam  [x]
Note  Indications: Based on my examination of this patient's wound(s)/ulcer(s) today, debridement is required to promote healing and evaluate the wound base. Debridement: Excisional Debridement    Using: curette the wound(s)/ulcer(s) was/were debrided down through and including the removal of subcutaneous tissue. Performed by: Anali Thomas MD  Consent obtained: Yes  Time out taken: Yes  Pain Control:         Devitalized Tissue Debrided: slough    Pre Debridement Measurements:  Are located in the [facility]  Documentation Flow Sheet    Diabetic/Pressure/Non Pressure Ulcers only:  Ulcer: Non-Pressure ulcer, 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     Wound/Ulcer #: 1 and 2  Post Debridement Measurements:  Wound/Ulcer Descriptions are Pre Debridement except measurements:  Wound 05/08/23 Leg Right #1 (Active)   Wound Image   10/09/23 0833   Wound Etiology Venous 10/09/23 0833   Dressing Status Clean;Dry; Intact 10/09/23 0833   Wound Cleansed Soap and water 10/09/23 0833   Dressing/Treatment Other (comment) 09/25/23 1133   Wound Length (cm) 8 cm 10/09/23 0833   Wound Width (cm) 9 cm 10/09/23 0833   Wound Depth (cm) 0.1 cm 10/09/23 0833   Wound Surface Area (cm^2) 72 cm^2 10/09/23 0833   Change in Wound Size % (l*w) -12.5 10/09/23 0833   Wound Volume (cm^3) 7.2 cm^3 10/09/23 0833   Wound Healing % -13 10/09/23 0833   Post-Procedure Length (cm) 8 cm 10/09/23 0845   Post-Procedure Width (cm) 9 cm 10/09/23 0845   Post-Procedure Depth (cm) 0.1 cm 10/09/23 0845   Post-Procedure Surface Area (cm^2) 72 cm^2 10/09/23 0845   Post-Procedure Volume (cm^3) 7.2 cm^3 10/09/23 0845   Wound Assessment Slough;Granulation tissue 10/09/23 0833   Drainage Amount Large (50-75% saturated) 10/09/23 0833   Drainage Description Serosanguinous 10/09/23 0833   Odor Mild 10/09/23 0833   Lyla-wound Assessment Intact;Dry/flaky 10/09/23 0833   Margins Attached edges 10/09/23 0833   Wound Thickness Description not for Pressure Injury Full thickness 10/09/23 6249

## 2023-10-23 ENCOUNTER — HOSPITAL ENCOUNTER (OUTPATIENT)
Facility: HOSPITAL | Age: 78
Discharge: HOME | End: 2023-10-23
Attending: SPECIALIST | Admitting: SPECIALIST
Payer: MEDICARE

## 2023-10-23 VITALS
RESPIRATION RATE: 20 BRPM | HEART RATE: 60 BPM | SYSTOLIC BLOOD PRESSURE: 143 MMHG | DIASTOLIC BLOOD PRESSURE: 80 MMHG | TEMPERATURE: 98.8 F

## 2023-10-23 DIAGNOSIS — L97.822 NON-PRESSURE CHRONIC ULCER OF OTHER PART OF LEFT LOWER LEG WITH FAT LAYER EXPOSED (HCC): ICD-10-CM

## 2023-10-23 DIAGNOSIS — L97.812 NON-PRESSURE CHRONIC ULCER OF OTHER PART OF RIGHT LOWER LEG WITH FAT LAYER EXPOSED (HCC): Primary | ICD-10-CM

## 2023-10-23 PROCEDURE — 11042 DBRDMT SUBQ TIS 1ST 20SQCM/<: CPT

## 2023-10-23 PROCEDURE — 11045 DBRDMT SUBQ TISS EACH ADDL: CPT

## 2023-10-23 RX ORDER — LIDOCAINE HYDROCHLORIDE 20 MG/ML
JELLY TOPICAL ONCE
OUTPATIENT
Start: 2023-10-23 | End: 2023-10-23

## 2023-10-23 RX ORDER — TRIAMCINOLONE ACETONIDE 1 MG/G
OINTMENT TOPICAL ONCE
OUTPATIENT
Start: 2023-10-23 | End: 2023-10-23

## 2023-10-23 RX ORDER — SODIUM CHLOR/HYPOCHLOROUS ACID 0.033 %
SOLUTION, IRRIGATION IRRIGATION ONCE
OUTPATIENT
Start: 2023-10-23 | End: 2023-10-23

## 2023-10-23 NOTE — DISCHARGE INSTRUCTIONS
Wound Clinic Physician Orders and Discharge Instructions  [address] [place], [place] [address] S. 709 [facility], [address] [place] [address] Way  Telephone: [phone]    NAME:  Gildardo Moore. YOB: 1945  MEDICAL RECORD NUMBER:  [id number]  DATE:  10/23/2023      Return Appointment:  [] Dressing Supply Provider:   [x] Home Healthcare: [facility] due to patient starting at the Lymphedema clinic 10/25/23  [x] Return Appointment: 2 Week(s)  [] Nurse Visit:     [] Discharge from [facility]: [] Healed            [] Refer to Provider:    Follow-up Information:  [] Ordered Tests:   [x] Referrals: Dr. Jefry Boles  [] Rx:   [] Other:     Wound Cleansing:   Do not scrub or use excessive force. Cleanse wound prior to applying a clean dressing with:  [] Normal Saline   [] Keep Wound Dry in Shower     [] Wound Cleanser   [x] Cleanse wound with Mild Soap & Water    [] Other:       Topical Treatments:  Do not apply lotions, creams, or ointments to wound bed unless directed. [] Apply moisturizing lotion to skin surrounding the wound prior to dressing change.  [] Apply antifungal ointment to skin surrounding the wound prior to dressing change.  [] Apply thin film of moisture barrier ointment to skin immediately around wound.   [] Apply Betadine to skim immediately around wound      Dressings:           Wound Location R Leg   [x] Apply Primary Dressing:       [] MediHoney Gel [] MediHoney Alginate  [] Calcium Alginate with Silver   [x] Calcium Alginate without silver   [] Collagen with silver [] Collagen without Silver    [] Santyl with moist saline gauze     [] Hydrofera Blue (cut to size and moistened with normal saline)  [] Hydrofera Blue Ready (cut to size)      [] Normal Saline wet to dry  [] Betadine wet to dry    [] Hydrogel  [] Mepitel     [] Bactroban/Mupirocin   [] Iodoform Packing Strip [] Plain Packing Strip   [] Skin Sub:   [] Other:     [x] Cover and Secure with:

## 2023-10-23 NOTE — WOUND CARE
[address] [place]   Medical Staff Progress Note     Mateusz Calvillo MEDICAL RECORD NUMBER:  [id number]  AGE: 77 y.o. GENDER: male  : 1945  EPISODE DATE:  10/23/2023    Chief complaint and reason for visit:   BLE lymphedema  Chief Complaint   Patient presents with    Wound Check     R & L leg      Patient presenting for follow up evaluation of wound(s) per chief complaint. Subjective: Symptoms, wound related issues, or other pertinent wound history since last visit: has an appt at the [address] [place] [address] next week. Wound 23 Leg Right #1 (Active)   Wound Image   10/23/23 1040   Wound Etiology Venous 10/23/23 1040   Dressing Status Clean;Dry; Intact 10/23/23 1040   Wound Cleansed Soap and water 10/23/23 1040   Dressing/Treatment Alginate;Gauze dressing/dressing sponge;ABD;Other (comment) 10/09/23 0848   Wound Length (cm) 11 cm 10/23/23 1040   Wound Width (cm) 18 cm 10/23/23 1040   Wound Depth (cm) 0.1 cm 10/23/23 1040   Wound Surface Area (cm^2) 198 cm^2 10/23/23 1040   Change in Wound Size % (l*w) -209.38 10/23/23 1040   Wound Volume (cm^3) 19.8 cm^3 10/23/23 1040   Wound Healing % -209 10/23/23 1040   Post-Procedure Length (cm) 11 cm 10/23/23 1059   Post-Procedure Width (cm) 18 cm 10/23/23 1059   Post-Procedure Depth (cm) 0.1 cm 10/23/23 1059   Post-Procedure Surface Area (cm^2) 198 cm^2 10/23/23 1059   Post-Procedure Volume (cm^3) 19.8 cm^3 10/23/23 1059   Wound Assessment Slough;Granulation tissue 10/23/23 1040   Drainage Amount Large (50-75% saturated) 10/23/23 1040   Drainage Description Serosanguinous 10/23/23 1040   Odor Mild 10/23/23 1040   Lyla-wound Assessment Intact;Dry/flaky 10/23/23 1040   Margins Attached edges 10/23/23 1040   Wound Thickness Description not for Pressure Injury Full thickness 10/23/23 1040   Number of days: 168       Wound 23 Leg Left #2 (Active)   Wound Image   10/23/23
W[employer] Application   Below Knee    NAME:  Nadya Colunga. YOB: 1945  MEDICAL RECORD NUMBER:  [id number]  DATE:  10/23/2023    Russ Hams boot: Applied moisturizing agent to dry skin as needed. Appied primary and secondary dressing as ordered. Applied Unna roll from toes to knee overlapping each time. Applied ace wrap or coban from toes to below the knee. Secured with tape and/or metal clips covered with tape. Instructed patient/caregiver to keep dressing dry and intact. DO NOT REMOVE DRESSING. Instructed pt/family/caregiver to report excessive draining, loose bandage, wet dressing, severe pain or tingling in toes. Applied [employer] dressing below the knee to right lower leg. Applied [employer] dressing below the knee to left lower leg. Unna Boot(s) were applied per  Guidelines.      Electronically signed by Dayami Ramos RN on 10/23/2023 at 11:22 AM
Added Salt:  [x] Increase Protein: [] Other:     Activity:  [x] Activity as tolerated:    [] Patient has no activity restrictions      [] Strict Bedrest   [] Remain off Work      [] May return to full duty work                                     [] Return to work with restrictions     Physician:  [x] Dr. Corrinne Dacosta  [] Dr. Madyson Peterson  [] Dr. Joseph Miguel      Nurse Case Manger:  [address] [place] [address] Information: Should you experience any significant changes in your wound(s) or have questions about your wound care, please contact the [address] [place] [address] at [phone]. Our hours are Monday - Friday 8am - 4:30pm, closed all major holidays. If you need help with your wound outside these hours and cannot wait until we are again available, contact your PCP or go to the hospital emergency room. PLEASE NOTE: IF YOU ARE UNABLE TO OBTAIN WOUND SUPPLIES, CONTINUE TO USE THE SUPPLIES YOU HAVE AVAILABLE UNTIL YOU ARE ABLE TO REACH US. IT IS MOST IMPORTANT TO KEEP THE WOUND COVERED AT ALL TIMES.

## 2023-10-25 ENCOUNTER — HOSPITAL ENCOUNTER (OUTPATIENT)
Facility: HOSPITAL | Age: 78
Setting detail: RECURRING SERIES
Discharge: HOME | End: 2023-10-28

## 2023-10-25 NOTE — THERAPY EVALUATION
Plan:13067:a}  Patient / Family readiness to learn indicated by: {Outpt PT Patient Family Readiness to Learn:76860}  Persons(s) to be included in education: {IN MOTION PT PERSONS EDUCATION:70761}  Barriers to Learning/Limitations: {Judy PT Barriers to Learning List:81683}  Measures taken if barriers to learning present: ***  Patient Self Reported Health Status: {Outpt PT rehab Potential:76099}  Rehabilitation Potential: {Outpt PT rehab Potential:79321}    Short Term Goals: To be accomplished in *** treatments. ***  Long Term Goals: To be accomplished in *** treatments. ***    Frequency / Duration: Patient to be seen *** times per week for *** treatments. Patient/ Caregiver education and instruction: Diagnosis, prognosis, {Outpt PT patient caregiver ed.:73182} [x]  Plan of care has been reviewed with MICHAELA Meza, PT       10/25/2023       10:06 AM        ===================================================================  I certify that the above Therapy Services are being furnished while the patient is under my care. I agree with the treatment plan and certify that this therapy is necessary. Physician's Signature:_________________________   DATE:_________   TIME:________                           Jesica Maria, *    ** Signature, Date and Time must be completed for valid certification **  Please sign and fax to [phone].   Thank you

## 2023-11-06 ENCOUNTER — HOSPITAL ENCOUNTER (OUTPATIENT)
Facility: HOSPITAL | Age: 78
Discharge: HOME | End: 2023-11-06
Attending: SPECIALIST | Admitting: SPECIALIST
Payer: MEDICARE

## 2023-11-06 VITALS
HEART RATE: 85 BPM | DIASTOLIC BLOOD PRESSURE: 79 MMHG | RESPIRATION RATE: 20 BRPM | SYSTOLIC BLOOD PRESSURE: 126 MMHG | TEMPERATURE: 98.3 F

## 2023-11-06 DIAGNOSIS — L97.812 NON-PRESSURE CHRONIC ULCER OF OTHER PART OF RIGHT LOWER LEG WITH FAT LAYER EXPOSED (HCC): Primary | ICD-10-CM

## 2023-11-06 DIAGNOSIS — L97.822 NON-PRESSURE CHRONIC ULCER OF OTHER PART OF LEFT LOWER LEG WITH FAT LAYER EXPOSED (HCC): ICD-10-CM

## 2023-11-06 PROCEDURE — 11045 DBRDMT SUBQ TISS EACH ADDL: CPT

## 2023-11-06 PROCEDURE — 11042 DBRDMT SUBQ TIS 1ST 20SQCM/<: CPT

## 2023-11-06 RX ORDER — LIDOCAINE HYDROCHLORIDE 20 MG/ML
JELLY TOPICAL ONCE
OUTPATIENT
Start: 2023-11-06 | End: 2023-11-06

## 2023-11-06 RX ORDER — TRIAMCINOLONE ACETONIDE 1 MG/G
OINTMENT TOPICAL ONCE
OUTPATIENT
Start: 2023-11-06 | End: 2023-11-06

## 2023-11-06 RX ORDER — SODIUM CHLOR/HYPOCHLOROUS ACID 0.033 %
SOLUTION, IRRIGATION IRRIGATION ONCE
OUTPATIENT
Start: 2023-11-06 | End: 2023-11-06

## 2023-11-06 NOTE — WOUND CARE
Multilayer Compression Wrap   (Not Unna) Below the Knee    NAME:  Jennifer Phipps. YOB: 1945  MEDICAL RECORD NUMBER:  [id number]  DATE:  11/6/2023    Multilayer compression wrap: Removed old Multilayer wrap if indicated and wash leg with mild soap/water. Applied moisturizing agent to dry skin as needed. Applied primary and secondary dressing as ordered. Applied multilayered dressing below the knee to right lower leg. Applied multilayered dressing below the knee to left lower leg. Instructed patient/caregiver not to remove dressing and to keep it clean and dry. Instructed patient/caregiver on complications to report to provider, such as pain, numbness in toes, heavy drainage, and slippage of dressing. Instructed patient on purpose of compression dressing and on activity and exercise recommendations.       Electronically signed by Rajiv Keene RN on 11/6/2023 at 10:59 AM

## 2023-11-06 NOTE — WOUND CARE
Wound Clinic Physician Orders and Discharge Instructions  [address] [place] [address] S. 709 [facility], [address] [place] [address] Way  Telephone: [phone]    NAME:  Ananda Bernardo. YOB: 1945  MEDICAL RECORD NUMBER:  [id number]  DATE:  11/6/2023      Return Appointment:  [] Dressing Supply Provider:   [x] Home Healthcare: Vidal Bailey. Fax [phone]  [x] Return Appointment: 2 Week(s)  [] Nurse Visit:     [] Discharge from [facility]: [] Healed            [] Refer to Provider:    Follow-up Information:  [] Ordered Tests:   [x] Referrals: Dr. Kelsi Maher  [] Rx:   [] Other:     Wound Cleansing:   Do not scrub or use excessive force. Cleanse wound prior to applying a clean dressing with:  [] Normal Saline   [] Keep Wound Dry in Shower     [] Wound Cleanser   [x] Cleanse wound with Mild Soap & Water    [] Other:       Topical Treatments:  Do not apply lotions, creams, or ointments to wound bed unless directed. [] Apply moisturizing lotion to skin surrounding the wound prior to dressing change.  [] Apply antifungal ointment to skin surrounding the wound prior to dressing change.  [] Apply thin film of moisture barrier ointment to skin immediately around wound.   [] Apply Betadine to skim immediately around wound      Dressings:           Wound Location R Leg   [x] Apply Primary Dressing:       [] MediHoney Gel [] MediHoney Alginate  [] Calcium Alginate with Silver   [x] Calcium Alginate without silver   [] Collagen with silver [] Collagen without Silver    [] Santyl with moist saline gauze     [] Hydrofera Blue (cut to size and moistened with normal saline)  [] Hydrofera Blue Ready (cut to size)      [] Normal Saline wet to dry  [] Betadine wet to dry    [] Hydrogel  [] Mepitel     [] Bactroban/Mupirocin   [] Iodoform Packing Strip [] Plain Packing Strip   [] Skin Sub:   [] Other:     [x] Cover and Secure with:     [x] Gauze [] Demetrius [] Kerlix   [] Foam  [x]

## 2023-11-06 NOTE — DISCHARGE INSTRUCTIONS
Wound Clinic Physician Orders and Discharge Instructions  [address] [place] [address] S. 709 [facility], [address] [place] [address] Way  Telephone: [phone]    NAME:  Steve Fair. YOB: 1945  MEDICAL RECORD NUMBER:  [id number]  DATE:  11/6/2023      Return Appointment:  [] Dressing Supply Provider:   [x] Home Healthcare: Jemal KOVACS. [x] Return Appointment: 2 Week(s)  [] Nurse Visit:     [] Discharge from [facility]: [] Healed            [] Refer to Provider:    Follow-up Information:  [] Ordered Tests:   [x] Referrals: Dr. Blake Dixon  [] Rx:   [] Other:     Wound Cleansing:   Do not scrub or use excessive force. Cleanse wound prior to applying a clean dressing with:  [] Normal Saline   [] Keep Wound Dry in Shower     [] Wound Cleanser   [x] Cleanse wound with Mild Soap & Water    [] Other:       Topical Treatments:  Do not apply lotions, creams, or ointments to wound bed unless directed. [] Apply moisturizing lotion to skin surrounding the wound prior to dressing change.  [] Apply antifungal ointment to skin surrounding the wound prior to dressing change.  [] Apply thin film of moisture barrier ointment to skin immediately around wound.   [] Apply Betadine to skim immediately around wound      Dressings:           Wound Location R Leg   [x] Apply Primary Dressing:       [] MediHoney Gel [] MediHoney Alginate  [] Calcium Alginate with Silver   [x] Calcium Alginate without silver   [] Collagen with silver [] Collagen without Silver    [] Santyl with moist saline gauze     [] Hydrofera Blue (cut to size and moistened with normal saline)  [] Hydrofera Blue Ready (cut to size)      [] Normal Saline wet to dry  [] Betadine wet to dry    [] Hydrogel  [] Mepitel     [] Bactroban/Mupirocin   [] Iodoform Packing Strip [] Plain Packing Strip   [] Skin Sub:   [] Other:     [x] Cover and Secure with:     [x] Gauze [] Demetrius [] Kerlix   [] Foam  [x] Super Absorbant []

## 2023-11-06 NOTE — WOUND CARE
[address] [address] [facility]   Medical Staff Progress Note     Radha Cárdenas. MEDICAL RECORD NUMBER:  [id number]  AGE: 77 y.o. GENDER: male  : 1945  EPISODE DATE:  2023    Chief complaint and reason for visit:   Bilateral lower extremity lymphedema with ulcerations  Chief Complaint   Patient presents with    Wound Check     R & L leg      Patient presenting for follow up evaluation of wound(s) per chief complaint. Subjective: Symptoms, wound related issues, or other pertinent wound history since last visit: Patient visited the lymphedema clinic. He was advised that he would cost him $80 a week in order to have treatment at the lymphedema center. He cannot afford this. No other new problems have been reported. Wound 23 Leg Right #1 (Active)   Wound Image   23 1029   Wound Etiology Venous 23 1029   Dressing Status Clean;Dry; Intact 23 1029   Wound Cleansed Soap and water 23 1029   Dressing/Treatment Alginate;Gauze dressing/dressing sponge;ABD;Other (comment) 10/09/23 0848   Wound Length (cm) 8.5 cm 23 1029   Wound Width (cm) 9.8 cm 23 1029   Wound Depth (cm) 0.1 cm 23 1029   Wound Surface Area (cm^2) 83.3 cm^2 23 1029   Change in Wound Size % (l*w) -30.16 23 1029   Wound Volume (cm^3) 8.33 cm^3 23 1029   Wound Healing % -30 23 1029   Post-Procedure Length (cm) 8.5 cm 23 1038   Post-Procedure Width (cm) 9.8 cm 23 1038   Post-Procedure Depth (cm) 0.1 cm 23 1038   Post-Procedure Surface Area (cm^2) 83.3 cm^2 23 1038   Post-Procedure Volume (cm^3) 8.33 cm^3 23 1038   Wound Assessment Slough;Granulation tissue 23 1029   Drainage Amount Large (50-75% saturated) 23 1029   Drainage Description Serosanguinous 23 1029   Odor Mild 23 1029   Lyla-wound Assessment Intact;Dry/flaky 23 1029   Margins Attached edges

## 2023-11-20 ENCOUNTER — HOSPITAL ENCOUNTER (OUTPATIENT)
Facility: HOSPITAL | Age: 78
Discharge: HOME | End: 2023-11-20
Attending: SPECIALIST | Admitting: SPECIALIST
Payer: MEDICARE

## 2023-11-20 VITALS
TEMPERATURE: 98.9 F | HEART RATE: 90 BPM | RESPIRATION RATE: 20 BRPM | DIASTOLIC BLOOD PRESSURE: 74 MMHG | SYSTOLIC BLOOD PRESSURE: 125 MMHG

## 2023-11-20 DIAGNOSIS — L97.812 NON-PRESSURE CHRONIC ULCER OF OTHER PART OF RIGHT LOWER LEG WITH FAT LAYER EXPOSED (HCC): Primary | ICD-10-CM

## 2023-11-20 DIAGNOSIS — L97.822 NON-PRESSURE CHRONIC ULCER OF OTHER PART OF LEFT LOWER LEG WITH FAT LAYER EXPOSED (HCC): ICD-10-CM

## 2023-11-20 PROCEDURE — 11042 DBRDMT SUBQ TIS 1ST 20SQCM/<: CPT

## 2023-11-20 PROCEDURE — 11045 DBRDMT SUBQ TISS EACH ADDL: CPT

## 2023-11-20 RX ORDER — LIDOCAINE HYDROCHLORIDE 20 MG/ML
JELLY TOPICAL ONCE
OUTPATIENT
Start: 2023-11-20 | End: 2023-11-20

## 2023-11-20 RX ORDER — SODIUM CHLOR/HYPOCHLOROUS ACID 0.033 %
SOLUTION, IRRIGATION IRRIGATION ONCE
OUTPATIENT
Start: 2023-11-20 | End: 2023-11-20

## 2023-11-20 RX ORDER — TRIAMCINOLONE ACETONIDE 1 MG/G
OINTMENT TOPICAL ONCE
OUTPATIENT
Start: 2023-11-20 | End: 2023-11-20

## 2023-11-20 NOTE — WOUND CARE
Multilayer Compression Wrap   (Not Unna) Below the Knee    NAME:  Sai Ford. YOB: 1945  MEDICAL RECORD NUMBER:  [id number]  DATE:  11/20/2023    Multilayer compression wrap: Removed old Multilayer wrap if indicated and wash leg with mild soap/water. Applied moisturizing agent to dry skin as needed. Applied primary and secondary dressing as ordered. Applied multilayered dressing below the knee to right lower leg. Applied multilayered dressing below the knee to left lower leg. Instructed patient/caregiver not to remove dressing and to keep it clean and dry. Instructed patient/caregiver on complications to report to provider, such as pain, numbness in toes, heavy drainage, and slippage of dressing. Instructed patient on purpose of compression dressing and on activity and exercise recommendations.       Electronically signed by Carolynn Wolfe RN on 11/20/2023 at 11:24 AM

## 2023-11-20 NOTE — WOUND CARE
Wound Clinic Physician Orders and Discharge Instructions  [address] [place] [address] S. 709 [facility], [address] [place] [address] Way  Telephone: [phone]    NAME:  Coty Garsia. YOB: 1945  MEDICAL RECORD NUMBER:  [id number]  DATE:  11/20/2023      Return Appointment:  [] Dressing Supply Provider:   [x] Home Healthcare: Catherine Barrow. Fax [phone]  [x] Return Appointment: 2 Week(s)  [] Nurse Visit:     [] Discharge from [facility]: [] Healed            [] Refer to Provider:    Follow-up Information:  [] Ordered Tests:   [x] Referrals: Dr. Philip Wilks  [] Rx:   [] Other:     Wound Cleansing:   Do not scrub or use excessive force. Cleanse wound prior to applying a clean dressing with:  [] Normal Saline   [] Keep Wound Dry in Shower     [] Wound Cleanser   [x] Cleanse wound with Mild Soap & Water    [] Other:       Topical Treatments:  Do not apply lotions, creams, or ointments to wound bed unless directed. [] Apply moisturizing lotion to skin surrounding the wound prior to dressing change.  [] Apply antifungal ointment to skin surrounding the wound prior to dressing change.  [] Apply thin film of moisture barrier ointment to skin immediately around wound.   [] Apply Betadine to skim immediately around wound      Dressings:           Wound Location R Leg   [x] Apply Primary Dressing:       [] MediHoney Gel [] MediHoney Alginate  [] Calcium Alginate with Silver   [x] Calcium Alginate without silver   [] Collagen with silver [] Collagen without Silver    [] Santyl with moist saline gauze     [] Hydrofera Blue (cut to size and moistened with normal saline)  [] Hydrofera Blue Ready (cut to size)      [] Normal Saline wet to dry  [] Betadine wet to dry    [] Hydrogel  [] Mepitel     [] Bactroban/Mupirocin   [] Iodoform Packing Strip [] Plain Packing Strip   [] Skin Sub:   [] Other:     [x] Cover and Secure with:     [x] Gauze [] Demetrius [] Kerlix   [] Foam  [x]

## 2023-11-20 NOTE — WOUND CARE
[address] [place] [address] [facility]   Medical Staff Progress Note     Callie Simin. MEDICAL RECORD NUMBER:  [id number]  AGE: 77 y.o. GENDER: male  : 1945  EPISODE DATE:  2023    Chief complaint and reason for visit:   Bilateral leg ulcers  Chief Complaint   Patient presents with    Wound Check     Bilateral legs      Patient presenting for follow up evaluation of wound(s) per chief complaint. Subjective: Symptoms, wound related issues, or other pertinent wound history since last visit: Using his lymphedema pumps daily, no pain, drainage is reduced    Wound 23 Leg Right #1 (Active)   Wound Image   23 1048   Wound Etiology Venous 23 1048   Dressing Status Clean;Dry; Intact 23 1048   Wound Cleansed Soap and water 23 1048   Dressing/Treatment Alginate;Gauze dressing/dressing sponge;ABD;Other (comment) 10/09/23 0848   Wound Length (cm) 7.5 cm 23 1048   Wound Width (cm) 5 cm 23 1048   Wound Depth (cm) 0.1 cm 23 1048   Wound Surface Area (cm^2) 37.5 cm^2 23 1048   Change in Wound Size % (l*w) 41.41 23 1048   Wound Volume (cm^3) 3.75 cm^3 23 1048   Wound Healing % 41 23 1048   Post-Procedure Length (cm) 7.5 cm 23 1059   Post-Procedure Width (cm) 5 cm 23 1059   Post-Procedure Depth (cm) 0.1 cm 23 1059   Post-Procedure Surface Area (cm^2) 37.5 cm^2 23 1059   Post-Procedure Volume (cm^3) 3.75 cm^3 23 1059   Wound Assessment Slough;Granulation tissue 23 1048   Drainage Amount Large (50-75% saturated) 23 1048   Drainage Description Serosanguinous 23 1048   Odor Mild 23 1048   Lyla-wound Assessment Intact;Dry/flaky 23 1048   Margins Attached edges 23 1048   Wound Thickness Description not for Pressure Injury Full thickness 23 1048   Number of days: 196       Wound 23 Leg Left #2 (Active)   Wound Image

## 2023-11-20 NOTE — DISCHARGE INSTRUCTIONS
Wound Clinic Physician Orders and Discharge Instructions  [address] [place] [address] S. 709 [facility], [address] [place] [address] Way  Telephone: [phone]    NAME:  Puja Boles YOB: 1945  MEDICAL RECORD NUMBER:  [id number]  DATE:  11/20/2023      Return Appointment:  [] Dressing Supply Provider:   [x] Home Healthcare: Naman Verma. Fax [phone]  [x] Return Appointment: 2 Week(s)  [] Nurse Visit:     [] Discharge from [facility]: [] Healed            [] Refer to Provider:    Follow-up Information:  [] Ordered Tests:   [x] Referrals: Dr. Kayce Walker  [] Rx:   [] Other:     Wound Cleansing:   Do not scrub or use excessive force. Cleanse wound prior to applying a clean dressing with:  [] Normal Saline   [] Keep Wound Dry in Shower     [] Wound Cleanser   [x] Cleanse wound with Mild Soap & Water    [] Other:       Topical Treatments:  Do not apply lotions, creams, or ointments to wound bed unless directed. [] Apply moisturizing lotion to skin surrounding the wound prior to dressing change.  [] Apply antifungal ointment to skin surrounding the wound prior to dressing change.  [] Apply thin film of moisture barrier ointment to skin immediately around wound.   [] Apply Betadine to skim immediately around wound      Dressings:           Wound Location R Leg   [x] Apply Primary Dressing:       [] MediHoney Gel [] MediHoney Alginate  [] Calcium Alginate with Silver   [x] Calcium Alginate without silver   [] Collagen with silver [] Collagen without Silver    [] Santyl with moist saline gauze     [] Hydrofera Blue (cut to size and moistened with normal saline)  [] Hydrofera Blue Ready (cut to size)      [] Normal Saline wet to dry  [] Betadine wet to dry    [] Hydrogel  [] Mepitel     [] Bactroban/Mupirocin   [] Iodoform Packing Strip [] Plain Packing Strip   [] Skin Sub:   [] Other:     [x] Cover and Secure with:     [x] Gauze [] Demetrius [] Kerlix   [] Foam  [x]

## 2023-12-04 ENCOUNTER — HOSPITAL ENCOUNTER (OUTPATIENT)
Facility: HOSPITAL | Age: 78
Discharge: HOME | End: 2023-12-04
Attending: SPECIALIST | Admitting: SPECIALIST
Payer: MEDICARE

## 2023-12-04 VITALS
DIASTOLIC BLOOD PRESSURE: 70 MMHG | HEART RATE: 86 BPM | SYSTOLIC BLOOD PRESSURE: 120 MMHG | TEMPERATURE: 97.3 F | RESPIRATION RATE: 18 BRPM

## 2023-12-04 DIAGNOSIS — L97.812 NON-PRESSURE CHRONIC ULCER OF OTHER PART OF RIGHT LOWER LEG WITH FAT LAYER EXPOSED (HCC): Primary | ICD-10-CM

## 2023-12-04 DIAGNOSIS — L97.822 NON-PRESSURE CHRONIC ULCER OF OTHER PART OF LEFT LOWER LEG WITH FAT LAYER EXPOSED (HCC): ICD-10-CM

## 2023-12-04 PROCEDURE — 11042 DBRDMT SUBQ TIS 1ST 20SQCM/<: CPT

## 2023-12-04 PROCEDURE — 11045 DBRDMT SUBQ TISS EACH ADDL: CPT

## 2023-12-04 RX ORDER — TRIAMCINOLONE ACETONIDE 1 MG/G
OINTMENT TOPICAL ONCE
OUTPATIENT
Start: 2023-12-04 | End: 2023-12-04

## 2023-12-04 RX ORDER — SODIUM CHLOR/HYPOCHLOROUS ACID 0.033 %
SOLUTION, IRRIGATION IRRIGATION ONCE
OUTPATIENT
Start: 2023-12-04 | End: 2023-12-04

## 2023-12-04 RX ORDER — LIDOCAINE HYDROCHLORIDE 20 MG/ML
JELLY TOPICAL ONCE
OUTPATIENT
Start: 2023-12-04 | End: 2023-12-04

## 2023-12-04 NOTE — WOUND CARE
Multilayer Compression Wrap   (Not Unna) Below the Knee    NAME:  Mell Elmore. YOB: 1945  MEDICAL RECORD NUMBER:  [id number]  DATE:  12/4/2023    Multilayer compression wrap: Removed old Multilayer wrap if indicated and wash leg with mild soap/water. Applied moisturizing agent to dry skin as needed. Applied primary and secondary dressing as ordered. Applied multilayered dressing below the knee to right lower leg. Applied multilayered dressing below the knee to left lower leg. Instructed patient/caregiver not to remove dressing and to keep it clean and dry. Instructed patient/caregiver on complications to report to provider, such as pain, numbness in toes, heavy drainage, and slippage of dressing. Instructed patient on purpose of compression dressing and on activity and exercise recommendations.       Electronically signed by Pennie Lee RN on 12/4/2023 at 11:36 AM

## 2023-12-04 NOTE — WOUND CARE
Wound Clinic Physician Orders and Discharge Instructions  [address] [place] [address] S. 709 [facility], [address] [place] [address] Way  Telephone: [phone]    NAME:  Marcia Oropeza. YOB: 1945  MEDICAL RECORD NUMBER:  [id number]  DATE:  12/4/2023      Return Appointment:  [] Dressing Supply Provider:   [x] Home Healthcare: Malka Lindquist. Fax [phone]  [x] Return Appointment: 2 Week(s)  [] Nurse Visit:     [] Discharge from [facility]: [] Healed            [] Refer to Provider:    Follow-up Information:  [] Ordered Tests:   [x] Referrals: Dr. Gege Cowan  [] Rx:   [] Other:     Wound Cleansing:   Do not scrub or use excessive force. Cleanse wound prior to applying a clean dressing with:  [] Normal Saline   [] Keep Wound Dry in Shower     [] Wound Cleanser   [x] Cleanse wound with Mild Soap & Water    [] Other:       Topical Treatments:  Do not apply lotions, creams, or ointments to wound bed unless directed. [] Apply moisturizing lotion to skin surrounding the wound prior to dressing change.  [] Apply antifungal ointment to skin surrounding the wound prior to dressing change.  [] Apply thin film of moisture barrier ointment to skin immediately around wound.   [] Apply Betadine to skim immediately around wound      Dressings:           Wound Location R Leg   [x] Apply Primary Dressing:       [] MediHoney Gel [] MediHoney Alginate  [] Calcium Alginate with Silver   [x] Calcium Alginate without silver   [] Collagen with silver [] Collagen without Silver    [] Santyl with moist saline gauze     [] Hydrofera Blue (cut to size and moistened with normal saline)  [] Hydrofera Blue Ready (cut to size)      [] Normal Saline wet to dry  [] Betadine wet to dry    [] Hydrogel  [] Mepitel     [] Bactroban/Mupirocin   [] Iodoform Packing Strip [] Plain Packing Strip   [] Skin Sub:   [] Other:     [x] Cover and Secure with:     [x] Gauze [] Demetrius [] Kerlix   [] Foam  [x]

## 2023-12-04 NOTE — WOUND CARE
[address] [place] [address] [facility]   Medical Staff Progress Note     Earnest Coyle. MEDICAL RECORD NUMBER:  [id number]  AGE: 77 y.o. GENDER: male  : 1945  EPISODE DATE:  2023    Chief complaint and reason for visit:   Bilateral lower extremity lymphedema with ulcerations  No chief complaint on file. Patient presenting for follow up evaluation of wound(s) per chief complaint. Subjective: Symptoms, wound related issues, or other pertinent wound history since last visit: No new problems reported. Patient is unable to afford treatment at the lymphedema clinic, and his progress is therefore been very slow. Wound 23 Leg Right #1 (Active)   Wound Image   23 111   Wound Etiology Venous 23 111   Dressing Status Clean;Dry; Intact 23 111   Wound Cleansed Soap and water 23 111   Dressing/Treatment Alginate;Gauze dressing/dressing sponge;ABD;Other (comment) 10/09/23 0848   Wound Length (cm) 8.5 cm 23 1110   Wound Width (cm) 10 cm 23 1110   Wound Depth (cm) 0.1 cm 23 1110   Wound Surface Area (cm^2) 85 cm^2 23 1110   Change in Wound Size % (l*w) -32.81 23 1110   Wound Volume (cm^3) 8.5 cm^3 23 1110   Wound Healing % -33 23 1110   Post-Procedure Length (cm) 8.5 cm 23 1113   Post-Procedure Width (cm) 10 cm 23 1113   Post-Procedure Depth (cm) 0.1 cm 23 1113   Post-Procedure Surface Area (cm^2) 85 cm^2 23 1113   Post-Procedure Volume (cm^3) 8.5 cm^3 23 1113   Wound Assessment Slough;Granulation tissue 23 111   Drainage Amount Large (50-75% saturated) 23 111   Drainage Description Serosanguinous 23 111   Odor Mild 23 111   Lyla-wound Assessment Intact;Dry/flaky; Maceration 23 1110   Margins Attached edges 23 1110   Wound Thickness Description not for Pressure Injury Full thickness 23 1110   Number of

## 2023-12-04 NOTE — DISCHARGE INSTRUCTIONS
Wound Clinic Physician Orders and Discharge Instructions  [address] [place] [address] S. 709 [facility], [address] [place] [address] Way  Telephone: [phone]    NAME:  Ignacio Lui. YOB: 1945  MEDICAL RECORD NUMBER:  [id number]  DATE:  12/4/2023      Return Appointment:  [] Dressing Supply Provider:   [x] Home Healthcare: [facility]. Fax [phone]  [x] Return Appointment: 2 Week(s)  [] Nurse Visit:     [] Discharge from [facility]: [] Healed            [] Refer to Provider:    Follow-up Information:  [] Ordered Tests:   [x] Referrals: Dr. Constance Javier  [] Rx:   [] Other:     Wound Cleansing:   Do not scrub or use excessive force. Cleanse wound prior to applying a clean dressing with:  [] Normal Saline   [] Keep Wound Dry in Shower     [] Wound Cleanser   [x] Cleanse wound with Mild Soap & Water    [] Other:       Topical Treatments:  Do not apply lotions, creams, or ointments to wound bed unless directed. [] Apply moisturizing lotion to skin surrounding the wound prior to dressing change.  [] Apply antifungal ointment to skin surrounding the wound prior to dressing change.  [] Apply thin film of moisture barrier ointment to skin immediately around wound.   [] Apply Betadine to skim immediately around wound      Dressings:           Wound Location R Leg   [x] Apply Primary Dressing:       [] MediHoney Gel [] MediHoney Alginate  [] Calcium Alginate with Silver   [x] Calcium Alginate without silver   [] Collagen with silver [] Collagen without Silver    [] Santyl with moist saline gauze     [] Hydrofera Blue (cut to size and moistened with normal saline)  [] Hydrofera Blue Ready (cut to size)      [] Normal Saline wet to dry  [] Betadine wet to dry    [] Hydrogel  [] Mepitel     [] Bactroban/Mupirocin   [] Iodoform Packing Strip [] Plain Packing Strip   [] Skin Sub:   [] Other:     [x] Cover and Secure with:     [x] Gauze [] Demetrius [] Kerlix   [] Foam  [x] Super

## 2024-01-15 ENCOUNTER — HOSPITAL ENCOUNTER (OUTPATIENT)
Facility: HOSPITAL | Age: 79
Discharge: HOME | End: 2024-01-15
Attending: SPECIALIST | Admitting: SPECIALIST
Payer: MEDICARE

## 2024-01-15 VITALS
TEMPERATURE: 98.1 F | DIASTOLIC BLOOD PRESSURE: 68 MMHG | RESPIRATION RATE: 20 BRPM | HEART RATE: 94 BPM | SYSTOLIC BLOOD PRESSURE: 113 MMHG

## 2024-01-15 DIAGNOSIS — L97.812 NON-PRESSURE CHRONIC ULCER OF OTHER PART OF RIGHT LOWER LEG WITH FAT LAYER EXPOSED (HCC): Primary | ICD-10-CM

## 2024-01-15 DIAGNOSIS — L97.822 NON-PRESSURE CHRONIC ULCER OF OTHER PART OF LEFT LOWER LEG WITH FAT LAYER EXPOSED (HCC): ICD-10-CM

## 2024-01-15 PROCEDURE — 29580 STRAPPING UNNA BOOT: CPT

## 2024-01-15 RX ORDER — SODIUM CHLOR/HYPOCHLOROUS ACID 0.033 %
SOLUTION, IRRIGATION IRRIGATION ONCE
OUTPATIENT
Start: 2024-01-15 | End: 2024-01-15

## 2024-01-15 RX ORDER — TRIAMCINOLONE ACETONIDE 1 MG/G
OINTMENT TOPICAL ONCE
OUTPATIENT
Start: 2024-01-15 | End: 2024-01-15

## 2024-01-15 RX ORDER — LIDOCAINE HYDROCHLORIDE 20 MG/ML
JELLY TOPICAL ONCE
OUTPATIENT
Start: 2024-01-15 | End: 2024-01-15

## 2024-01-15 NOTE — WOUND CARE
Wound Clinic Physician Orders and Discharge Instructions  [facility]  [address], [address]  [place] [address]  Telephone: [phone]     FAX [phone]    NAME:  Ramin Brown Jr.  YOB: 1945  MEDICAL RECORD NUMBER:  [id number]  DATE:  1/15/2024      Return Appointment:  [] Dressing Supply Provider:   [x] Home Healthcare: [facility]. Fax [phone]  [x] Return Appointment: 2 Week(s)  [] Nurse Visit:     [] Discharge from [facility]: [] Healed            [] Refer to Provider:    Follow-up Information:  [] Ordered Tests:   [x] Referrals: Dr. Mcclellan  [] Rx:   [x] Other: Lymph pump twice daily x 1 hour each    Wound Cleansing:   Do not scrub or use excessive force.  Cleanse wound prior to applying a clean dressing with:  [] Normal Saline   [] Keep Wound Dry in Shower     [] Wound Cleanser   [x] Cleanse wound with Mild Soap & Water    [] Other:       Topical Treatments:  Do not apply lotions, creams, or ointments to wound bed unless directed.   [] Apply moisturizing lotion to skin surrounding the wound prior to dressing change.  [] Apply antifungal ointment to skin surrounding the wound prior to dressing change.  [] Apply thin film of moisture barrier ointment to skin immediately around wound.  [] Apply Betadine to skim immediately around wound      Dressings:           Wound Location R Leg   [x] Apply Primary Dressing:       [] MediHoney Gel [] MediHoney Alginate  [x] Calcium Alginate with Silver   [] Calcium Alginate without silver   [] Collagen with silver [] Collagen without Silver    [] Santyl with moist saline gauze     [] Hydrofera Blue (cut to size and moistened with normal saline)  [] Hydrofera Blue Ready (cut to size)      [] Normal Saline wet to dry  [] Betadine wet to dry    [] Hydrogel  [] Mepitel     [] Bactroban/Mupirocin   [] Iodoform Packing Strip [] Plain Packing Strip   [] Skin Sub:   [] Other:     [x] Cover and Secure with:     [x]

## 2024-01-15 NOTE — DISCHARGE INSTRUCTIONS
Salt:  [x] Increase Protein: [] Other:     Activity:  [x] Activity as tolerated:    [] Patient has no activity restrictions      [] Strict Bedrest   [] Remain off Work      [] May return to full duty work                                     [] Return to work with restrictions     Physician:  []   [] Dr. Natalie Lang  [x] Dr. Fernando Peterson      Nurse Case Manger:  Alisson      Wound Care Center Information: Should you experience any significant changes in your wound(s) or have questions about your wound care, please contact the Wound Center at [phone]. Our hours are Monday - Friday 8am - 4:30pm, closed all major holidays. If you need help with your wound outside these hours and cannot wait until we are again available, contact your PCP or go to the hospital emergency room.     PLEASE NOTE: IF YOU ARE UNABLE TO OBTAIN WOUND SUPPLIES, CONTINUE TO USE THE SUPPLIES YOU HAVE AVAILABLE UNTIL YOU ARE ABLE TO REACH US. IT IS MOST IMPORTANT TO KEEP THE WOUND COVERED AT ALL TIMES.

## 2024-01-15 NOTE — CONSULTS
skin.    [x] Change dressing: [] Daily    [] Every Other Day   [] Twice per week   [x] Three times per week   [] Once a week [] Do Not Change Dressing   [] Other:    Dressings:           Wound Location L Leg   [x] Apply Primary Dressing:       [] MediHoney Gel [] MediHoney Alginate  [x] Calcium Alginate with Silver  [] Calcium Alginate without silver   [] Collagen with silver [] Collagen without Silver    [] Santyl with moist saline gauze     [] Hydrofera Blue (cut to size and moistened with normal saline)  [] Hydrofera Blue Ready (cut to size)      [] Normal Saline wet to dry  [] Betadine wet to dry    [] Hydrogel  [] Mepitel     [] Bactroban/Mupirocin   [] Iodoform Packing Strip [] Plain Packing Strip   [] Skin Sub:   [] Other:      [x] Cover and Secure with:     [x] Gauze [] Demetrius [] Kerlix   [] Foam  [x] Super Absorbant [] ABD     [] Ace Wrap [] Other:    Limit contact of tape with skin.    [x] Change dressing: [] Daily    [] Every Other Day   [] Twice per week   [x] Three times per week   [] Once a week [] Do Not Change Dressing   [] Other:     Edema Control:  Apply: [] Compression Stocking:  mmHg  []Right Leg []Left Leg   [] Tubigrip []Right Leg Double Layer []Left Leg Double Layer      []Right Leg Single Layer []Left Leg Single Layer      [] Elevate leg(s) above the level of the heart when sitting.    [] Avoid prolonged standing in one place.     Compression:  Apply: [x] Compression Wrap to [x]RightLeg [x]Left Leg    []  Coflex [] Coflex Lite  [x] Unna with Calamine Lite     [x] Do not get leg(s) with wrap wet. If wrap becomes too tight, call the wound center and remove wrap from leg by unrolling each layer.   [x] Elevate leg(s) above the level of the heart when sitting.    [x] Avoid prolonged standing in one place.    Dietary:  [x] Diet as tolerated: [] Calorie Diabetic Diet: [] No Added Salt:  [x] Increase Protein: [] Other:     Activity:  [x] Activity as tolerated:    [] Patient has no activity

## 2024-01-15 NOTE — WOUND CARE
Unna Boot Application   Below Knee    NAME:  Ramin Brown Jr.  YOB: 1945  MEDICAL RECORD NUMBER:  [id number]  DATE:  1/15/2024    Unna boot: Applied moisturizing agent to dry skin as needed.   Appied primary and secondary dressing as ordered.  Applied Unna roll from toes to knee overlapping each time.   Applied ace wrap or coban from toes to below the knee.   Secured with tape and/or metal clips covered with tape.   Instructed patient/caregiver to keep dressing dry and intact. DO NOT REMOVE DRESSING.   Instructed pt/family/caregiver to report excessive draining, loose bandage, wet dressing, severe pain or tingling in toes.  Applied Unna Boot dressing below the knee to right lower leg.  Applied Unna Boot dressing below the knee to left lower leg.    Unna Boot(s) were applied per  Guidelines.     Electronically signed by Jenni Arzate LPN on 1/15/2024 at 12:16 PM

## 2024-01-29 ENCOUNTER — HOSPITAL ENCOUNTER (OUTPATIENT)
Facility: HOSPITAL | Age: 79
Discharge: HOME | End: 2024-01-29
Attending: SPECIALIST | Admitting: SPECIALIST
Payer: MEDICARE

## 2024-01-29 VITALS
SYSTOLIC BLOOD PRESSURE: 135 MMHG | HEART RATE: 85 BPM | DIASTOLIC BLOOD PRESSURE: 72 MMHG | RESPIRATION RATE: 20 BRPM | TEMPERATURE: 99.5 F

## 2024-01-29 DIAGNOSIS — L97.822 NON-PRESSURE CHRONIC ULCER OF OTHER PART OF LEFT LOWER LEG WITH FAT LAYER EXPOSED (HCC): ICD-10-CM

## 2024-01-29 DIAGNOSIS — L97.812 NON-PRESSURE CHRONIC ULCER OF OTHER PART OF RIGHT LOWER LEG WITH FAT LAYER EXPOSED (HCC): Primary | ICD-10-CM

## 2024-01-29 PROCEDURE — 11042 DBRDMT SUBQ TIS 1ST 20SQCM/<: CPT

## 2024-01-29 PROCEDURE — 87077 CULTURE AEROBIC IDENTIFY: CPT

## 2024-01-29 PROCEDURE — 87205 SMEAR GRAM STAIN: CPT

## 2024-01-29 PROCEDURE — 87176 TISSUE HOMOGENIZATION CULTR: CPT

## 2024-01-29 PROCEDURE — 87186 SC STD MICRODIL/AGAR DIL: CPT

## 2024-01-29 PROCEDURE — 87070 CULTURE OTHR SPECIMN AEROBIC: CPT

## 2024-01-29 PROCEDURE — 11045 DBRDMT SUBQ TISS EACH ADDL: CPT

## 2024-01-29 RX ORDER — TRIAMCINOLONE ACETONIDE 1 MG/G
OINTMENT TOPICAL ONCE
OUTPATIENT
Start: 2024-01-29 | End: 2024-01-29

## 2024-01-29 RX ORDER — SODIUM CHLOR/HYPOCHLOROUS ACID 0.033 %
SOLUTION, IRRIGATION IRRIGATION ONCE
OUTPATIENT
Start: 2024-01-29 | End: 2024-01-29

## 2024-01-29 RX ORDER — LIDOCAINE HYDROCHLORIDE 20 MG/ML
JELLY TOPICAL ONCE
OUTPATIENT
Start: 2024-01-29 | End: 2024-01-29

## 2024-01-29 NOTE — OP NOTE
Procedure Note  Indications: Based on my examination of this patient's wound(s)/ulcer(s) today, debridement is required to promote healing and evaluate the wound base.    Debridement: Excisional Debridement    Using: curette the wound(s)/ulcer(s) was/were debrided down through and including the removal of epidermis, dermis, and subcutaneous tissue.  Performed by: Fernando Peterson MD  Consent obtained: Yes  Time out taken: Yes  Pain Control: Anesthetic  Anesthetic: 4% Lidocaine Liquid Topical       Devitalized Tissue Debrided: fibrin, biofilm, and slough    Pre Debridement Measurements:  Are located in the Wound/Ulcer Documentation Flow Sheet    Diabetic/Pressure/Non Pressure Ulcers only:  Ulcer: Other: Left lateral leg venous stasis ulcers with lymphedema.      Wound/Ulcer #: 2  Post Debridement Measurements:  Wound/Ulcer Descriptions are Pre Debridement except measurements:  Wound 05/08/23 Leg Right #1 (Active)   Wound Image   01/29/24 1101   Wound Etiology Venous 01/29/24 1101   Dressing Status Clean;Dry;Intact 01/29/24 1101   Wound Cleansed Soap and water 01/29/24 1101   Dressing/Treatment Alginate;Gauze dressing/dressing sponge;ABD;Other (comment) 01/29/24 1202   Wound Length (cm) 8.5 cm 01/29/24 1101   Wound Width (cm) 11 cm 01/29/24 1101   Wound Depth (cm) 0.1 cm 01/29/24 1101   Wound Surface Area (cm^2) 93.5 cm^2 01/29/24 1101   Change in Wound Size % (l*w) -46.09 01/29/24 1101   Wound Volume (cm^3) 9.35 cm^3 01/29/24 1101   Wound Healing % -46 01/29/24 1101   Post-Procedure Length (cm) 9 cm 12/18/23 1045   Post-Procedure Width (cm) 21.5 cm 12/18/23 1045   Post-Procedure Depth (cm) 0.1 cm 12/18/23 1045   Post-Procedure Surface Area (cm^2) 193.5 cm^2 12/18/23 1045   Post-Procedure Volume (cm^3) 19.35 cm^3 12/18/23 1045   Wound Assessment Slough;Granulation tissue 01/29/24 1101   Drainage Amount Large (50-75% saturated) 01/29/24 1101   Drainage Description Serosanguinous 01/29/24 1101   Odor Mild 01/29/24

## 2024-01-29 NOTE — PROGRESS NOTES
[facility]   Wound Care and Hyperbaric Oxygen Therapy Center   Medical Staff Note     Ramin Brown Jr.  MEDICAL RECORD NUMBER:  [id number]  AGE: 78 y.o.   GENDER: male  : 1945  EPISODE DATE:  2024      Chief Complaint:   Chief Complaint   Patient presents with    Wound Check     Bilateral legs       History of Present Illness:  Ramin Brown Jr. is a 78 y.o. male who presents today for wound/ulcer evaluation. He has a long standing history of BLE lymphedema. Unable to afford lymphedema wrap at the clinic. Due to cost. Has been using Unna boot and Lymphedema pump and followed by Dr. Andre.     History of Wound Context: Per original history and physical on this patient. Changes in history since last evaluation: none  Wound/Ulcer Pain Timing/Severity: PAIN ASSESSMENT WOUND: intermittent  Quality of pain: PAIN QUALITY: dull  Severity:  1 / 10   Modifying Factors: Modifying Factors Wound: Pain worsens with walking  Associated Signs/Symptoms: ASSOCIATED SYMPTOMS WOUND: edema, erythema, and drainage     Ulcer Identification:  Ulcer Type: Wound type: Venous stasis     Contributing Factors: HEALTH FACTORS: edema, venous stasis, lymphedema, and obesity     Wound: BLE venous stasis ulcers with lymphedema      Past Medical History:       Diagnosis Date    Dyslipidemia     Dyslipidemia     HTN (hypertension)     HTN (hypertension)        Past Surgical History:   Past Surgical History:   Procedure Laterality Date    CHOLECYSTECTOMY         Allergy:No Known Allergies    Social History:   Social History     Tobacco Use    Smoking status: Never    Smokeless tobacco: Never   Vaping Use    Vaping Use: Never used   Substance Use Topics    Alcohol use: Never    Drug use: Never       Family History:   Family History   Problem Relation Age of Onset    Diabetes Mother     Diabetes Brother     Diabetes Son        Current Medications:  Current Outpatient Medications on File Prior to Encounter

## 2024-01-29 NOTE — DISCHARGE INSTRUCTIONS
Wound Clinic Physician Orders and Discharge Instructions  [facility]  [address], [address]  [place] [address]  Telephone: [phone]     FAX [phone]    NAME:  Ramin Brown Jr.  YOB: 1945  MEDICAL RECORD NUMBER:  [id number]  DATE:  1/29/2024      Return Appointment:  [] Dressing Supply Provider:   [x] Home Healthcare: [facility]. Fax [phone]  [x] Return Appointment: 1 Week(s)  [] Nurse Visit:     [] Discharge from [facility]: [] Healed            [] Refer to Provider:    Follow-up Information:  [x] Ordered Tests: culture obtained in clinic  [x] Referrals: Dr. Mcclellan  [] Rx:   [x] Other: Lymph pump twice daily x 1 hour each    Wound Cleansing:   Do not scrub or use excessive force.  Cleanse wound prior to applying a clean dressing with:  [] Normal Saline   [] Keep Wound Dry in Shower     [] Wound Cleanser   [x] Cleanse wound with Mild Soap & Water    [] Other:       Topical Treatments:  Do not apply lotions, creams, or ointments to wound bed unless directed.   [] Apply moisturizing lotion to skin surrounding the wound prior to dressing change.  [] Apply antifungal ointment to skin surrounding the wound prior to dressing change.  [] Apply thin film of moisture barrier ointment to skin immediately around wound.  [] Apply Betadine to skim immediately around wound      Dressings:           Wound Location R Leg   [x] Apply Primary Dressing:       [] MediHoney Gel [] MediHoney Alginate  [x] Calcium Alginate with Silver   [] Calcium Alginate without silver   [] Collagen with silver [] Collagen without Silver    [] Santyl with moist saline gauze     [] Hydrofera Blue (cut to size and moistened with normal saline)  [] Hydrofera Blue Ready (cut to size)      [] Normal Saline wet to dry  [] Betadine wet to dry    [] Hydrogel  [] Mepitel     [] Bactroban/Mupirocin   [] Iodoform Packing Strip [] Plain Packing Strip   [] Skin Sub:   [] Other:     [x] Cover and

## 2024-01-29 NOTE — WOUND CARE
Unna Boot Application   Below Knee    NAME:  Ramin Brown Jr.  YOB: 1945  MEDICAL RECORD NUMBER:  [id number]  DATE:  1/29/2024    Unna boot: Applied moisturizing agent to dry skin as needed.   Appied primary and secondary dressing as ordered.  Applied Unna roll from toes to knee overlapping each time.   Applied ace wrap or coban from toes to below the knee.   Secured with tape and/or metal clips covered with tape.   Instructed patient/caregiver to keep dressing dry and intact. DO NOT REMOVE DRESSING.   Instructed pt/family/caregiver to report excessive draining, loose bandage, wet dressing, severe pain or tingling in toes.  Applied Unna Boot dressing below the knee to right lower leg.  Applied Unna Boot dressing below the knee to left lower leg.    Unna Boot(s) were applied per  Guidelines.     Electronically signed by Noelle Fraire RN on 1/29/2024 at 12:03 PM

## 2024-02-03 LAB
BACTERIA SPEC CULT: ABNORMAL
GRAM STN SPEC: ABNORMAL
Lab: ABNORMAL

## 2024-02-05 ENCOUNTER — HOSPITAL ENCOUNTER (OUTPATIENT)
Facility: HOSPITAL | Age: 79
Discharge: HOME | End: 2024-02-05
Attending: SPECIALIST | Admitting: SPECIALIST
Payer: MEDICARE

## 2024-02-05 VITALS
RESPIRATION RATE: 20 BRPM | HEART RATE: 78 BPM | SYSTOLIC BLOOD PRESSURE: 141 MMHG | TEMPERATURE: 99.4 F | DIASTOLIC BLOOD PRESSURE: 78 MMHG

## 2024-02-05 DIAGNOSIS — L97.812 NON-PRESSURE CHRONIC ULCER OF OTHER PART OF RIGHT LOWER LEG WITH FAT LAYER EXPOSED (HCC): Primary | ICD-10-CM

## 2024-02-05 DIAGNOSIS — L97.822 NON-PRESSURE CHRONIC ULCER OF OTHER PART OF LEFT LOWER LEG WITH FAT LAYER EXPOSED (HCC): ICD-10-CM

## 2024-02-05 PROCEDURE — 29580 STRAPPING UNNA BOOT: CPT

## 2024-02-05 RX ORDER — CIPROFLOXACIN 500 MG/1
500 TABLET, FILM COATED ORAL 2 TIMES DAILY
Qty: 28 TABLET | Refills: 0 | Status: SHIPPED | OUTPATIENT
Start: 2024-02-05 | End: 2024-02-19

## 2024-02-05 RX ORDER — TRIAMCINOLONE ACETONIDE 1 MG/G
OINTMENT TOPICAL ONCE
OUTPATIENT
Start: 2024-02-05 | End: 2024-02-05

## 2024-02-05 RX ORDER — SODIUM CHLOR/HYPOCHLOROUS ACID 0.033 %
SOLUTION, IRRIGATION IRRIGATION ONCE
OUTPATIENT
Start: 2024-02-05 | End: 2024-02-05

## 2024-02-05 RX ORDER — LIDOCAINE HYDROCHLORIDE 20 MG/ML
JELLY TOPICAL ONCE
OUTPATIENT
Start: 2024-02-05 | End: 2024-02-05

## 2024-02-05 NOTE — DISCHARGE INSTRUCTIONS
Diet: [] No Added Salt:  [x] Increase Protein: [] Other:     Activity:  [x] Activity as tolerated:    [] Patient has no activity restrictions      [] Strict Bedrest   [] Remain off Work      [] May return to full duty work                                     [] Return to work with restrictions     Physician:  []   [] Dr. Natalie Lang  [x] Dr. Fernando Peterson      Nurse Case Manger:  Alisson      Wound Care Center Information: Should you experience any significant changes in your wound(s) or have questions about your wound care, please contact the Wound Center at [phone]. Our hours are Monday - Friday 8am - 4:30pm, closed all major holidays. If you need help with your wound outside these hours and cannot wait until we are again available, contact your PCP or go to the hospital emergency room.     PLEASE NOTE: IF YOU ARE UNABLE TO OBTAIN WOUND SUPPLIES, CONTINUE TO USE THE SUPPLIES YOU HAVE AVAILABLE UNTIL YOU ARE ABLE TO REACH US. IT IS MOST IMPORTANT TO KEEP THE WOUND COVERED AT ALL TIMES.

## 2024-02-05 NOTE — WOUND CARE
Unna Boot Application   Below Knee    NAME:  Ramin Brown Jr.  YOB: 1945  MEDICAL RECORD NUMBER:  [id number]  DATE:  2/5/2024    Unna boot: Appied primary and secondary dressing as ordered.  Applied Unna roll from toes to knee overlapping each time.   Applied ace wrap or coban from toes to below the knee.   Secured with tape and/or metal clips covered with tape.   Instructed patient/caregiver to keep dressing dry and intact. DO NOT REMOVE DRESSING.   Instructed pt/family/caregiver to report excessive draining, loose bandage, wet dressing, severe pain or tingling in toes.  Applied Unna Boot dressing below the knee to right lower leg.  Applied Unna Boot dressing below the knee to left lower leg.    Unna Boot(s) were applied per  Guidelines.     Electronically signed by Alisson Aaron RN on 2/5/2024 at 12:18 PM

## 2024-02-05 NOTE — WOUND CARE
Wound Clinic Physician Orders and Discharge Instructions  [facility]  [address], [address]  [place] [address]  Telephone: [phone]     FAX [phone]    NAME:  Ramin Brown Jr.  YOB: 1945  MEDICAL RECORD NUMBER:  [id number]  DATE:  2/5/2024      Return Appointment:  [] Dressing Supply Provider:   [x] Home Healthcare: [facility]. Fax [phone]  [x] Return Appointment: 2 Week(s)  [] Nurse Visit:     [] Discharge from [facility]: [] Healed            [] Refer to Provider:    Follow-up Information:  [] Ordered Tests:   [x] Referrals: Dr. Mcclellan  [x] Rx: Cipro to pharmacy  [x] Other: Lymph pump twice daily x 1 hour each    Wound Cleansing:   Do not scrub or use excessive force.  Cleanse wound prior to applying a clean dressing with:  [] Normal Saline   [] Keep Wound Dry in Shower     [] Wound Cleanser   [x] Cleanse wound with Mild Soap & Water    [] Other:       Topical Treatments:  Do not apply lotions, creams, or ointments to wound bed unless directed.   [] Apply moisturizing lotion to skin surrounding the wound prior to dressing change.  [] Apply antifungal ointment to skin surrounding the wound prior to dressing change.  [] Apply thin film of moisture barrier ointment to skin immediately around wound.  [] Apply Betadine to skim immediately around wound      Dressings:           Wound Location R Leg   [x] Apply Primary Dressing:       [] MediHoney Gel [] MediHoney Alginate  [x] Calcium Alginate with Silver   [] Calcium Alginate without silver   [] Collagen with silver [] Collagen without Silver    [] Santyl with moist saline gauze     [] Hydrofera Blue (cut to size and moistened with normal saline)  [] Hydrofera Blue Ready (cut to size)      [] Normal Saline wet to dry  [] Betadine wet to dry    [] Hydrogel  [] Mepitel     [] Bactroban/Mupirocin   [] Iodoform Packing Strip [] Plain Packing Strip   [] Skin Sub:   [] Other:     [x] Cover and Secure

## 2024-02-05 NOTE — PROGRESS NOTES
ABLE TO REACH US. IT IS MOST IMPORTANT TO KEEP THE WOUND COVERED AT ALL TIMES.           TIME: E/M Time spent with patient and/or patient care issues: [] 15-20 min  [] 21-30 min  [] 31-44 min  [] 45 min or more.   This is above the usual time needed to address patient's chief complaint today: [] Yes  [] No  This time includes physician non-face-to-face service time visit on the date of service such as  Preparing to see the patient (eg, review of tests)  Obtaining and/or reviewing separately obtained history  Performing a medically necessary appropriate examination and/or evaluation  Counseling and educating the patient/family/caregiver  Ordering medications, tests, or procedures  Referring and communicating with other health care professionals as needed  Documenting clinical information in the electronic or other health record  Independently interpreting results (not reported separately) and communicating results to the patient/family/caregiver  Care coordination (not reported separately)    Electronically signed by Fernando Peterson MD on 2/5/2024 at 12:44 PM

## 2024-02-19 ENCOUNTER — HOSPITAL ENCOUNTER (OUTPATIENT)
Facility: HOSPITAL | Age: 79
Discharge: HOME | End: 2024-02-19
Attending: SPECIALIST | Admitting: SPECIALIST
Payer: MEDICARE

## 2024-02-19 VITALS
HEART RATE: 87 BPM | SYSTOLIC BLOOD PRESSURE: 132 MMHG | RESPIRATION RATE: 20 BRPM | TEMPERATURE: 97.3 F | DIASTOLIC BLOOD PRESSURE: 74 MMHG

## 2024-02-19 DIAGNOSIS — L97.812 NON-PRESSURE CHRONIC ULCER OF OTHER PART OF RIGHT LOWER LEG WITH FAT LAYER EXPOSED (HCC): Primary | ICD-10-CM

## 2024-02-19 DIAGNOSIS — L97.822 NON-PRESSURE CHRONIC ULCER OF OTHER PART OF LEFT LOWER LEG WITH FAT LAYER EXPOSED (HCC): ICD-10-CM

## 2024-02-19 PROCEDURE — 11042 DBRDMT SUBQ TIS 1ST 20SQCM/<: CPT

## 2024-02-19 PROCEDURE — 11045 DBRDMT SUBQ TISS EACH ADDL: CPT

## 2024-02-19 PROCEDURE — 29580 STRAPPING UNNA BOOT: CPT

## 2024-02-19 RX ORDER — TRIAMCINOLONE ACETONIDE 1 MG/G
OINTMENT TOPICAL ONCE
OUTPATIENT
Start: 2024-02-19 | End: 2024-02-19

## 2024-02-19 RX ORDER — LIDOCAINE HYDROCHLORIDE 20 MG/ML
JELLY TOPICAL ONCE
OUTPATIENT
Start: 2024-02-19 | End: 2024-02-19

## 2024-02-19 RX ORDER — SODIUM CHLOR/HYPOCHLOROUS ACID 0.033 %
SOLUTION, IRRIGATION IRRIGATION ONCE
OUTPATIENT
Start: 2024-02-19 | End: 2024-02-19

## 2024-02-19 NOTE — WOUND CARE
Unna Boot Application   Below Knee    NAME:  Ramin Brown Jr.  YOB: 1945  MEDICAL RECORD NUMBER:  [id number]  DATE:  2/19/2024    Unna boot: Appied primary and secondary dressing as ordered.  Applied Unna roll from toes to knee overlapping each time.   Applied ace wrap or coban from toes to below the knee.   Secured with tape and/or metal clips covered with tape.   Instructed patient/caregiver to keep dressing dry and intact. DO NOT REMOVE DRESSING.   Instructed pt/family/caregiver to report excessive draining, loose bandage, wet dressing, severe pain or tingling in toes.  Applied Unna Boot dressing below the knee to right lower leg.  Applied Unna Boot dressing below the knee to left lower leg.    Unna Boot(s) were applied per  Guidelines.     Electronically signed by Alisson Aaron RN on 2/19/2024 at 12:06 PM    
Salt:  [x] Increase Protein: [] Other:     Activity:  [x] Activity as tolerated:    [] Patient has no activity restrictions      [] Strict Bedrest   [] Remain off Work      [] May return to full duty work                                     [] Return to work with restrictions     Physician:  []   [] Dr. Natalie Lang  [x] Dr. Fernando Peterson      Nurse Case Manger:  Alisson      Wound Care Center Information: Should you experience any significant changes in your wound(s) or have questions about your wound care, please contact the Wound Center at [phone]. Our hours are Monday - Friday 8am - 4:30pm, closed all major holidays. If you need help with your wound outside these hours and cannot wait until we are again available, contact your PCP or go to the hospital emergency room.     PLEASE NOTE: IF YOU ARE UNABLE TO OBTAIN WOUND SUPPLIES, CONTINUE TO USE THE SUPPLIES YOU HAVE AVAILABLE UNTIL YOU ARE ABLE TO REACH US. IT IS MOST IMPORTANT TO KEEP THE WOUND COVERED AT ALL TIMES.

## 2024-02-19 NOTE — PROGRESS NOTES
[facility]   Wound Care and Hyperbaric Oxygen Therapy Center   Medical Staff Note     Ramin Brown Jr.  MEDICAL RECORD NUMBER:  [id number]  AGE: 78 y.o.   GENDER: male  : 1945  EPISODE DATE:  2024      Chief Complaint:   Chief Complaint   Patient presents with    Wound Check     Right and left leg        History of Present Illness:     Ramin Brown Jr. is a 78 y.o. male who presents today for wound/ulcer evaluation of bilateral lower extremity lymphedema with bilateral leg ulcers.    History of Wound Context: Per original history and physical on this patient. Changes in history since last evaluation: none    Wound: Bilateral lower extremity lymphedema with ulcers    Past Medical History:       Diagnosis Date    Dyslipidemia     Dyslipidemia     HTN (hypertension)     HTN (hypertension)        Past Surgical History:   Past Surgical History:   Procedure Laterality Date    CHOLECYSTECTOMY         Allergy:No Known Allergies    Social History:   Social History     Tobacco Use    Smoking status: Never    Smokeless tobacco: Never   Vaping Use    Vaping Use: Never used   Substance Use Topics    Alcohol use: Never    Drug use: Never       Family History:   Family History   Problem Relation Age of Onset    Diabetes Mother     Diabetes Brother     Diabetes Son        Current Medications:  Current Outpatient Medications on File Prior to Encounter   Medication Sig Dispense Refill    ciprofloxacin (CIPRO) 500 MG tablet Take 1 tablet by mouth 2 times daily for 14 days 28 tablet 0    Omega-3 Fatty Acids (FISH OIL OMEGA-3 PO) Take by mouth      aspirin 325 MG tablet Take 1 tablet by mouth daily      furosemide (LASIX) 40 MG tablet Take 1 tablet by mouth 2 times daily      vitamin E 400 UNIT capsule Take 1 capsule by mouth daily       No current facility-administered medications on file prior to encounter.       Immunization History:   Immunization History   Administered Date(s)

## 2024-02-19 NOTE — DISCHARGE INSTRUCTIONS
Demetrius [] Kerlix   [] Foam  [x] Super Absorbant [] ABD     [] Ace Wrap [] Other:    Limit contact of tape with skin.    [x] Change dressing: [] Daily    [] Every Other Day   [] Twice per week   [x] Three times per week   [] Once a week [] Do Not Change Dressing   [] Other:    Dressings:           Wound Location L Leg   [x] Apply Primary Dressing:       [] MediHoney Gel [] MediHoney Alginate  [x] Calcium Alginate with Silver  [] Calcium Alginate without silver   [] Collagen with silver [] Collagen without Silver    [] Santyl with moist saline gauze     [] Hydrofera Blue (cut to size and moistened with normal saline)  [] Hydrofera Blue Ready (cut to size)      [] Normal Saline wet to dry  [] Betadine wet to dry    [] Hydrogel  [] Mepitel     [] Bactroban/Mupirocin   [] Iodoform Packing Strip [] Plain Packing Strip   [] Skin Sub:   [] Other:      [x] Cover and Secure with:     [x] Gauze [] Demetrius [] Kerlix   [] Foam  [x] Super Absorbant [] ABD     [] Ace Wrap [] Other:    Limit contact of tape with skin.    [x] Change dressing: [] Daily    [] Every Other Day   [] Twice per week   [x] Three times per week   [] Once a week [] Do Not Change Dressing   [] Other:     Edema Control:  Apply: [] Compression Stocking:  mmHg  []Right Leg []Left Leg   [] Tubigrip []Right Leg Double Layer []Left Leg Double Layer      []Right Leg Single Layer []Left Leg Single Layer      [] Elevate leg(s) above the level of the heart when sitting.    [] Avoid prolonged standing in one place.     Compression:  Apply: [x] Compression Wrap to [x]RightLeg [x]Left Leg    []  Coflex [] Coflex Lite  [x] Unna with Calamine Lite     [x] Do not get leg(s) with wrap wet. If wrap becomes too tight, call the wound center and remove wrap from leg by unrolling each layer.   [x] Elevate leg(s) above the level of the heart when sitting.    [x] Avoid prolonged standing in one place.    Dietary:  [x] Diet as tolerated: [] Calorie Diabetic Diet: [] No Added Salt:  [x]

## 2024-02-19 NOTE — OP NOTE
Procedure Note  Indications: Based on my examination of this patient's wound(s)/ulcer(s) today, debridement is required to promote healing and evaluate the wound base.    Debridement: Excisional Debridement    Using: curette the wound(s)/ulcer(s) was/were debrided down through and including the removal of epidermis, dermis, and subcutaneous tissue.  Performed by: Fernando Peterson MD  Consent obtained: Yes  Time out taken: Yes  Pain Control: Anesthetic  Anesthetic: 4% Lidocaine Liquid Topical       Devitalized Tissue Debrided: fibrin, biofilm, and slough    Pre Debridement Measurements:  Are located in the Wound/Ulcer Documentation Flow Sheet    Diabetic/Pressure/Non Pressure Ulcers only:  Ulcer: Other: Left lower extremity ulcer with lymphedema    Wound/Ulcer #: 2  Post Debridement Measurements:  Wound/Ulcer Descriptions are Pre Debridement except measurements:  Wound 05/08/23 Leg Right #1 (Active)   Wound Image   02/19/24 1123   Wound Etiology Venous 02/19/24 1123   Dressing Status Clean;Dry;Intact 02/19/24 1123   Wound Cleansed Soap and water 02/19/24 1123   Dressing/Treatment Alginate;Gauze dressing/dressing sponge;ABD;Other (comment) 01/29/24 1202   Wound Length (cm) 7 cm 02/19/24 1123   Wound Width (cm) 7 cm 02/19/24 1123   Wound Depth (cm) 0.1 cm 02/19/24 1123   Wound Surface Area (cm^2) 49 cm^2 02/19/24 1123   Change in Wound Size % (l*w) 23.44 02/19/24 1123   Wound Volume (cm^3) 4.9 cm^3 02/19/24 1123   Wound Healing % 23 02/19/24 1123   Post-Procedure Length (cm) 9 cm 12/18/23 1045   Post-Procedure Width (cm) 21.5 cm 12/18/23 1045   Post-Procedure Depth (cm) 0.1 cm 12/18/23 1045   Post-Procedure Surface Area (cm^2) 193.5 cm^2 12/18/23 1045   Post-Procedure Volume (cm^3) 19.35 cm^3 12/18/23 1045   Wound Assessment Slough;Granulation tissue 02/19/24 1123   Drainage Amount Large (50-75% saturated) 02/19/24 1123   Drainage Description Serosanguinous 02/19/24 1123   Odor Mild 02/19/24 1123   Lyla-wound

## 2024-03-04 ENCOUNTER — HOSPITAL ENCOUNTER (OUTPATIENT)
Facility: HOSPITAL | Age: 79
Discharge: HOME | End: 2024-03-04
Attending: SPECIALIST | Admitting: SPECIALIST
Payer: MEDICARE

## 2024-03-04 VITALS
HEART RATE: 83 BPM | RESPIRATION RATE: 18 BRPM | DIASTOLIC BLOOD PRESSURE: 77 MMHG | SYSTOLIC BLOOD PRESSURE: 135 MMHG | TEMPERATURE: 98.2 F

## 2024-03-04 DIAGNOSIS — L97.812 NON-PRESSURE CHRONIC ULCER OF OTHER PART OF RIGHT LOWER LEG WITH FAT LAYER EXPOSED (HCC): Primary | ICD-10-CM

## 2024-03-04 DIAGNOSIS — L97.822 NON-PRESSURE CHRONIC ULCER OF OTHER PART OF LEFT LOWER LEG WITH FAT LAYER EXPOSED (HCC): ICD-10-CM

## 2024-03-04 PROCEDURE — 29580 STRAPPING UNNA BOOT: CPT

## 2024-03-04 RX ORDER — SODIUM CHLOR/HYPOCHLOROUS ACID 0.033 %
SOLUTION, IRRIGATION IRRIGATION ONCE
OUTPATIENT
Start: 2024-03-04 | End: 2024-03-04

## 2024-03-04 RX ORDER — LIDOCAINE HYDROCHLORIDE 20 MG/ML
JELLY TOPICAL ONCE
OUTPATIENT
Start: 2024-03-04 | End: 2024-03-04

## 2024-03-04 RX ORDER — TRIAMCINOLONE ACETONIDE 1 MG/G
OINTMENT TOPICAL ONCE
OUTPATIENT
Start: 2024-03-04 | End: 2024-03-04

## 2024-03-04 NOTE — WOUND CARE
Wound Clinic Physician Orders and Discharge Instructions  [facility]  [address], [address]  [place] [address]  Telephone: [phone]     FAX [phone]    NAME:  Ramin Brown Jr.  YOB: 1945  MEDICAL RECORD NUMBER:  [id number]  DATE:  3/4/2024      Return Appointment:  [] Dressing Supply Provider:   [x] Home Healthcare: [facility]. Fax [phone]  [x] Return Appointment: 3 Week(s)  [] Nurse Visit:     [] Discharge from [facility]: [] Healed            [] Refer to Provider:    Follow-up Information:  [] Ordered Tests:   [x] Referrals: Dr. Mcclellan  [] Rx:   [x] Other: Lymph pump 3 times daily x 1 hour each    Wound Cleansing:   Do not scrub or use excessive force.  Cleanse wound prior to applying a clean dressing with:  [] Normal Saline   [] Keep Wound Dry in Shower     [] Wound Cleanser   [x] Cleanse wound with Mild Soap & Water    [] Other:       Topical Treatments:  Do not apply lotions, creams, or ointments to wound bed unless directed.   [] Apply moisturizing lotion to skin surrounding the wound prior to dressing change.  [] Apply antifungal ointment to skin surrounding the wound prior to dressing change.  [] Apply thin film of moisture barrier ointment to skin immediately around wound.  [] Apply Betadine to skim immediately around wound      Dressings:           Wound Location R Leg   [x] Apply Primary Dressing:       [] MediHoney Gel [] MediHoney Alginate  [x] Calcium Alginate with Silver   [] Calcium Alginate without silver   [] Collagen with silver [] Collagen without Silver    [] Santyl with moist saline gauze     [] Hydrofera Blue (cut to size and moistened with normal saline)  [] Hydrofera Blue Ready (cut to size)      [] Normal Saline wet to dry  [] Betadine wet to dry    [] Hydrogel  [] Mepitel     [] Bactroban/Mupirocin   [] Iodoform Packing Strip [] Plain Packing Strip   [] Skin Sub:   [] Other:     [x] Cover and Secure with:     [x]

## 2024-03-04 NOTE — DISCHARGE INSTRUCTIONS
Wound Clinic Physician Orders and Discharge Instructions  [facility]  [address], [address]  [place] [address]  Telephone: [phone]     FAX [phone]    NAME:  Ramin Brown Jr.  YOB: 1945  MEDICAL RECORD NUMBER:  [id number]  DATE:  3/4/2024      Return Appointment:  [] Dressing Supply Provider:   [x] Home Healthcare: [facility]. Fax [phone]  [x] Return Appointment: 3 Week(s)  [] Nurse Visit:     [] Discharge from [facility]: [] Healed            [] Refer to Provider:    Follow-up Information:  [] Ordered Tests:   [x] Referrals: Dr. Mcclellan  [] Rx:   [x] Other: Lymph pump 3 times daily x 1 hour each    Wound Cleansing:   Do not scrub or use excessive force.  Cleanse wound prior to applying a clean dressing with:  [] Normal Saline   [] Keep Wound Dry in Shower     [] Wound Cleanser   [x] Cleanse wound with Mild Soap & Water    [] Other:       Topical Treatments:  Do not apply lotions, creams, or ointments to wound bed unless directed.   [] Apply moisturizing lotion to skin surrounding the wound prior to dressing change.  [] Apply antifungal ointment to skin surrounding the wound prior to dressing change.  [] Apply thin film of moisture barrier ointment to skin immediately around wound.  [] Apply Betadine to skim immediately around wound      Dressings:           Wound Location R Leg   [x] Apply Primary Dressing:       [] MediHoney Gel [] MediHoney Alginate  [x] Calcium Alginate with Silver   [] Calcium Alginate without silver   [] Collagen with silver [] Collagen without Silver    [] Santyl with moist saline gauze     [] Hydrofera Blue (cut to size and moistened with normal saline)  [] Hydrofera Blue Ready (cut to size)      [] Normal Saline wet to dry  [] Betadine wet to dry    [] Hydrogel  [] Mepitel     [] Bactroban/Mupirocin   [] Iodoform Packing Strip [] Plain Packing Strip   [] Skin Sub:   [] Other:     [x] Cover and Secure with:     [x]

## 2024-03-04 NOTE — WOUND CARE
Unna Boot Application   Below Knee    NAME:  Ramin Brown Jr.  YOB: 1945  MEDICAL RECORD NUMBER:  [id number]  DATE:  3/4/2024    Unna boot: Appied primary and secondary dressing as ordered.  Applied Unna roll from toes to knee overlapping each time.   Applied ace wrap or coban from toes to below the knee.   Secured with tape and/or metal clips covered with tape.   Instructed patient/caregiver to keep dressing dry and intact. DO NOT REMOVE DRESSING.   Instructed pt/family/caregiver to report excessive draining, loose bandage, wet dressing, severe pain or tingling in toes.  Applied Unna Boot dressing below the knee to right lower leg.  Applied Unna Boot dressing below the knee to left lower leg.    Unna Boot(s) were applied per  Guidelines.     Electronically signed by Alisson Aaron RN on 3/4/2024 at 11:36 AM

## 2024-03-04 NOTE — PROGRESS NOTES
03/04/24 1114   Odor Mild 03/04/24 1114   Lyla-wound Assessment Dry/flaky;Intact;Maceration 03/04/24 1114   Margins Undefined edges;Attached edges 03/04/24 1114   Wound Thickness Description not for Pressure Injury Full thickness 03/04/24 1114   Number of days: 301            Laboratory Values: No results found for this or any previous visit (from the past 24 hour(s)).      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    Continue local wound treatment with silver alginate and an Unna boot per discharge instructions.     Follow-up in 3 weeks.    Discharge:  Written patient dismissal instructions given to patient and signed by patient or POA.             TIME: E/M Time spent with patient and/or patient care issues: [] 15-20 min  [] 21-30 min  [] 31-44 min  [] 45 min or more.   This is above the usual time needed to address patient's chief complaint today: [] Yes  [] No  This time includes physician non-face-to-face service time visit on the date of service such as  Preparing to see the patient (eg, review of tests)  Obtaining and/or reviewing separately obtained history  Performing a medically necessary appropriate examination and/or evaluation  Counseling and educating

## 2024-03-25 ENCOUNTER — HOSPITAL ENCOUNTER (OUTPATIENT)
Facility: HOSPITAL | Age: 79
Discharge: HOME | End: 2024-03-25
Attending: SPECIALIST | Admitting: SPECIALIST
Payer: MEDICARE

## 2024-03-25 VITALS
RESPIRATION RATE: 20 BRPM | TEMPERATURE: 97.2 F | SYSTOLIC BLOOD PRESSURE: 163 MMHG | DIASTOLIC BLOOD PRESSURE: 86 MMHG | HEART RATE: 73 BPM

## 2024-03-25 DIAGNOSIS — L97.822 NON-PRESSURE CHRONIC ULCER OF OTHER PART OF LEFT LOWER LEG WITH FAT LAYER EXPOSED (HCC): ICD-10-CM

## 2024-03-25 DIAGNOSIS — L97.812 NON-PRESSURE CHRONIC ULCER OF OTHER PART OF RIGHT LOWER LEG WITH FAT LAYER EXPOSED (HCC): Primary | ICD-10-CM

## 2024-03-25 PROCEDURE — 29580 STRAPPING UNNA BOOT: CPT

## 2024-03-25 RX ORDER — LIDOCAINE HYDROCHLORIDE 20 MG/ML
JELLY TOPICAL ONCE
OUTPATIENT
Start: 2024-03-25 | End: 2024-03-25

## 2024-03-25 RX ORDER — SODIUM CHLOR/HYPOCHLOROUS ACID 0.033 %
SOLUTION, IRRIGATION IRRIGATION ONCE
OUTPATIENT
Start: 2024-03-25 | End: 2024-03-25

## 2024-03-25 RX ORDER — TRIAMCINOLONE ACETONIDE 1 MG/G
OINTMENT TOPICAL ONCE
OUTPATIENT
Start: 2024-03-25 | End: 2024-03-25

## 2024-03-25 NOTE — WOUND CARE
Unna Boot Application   Below Knee    NAME:  Ramin Brown Jr.  YOB: 1945  MEDICAL RECORD NUMBER:  [id number]  DATE:  3/25/2024    Unna boot: Applied moisturizing agent to dry skin as needed.   Appied primary and secondary dressing as ordered.  Applied Unna roll from toes to knee overlapping each time.   Applied ace wrap or coban from toes to below the knee.   Secured with tape and/or metal clips covered with tape.   Instructed patient/caregiver to keep dressing dry and intact. DO NOT REMOVE DRESSING.   Instructed pt/family/caregiver to report excessive draining, loose bandage, wet dressing, severe pain or tingling in toes.  Applied Unna Boot dressing below the knee to right lower leg.  Applied Unna Boot dressing below the knee to left lower leg.    Unna Boot(s) were applied per  Guidelines.     Electronically signed by Jenni Arzate LPN on 3/25/2024 at 11:58 AM    
Salt:  [x] Increase Protein: [] Other:     Activity:  [x] Activity as tolerated:    [] Patient has no activity restrictions      [] Strict Bedrest   [] Remain off Work      [] May return to full duty work                                     [] Return to work with restrictions     Physician:  []   [] Dr. Natalie Lang  [x] Dr. Fernando Peterson      Nurse Case Manger:  Alisson      Wound Care Center Information: Should you experience any significant changes in your wound(s) or have questions about your wound care, please contact the Wound Center at [phone]. Our hours are Monday - Friday 8am - 4:30pm, closed all major holidays. If you need help with your wound outside these hours and cannot wait until we are again available, contact your PCP or go to the hospital emergency room.     PLEASE NOTE: IF YOU ARE UNABLE TO OBTAIN WOUND SUPPLIES, CONTINUE TO USE THE SUPPLIES YOU HAVE AVAILABLE UNTIL YOU ARE ABLE TO REACH US. IT IS MOST IMPORTANT TO KEEP THE WOUND COVERED AT ALL TIMES.

## 2024-03-25 NOTE — DISCHARGE INSTRUCTIONS
Wound Clinic Physician Orders and Discharge Instructions  [facility]  [address], [address]  [place] [address]  Telephone: [phone]     FAX [phone]    NAME:  Ramin Brown Jr.  YOB: 1945  MEDICAL RECORD NUMBER:  [id number]  DATE:  3/25/2024      Return Appointment:  [] Dressing Supply Provider:   [x] Home Healthcare: [facility]. Fax [phone]  [x] Return Appointment: 2 Week(s)  [] Nurse Visit:     [] Discharge from [facility]: [] Healed            [] Refer to Provider:    Follow-up Information:  [] Ordered Tests:   [x] Referrals: Dr. Mcclellan  [] Rx:   [x] Other: Lymph pump 3 times daily x 1 hour each    Wound Cleansing:   Do not scrub or use excessive force.  Cleanse wound prior to applying a clean dressing with:  [] Normal Saline   [] Keep Wound Dry in Shower     [] Wound Cleanser   [x] Cleanse wound with Mild Soap & Water    [] Other:       Topical Treatments:  Do not apply lotions, creams, or ointments to wound bed unless directed.   [] Apply moisturizing lotion to skin surrounding the wound prior to dressing change.  [] Apply antifungal ointment to skin surrounding the wound prior to dressing change.  [] Apply thin film of moisture barrier ointment to skin immediately around wound.  [] Apply Betadine to skim immediately around wound      Dressings:           Wound Location R Leg   [x] Apply Primary Dressing:       [] MediHoney Gel [] MediHoney Alginate  [x] Calcium Alginate with Silver   [] Calcium Alginate without silver   [] Collagen with silver [] Collagen without Silver    [] Santyl with moist saline gauze     [] Hydrofera Blue (cut to size and moistened with normal saline)  [] Hydrofera Blue Ready (cut to size)      [] Normal Saline wet to dry  [] Betadine wet to dry    [] Hydrogel  [] Mepitel     [] Bactroban/Mupirocin   [] Iodoform Packing Strip [] Plain Packing Strip   [] Skin Sub:   [] Other:     [x] Cover and Secure with:     [x]

## 2024-03-25 NOTE — PROGRESS NOTES
03/25/24 1124   Lyla-wound Assessment Dry/flaky;Intact 03/25/24 1124   Margins Undefined edges;Attached edges 03/25/24 1124   Wound Thickness Description not for Pressure Injury Full thickness 03/25/24 1124   Number of days: 322            Laboratory Values: No results found for this or any previous visit (from the past 24 hour(s)).      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     Continue local wound treatment with silver alginate and an Unna boot per discharge instructions.     Follow-up in 3 weeks.       Discharge:  Written patient dismissal instructions given to patient and signed by patient or POA.         Discharge Instructions              Wound Clinic Physician Orders and Discharge Instructions  [facility]  [facility] [address] , [place] [address]  Telephone: [phone]     FAX [phone]    NAME:  Ramin Brown JrMy  YOB: 1945  MEDICAL RECORD NUMBER:  [id number]  DATE:  3/25/2024      Return Appointment:  [] Dressing Supply Provider:   [x] Home Healthcare: [facility]. Fax [phone]  [x] Return Appointment: 2 Week(s)  [] Nurse Visit:     [] Discharge from [facility]: [] Healed            [] Refer

## 2024-04-08 ENCOUNTER — HOSPITAL ENCOUNTER (OUTPATIENT)
Facility: HOSPITAL | Age: 79
Discharge: HOME | End: 2024-04-08
Attending: SPECIALIST | Admitting: SPECIALIST
Payer: MEDICARE

## 2024-04-08 VITALS
DIASTOLIC BLOOD PRESSURE: 86 MMHG | RESPIRATION RATE: 18 BRPM | HEART RATE: 98 BPM | TEMPERATURE: 97.9 F | SYSTOLIC BLOOD PRESSURE: 150 MMHG

## 2024-04-08 DIAGNOSIS — L97.812 NON-PRESSURE CHRONIC ULCER OF OTHER PART OF RIGHT LOWER LEG WITH FAT LAYER EXPOSED (HCC): Primary | ICD-10-CM

## 2024-04-08 DIAGNOSIS — L97.822 NON-PRESSURE CHRONIC ULCER OF OTHER PART OF LEFT LOWER LEG WITH FAT LAYER EXPOSED (HCC): ICD-10-CM

## 2024-04-08 PROCEDURE — 29581 APPL MULTLAYER CMPRN SYS LEG: CPT

## 2024-04-08 PROCEDURE — 29580 STRAPPING UNNA BOOT: CPT

## 2024-04-08 RX ORDER — SODIUM CHLOR/HYPOCHLOROUS ACID 0.033 %
SOLUTION, IRRIGATION IRRIGATION ONCE
OUTPATIENT
Start: 2024-04-08 | End: 2024-04-08

## 2024-04-08 RX ORDER — TRIAMCINOLONE ACETONIDE 1 MG/G
OINTMENT TOPICAL ONCE
OUTPATIENT
Start: 2024-04-08 | End: 2024-04-08

## 2024-04-08 RX ORDER — LIDOCAINE HYDROCHLORIDE 20 MG/ML
JELLY TOPICAL ONCE
OUTPATIENT
Start: 2024-04-08 | End: 2024-04-08

## 2024-04-08 NOTE — PROGRESS NOTES
[phone]. Our hours are Monday - Friday 8am - 4:30pm, closed all major holidays. If you need help with your wound outside these hours and cannot wait until we are again available, contact your PCP or go to the hospital emergency room.     PLEASE NOTE: IF YOU ARE UNABLE TO OBTAIN WOUND SUPPLIES, CONTINUE TO USE THE SUPPLIES YOU HAVE AVAILABLE UNTIL YOU ARE ABLE TO REACH US. IT IS MOST IMPORTANT TO KEEP THE WOUND COVERED AT ALL TIMES.        TIME: E/M Time spent with patient and/or patient care issues: [] 15-20 min  [] 21-30 min  [] 31-44 min  [] 45 min or more.   This is above the usual time needed to address patient's chief complaint today: [] Yes  [] No  This time includes physician non-face-to-face service time visit on the date of service such as  Preparing to see the patient (eg, review of tests)  Obtaining and/or reviewing separately obtained history  Performing a medically necessary appropriate examination and/or evaluation  Counseling and educating the patient/family/caregiver  Ordering medications, tests, or procedures  Referring and communicating with other health care professionals as needed  Documenting clinical information in the electronic or other health record  Independently interpreting results (not reported separately) and communicating results to the patient/family/caregiver  Care coordination (not reported separately)    Electronically signed by Fernando Peterson MD on 4/8/2024 at 12:20 PM

## 2024-04-08 NOTE — WOUND CARE
Wound Clinic Physician Orders and Discharge Instructions  [facility]  [address], [address]  [place] [address]  Telephone: [phone]     FAX [phone]    NAME:  Ramin Brown Jr.  YOB: 1945  MEDICAL RECORD NUMBER:  [id number]  DATE:  4/8/2024      Return Appointment:  [] Dressing Supply Provider:   [x] Home Healthcare: [facility]. Fax [phone]  [x] Return Appointment: 3 Week(s)  [] Nurse Visit:     [] Discharge from [facility]: [] Healed            [] Refer to Provider:    Follow-up Information:  [] Ordered Tests:   [x] Referrals: Dr. Mcclellan  [] Rx:   [x] Other: Lymph pump 3 times daily x 1 hour each    Wound Cleansing:   Do not scrub or use excessive force.  Cleanse wound prior to applying a clean dressing with:  [] Normal Saline   [] Keep Wound Dry in Shower     [] Wound Cleanser   [x] Cleanse wound with Mild Soap & Water    [] Other:       Topical Treatments:  Do not apply lotions, creams, or ointments to wound bed unless directed.   [] Apply moisturizing lotion to skin surrounding the wound prior to dressing change.  [] Apply antifungal ointment to skin surrounding the wound prior to dressing change.  [] Apply thin film of moisture barrier ointment to skin immediately around wound.  [] Apply Betadine to skim immediately around wound      Dressings:           Wound Location R Leg   [x] Apply Primary Dressing:       [] MediHoney Gel [] MediHoney Alginate  [x] Calcium Alginate with Silver   [] Calcium Alginate without silver   [] Collagen with silver [] Collagen without Silver    [] Santyl with moist saline gauze     [] Hydrofera Blue (cut to size and moistened with normal saline)  [] Hydrofera Blue Ready (cut to size)      [] Normal Saline wet to dry  [] Betadine wet to dry    [] Hydrogel  [] Mepitel     [] Bactroban/Mupirocin   [] Iodoform Packing Strip [] Plain Packing Strip   [] Skin Sub:   [] Other:     [x] Cover and Secure with:     [x]

## 2024-04-08 NOTE — DISCHARGE INSTRUCTIONS
Wound Clinic Physician Orders and Discharge Instructions  [facility]  [address], [address]  [place] [address]  Telephone: [phone]     FAX [phone]    NAME:  Ramin Brown Jr.  YOB: 1945  MEDICAL RECORD NUMBER:  [id number]  DATE:  4/8/2024      Return Appointment:  [] Dressing Supply Provider:   [x] Home Healthcare: [facility]. Fax [phone]  [x] Return Appointment: 3 Week(s)  [] Nurse Visit:     [] Discharge from [facility]: [] Healed            [] Refer to Provider:    Follow-up Information:  [] Ordered Tests:   [x] Referrals: Dr. Mcclellan  [] Rx:   [x] Other: Lymph pump 3 times daily x 1 hour each    Wound Cleansing:   Do not scrub or use excessive force.  Cleanse wound prior to applying a clean dressing with:  [] Normal Saline   [] Keep Wound Dry in Shower     [] Wound Cleanser   [x] Cleanse wound with Mild Soap & Water    [] Other:       Topical Treatments:  Do not apply lotions, creams, or ointments to wound bed unless directed.   [] Apply moisturizing lotion to skin surrounding the wound prior to dressing change.  [] Apply antifungal ointment to skin surrounding the wound prior to dressing change.  [] Apply thin film of moisture barrier ointment to skin immediately around wound.  [] Apply Betadine to skim immediately around wound      Dressings:           Wound Location R Leg   [x] Apply Primary Dressing:       [] MediHoney Gel [] MediHoney Alginate  [x] Calcium Alginate with Silver   [] Calcium Alginate without silver   [] Collagen with silver [] Collagen without Silver    [] Santyl with moist saline gauze     [] Hydrofera Blue (cut to size and moistened with normal saline)  [] Hydrofera Blue Ready (cut to size)      [] Normal Saline wet to dry  [] Betadine wet to dry    [] Hydrogel  [] Mepitel     [] Bactroban/Mupirocin   [] Iodoform Packing Strip [] Plain Packing Strip   [] Skin Sub:   [] Other:     [x] Cover and Secure with:     [x]

## 2024-04-29 ENCOUNTER — HOSPITAL ENCOUNTER (OUTPATIENT)
Facility: HOSPITAL | Age: 79
Discharge: HOME | End: 2024-04-29
Attending: SPECIALIST | Admitting: SPECIALIST
Payer: MEDICARE

## 2024-04-29 VITALS
SYSTOLIC BLOOD PRESSURE: 149 MMHG | TEMPERATURE: 97.2 F | RESPIRATION RATE: 18 BRPM | HEART RATE: 90 BPM | DIASTOLIC BLOOD PRESSURE: 92 MMHG

## 2024-04-29 DIAGNOSIS — L97.822 NON-PRESSURE CHRONIC ULCER OF OTHER PART OF LEFT LOWER LEG WITH FAT LAYER EXPOSED (HCC): ICD-10-CM

## 2024-04-29 DIAGNOSIS — L97.812 NON-PRESSURE CHRONIC ULCER OF OTHER PART OF RIGHT LOWER LEG WITH FAT LAYER EXPOSED (HCC): Primary | ICD-10-CM

## 2024-04-29 PROCEDURE — 29580 STRAPPING UNNA BOOT: CPT

## 2024-04-29 RX ORDER — LIDOCAINE HYDROCHLORIDE 20 MG/ML
JELLY TOPICAL ONCE
OUTPATIENT
Start: 2024-04-29 | End: 2024-04-29

## 2024-04-29 RX ORDER — TRIAMCINOLONE ACETONIDE 1 MG/G
OINTMENT TOPICAL ONCE
OUTPATIENT
Start: 2024-04-29 | End: 2024-04-29

## 2024-04-29 RX ORDER — SODIUM CHLOR/HYPOCHLOROUS ACID 0.033 %
SOLUTION, IRRIGATION IRRIGATION ONCE
OUTPATIENT
Start: 2024-04-29 | End: 2024-04-29

## 2024-04-29 NOTE — DISCHARGE INSTRUCTIONS
Wound Clinic Physician Orders and Discharge Instructions  [facility]  [address], [address]  [place] [address]  Telephone: [phone]     FAX [phone]    NAME:  Ramin Brown Jr.  YOB: 1945  MEDICAL RECORD NUMBER:  [id number]  DATE:  4/29/2024      Return Appointment:  [] Dressing Supply Provider:   [x] Home Healthcare: [facility]. Fax [phone]  [x] Return Appointment: 4 Week(s)  [] Nurse Visit:     [] Discharge from [facility]: [] Healed            [] Refer to Provider:    Follow-up Information:  [] Ordered Tests:   [x] Referrals: Dr. Mcclellan  [] Rx:   [x] Other: Lymph pump 3 times daily x 1 hour each    Wound Cleansing:   Do not scrub or use excessive force.  Cleanse wound prior to applying a clean dressing with:  [] Normal Saline   [] Keep Wound Dry in Shower     [] Wound Cleanser   [x] Cleanse wound with Mild Soap & Water    [] Other:       Topical Treatments:  Do not apply lotions, creams, or ointments to wound bed unless directed.   [] Apply moisturizing lotion to skin surrounding the wound prior to dressing change.  [] Apply antifungal ointment to skin surrounding the wound prior to dressing change.  [] Apply thin film of moisture barrier ointment to skin immediately around wound.  [] Apply Betadine to skim immediately around wound      Dressings:           Wound Location R Leg   [x] Apply Primary Dressing:       [] MediHoney Gel [] MediHoney Alginate  [x] Calcium Alginate with Silver   [] Calcium Alginate without silver   [] Collagen with silver [] Collagen without Silver    [] Santyl with moist saline gauze     [] Hydrofera Blue (cut to size and moistened with normal saline)  [] Hydrofera Blue Ready (cut to size)      [] Normal Saline wet to dry  [] Betadine wet to dry    [] Hydrogel  [] Mepitel     [] Bactroban/Mupirocin   [] Iodoform Packing Strip [] Plain Packing Strip   [] Skin Sub:   [] Other:     [x] Cover and Secure with:     [x]

## 2024-04-29 NOTE — WOUND CARE
Unna Boot Application   Below Knee    NAME:  Ramin Brown Jr.  YOB: 1945  MEDICAL RECORD NUMBER:  [id number]  DATE:  4/29/2024    Unna boot: Applied moisturizing agent to dry skin as needed.   Appied primary and secondary dressing as ordered.  Applied Unna roll from toes to knee overlapping each time.   Applied ace wrap or coban from toes to below the knee.   Secured with tape and/or metal clips covered with tape.   Instructed patient/caregiver to keep dressing dry and intact. DO NOT REMOVE DRESSING.   Instructed pt/family/caregiver to report excessive draining, loose bandage, wet dressing, severe pain or tingling in toes.  Applied Unna Boot dressing below the knee to right lower leg.  Applied Unna Boot dressing below the knee to left lower leg.    Unna Boot(s) were applied per  Guidelines.     Electronically signed by Jenni Arzate LPN on 4/29/2024 at 12:49 PM    
    [x] Gauze [] Demetrius [] Kerlix   [] Foam  [x] Super Absorbant or   [x] ABD     [] Ace Wrap [] Other:    Limit contact of tape with skin.    [x] Change dressing: [] Daily    [] Every Other Day   [] Twice per week   [x] Three times per week   [] Once a week [] Do Not Change Dressing   [] Other:    Dressings:           Wound Location L Leg   [x] Apply Primary Dressing:       [] MediHoney Gel [] MediHoney Alginate  [] Calcium Alginate with Silver  [] Calcium Alginate without silver   [] Collagen with silver [] Collagen without Silver    [] Santyl with moist saline gauze     [] Hydrofera Blue (cut to size and moistened with normal saline)  [] Hydrofera Blue Ready (cut to size)      [] Normal Saline wet to dry  [] Betadine wet to dry    [] Hydrogel  [] Mepitel     [] Bactroban/Mupirocin   [] Iodoform Packing Strip [] Plain Packing Strip   [] Skin Sub:   [x] Other: Drawtex     [x] Cover and Secure with:     [x] Gauze [] Demetrius [] Kerlix   [] Foam  [x] Super Absorbant or   [x] ABD     [] Ace Wrap [] Other:    Limit contact of tape with skin.    [x] Change dressing: [] Daily    [] Every Other Day   [] Twice per week   [x] Three times per week   [] Once a week [] Do Not Change Dressing   [] Other:     Edema Control:  Apply: [] Compression Stocking:  mmHg  []Right Leg []Left Leg   [] Tubigrip []Right Leg Double Layer []Left Leg Double Layer      []Right Leg Single Layer []Left Leg Single Layer      [] Elevate leg(s) above the level of the heart when sitting.    [] Avoid prolonged standing in one place.     Compression:  Apply: [x] Compression Wrap to [x]RightLeg [x]Left Leg    []  Coflex [] Coflex Lite  [x] Unna with Calamine Lite     [x] Do not get leg(s) with wrap wet. If wrap becomes too tight, call the wound center and remove wrap from leg by unrolling each layer.   [x] Elevate leg(s) above the level of the heart when sitting.    [x] Avoid prolonged standing in one place.    Dietary:  [x] Diet as tolerated: [] Calorie

## 2024-04-29 NOTE — PROGRESS NOTES
72      Ht Readings from Last 3 Encounters:   05/08/23 1.727 m (5' 8\")   07/21/22 1.727 m (5' 8\")   02/21/22 1.727 m (5' 8\")        General: well appearing, no acute distress  Head: Normal  Face: Nornal  HEENT: atraumatic, PERRLA, moist mucosa, normal pharynx, normal tonsils and adenoids, normal tongue, no fluid in sinuses  Neck: Trachea midline, no carotid bruit, no masses  Chest: Normal.  Respiratory: Normal chest wall expansion, CTA B, no r/r/w, no rubs  Cardiovascular: RRR, no m/r/g, Normal S1 and S2  Abdomen: Soft, non tender, non-distended, normal bowel sounds in all quadrants, no hepatosplenomegaly, no tympany. Incision scar:   Genitourinary: No inguinal hernia, normal external gentalia, Testis & scrotum normal, no renal angle tenderness  Rectal: deferred  Musculoskeletal: normal ROM in upper and lower extremities, No joint swelling.  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) - Primary    Relevant Orders    Initiate Outpatient Wound Care Protocol    Non-pressure chronic ulcer of other part of left lower leg with 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 (HCC)    Relevant Orders    Initiate Outpatient Wound Care Protocol         Wound/Ulcer #: 2  Wound 05/08/23 Leg Right #1 (Active)   Wound Image   04/29/24 1118   Wound Etiology Venous 04/29/24 1118   Dressing Status Clean;Dry;Intact 04/29/24 1118   Wound Cleansed Soap and water 04/29/24 1118   Dressing/Treatment Alginate;Gauze dressing/dressing sponge;ABD;Other (comment) 01/29/24 1202   Wound Length (cm) 10 cm 04/29/24 1118   Wound Width (cm) 16.5 cm 04/29/24 1118   Wound Depth (cm) 0.1 cm 04/29/24 1118   Wound

## 2024-05-20 ENCOUNTER — HOSPITAL ENCOUNTER (OUTPATIENT)
Facility: HOSPITAL | Age: 79
Discharge: HOME | End: 2024-05-20
Attending: SPECIALIST | Admitting: SPECIALIST
Payer: MEDICARE

## 2024-05-20 VITALS
SYSTOLIC BLOOD PRESSURE: 136 MMHG | DIASTOLIC BLOOD PRESSURE: 90 MMHG | TEMPERATURE: 97.3 F | RESPIRATION RATE: 18 BRPM | HEART RATE: 77 BPM

## 2024-05-20 DIAGNOSIS — L97.822 NON-PRESSURE CHRONIC ULCER OF OTHER PART OF LEFT LOWER LEG WITH FAT LAYER EXPOSED (HCC): ICD-10-CM

## 2024-05-20 DIAGNOSIS — L97.812 NON-PRESSURE CHRONIC ULCER OF OTHER PART OF RIGHT LOWER LEG WITH FAT LAYER EXPOSED (HCC): Primary | ICD-10-CM

## 2024-05-20 PROCEDURE — 29580 STRAPPING UNNA BOOT: CPT

## 2024-05-20 RX ORDER — SODIUM CHLOR/HYPOCHLOROUS ACID 0.033 %
SOLUTION, IRRIGATION IRRIGATION ONCE
OUTPATIENT
Start: 2024-05-20 | End: 2024-05-20

## 2024-05-20 RX ORDER — TRIAMCINOLONE ACETONIDE 1 MG/G
OINTMENT TOPICAL ONCE
Status: CANCELLED | OUTPATIENT
Start: 2024-05-20 | End: 2024-05-20

## 2024-05-20 RX ORDER — SODIUM CHLOR/HYPOCHLOROUS ACID 0.033 %
SOLUTION, IRRIGATION IRRIGATION ONCE
Status: CANCELLED | OUTPATIENT
Start: 2024-05-20 | End: 2024-05-20

## 2024-05-20 RX ORDER — LIDOCAINE HYDROCHLORIDE 20 MG/ML
JELLY TOPICAL ONCE
OUTPATIENT
Start: 2024-05-20 | End: 2024-05-20

## 2024-05-20 RX ORDER — LIDOCAINE HYDROCHLORIDE 20 MG/ML
JELLY TOPICAL ONCE
Status: CANCELLED | OUTPATIENT
Start: 2024-05-20 | End: 2024-05-20

## 2024-05-20 RX ORDER — TRIAMCINOLONE ACETONIDE 1 MG/G
OINTMENT TOPICAL ONCE
OUTPATIENT
Start: 2024-05-20 | End: 2024-05-20

## 2024-05-20 NOTE — WOUND CARE
Unna Boot Application   Below Knee    NAME:  Ramin Brown Jr.  YOB: 1945  MEDICAL RECORD NUMBER:  [id number]  DATE:  5/20/2024    Unna boot: Applied moisturizing agent to dry skin as needed.   Appied primary and secondary dressing as ordered.  Applied Unna roll from toes to knee overlapping each time.   Applied ace wrap or coban from toes to below the knee.   Secured with tape and/or metal clips covered with tape.   Instructed patient/caregiver to keep dressing dry and intact. DO NOT REMOVE DRESSING.   Instructed pt/family/caregiver to report excessive draining, loose bandage, wet dressing, severe pain or tingling in toes.  Applied Unna Boot dressing below the knee to right lower leg.  Applied Unna Boot dressing below the knee to left lower leg.    Unna Boot(s) were applied per  Guidelines.     Electronically signed by Jaimie Drummond RN on 5/20/2024 at 12:08 PM    
Diabetic Diet: [] No Added Salt:  [x] Increase Protein: [] Other:     Activity:  [x] Activity as tolerated:    [] Patient has no activity restrictions      [] Strict Bedrest   [] Remain off Work      [] May return to full duty work                                     [] Return to work with restrictions     Physician:  []   [] Dr. Natalie Lang  [x] Dr. Fernando Peterson      Nurse Case Manger:  Alisson      Wound Care Center Information: Should you experience any significant changes in your wound(s) or have questions about your wound care, please contact the Wound Center at [phone]. Our hours are Monday - Friday 8am - 4:30pm, closed all major holidays. If you need help with your wound outside these hours and cannot wait until we are again available, contact your PCP or go to the hospital emergency room.     PLEASE NOTE: IF YOU ARE UNABLE TO OBTAIN WOUND SUPPLIES, CONTINUE TO USE THE SUPPLIES YOU HAVE AVAILABLE UNTIL YOU ARE ABLE TO REACH US. IT IS MOST IMPORTANT TO KEEP THE WOUND COVERED AT ALL TIMES.

## 2024-05-20 NOTE — PROGRESS NOTES
98      Ht Readings from Last 3 Encounters:   05/08/23 1.727 m (5' 8\")   07/21/22 1.727 m (5' 8\")   02/21/22 1.727 m (5' 8\")        General: well appearing, no acute distress  Head: Normal  Face: Nornal  HEENT: atraumatic, PERRLA, moist mucosa, normal pharynx, normal tonsils and adenoids, normal tongue, no fluid in sinuses  Neck: Trachea midline, no carotid bruit, no masses  Chest: Normal.  Respiratory: Normal chest wall expansion, CTA B, no r/r/w, no rubs  Cardiovascular: RRR, no m/r/g, Normal S1 and S2  Abdomen: Soft, non tender, non-distended, normal bowel sounds in all quadrants, no hepatosplenomegaly, no tympany. Incision scar:   Genitourinary: No inguinal hernia, normal external gentalia, Testis & scrotum normal, no renal angle tenderness  Rectal: deferred  Musculoskeletal: normal ROM in upper and lower extremities, No joint swelling, bilateral lower extremity lymphedema.  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

## 2024-05-20 NOTE — DISCHARGE INSTRUCTIONS
Wound Clinic Physician Orders and Discharge Instructions  [facility]  [address], [address]  [place] [address]  Telephone: [phone]     FAX [phone]    NAME:  Ramin Brown Jr.  YOB: 1945  MEDICAL RECORD NUMBER:  [id number]  DATE:  5/20/2024      Return Appointment:  [] Dressing Supply Provider:   [x] Home Healthcare: [facility]. Fax [phone]  [x] Return Appointment: 4 Week(s)  [] Nurse Visit:     [] Discharge from [facility]: [] Healed            [] Refer to Provider:    Follow-up Information:  [] Ordered Tests:   [x] Referrals: Dr. Mcclellan  [] Rx:   [x] Other: Lymph pump 3 times daily x 1 hour each    Wound Cleansing:   Do not scrub or use excessive force.  Cleanse wound prior to applying a clean dressing with:  [] Normal Saline   [] Keep Wound Dry in Shower     [] Wound Cleanser   [x] Cleanse wound with Mild Soap & Water    [] Other:       Topical Treatments:  Do not apply lotions, creams, or ointments to wound bed unless directed.   [] Apply moisturizing lotion to skin surrounding the wound prior to dressing change.  [] Apply antifungal ointment to skin surrounding the wound prior to dressing change.  [] Apply thin film of moisture barrier ointment to skin immediately around wound.  [] Apply Betadine to skim immediately around wound      Dressings:           Wound Location R Leg   [x] Apply Primary Dressing:       [] MediHoney Gel [] MediHoney Alginate  [x] Calcium Alginate with Silver   [] Calcium Alginate without silver   [] Collagen with silver [] Collagen without Silver    [] Santyl with moist saline gauze     [] Hydrofera Blue (cut to size and moistened with normal saline)  [] Hydrofera Blue Ready (cut to size)      [] Normal Saline wet to dry  [] Betadine wet to dry    [] Hydrogel  [] Mepitel     [] Bactroban/Mupirocin   [] Iodoform Packing Strip [] Plain Packing Strip   [] Skin Sub:   [] Other: Drawtex    [x] Cover and Secure with:     [x]

## 2024-06-17 ENCOUNTER — HOSPITAL ENCOUNTER (OUTPATIENT)
Facility: HOSPITAL | Age: 79
Discharge: HOME | End: 2024-06-17
Attending: SPECIALIST | Admitting: SPECIALIST
Payer: MEDICARE

## 2024-06-17 VITALS
HEART RATE: 83 BPM | DIASTOLIC BLOOD PRESSURE: 76 MMHG | TEMPERATURE: 97.3 F | RESPIRATION RATE: 18 BRPM | SYSTOLIC BLOOD PRESSURE: 115 MMHG

## 2024-06-17 PROCEDURE — 11042 DBRDMT SUBQ TIS 1ST 20SQCM/<: CPT

## 2024-06-17 PROCEDURE — 11045 DBRDMT SUBQ TISS EACH ADDL: CPT

## 2024-06-17 NOTE — WOUND CARE
Unna Boot Application   Below Knee    NAME:  Ramin Brown Jr.  YOB: 1945  MEDICAL RECORD NUMBER:  [id number]  DATE:  6/17/2024    Unna boot: Applied moisturizing agent to dry skin as needed.   Appied primary and secondary dressing as ordered.  Applied Unna roll from toes to knee overlapping each time.   Applied ace wrap or coban from toes to below the knee.   Secured with tape and/or metal clips covered with tape.   Instructed patient/caregiver to keep dressing dry and intact. DO NOT REMOVE DRESSING.   Instructed pt/family/caregiver to report excessive draining, loose bandage, wet dressing, severe pain or tingling in toes.  Applied Unna Boot dressing below the knee to right lower leg.  Applied Unna Boot dressing below the knee to left lower leg.    Unna Boot(s) were applied per  Guidelines.     Electronically signed by Jaimie Drummond RN on 6/17/2024 at 11:57 AM    
Diabetic Diet: [] No Added Salt:  [x] Increase Protein: [] Other:     Activity:  [x] Activity as tolerated:    [] Patient has no activity restrictions      [] Strict Bedrest   [] Remain off Work      [] May return to full duty work                                     [] Return to work with restrictions     Physician:  [x] Ramin Ramirez PA-C  [] Dr. Natalie Lang  [] Dr. Fernando Peterson      Nurse Case Manger:  Alisson      Wound Care Center Information: Should you experience any significant changes in your wound(s) or have questions about your wound care, please contact the Wound Center at [phone]. Our hours are Monday - Friday 8am - 4:30pm, closed all major holidays. If you need help with your wound outside these hours and cannot wait until we are again available, contact your PCP or go to the hospital emergency room.     PLEASE NOTE: IF YOU ARE UNABLE TO OBTAIN WOUND SUPPLIES, CONTINUE TO USE THE SUPPLIES YOU HAVE AVAILABLE UNTIL YOU ARE ABLE TO REACH US. IT IS MOST IMPORTANT TO KEEP THE WOUND COVERED AT ALL TIMES.

## 2024-06-17 NOTE — PROGRESS NOTES
Healing % -131 06/17/24 1108   Post-Procedure Length (cm) 18 cm 06/17/24 1146   Post-Procedure Width (cm) 18 cm 06/17/24 1146   Post-Procedure Depth (cm) 0.2 cm 06/17/24 1146   Post-Procedure Surface Area (cm^2) 324 cm^2 06/17/24 1146   Post-Procedure Volume (cm^3) 64.8 cm^3 06/17/24 1146   Wound Assessment Slough;Granulation tissue 06/17/24 1108   Drainage Amount Copious (>75 % saturated) 06/17/24 1108   Drainage Description Serosanguinous 06/17/24 1108   Odor Mild 06/17/24 1108   Lyla-wound Assessment Dry/flaky;Intact;Maceration 06/17/24 1108   Margins Undefined edges;Attached edges 06/17/24 1108   Wound Thickness Description not for Pressure Injury Full thickness 06/17/24 1108   Number of days: 406          Procedures done during this encounter:   Debridement: Excisional Debridement  Indications:  Based on my examination of this patient's wound(s)/ulcer(s) today, debridement is required to promote healing and evaluate the wound base. Risks and benefits discussed with patient who has agreed to proceed.   Performed by: Ramin Ramirez PA-C  Consent obtained:  Yes  Time out taken:  Yes  Pain Control: Anesthetic  Anesthetic: 4% Lidocaine Liquid Topical   Using curette the wound(s)/ulcer(s) was/were debrided down through and including the removal of subcutaneous tissue.      Devitalized Tissue Debrided:  fibrin, biofilm, and slough  Pre Debridement Measurements:  Are located in the Wound/Ulcer Documentation Flow Sheet  Wound/Ulcer #: 1  Post Debridement Measurements:  Wound/Ulcer Descriptions are Pre Debridement except measurements:  Total Surface Area Debrided:  130 sq cm   Diabetic/Pressure/Non Pressure Ulcers only:  Ulcer: Non-Pressure ulcer, 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   Estimated Blood Loss:  Minimal  Hemostasis Achieved:  by pressure  Procedural Pain:  0  / 10   Post Procedural Pain:  0 / 10   Response to treatment:  Well tolerated by patient.     TIME: E/M Time spent with patient and/or patient care issues: [] 15-20 min

## 2024-06-17 NOTE — DISCHARGE INSTRUCTIONS
Gauze [] Demetrius [] Kerlix   [] Foam  [x] Super Absorbant    [x] ABD     [] Ace Wrap [] Other:    Limit contact of tape with skin.    [x] Change dressing: [] Daily    [] Every Other Day   [] Twice per week   [x] Three times per week   [] Once a week [] Do Not Change Dressing   [] Other:    Dressings:           Wound Location L Leg   [x] Apply Primary Dressing:       [] MediHoney Gel [] MediHoney Alginate  [x] Calcium Alginate with Silver  [] Calcium Alginate without silver   [] Collagen with silver [] Collagen without Silver    [] Santyl with moist saline gauze     [] Hydrofera Blue (cut to size and moistened with normal saline)  [] Hydrofera Blue Ready (cut to size)      [] Normal Saline wet to dry  [] Betadine wet to dry    [] Hydrogel  [] Mepitel     [] Bactroban/Mupirocin   [] Iodoform Packing Strip [] Plain Packing Strip   [] Skin Sub:   [] Other: Drawtex     [x] Cover and Secure with:     [x] Gauze [] Demetrius [] Kerlix   [] Foam  [x] Super Absorbant    [x] ABD     [] Ace Wrap [] Other:    Limit contact of tape with skin.    [x] Change dressing: [] Daily    [] Every Other Day   [] Twice per week   [x] Three times per week   [] Once a week [] Do Not Change Dressing   [] Other:     Edema Control:  Apply: [] Compression Stocking:  mmHg  []Right Leg []Left Leg   [] Tubigrip []Right Leg Double Layer []Left Leg Double Layer      []Right Leg Single Layer []Left Leg Single Layer      [] Elevate leg(s) above the level of the heart when sitting.    [] Avoid prolonged standing in one place.     Compression:  Apply: [x] Compression Wrap to [x]RightLeg [x]Left Leg    []  Coflex [] Coflex Lite  [x] Unna with Calamine Lite     [x] Do not get leg(s) with wrap wet. If wrap becomes too tight, call the wound center and remove wrap from leg by unrolling each layer.   [x] Elevate leg(s) above the level of the heart when sitting.    [x] Avoid prolonged standing in one place.    Dietary:  [x] Diet as tolerated: [] Calorie Diabetic

## 2024-07-15 ENCOUNTER — HOSPITAL ENCOUNTER (OUTPATIENT)
Facility: HOSPITAL | Age: 79
Discharge: HOME | End: 2024-07-15
Attending: SPECIALIST | Admitting: SPECIALIST
Payer: MEDICARE

## 2024-07-15 VITALS
DIASTOLIC BLOOD PRESSURE: 81 MMHG | TEMPERATURE: 97.3 F | SYSTOLIC BLOOD PRESSURE: 130 MMHG | RESPIRATION RATE: 18 BRPM | HEART RATE: 97 BPM

## 2024-07-15 PROCEDURE — 87205 SMEAR GRAM STAIN: CPT

## 2024-07-15 PROCEDURE — 87176 TISSUE HOMOGENIZATION CULTR: CPT

## 2024-07-15 PROCEDURE — 87070 CULTURE OTHR SPECIMN AEROBIC: CPT

## 2024-07-15 PROCEDURE — 29580 STRAPPING UNNA BOOT: CPT

## 2024-07-15 NOTE — WOUND CARE
Unna Boot Application   Below Knee    NAME:  Ramin Brown Jr.  YOB: 1945  MEDICAL RECORD NUMBER:  [id number]  DATE:  7/15/2024    Unna boot: Applied moisturizing agent to dry skin as needed.   Appied primary and secondary dressing as ordered.  Applied Unna roll from toes to knee overlapping each time.   Applied ace wrap or coban from toes to below the knee.   Secured with tape and/or metal clips covered with tape.   Instructed patient/caregiver to keep dressing dry and intact. DO NOT REMOVE DRESSING.   Instructed pt/family/caregiver to report excessive draining, loose bandage, wet dressing, severe pain or tingling in toes.  Applied Unna Boot dressing below the knee to right lower leg.  Applied Unna Boot dressing below the knee to left lower leg.    Unna Boot(s) were applied per  Guidelines.     Electronically signed by Jaimie Drummond RN on 7/15/2024 at 11:19 AM

## 2024-07-15 NOTE — PROGRESS NOTES
excessive force.  Cleanse wound prior to applying a clean dressing with:  [] Normal Saline   [] Keep Wound Dry in Shower     [] Wound Cleanser   [x] Cleanse wound with Mild Soap & Water    [] Other:       Topical Treatments:  Do not apply lotions, creams, or ointments to wound bed unless directed.   [] Apply moisturizing lotion to skin surrounding the wound prior to dressing change.  [] Apply antifungal ointment to skin surrounding the wound prior to dressing change.  [] Apply thin film of moisture barrier ointment to skin immediately around wound.  [] Apply Betadine to skim immediately around wound      Dressings:           Wound Location R Leg   [x] Apply Primary Dressing:       [] MediHoney Gel [] MediHoney Alginate  [x] Calcium Alginate with Silver   [] Calcium Alginate without silver   [] Collagen with silver [] Collagen without Silver    [] Santyl with moist saline gauze     [] Hydrofera Blue (cut to size and moistened with normal saline)  [] Hydrofera Blue Ready (cut to size)      [] Normal Saline wet to dry  [] Betadine wet to dry    [] Hydrogel  [] Mepitel     [] Bactroban/Mupirocin   [] Iodoform Packing Strip [] Plain Packing Strip   [] Skin Sub:   [] Other: Drawtex    [x] Cover and Secure with:     [x] Gauze [] Demetrius [] Kerlix   [] Foam  [x] Super Absorbant    [x] ABD     [] Ace Wrap [] Other:    Limit contact of tape with skin.    [x] Change dressing: [] Daily    [] Every Other Day   [] Twice per week   [x] Three times per week   [] Once a week [] Do Not Change Dressing   [] Other:    Dressings:           Wound Location L Leg   [x] Apply Primary Dressing:       [] MediHoney Gel [] MediHoney Alginate  [x] Calcium Alginate with Silver  [] Calcium Alginate without silver   [] Collagen with silver [] Collagen without Silver    [] Santyl with moist saline gauze     [] Hydrofera Blue (cut to size and moistened with normal saline)  [] Hydrofera Blue Ready (cut to size)      [] Normal Saline wet to dry  []

## 2024-07-15 NOTE — WOUND CARE
Wound Clinic Physician Orders and Discharge Instructions  [facility]  [address], [address]  [place] [address]  Telephone: [phone]     FAX [phone]    NAME:  Ramin Brown Jr.  YOB: 1945  MEDICAL RECORD NUMBER:  [id number]  DATE:  7/15/2024      Return Appointment:  [] Dressing Supply Provider:   [x] Home Healthcare: [facility]. Fax [phone]  [x] Return Appointment: 2 Week(s)  [] Nurse Visit:     [] Discharge from [facility]: [] Healed            [] Refer to Provider:    Follow-up Information:  [] Ordered Tests:   [x] Referrals: Dr. Mcclellan  [] Rx:   [x] Other: Lymph pump 3 times daily x 1 hour each  [x] Other: tissue cultures obtained BLE    Wound Cleansing:   Do not scrub or use excessive force.  Cleanse wound prior to applying a clean dressing with:  [] Normal Saline   [] Keep Wound Dry in Shower     [] Wound Cleanser   [x] Cleanse wound with Mild Soap & Water    [] Other:       Topical Treatments:  Do not apply lotions, creams, or ointments to wound bed unless directed.   [] Apply moisturizing lotion to skin surrounding the wound prior to dressing change.  [] Apply antifungal ointment to skin surrounding the wound prior to dressing change.  [] Apply thin film of moisture barrier ointment to skin immediately around wound.  [] Apply Betadine to skim immediately around wound      Dressings:           Wound Location R Leg   [x] Apply Primary Dressing:       [] MediHoney Gel [] MediHoney Alginate  [x] Calcium Alginate with Silver   [] Calcium Alginate without silver   [] Collagen with silver [] Collagen without Silver    [] Santyl with moist saline gauze     [] Hydrofera Blue (cut to size and moistened with normal saline)  [] Hydrofera Blue Ready (cut to size)      [] Normal Saline wet to dry  [] Betadine wet to dry    [] Hydrogel  [] Mepitel     [] Bactroban/Mupirocin   [] Iodoform Packing Strip [] Plain Packing Strip   [] Skin Sub:   [] Other:

## 2024-07-15 NOTE — DISCHARGE INSTRUCTIONS
Wound Clinic Physician Orders and Discharge Instructions  [facility]  [address], [address]  [place] [address]  Telephone: [phone]     FAX [phone]    NAME:  Ramin Brown Jr.  YOB: 1945  MEDICAL RECORD NUMBER:  [id number]  DATE:  7/15/2024      Return Appointment:  [] Dressing Supply Provider:   [x] Home Healthcare: [facility]. Fax [phone]  [x] Return Appointment: 2 Week(s)  [] Nurse Visit:     [] Discharge from [facility]: [] Healed            [] Refer to Provider:    Follow-up Information:  [] Ordered Tests:   [x] Referrals: Dr. Mcclellan  [] Rx:   [x] Other: Lymph pump 3 times daily x 1 hour each  [x] Other: tissue cultures obtained BLE    Wound Cleansing:   Do not scrub or use excessive force.  Cleanse wound prior to applying a clean dressing with:  [] Normal Saline   [] Keep Wound Dry in Shower     [] Wound Cleanser   [x] Cleanse wound with Mild Soap & Water    [] Other:       Topical Treatments:  Do not apply lotions, creams, or ointments to wound bed unless directed.   [] Apply moisturizing lotion to skin surrounding the wound prior to dressing change.  [] Apply antifungal ointment to skin surrounding the wound prior to dressing change.  [] Apply thin film of moisture barrier ointment to skin immediately around wound.  [] Apply Betadine to skim immediately around wound      Dressings:           Wound Location R Leg   [x] Apply Primary Dressing:       [] MediHoney Gel [] MediHoney Alginate  [x] Calcium Alginate with Silver   [] Calcium Alginate without silver   [] Collagen with silver [] Collagen without Silver    [] Santyl with moist saline gauze     [] Hydrofera Blue (cut to size and moistened with normal saline)  [] Hydrofera Blue Ready (cut to size)      [] Normal Saline wet to dry  [] Betadine wet to dry    [] Hydrogel  [] Mepitel     [] Bactroban/Mupirocin   [] Iodoform Packing Strip [] Plain Packing Strip   [] Skin Sub:   [] Other:

## 2024-07-16 LAB
BACTERIA SPEC CULT: NORMAL
BACTERIA SPEC CULT: NORMAL
GRAM STN SPEC: NORMAL
Lab: NORMAL
Lab: NORMAL

## 2024-07-29 ENCOUNTER — HOSPITAL ENCOUNTER (OUTPATIENT)
Facility: HOSPITAL | Age: 79
Discharge: HOME | End: 2024-07-29
Attending: SPECIALIST | Admitting: SPECIALIST
Payer: MEDICARE

## 2024-07-29 VITALS
SYSTOLIC BLOOD PRESSURE: 148 MMHG | TEMPERATURE: 97.9 F | DIASTOLIC BLOOD PRESSURE: 84 MMHG | RESPIRATION RATE: 18 BRPM | HEART RATE: 83 BPM

## 2024-07-29 PROCEDURE — 29580 STRAPPING UNNA BOOT: CPT

## 2024-07-29 NOTE — WOUND CARE
Unna Boot Application   Below Knee    NAME:  Ramin Brown Jr.  YOB: 1945  MEDICAL RECORD NUMBER:  [id number]  DATE:  7/29/2024    Unna boot: Applied moisturizing agent to dry skin as needed.   Appied primary and secondary dressing as ordered.  Applied Unna roll from toes to knee overlapping each time.   Applied ace wrap or coban from toes to below the knee.   Secured with tape and/or metal clips covered with tape.   Instructed patient/caregiver to keep dressing dry and intact. DO NOT REMOVE DRESSING.   Instructed pt/family/caregiver to report excessive draining, loose bandage, wet dressing, severe pain or tingling in toes.  Applied Unna Boot dressing below the knee to right lower leg.  Applied Unna Boot dressing below the knee to left lower leg.    Unna Boot(s) were applied per  Guidelines.     Electronically signed by Jaimie Drummond RN on 7/29/2024 at 11:59 AM

## 2024-07-29 NOTE — DISCHARGE INSTRUCTIONS
Wound Clinic Physician Orders and Discharge Instructions  [facility]  [address], [address]  [place] [address]  Telephone: [phone]     FAX [phone]    NAME:  Ramin Brown Jr.  YOB: 1945  MEDICAL RECORD NUMBER:  [id number]  DATE:  7/29/2024      Return Appointment:  [] Dressing Supply Provider:   [x] Home Healthcare: [facility]. Fax [phone]  [x] Return Appointment: 2 Week(s)  [] Nurse Visit:     [] Discharge from [facility]: [] Healed            [] Refer to Provider:    Follow-up Information:  [] Ordered Tests:   [x] Referrals: Dr. Mcclellan  [] Rx:   [x] Other: Lymph pump 3 times daily x 1 hour each  [] Other:     Wound Cleansing:   Do not scrub or use excessive force.  Cleanse wound prior to applying a clean dressing with:  [] Normal Saline   [] Keep Wound Dry in Shower     [] Wound Cleanser   [x] Cleanse wound with Mild Soap & Water    [] Other:       Topical Treatments:  Do not apply lotions, creams, or ointments to wound bed unless directed.   [] Apply moisturizing lotion to skin surrounding the wound prior to dressing change.  [] Apply antifungal ointment to skin surrounding the wound prior to dressing change.  [] Apply thin film of moisture barrier ointment to skin immediately around wound.  [] Apply Betadine to skim immediately around wound      Dressings:           Wound Location R Leg   [x] Apply Primary Dressing:       [] MediHoney Gel [] MediHoney Alginate  [x] Calcium Alginate with Silver   [] Calcium Alginate without silver   [] Collagen with silver [] Collagen without Silver    [] Santyl with moist saline gauze     [] Hydrofera Blue (cut to size and moistened with normal saline)  [] Hydrofera Blue Ready (cut to size)      [] Normal Saline wet to dry  [] Betadine wet to dry    [] Hydrogel  [] Mepitel     [] Bactroban/Mupirocin   [] Iodoform Packing Strip [] Plain Packing Strip   [] Skin Sub:   [] Other: Drawtex    [x] Cover and Secure 
<---Click to Launch ICDx for Pre-Op and Post-Op

## 2024-07-29 NOTE — PROGRESS NOTES
Medications:  Continuous Infusions:   Scheduled Meds:   amlodipine  10 mg Oral Daily    aspirin  81 mg Oral Daily    calcitRIOL  0.5 mcg Oral Daily    cefTRIAXone (ROCEPHIN) IVPB  1 g Intravenous Q24H    epoetin batsheva (PROCRIT) injection  100 Units/kg Intravenous Every Mon, Wed, Fri    heparin (porcine)  5,000 Units Subcutaneous Q12H    insulin detemir  5 Units Subcutaneous BID    pantoprazole  40 mg Oral Daily    sevelamer carbonate  2,400 mg Oral TID WM     PRN Meds:sodium chloride 0.9%, dextrose 50%, dextrose 50%, glucagon (human recombinant), glucose, glucose, heparin (porcine), insulin aspart, midazolam (PF), oxycodone, polyethylene glycol     Objective:     Vital Signs (Most Recent):  Temp: 99.4 °F (37.4 °C) (07/27/17 0754)  Pulse: 89 (07/27/17 0754)  Resp: 18 (07/27/17 0754)  BP: (!) 121/59 (07/27/17 0754)  SpO2: (!) 94 % (07/27/17 0300) Vital Signs (24h Range):  Temp:  [98.6 °F (37 °C)-99.5 °F (37.5 °C)] 99.4 °F (37.4 °C)  Pulse:  [] 89  Resp:  [16-20] 18  SpO2:  [91 %-100 %] 94 %  BP: (105-147)/(52-77) 121/59          Physical Exam   Constitutional: She is oriented to person, place, and time. She appears well-developed and well-nourished.   HENT:   Head: Normocephalic and atraumatic.   Eyes: EOM are normal. Pupils are equal, round, and reactive to light.   Cardiovascular: Normal rate and regular rhythm.    Pulses:       Femoral pulses are 1+ on the right side, and 1+ on the left side.  Left DP monophasic; no PT  Right DP biphasic; monophasic PT  Thrill in L femoral AVG   Pulmonary/Chest: Effort normal. No respiratory distress.   Abdominal: She exhibits no distension. There is no tenderness.   Musculoskeletal:   No evidence of infection of left femoral AVG;  No erythema, no fluctuance    Left plantar foot from metatarsal heads to heel with area of compromised ischemic tissue. Epidermis removed, incision to calcaneus, necrotic tissue extending to bone. nontender, no evidence of ascending  infection, no crepitus   Neurological: She is alert and oriented to person, place, and time.   Skin: Skin is warm and dry.   Psychiatric: She has a normal mood and affect.     Significant Labs:  CBC:   Recent Labs  Lab 07/27/17 0433   WBC 17.04*   RBC 2.67*   HGB 7.4*   HCT 23.4*   *   MCV 88   MCH 27.7   MCHC 31.6*     CMP:   Recent Labs  Lab 07/27/17 0433   GLU 75   CALCIUM 8.4*   *   K 5.4*   CO2 19*      BUN 40*   CREATININE 6.5*     Coagulation: No results for input(s): LABPROT, INR, APTT in the last 48 hours.    Significant Diagnostics:  Microbiology Results (last 7 days)     Procedure Component Value Units Date/Time    Culture, Anaerobe [[id number]] Collected:  07/26/17 1414    Order Status:  Completed Specimen:  Wound from Foot, Left Updated:  07/27/17 0742     Anaerobic Culture Culture in progress    Narrative:       1.  Left heel    Gram stain [[id number]] Collected:  07/26/17 1414    Order Status:  Completed Specimen:  Wound from Foot, Left Updated:  07/26/17 1809     Gram Stain Result Rare WBC's      Few Gram negative rods      Rare Gram positive cocci    Narrative:       1.  Left heel    Blood culture [[id number]] Collected:  07/26/17 0800    Order Status:  Completed Specimen:  Blood Updated:  07/26/17 1715     Blood Culture, Routine No Growth to date    Blood culture [[id number]] Collected:  07/26/17 0800    Order Status:  Completed Specimen:  Blood Updated:  07/26/17 1715     Blood Culture, Routine No Growth to date    AFB Culture & Smear [[id number]] Collected:  07/26/17 1414    Order Status:  Sent Specimen:  Wound from Foot, Left Updated:  07/26/17 1540    Fungus culture [[id number]] Collected:  07/26/17 1414    Order Status:  Sent Specimen:  Wound from Foot, Left Updated:  07/26/17 1538    Aerobic culture [[id number]] Collected:  07/26/17 1414    Order Status:  Sent Specimen:  Wound from Foot, Left Updated:  07/26/17 1537    Aerobic culture [[id number]] Collected:  07/25/17 1000     Order Status:  Completed Specimen:  Wound from Foot, Left Updated:  07/26/17 1236     Aerobic Bacterial Culture --     ESCHERICHIA COLI  Moderate  Susceptibility pending       Aerobic Bacterial Culture --     PROTEUS MIRABILIS  Moderate  Susceptibility pending      Blood culture x two cultures. Draw prior to antibiotics. [[id number]] Collected:  07/23/17 1947    Order Status:  Completed Specimen:  Blood from Peripheral, Hand, Right Updated:  07/26/17 0954     Blood Culture, Routine Gram stain aer bottle: Gram positive cocci in clusters resembling Staph     Blood Culture, Routine Results called to and read back by:Andres Echeverria RN 07/25/2017  08:11     Blood Culture, Routine --     COAGULASE-NEGATIVE STAPHYLOCOCCUS SPECIES  Organism is a probable contaminant      Narrative:       Aerobic and anaerobic    Culture, Anaerobe [[id number]] Collected:  07/25/17 1000    Order Status:  Completed Specimen:  Wound from Foot, Left Updated:  07/26/17 0744     Anaerobic Culture Culture in progress    Blood culture x two cultures. Draw prior to antibiotics. [[id number]] Collected:  07/23/17 1947    Order Status:  Completed Specimen:  Blood from Peripheral, Wrist, Right Updated:  07/26/17 0613     Blood Culture, Routine Gram stain aer bottle: Gram positive rods      Blood Culture, Routine Results called to and read back by: Candy La RN  07/26/2017       Blood Culture, Routine 06:13    Narrative:       Aerobic and anaerobic           Wrap [] Other:    Limit contact of tape with skin.    [x] Change dressing: [] Daily    [] Every Other Day   [] Twice per week   [x] Three times per week   [] Once a week [] Do Not Change Dressing   [] Other:     Edema Control:  Apply: [] Compression Stocking:  mmHg  []Right Leg []Left Leg   [] Tubigrip []Right Leg Double Layer []Left Leg Double Layer      []Right Leg Single Layer []Left Leg Single Layer      [] Elevate leg(s) above the level of the heart when sitting.    [] Avoid prolonged standing in one place.     Compression:  Apply: [x] Compression Wrap to [x]RightLeg [x]Left Leg    []  Coflex [] Coflex Lite  [x] Unna with Calamine Lite     [x] Do not get leg(s) with wrap wet. If wrap becomes too tight, call the wound center and remove wrap from leg by unrolling each layer.   [x] Elevate leg(s) above the level of the heart when sitting.    [x] Avoid prolonged standing in one place.    Dietary:  [x] Diet as tolerated: [] Calorie Diabetic Diet: [] No Added Salt:  [x] Increase Protein: [] Other:     Activity:  [x] Activity as tolerated:    [] Patient has no activity restrictions      [] Strict Bedrest   [] Remain off Work      [] May return to full duty work                                     [] Return to work with restrictions     Physician:  [] Ramin Ramirez PA-C  [] Dr. Natalie Lang  [x] Dr. Fernando Peterson      Nurse Case Manger:  Alisson      Wound Care Center Information: Should you experience any significant changes in your wound(s) or have questions about your wound care, please contact the Wound Center at [phone]. Our hours are Monday - Friday 8am - 4:30pm, closed all major holidays. If you need help with your wound outside these hours and cannot wait until we are again available, contact your PCP or go to the hospital emergency room.     PLEASE NOTE: IF YOU ARE UNABLE TO OBTAIN WOUND SUPPLIES, CONTINUE TO USE THE SUPPLIES YOU HAVE AVAILABLE UNTIL YOU ARE ABLE TO REACH US. IT IS MOST IMPORTANT

## 2024-07-29 NOTE — WOUND CARE
Wound culture reviewed with Dr. Peterson. VORB received for Augmentin 875mg 1 PO BID x14 days 0 refills called in to [facility] pharmacy. Patient aware.

## 2024-08-12 ENCOUNTER — HOSPITAL ENCOUNTER (OUTPATIENT)
Facility: HOSPITAL | Age: 79
Discharge: HOME | End: 2024-08-12
Attending: SPECIALIST | Admitting: SPECIALIST
Payer: MEDICARE

## 2024-08-12 VITALS — HEART RATE: 81 BPM | TEMPERATURE: 98.4 F | DIASTOLIC BLOOD PRESSURE: 83 MMHG | SYSTOLIC BLOOD PRESSURE: 149 MMHG

## 2024-08-12 PROCEDURE — 29580 STRAPPING UNNA BOOT: CPT

## 2024-08-12 NOTE — WOUND CARE
Wound Clinic Physician Orders and Discharge Instructions  [facility]  [address], [address]  [place] [address]  Telephone: [phone]     FAX [phone]    NAME:  Ramin Brown Jr.  YOB: 1945  MEDICAL RECORD NUMBER:  [id number]  DATE:  8/12/2024      Return Appointment:  [] Dressing Supply Provider:   [x] Home Healthcare: [facility]. Fax [phone]  [x] Return Appointment: 2 Week(s)  [] Nurse Visit:     [] Discharge from [facility]: [] Healed            [] Refer to Provider:    Follow-up Information:  [] Ordered Tests:   [x] Referrals: Dr. Mcclellan  [] Rx:   [x] Other: Lymph pump 3 times daily x 1 hour each  [] Other:     Wound Cleansing:   Do not scrub or use excessive force.  Cleanse wound prior to applying a clean dressing with:  [] Normal Saline   [] Keep Wound Dry in Shower     [] Wound Cleanser   [x] Cleanse wound with Mild Soap & Water    [] Other:       Topical Treatments:  Do not apply lotions, creams, or ointments to wound bed unless directed.   [] Apply moisturizing lotion to skin surrounding the wound prior to dressing change.  [] Apply antifungal ointment to skin surrounding the wound prior to dressing change.  [] Apply thin film of moisture barrier ointment to skin immediately around wound.  [] Apply Betadine to skim immediately around wound      Dressings:           Wound Location R Leg   [x] Apply Primary Dressing:       [] MediHoney Gel [] MediHoney Alginate  [] Calcium Alginate with Silver   [] Calcium Alginate without silver   [] Collagen with silver [] Collagen without Silver    [] Santyl with moist saline gauze     [] Hydrofera Blue (cut to size and moistened with normal saline)  [x] Hydrofera Blue Ready (cut to size)      [] Normal Saline wet to dry  [] Betadine wet to dry    [] Hydrogel  [] Mepitel     [] Bactroban/Mupirocin   [] Iodoform Packing Strip [] Plain Packing Strip   [] Skin Sub:   [] Other: Drawtex    [x] Cover and Secure

## 2024-08-12 NOTE — DISCHARGE INSTRUCTIONS
Wound Clinic Physician Orders and Discharge Instructions  [facility]  [address], [address]  [place] [address]  Telephone: [phone]     FAX [phone]    NAME:  Ramin Brown Jr.  YOB: 1945  MEDICAL RECORD NUMBER:  [id number]  DATE:  8/12/2024      Return Appointment:  [] Dressing Supply Provider:   [x] Home Healthcare: [facility]. Fax [phone]  [x] Return Appointment: 2 Week(s)  [] Nurse Visit:     [] Discharge from [facility]: [] Healed            [] Refer to Provider:    Follow-up Information:  [] Ordered Tests:   [x] Referrals: Dr. Mcclellan  [] Rx:   [x] Other: Lymph pump 3 times daily x 1 hour each  [] Other:     Wound Cleansing:   Do not scrub or use excessive force.  Cleanse wound prior to applying a clean dressing with:  [] Normal Saline   [] Keep Wound Dry in Shower     [] Wound Cleanser   [x] Cleanse wound with Mild Soap & Water    [] Other:       Topical Treatments:  Do not apply lotions, creams, or ointments to wound bed unless directed.   [] Apply moisturizing lotion to skin surrounding the wound prior to dressing change.  [] Apply antifungal ointment to skin surrounding the wound prior to dressing change.  [] Apply thin film of moisture barrier ointment to skin immediately around wound.  [] Apply Betadine to skim immediately around wound      Dressings:           Wound Location R Leg   [x] Apply Primary Dressing:       [] MediHoney Gel [] MediHoney Alginate  [] Calcium Alginate with Silver   [] Calcium Alginate without silver   [] Collagen with silver [] Collagen without Silver    [] Santyl with moist saline gauze     [] Hydrofera Blue (cut to size and moistened with normal saline)  [x] Hydrofera Blue Ready (cut to size)      [] Normal Saline wet to dry  [] Betadine wet to dry    [] Hydrogel  [] Mepitel     [] Bactroban/Mupirocin   [] Iodoform Packing Strip [] Plain Packing Strip   [] Skin Sub:   [] Other: Drawtex    [x] Cover and Secure

## 2024-08-12 NOTE — PROGRESS NOTES
[facility]   Wound Care and Hyper[facility] Oxygen Therapy Center   Medical Staff Note     Ramin Brown Jr.  MEDICAL RECORD NUMBER:  [id number]  AGE: 79 y.o.   GENDER: male  : 1945  EPISODE DATE:  2024      Chief Complaint:   Chief Complaint   Patient presents with    Wound Check     Bilateral legs       History of Present Illness:   Ramin Brown Jr. is a 79 y.o. male who presents today for wound/ulcer evaluationof bilateral lower extremity lymphedema with bilateral leg ulcers.  Wound culture showed gram-negative rods eventhough the final culture was suggestive of skin patty and hence Augmentin started for 2-week.  He is complaining of right wrist and dorsal hand swelling with some pain.  But no open wound.     History of Wound Context: Per original history and physical on this patient. Changes in history since last evaluation: none     Wound: Bilateral lower extremity lymphedema with ulcers.    Past Medical History:       Diagnosis Date    Dyslipidemia     Dyslipidemia     HTN (hypertension)     HTN (hypertension)        Past Surgical History:   Past Surgical History:   Procedure Laterality Date    CHOLECYSTECTOMY         Allergy:No Known Allergies    Social History:   Social History     Tobacco Use    Smoking status: Never    Smokeless tobacco: Never   Vaping Use    Vaping status: Never Used   Substance Use Topics    Alcohol use: Never    Drug use: Never       Family History:   Family History   Problem Relation Age of Onset    Diabetes Mother     Diabetes Brother     Diabetes Son        Current Medications:  Current Outpatient Medications on File Prior to Encounter   Medication Sig Dispense Refill    Omega-3 Fatty Acids (FISH OIL OMEGA-3 PO) Take by mouth      aspirin 325 MG tablet Take 1 tablet by mouth daily      furosemide (LASIX) 40 MG tablet Take 1 tablet by mouth 2 times daily      vitamin E 400 UNIT capsule Take 1 capsule by mouth daily       No current

## 2024-08-12 NOTE — ICUWATCH
Unna Boot Application   Below Knee    NAME:  Ramin Brown Jr.  YOB: 1945  MEDICAL RECORD NUMBER:  [id number]  DATE:  8/12/2024    Unna boot: Applied moisturizing agent to dry skin as needed.   Appied primary and secondary dressing as ordered.  Applied Unna roll from toes to knee overlapping each time.   Applied ace wrap or coban from toes to below the knee.   Secured with tape and/or metal clips covered with tape.   Instructed patient/caregiver to keep dressing dry and intact. DO NOT REMOVE DRESSING.   Instructed pt/family/caregiver to report excessive draining, loose bandage, wet dressing, severe pain or tingling in toes.  Applied Unna Boot dressing below the knee to right lower leg.  Applied Unna Boot dressing below the knee to left lower leg.    Unna Boot(s) were applied per  Guidelines.     Electronically signed by Jaimie Drummond RN on 8/12/2024 at 11:59 AM

## 2024-08-26 ENCOUNTER — HOSPITAL ENCOUNTER (OUTPATIENT)
Facility: HOSPITAL | Age: 79
Discharge: HOME | End: 2024-08-26
Attending: SPECIALIST | Admitting: SPECIALIST
Payer: MEDICARE

## 2024-08-26 VITALS
RESPIRATION RATE: 18 BRPM | DIASTOLIC BLOOD PRESSURE: 75 MMHG | HEART RATE: 42 BPM | TEMPERATURE: 98.1 F | SYSTOLIC BLOOD PRESSURE: 130 MMHG

## 2024-08-26 DIAGNOSIS — L97.812 NON-PRESSURE CHRONIC ULCER OF OTHER PART OF RIGHT LOWER LEG WITH FAT LAYER EXPOSED (HCC): ICD-10-CM

## 2024-08-26 DIAGNOSIS — L97.822 NON-PRESSURE CHRONIC ULCER OF OTHER PART OF LEFT LOWER LEG WITH FAT LAYER EXPOSED (HCC): Primary | ICD-10-CM

## 2024-08-26 PROCEDURE — 29581 APPL MULTLAYER CMPRN SYS LEG: CPT

## 2024-08-26 NOTE — DISCHARGE INSTRUCTIONS
Wound Clinic Physician Orders and Discharge Instructions  [facility]  [address], [address]  [place] [address]  Telephone: [phone]     FAX [phone]    NAME:  Ramin Brown Jr.  YOB: 1945  MEDICAL RECORD NUMBER:  [id number]  DATE:  8/26/2024      Return Appointment:  [] Dressing Supply Provider:   [x] Home Healthcare: [facility]. Fax [phone]  [x] Return Appointment: 3 Week(s)  [] Nurse Visit:     [] Discharge from [facility]: [] Healed            [] Refer to Provider:    Follow-up Information:  [] Ordered Tests:   [x] Referrals: Dr. Mcclellan  [] Rx:   [x] Other: Lymph pump 3 times daily x 1 hour each  [] Other:     Wound Cleansing:   Do not scrub or use excessive force.  Cleanse wound prior to applying a clean dressing with:  [] Normal Saline   [] Keep Wound Dry in Shower     [] Wound Cleanser   [x] Cleanse wound with Mild Soap & Water    [] Other:       Topical Treatments:  Do not apply lotions, creams, or ointments to wound bed unless directed.   [] Apply moisturizing lotion to skin surrounding the wound prior to dressing change.  [] Apply antifungal ointment to skin surrounding the wound prior to dressing change.  [] Apply thin film of moisture barrier ointment to skin immediately around wound.  [] Apply Betadine to skim immediately around wound      Dressings:           Wound Location R Leg   [x] Apply Primary Dressing:       [] MediHoney Gel [] MediHoney Alginate  [] Calcium Alginate with Silver   [] Calcium Alginate without silver   [] Collagen with silver [] Collagen without Silver    [] Santyl with moist saline gauze     [] Hydrofera Blue (cut to size and moistened with normal saline)  [x] Hydrofera Blue Ready (cut to size)      [] Normal Saline wet to dry  [] Betadine wet to dry    [] Hydrogel  [] Mepitel     [] Bactroban/Mupirocin   [] Iodoform Packing Strip [] Plain Packing Strip   [] Skin Sub:   [] Other: Drawtex    [x] Cover and Secure with:  Diabetic Diet: [] No Added Salt:  [x] Increase Protein: [] Other:     Activity:  [x] Activity as tolerated:    [] Patient has no activity restrictions      [] Strict Bedrest   [] Remain off Work      [] May return to full duty work                                     [] Return to work with restrictions     Physician:  [] Ramin Ramirez PA-C  [] Dr. Natalie Lang  [x] Dr. Fernando Peterson      Nurse Case Manger:  Alisson      Wound Care Center Information: Should you experience any significant changes in your wound(s) or have questions about your wound care, please contact the Wound Center at [phone]. Our hours are Monday - Friday 8am - 4:30pm, closed all major holidays. If you need help with your wound outside these hours and cannot wait until we are again available, contact your PCP or go to the hospital emergency room.     PLEASE NOTE: IF YOU ARE UNABLE TO OBTAIN WOUND SUPPLIES, CONTINUE TO USE THE SUPPLIES YOU HAVE AVAILABLE UNTIL YOU ARE ABLE TO REACH US. IT IS MOST IMPORTANT TO KEEP THE WOUND COVERED AT ALL TIMES.

## 2024-08-26 NOTE — WOUND CARE
Wound Clinic Physician Orders and Discharge Instructions  [facility]  [address], [address]  [place] [address]  Telephone: [phone]     FAX [phone]    NAME:  Ramin Brown Jr.  YOB: 1945  MEDICAL RECORD NUMBER:  [id number]  DATE:  8/26/2024      Return Appointment:  [] Dressing Supply Provider:   [x] Home Healthcare: [facility]. Fax [phone]  [x] Return Appointment: 3 Week(s)  [] Nurse Visit:     [] Discharge from [facility]: [] Healed            [] Refer to Provider:    Follow-up Information:  [] Ordered Tests:   [x] Referrals: Dr. Mcclellan  [] Rx:   [x] Other: Lymph pump 3 times daily x 1 hour each  [] Other:     Wound Cleansing:   Do not scrub or use excessive force.  Cleanse wound prior to applying a clean dressing with:  [] Normal Saline   [] Keep Wound Dry in Shower     [] Wound Cleanser   [x] Cleanse wound with Mild Soap & Water    [] Other:       Topical Treatments:  Do not apply lotions, creams, or ointments to wound bed unless directed.   [] Apply moisturizing lotion to skin surrounding the wound prior to dressing change.  [] Apply antifungal ointment to skin surrounding the wound prior to dressing change.  [] Apply thin film of moisture barrier ointment to skin immediately around wound.  [] Apply Betadine to skim immediately around wound      Dressings:           Wound Location R Leg   [x] Apply Primary Dressing:       [] MediHoney Gel [] MediHoney Alginate  [] Calcium Alginate with Silver   [] Calcium Alginate without silver   [] Collagen with silver [] Collagen without Silver    [] Santyl with moist saline gauze     [] Hydrofera Blue (cut to size and moistened with normal saline)  [x] Hydrofera Blue Ready (cut to size)      [] Normal Saline wet to dry  [] Betadine wet to dry    [] Hydrogel  [] Mepitel     [] Bactroban/Mupirocin   [] Iodoform Packing Strip [] Plain Packing Strip   [] Skin Sub:   [] Other: Drawtex    [x] Cover and Secure  with:     [x] Gauze [] Demetrius [] Kerlix   [] Foam  [x] Super Absorbant    [x] ABD     [] Ace Wrap [] Other:    Limit contact of tape with skin.    [x] Change dressing: [] Daily    [] Every Other Day   [x] Twice per week   [] Three times per week   [] Once a week [] Do Not Change Dressing   [] Other:    Dressings:           Wound Location L Leg   [x] Apply Primary Dressing:       [] MediHoney Gel [] MediHoney Alginate  [] Calcium Alginate with Silver  [] Calcium Alginate without silver   [] Collagen with silver [] Collagen without Silver    [] Santyl with moist saline gauze     [] Hydrofera Blue (cut to size and moistened with normal saline)  [x] Hydrofera Blue Ready (cut to size)      [] Normal Saline wet to dry  [] Betadine wet to dry    [] Hydrogel  [] Mepitel     [] Bactroban/Mupirocin   [] Iodoform Packing Strip [] Plain Packing Strip   [] Skin Sub:   [] Other: Drawtex     [x] Cover and Secure with:     [x] Gauze [] Demetrius [] Kerlix   [] Foam  [x] Super Absorbant    [x] ABD     [] Ace Wrap [] Other:    Limit contact of tape with skin.    [x] Change dressing: [] Daily    [] Every Other Day   [x] Twice per week   [] Three times per week   [] Once a week [] Do Not Change Dressing   [] Other:     Edema Control:  Apply: [] Compression Stocking:  mmHg  []Right Leg []Left Leg   [] Tubigrip []Right Leg Double Layer []Left Leg Double Layer      []Right Leg Single Layer []Left Leg Single Layer      [] Elevate leg(s) above the level of the heart when sitting.    [] Avoid prolonged standing in one place.     Compression:  Apply: [x] Compression Wrap to [x]RightLeg [x]Left Leg    [x]  Coflex [] Coflex Lite  [] Unna with Calamine Lite      [x] Do not get leg(s) with wrap wet. If wrap becomes too tight, call the wound center and remove wrap from leg by unrolling each layer.   [x] Elevate leg(s) above the level of the heart when sitting.    [x] Avoid prolonged standing in one place.    Dietary:  [x] Diet as tolerated: [] Calorie

## 2024-08-26 NOTE — PROGRESS NOTES
Thickness Description not for Pressure Injury Full thickness 08/26/24 1107   Number of days: 476            Laboratory Values: No results found for this or any previous visit (from the past 24 hour(s)).      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    Elevate legs and avoid prolonged standing.     Continue using lymphedema pump 3 times per day.     Recommended that the patient revisit Dr. Rios for continued recommendations.     Continue local wound treatment with Hydrofera Blue ready and Profore compression wrapping.        Discharge:  Written patient dismissal instructions given to patient and signed by patient or POA.         Discharge Instructions         Wound Clinic Physician Orders and Discharge Instructions  [facility]  [address] [place] [address] [place] , [address] [place] [address]  Telephone: [phone]     FAX [phone]    NAME:  Ramin Brown Jr.  YOB: 1945  MEDICAL RECORD NUMBER:  [id number]  DATE:  8/26/2024      Return Appointment:  [] Dressing Supply Provider:   [x] Home Healthcare: [facility]. Fax [phone]  [x] Return Appointment: 3 Week(s)  [] Nurse Visit:     [] Discharge from [facility]: [] Healed            [] Refer to Provider:    Follow-up Information:  [] Ordered Tests:   [x] Referrals: Dr. Mcclellan  [] Rx:   [x] Other: Lymph pump 3 times daily x 1 hour each  [] Other:     Wound Cleansing:   Do not scrub or use excessive force.  Cleanse wound prior to applying a clean dressing with:  [] Normal Saline   [] Keep Wound Dry in  Shower     [] Wound Cleanser   [x] Cleanse wound with Mild Soap & Water    [] Other:       Topical Treatments:  Do not apply lotions, creams, or ointments to wound bed unless directed.   [] Apply moisturizing lotion to skin surrounding the wound prior to dressing change.  [] Apply antifungal ointment to skin surrounding the wound prior to dressing change.  [] Apply thin film of moisture barrier ointment to skin immediately around wound.  [] Apply Betadine to skim immediately around wound      Dressings:           Wound Location R Leg   [x] Apply Primary Dressing:       [] MediHoney Gel [] MediHoney Alginate  [] Calcium Alginate with Silver   [] Calcium Alginate without silver   [] Collagen with silver [] Collagen without Silver    [] Santyl with moist saline gauze     [] Hydrofera Blue (cut to size and moistened with normal saline)  [x] Hydrofera Blue Ready (cut to size)      [] Normal Saline wet to dry  [] Betadine wet to dry    [] Hydrogel  [] Mepitel     [] Bactroban/Mupirocin   [] Iodoform Packing Strip [] Plain Packing Strip   [] Skin Sub:   [] Other: Drawtex    [x] Cover and Secure with:     [x] Gauze [] Demetrius [] Kerlix   [] Foam  [x] Super Absorbant    [x] ABD     [] Ace Wrap [] Other:    Limit contact of tape with skin.    [x] Change dressing: [] Daily    [] Every Other Day   [x] Twice per week   [] Three times per week   [] Once a week [] Do Not Change Dressing   [] Other:    Dressings:           Wound Location L Leg   [x] Apply Primary Dressing:       [] MediHoney Gel [] MediHoney Alginate  [] Calcium Alginate with Silver  [] Calcium Alginate without silver   [] Collagen with silver [] Collagen without Silver    [] Santyl with moist saline gauze     [] Hydrofera Blue (cut to size and moistened with normal saline)  [x] Hydrofera Blue Ready (cut to size)      [] Normal Saline wet to dry  [] Betadine wet to dry    [] Hydrogel  [] Mepitel     [] Bactroban/Mupirocin   [] Iodoform Packing Strip [] Plain Packing

## 2024-08-26 NOTE — WOUND CARE
Multilayer Compression Wrap   (Not Unna) Below the Knee    NAME:  Ramin Brown Jr.  YOB: 1945  MEDICAL RECORD NUMBER:  [id number]  DATE:  8/26/2024    Multilayer compression wrap: Removed old Multilayer wrap if indicated and wash leg with mild soap/water.  Applied moisturizing agent to dry skin as needed.   Applied primary and secondary dressing as ordered.  Applied multilayered dressing below the knee to right lower leg.  Applied multilayered dressing below the knee to left lower leg.  Instructed patient/caregiver not to remove dressing and to keep it clean and dry.   Instructed patient/caregiver on complications to report to provider, such as pain, numbness in toes, heavy drainage, and slippage of dressing.  Instructed patient on purpose of compression dressing and on activity and exercise recommendations.      Electronically signed by Jaimie Drummond RN on 8/26/2024 at 11:48 AM

## 2024-08-29 NOTE — WOUND CARE
Received call from Liane with [facility] stating patient may be discharged from home health services due to not being homebound and not being compliant with his lymphedema pump and BLE elevation. She stated patient can go to the lymphedema clinic, but he chooses not to due to a copay. She stated they will let me know what they decide regarding continuing care or discharging.

## 2024-09-16 ENCOUNTER — HOSPITAL ENCOUNTER (OUTPATIENT)
Facility: HOSPITAL | Age: 79
Discharge: HOME | End: 2024-09-16
Attending: SPECIALIST | Admitting: SPECIALIST
Payer: MEDICARE

## 2024-09-16 VITALS
RESPIRATION RATE: 18 BRPM | TEMPERATURE: 97.7 F | HEART RATE: 83 BPM | DIASTOLIC BLOOD PRESSURE: 81 MMHG | SYSTOLIC BLOOD PRESSURE: 144 MMHG

## 2024-09-16 DIAGNOSIS — L97.812 NON-PRESSURE CHRONIC ULCER OF OTHER PART OF RIGHT LOWER LEG WITH FAT LAYER EXPOSED (HCC): ICD-10-CM

## 2024-09-16 DIAGNOSIS — L97.822 NON-PRESSURE CHRONIC ULCER OF OTHER PART OF LEFT LOWER LEG WITH FAT LAYER EXPOSED (HCC): Primary | ICD-10-CM

## 2024-09-16 PROCEDURE — 87077 CULTURE AEROBIC IDENTIFY: CPT

## 2024-09-16 PROCEDURE — 87070 CULTURE OTHR SPECIMN AEROBIC: CPT

## 2024-09-16 PROCEDURE — 87205 SMEAR GRAM STAIN: CPT

## 2024-09-16 PROCEDURE — 29581 APPL MULTLAYER CMPRN SYS LEG: CPT

## 2024-09-16 PROCEDURE — 87186 SC STD MICRODIL/AGAR DIL: CPT

## 2024-09-20 LAB
BACTERIA SPEC CULT: ABNORMAL
GRAM STN SPEC: ABNORMAL
Lab: ABNORMAL
Lab: ABNORMAL

## 2024-09-23 ENCOUNTER — HOSPITAL ENCOUNTER (OUTPATIENT)
Facility: HOSPITAL | Age: 79
Discharge: HOME | End: 2024-09-23
Attending: SPECIALIST | Admitting: SPECIALIST
Payer: MEDICARE

## 2024-09-23 VITALS
RESPIRATION RATE: 18 BRPM | DIASTOLIC BLOOD PRESSURE: 86 MMHG | TEMPERATURE: 97.2 F | HEART RATE: 76 BPM | SYSTOLIC BLOOD PRESSURE: 141 MMHG

## 2024-09-23 DIAGNOSIS — L97.812 NON-PRESSURE CHRONIC ULCER OF OTHER PART OF RIGHT LOWER LEG WITH FAT LAYER EXPOSED (HCC): ICD-10-CM

## 2024-09-23 DIAGNOSIS — L97.822 NON-PRESSURE CHRONIC ULCER OF OTHER PART OF LEFT LOWER LEG WITH FAT LAYER EXPOSED (HCC): Primary | ICD-10-CM

## 2024-09-23 PROCEDURE — 29581 APPL MULTLAYER CMPRN SYS LEG: CPT

## 2024-09-23 RX ORDER — CIPROFLOXACIN 500 MG/1
500 TABLET, FILM COATED ORAL 2 TIMES DAILY
Qty: 28 TABLET | Refills: 0 | Status: SHIPPED | OUTPATIENT
Start: 2024-09-23 | End: 2024-10-07

## 2024-10-07 ENCOUNTER — HOSPITAL ENCOUNTER (OUTPATIENT)
Facility: HOSPITAL | Age: 79
Discharge: HOME | End: 2024-10-07
Attending: SPECIALIST | Admitting: SPECIALIST
Payer: MEDICARE

## 2024-10-07 VITALS
DIASTOLIC BLOOD PRESSURE: 89 MMHG | TEMPERATURE: 97.2 F | RESPIRATION RATE: 18 BRPM | SYSTOLIC BLOOD PRESSURE: 149 MMHG | HEART RATE: 87 BPM

## 2024-10-07 DIAGNOSIS — L97.822 NON-PRESSURE CHRONIC ULCER OF OTHER PART OF LEFT LOWER LEG WITH FAT LAYER EXPOSED (HCC): ICD-10-CM

## 2024-10-07 DIAGNOSIS — L97.812 NON-PRESSURE CHRONIC ULCER OF OTHER PART OF RIGHT LOWER LEG WITH FAT LAYER EXPOSED (HCC): Primary | ICD-10-CM

## 2024-10-07 PROCEDURE — 29581 APPL MULTLAYER CMPRN SYS LEG: CPT

## 2024-10-07 NOTE — DISCHARGE INSTRUCTIONS
Wound Clinic Physician Orders and Discharge Instructions  [facility]  [address], [address]  [place] [address]  Telephone: [phone]     FAX [phone]    NAME:  Ramin Brown Jr.  YOB: 1945  MEDICAL RECORD NUMBER:  [id number]  DATE:  10/7/2024      Return Appointment:  [] Dressing Supply Provider:   [x] Home Healthcare: [facility]. Fax [phone]  [x] Return Appointment: 2 Week(s)  [] Nurse Visit:     [] Discharge from [facility]: [] Healed            [] Refer to Provider:    Follow-up Information:  [] Ordered Tests:   [] Referrals:   [] Rx:   [x] Other: Lymph pump 2-3 times daily x 1 hour each  [] Other:     Wound Cleansing:   Do not scrub or use excessive force.  Cleanse wound prior to applying a clean dressing with:  [] Normal Saline   [x] Keep Wound Dry in Shower     [] Wound Cleanser   [x] Cleanse wound with Mild Soap & Water on dressing change days  [] Other:       Topical Treatments:  Do not apply lotions, creams, or ointments to wound bed unless directed.   [] Apply moisturizing lotion to skin surrounding the wound prior to dressing change.  [] Apply antifungal ointment to skin surrounding the wound prior to dressing change.  [] Apply thin film of moisture barrier ointment to skin immediately around wound.  [] Apply Betadine to skim immediately around wound      Dressings:           Wound Location R Leg   [x] Apply Primary Dressing:       [] MediHoney Gel [] MediHoney Alginate  [] Calcium Alginate with Silver   [] Calcium Alginate without silver   [x] Collagen with silver 1st [] Collagen without Silver    [] Santyl with moist saline gauze     [] Hydrofera Blue (cut to size and moistened with normal saline)  [x] Hydrofera Blue Ready 2nd (cut to size)      [] Normal Saline wet to dry  [] Betadine wet to dry    [] Hydrogel  [] Mepitel     [] Bactroban/Mupirocin   [] Iodoform Packing Strip [] Plain Packing Strip   [] Skin Sub:   [] Other:

## 2024-10-07 NOTE — PROGRESS NOTES
Demetrius [] Kerlix   [] Foam  [x] Super Absorbant    [x] ABD     [] Ace Wrap [] Other:    Limit contact of tape with skin.    [x] Change dressing: [] Daily    [] Every Other Day   [x] Twice per week   [] Three times per week   [] Once a week [] Do Not Change Dressing   [] Other:    Dressings:           Wound Location L Leg   [x] Apply Primary Dressing:       [] MediHoney Gel [] MediHoney Alginate  [] Calcium Alginate with Silver  [] Calcium Alginate without silver   [x] Collagen with silver 1st [] Collagen without Silver    [] Santyl with moist saline gauze     [] Hydrofera Blue (cut to size and moistened with normal saline)  [x] Hydrofera Blue Ready 2nd (cut to size)      [] Normal Saline wet to dry  [] Betadine wet to dry    [] Hydrogel  [] Mepitel     [] Bactroban/Mupirocin   [] Iodoform Packing Strip [] Plain Packing Strip   [] Skin Sub:   [] Other: Drawtex     [x] Cover and Secure with:     [x] Gauze [] Demetrius [] Kerlix   [] Foam  [x] Super Absorbant    [x] ABD     [] Ace Wrap [] Other:    Limit contact of tape with skin.    [x] Change dressing: [] Daily    [] Every Other Day   [x] Twice per week   [] Three times per week   [] Once a week [] Do Not Change Dressing   [] Other:     Edema Control:  Apply: [] Compression Stocking:  mmHg  []Right Leg []Left Leg   [] Tubigrip []Right Leg Double Layer []Left Leg Double Layer      []Right Leg Single Layer []Left Leg Single Layer      [] Elevate leg(s) above the level of the heart when sitting.    [] Avoid prolonged standing in one place.     Compression:  Apply: [x] Compression Wrap to [x]RightLeg [x]Left Leg    [x]  Coflex [] Coflex Lite  [] Unna with Calamine Lite      [x] Do not get leg(s) with wrap wet. If wrap becomes too tight, call the wound center and remove wrap from leg by unrolling each layer.   [x] Elevate leg(s) above the level of the heart when sitting.    [x] Avoid prolonged standing in one place.    Dietary:  [x] Diet as tolerated: [] Calorie Diabetic

## 2024-10-07 NOTE — WOUND CARE
Multilayer Compression Wrap   (Not Unna) Below the Knee    NAME:  Ramin Brown Jr.  YOB: 1945  MEDICAL RECORD NUMBER:  [id number]  DATE:  10/7/2024    Multilayer compression wrap: Removed old Multilayer wrap if indicated and wash leg with mild soap/water.  Applied moisturizing agent to dry skin as needed.   Applied primary and secondary dressing as ordered.  Applied multilayered dressing below the knee to right lower leg.  Applied multilayered dressing below the knee to left lower leg.  Instructed patient/caregiver not to remove dressing and to keep it clean and dry.   Instructed patient/caregiver on complications to report to provider, such as pain, numbness in toes, heavy drainage, and slippage of dressing.  Instructed patient on purpose of compression dressing and on activity and exercise recommendations.      Electronically signed by Jaimie Drummond RN on 10/7/2024 at 11:56 AM

## 2024-10-07 NOTE — WOUND CARE
Wound Clinic Physician Orders and Discharge Instructions  [facility]  [address], [address]  [place] [address]  Telephone: [phone]     FAX [phone]    NAME:  Ramin Brown Jr.  YOB: 1945  MEDICAL RECORD NUMBER:  [id number]  DATE:  10/7/2024      Return Appointment:  [] Dressing Supply Provider:   [x] Home Healthcare: [facility]. Fax [phone]  [x] Return Appointment: 2 Week(s)  [] Nurse Visit:     [] Discharge from [facility]: [] Healed            [] Refer to Provider:    Follow-up Information:  [] Ordered Tests:   [] Referrals:   [] Rx:   [x] Other: Lymph pump 2-3 times daily x 1 hour each  [] Other:     Wound Cleansing:   Do not scrub or use excessive force.  Cleanse wound prior to applying a clean dressing with:  [] Normal Saline   [x] Keep Wound Dry in Shower     [] Wound Cleanser   [x] Cleanse wound with Mild Soap & Water on dressing change days  [] Other:       Topical Treatments:  Do not apply lotions, creams, or ointments to wound bed unless directed.   [] Apply moisturizing lotion to skin surrounding the wound prior to dressing change.  [] Apply antifungal ointment to skin surrounding the wound prior to dressing change.  [] Apply thin film of moisture barrier ointment to skin immediately around wound.  [] Apply Betadine to skim immediately around wound      Dressings:           Wound Location R Leg   [x] Apply Primary Dressing:       [] MediHoney Gel [] MediHoney Alginate  [] Calcium Alginate with Silver   [] Calcium Alginate without silver   [x] Collagen with silver 1st [] Collagen without Silver    [] Santyl with moist saline gauze     [] Hydrofera Blue (cut to size and moistened with normal saline)  [x] Hydrofera Blue Ready 2nd (cut to size)      [] Normal Saline wet to dry  [] Betadine wet to dry    [] Hydrogel  [] Mepitel     [] Bactroban/Mupirocin   [] Iodoform Packing Strip [] Plain Packing Strip   [] Skin Sub:   [] Other:

## 2024-10-21 ENCOUNTER — HOSPITAL ENCOUNTER (OUTPATIENT)
Facility: HOSPITAL | Age: 79
Discharge: HOME | End: 2024-10-21
Attending: SPECIALIST | Admitting: SPECIALIST
Payer: MEDICARE

## 2024-10-21 VITALS
SYSTOLIC BLOOD PRESSURE: 136 MMHG | RESPIRATION RATE: 18 BRPM | TEMPERATURE: 97.2 F | HEART RATE: 76 BPM | DIASTOLIC BLOOD PRESSURE: 80 MMHG

## 2024-10-21 DIAGNOSIS — L97.822 NON-PRESSURE CHRONIC ULCER OF OTHER PART OF LEFT LOWER LEG WITH FAT LAYER EXPOSED (HCC): ICD-10-CM

## 2024-10-21 DIAGNOSIS — L97.812 NON-PRESSURE CHRONIC ULCER OF OTHER PART OF RIGHT LOWER LEG WITH FAT LAYER EXPOSED (HCC): Primary | ICD-10-CM

## 2024-10-21 PROCEDURE — 29581 APPL MULTLAYER CMPRN SYS LEG: CPT

## 2024-10-21 NOTE — WOUND CARE
Wound Clinic Physician Orders and Discharge Instructions  [facility]  [address], [address]  [place] [address]  Telephone: [phone]     FAX [phone]    NAME:  Ramin Brown Jr.  YOB: 1945  MEDICAL RECORD NUMBER:  [id number]  DATE:  10/21/2024      Return Appointment:  [] Dressing Supply Provider:   [x] Home Healthcare: [facility]. Fax [phone]  [x] Return Appointment: 2 Week(s)  [] Nurse Visit:     [] Discharge from [facility]: [] Healed            [] Refer to Provider:    Follow-up Information:  [] Ordered Tests:   [] Referrals:   [] Rx:   [x] Other: Lymph pump 2-3 times daily x 1 hour each  [] Other:     Wound Cleansing:   Do not scrub or use excessive force.  Cleanse wound prior to applying a clean dressing with:  [] Normal Saline   [x] Keep Wound Dry in Shower     [] Wound Cleanser   [x] Cleanse wound with Mild Soap & Water on dressing change days  [] Other:       Topical Treatments:  Do not apply lotions, creams, or ointments to wound bed unless directed.   [] Apply moisturizing lotion to skin surrounding the wound prior to dressing change.  [] Apply antifungal ointment to skin surrounding the wound prior to dressing change.  [] Apply thin film of moisture barrier ointment to skin immediately around wound.  [] Apply Betadine to skim immediately around wound      Dressings:           Wound Location R Leg   [x] Apply Primary Dressing:       [] MediHoney Gel [] MediHoney Alginate  [] Calcium Alginate with Silver   [] Calcium Alginate without silver   [x] Collagen with silver 1st [] Collagen without Silver    [] Santyl with moist saline gauze     [] Hydrofera Blue (cut to size and moistened with normal saline)  [x] Hydrofera Blue Ready 2nd (cut to size)      [] Normal Saline wet to dry  [] Betadine wet to dry    [] Hydrogel  [] Mepitel     [] Bactroban/Mupirocin   [] Iodoform Packing Strip [] Plain Packing Strip   [] Skin Sub:   [] Other:

## 2024-10-21 NOTE — WOUND CARE
Multilayer Compression Wrap   (Not Unna) Below the Knee    NAME:  Ramin Brown Jr.  YOB: 1945  MEDICAL RECORD NUMBER:  [id number]  DATE:  10/21/2024    Multilayer compression wrap: Removed old Multilayer wrap if indicated and wash leg with mild soap/water.  Applied primary and secondary dressing as ordered.  Applied multilayered dressing below the knee to right lower leg.  Applied multilayered dressing below the knee to left lower leg.  Instructed patient/caregiver not to remove dressing and to keep it clean and dry.   Instructed patient/caregiver on complications to report to provider, such as pain, numbness in toes, heavy drainage, and slippage of dressing.  Instructed patient on purpose of compression dressing and on activity and exercise recommendations.      Electronically signed by Miranda Barry RN on 10/21/2024 at 11:54 AM

## 2024-10-21 NOTE — PROGRESS NOTES
sitting.    [x] Avoid prolonged standing in one place.    Dietary:  [x] Diet as tolerated: [] Calorie Diabetic Diet: [] No Added Salt:  [x] Increase Protein: [] Other:     Activity:  [x] Activity as tolerated:    [] Patient has no activity restrictions      [] Strict Bedrest   [] Remain off Work      [] May return to full duty work                                     [] Return to work with restrictions     Physician:  [] Rmain Ramirez PA-C  [] Dr. Natalie Lang  [x] Dr. Fernando Peterson      Nurse Case Manger:  Alisson      Wound Care Center Information: Should you experience any significant changes in your wound(s) or have questions about your wound care, please contact the Wound Center at [phone]. Our hours are Monday - Friday 8am - 4:30pm, closed all major holidays. If you need help with your wound outside these hours and cannot wait until we are again available, contact your PCP or go to the hospital emergency room.     PLEASE NOTE: IF YOU ARE UNABLE TO OBTAIN WOUND SUPPLIES, CONTINUE TO USE THE SUPPLIES YOU HAVE AVAILABLE UNTIL YOU ARE ABLE TO REACH US. IT IS MOST IMPORTANT TO KEEP THE WOUND COVERED AT ALL TIMES.           TIME: E/M Time spent with patient and/or patient care issues: [] 15-20 min  [] 21-30 min  [] 31-44 min  [] 45 min or more.   This is above the usual time needed to address patient's chief complaint today: [] Yes  [] No  This time includes physician non-face-to-face service time visit on the date of service such as  Preparing to see the patient (eg, review of tests)  Obtaining and/or reviewing separately obtained history  Performing a medically necessary appropriate examination and/or evaluation  Counseling and educating the patient/family/caregiver  Ordering medications, tests, or procedures  Referring and communicating with other health care professionals as needed  Documenting clinical information in the electronic or other health record  Independently interpreting results (not

## 2024-10-21 NOTE — DISCHARGE INSTRUCTIONS
Wound Clinic Physician Orders and Discharge Instructions  [facility]  [address], [address]  [place] [address]  Telephone: [phone]     FAX [phone]    NAME:  Ramin Brown Jr.  YOB: 1945  MEDICAL RECORD NUMBER:  [id number]  DATE:  10/21/2024      Return Appointment:  [] Dressing Supply Provider:   [x] Home Healthcare: [facility]. Fax [phone]  [x] Return Appointment: 2 Week(s)  [] Nurse Visit:     [] Discharge from [facility]: [] Healed            [] Refer to Provider:    Follow-up Information:  [] Ordered Tests:   [] Referrals:   [] Rx:   [x] Other: Lymph pump 2-3 times daily x 1 hour each  [] Other:     Wound Cleansing:   Do not scrub or use excessive force.  Cleanse wound prior to applying a clean dressing with:  [] Normal Saline   [x] Keep Wound Dry in Shower     [] Wound Cleanser   [x] Cleanse wound with Mild Soap & Water on dressing change days  [] Other:       Topical Treatments:  Do not apply lotions, creams, or ointments to wound bed unless directed.   [] Apply moisturizing lotion to skin surrounding the wound prior to dressing change.  [] Apply antifungal ointment to skin surrounding the wound prior to dressing change.  [] Apply thin film of moisture barrier ointment to skin immediately around wound.  [] Apply Betadine to skim immediately around wound      Dressings:           Wound Location R Leg   [x] Apply Primary Dressing:       [] MediHoney Gel [] MediHoney Alginate  [] Calcium Alginate with Silver   [] Calcium Alginate without silver   [x] Collagen with silver 1st [] Collagen without Silver    [] Santyl with moist saline gauze     [] Hydrofera Blue (cut to size and moistened with normal saline)  [x] Hydrofera Blue Ready 2nd (cut to size)      [] Normal Saline wet to dry  [] Betadine wet to dry    [] Hydrogel  [] Mepitel     [] Bactroban/Mupirocin   [] Iodoform Packing Strip [] Plain Packing Strip   [] Skin Sub:   [] Other:

## 2024-11-04 ENCOUNTER — HOSPITAL ENCOUNTER (OUTPATIENT)
Facility: HOSPITAL | Age: 79
Discharge: HOME | End: 2024-11-04
Attending: SPECIALIST | Admitting: SPECIALIST
Payer: MEDICARE

## 2024-11-04 VITALS
DIASTOLIC BLOOD PRESSURE: 79 MMHG | RESPIRATION RATE: 18 BRPM | SYSTOLIC BLOOD PRESSURE: 127 MMHG | TEMPERATURE: 97.9 F | HEART RATE: 93 BPM

## 2024-11-04 DIAGNOSIS — L97.822 NON-PRESSURE CHRONIC ULCER OF OTHER PART OF LEFT LOWER LEG WITH FAT LAYER EXPOSED (HCC): ICD-10-CM

## 2024-11-04 DIAGNOSIS — L97.812 NON-PRESSURE CHRONIC ULCER OF OTHER PART OF RIGHT LOWER LEG WITH FAT LAYER EXPOSED (HCC): Primary | ICD-10-CM

## 2024-11-04 PROCEDURE — 29581 APPL MULTLAYER CMPRN SYS LEG: CPT

## 2024-11-04 NOTE — PROGRESS NOTES
tolerated: [] Calorie Diabetic Diet: [] No Added Salt:  [x] Increase Protein: [] Other:     Activity:  [x] Activity as tolerated:    [] Patient has no activity restrictions      [] Strict Bedrest   [] Remain off Work      [] May return to full duty work                                     [] Return to work with restrictions     Physician:  [] Ramin Ramirez PA-C  [] Dr. Natalie Lang  [x] Dr. Fernando Peterson      Nurse Case Manger:  Alisson      Wound Care Center Information: Should you experience any significant changes in your wound(s) or have questions about your wound care, please contact the Wound Center at [phone]. Our hours are Monday - Friday 8am - 4:30pm, closed all major holidays. If you need help with your wound outside these hours and cannot wait until we are again available, contact your PCP or go to the hospital emergency room.     PLEASE NOTE: IF YOU ARE UNABLE TO OBTAIN WOUND SUPPLIES, CONTINUE TO USE THE SUPPLIES YOU HAVE AVAILABLE UNTIL YOU ARE ABLE TO REACH US. IT IS MOST IMPORTANT TO KEEP THE WOUND COVERED AT ALL TIMES.        TIME: E/M Time spent with patient and/or patient care issues: [] 15-20 min  [] 21-30 min  [] 31-44 min  [] 45 min or more.   This is above the usual time needed to address patient's chief complaint today: [] Yes  [] No  This time includes physician non-face-to-face service time visit on the date of service such as  Preparing to see the patient (eg, review of tests)  Obtaining and/or reviewing separately obtained history  Performing a medically necessary appropriate examination and/or evaluation  Counseling and educating the patient/family/caregiver  Ordering medications, tests, or procedures  Referring and communicating with other health care professionals as needed  Documenting clinical information in the electronic or other health record  Independently interpreting results (not reported separately) and communicating results to the patient/family/caregiver  Care

## 2024-11-04 NOTE — WOUND CARE
Wound Clinic Physician Orders and Discharge Instructions  [facility]  [address], [address]  [place] [address]  Telephone: [phone]     FAX [phone]    NAME:  Ramin Brown Jr.  YOB: 1945  MEDICAL RECORD NUMBER:  [id number]  DATE:  11/4/2024      Return Appointment:  [] Dressing Supply Provider:   [x] Home Healthcare: [facility]. Fax [phone]  [x] Return Appointment: 3 Week(s)  [] Nurse Visit:     [] Discharge from [facility]: [] Healed            [] Refer to Provider:    Follow-up Information:  [] Ordered Tests:   [] Referrals:   [] Rx:   [x] Other: Lymph pump 2-3 times daily x 1 hour each  [] Other:     Wound Cleansing:   Do not scrub or use excessive force.  Cleanse wound prior to applying a clean dressing with:  [] Normal Saline   [x] Keep Wound Dry in Shower     [] Wound Cleanser   [x] Cleanse wound with Mild Soap & Water on dressing change days  [] Other:       Topical Treatments:  Do not apply lotions, creams, or ointments to wound bed unless directed.   [] Apply moisturizing lotion to skin surrounding the wound prior to dressing change.  [] Apply antifungal ointment to skin surrounding the wound prior to dressing change.  [] Apply thin film of moisture barrier ointment to skin immediately around wound.  [] Apply Betadine to skim immediately around wound      Dressings:           Wound Location R Leg   [x] Apply Primary Dressing:       [] MediHoney Gel [] MediHoney Alginate  [] Calcium Alginate with Silver   [] Calcium Alginate without silver   [x] Collagen with silver 1st [] Collagen without Silver    [] Santyl with moist saline gauze     [] Hydrofera Blue (cut to size and moistened with normal saline)  [x] Hydrofera Blue Ready 2nd (cut to size)      [] Normal Saline wet to dry  [] Betadine wet to dry    [] Hydrogel  [] Mepitel     [] Bactroban/Mupirocin   [] Iodoform Packing Strip [] Plain Packing Strip   [] Skin Sub:   [] Other:

## 2024-11-04 NOTE — DISCHARGE INSTRUCTIONS
Wound Clinic Physician Orders and Discharge Instructions  [facility]  [address], [address]  [place] [address]  Telephone: [phone]     FAX [phone]    NAME:  Ramin Brown Jr.  YOB: 1945  MEDICAL RECORD NUMBER:  [id number]  DATE:  11/4/2024      Return Appointment:  [] Dressing Supply Provider:   [x] Home Healthcare: [facility]. Fax [phone]  [x] Return Appointment: 3 Week(s)  [] Nurse Visit:     [] Discharge from [facility]: [] Healed            [] Refer to Provider:    Follow-up Information:  [] Ordered Tests:   [] Referrals:   [] Rx:   [x] Other: Lymph pump 2-3 times daily x 1 hour each  [] Other:     Wound Cleansing:   Do not scrub or use excessive force.  Cleanse wound prior to applying a clean dressing with:  [] Normal Saline   [x] Keep Wound Dry in Shower     [] Wound Cleanser   [x] Cleanse wound with Mild Soap & Water on dressing change days  [] Other:       Topical Treatments:  Do not apply lotions, creams, or ointments to wound bed unless directed.   [] Apply moisturizing lotion to skin surrounding the wound prior to dressing change.  [] Apply antifungal ointment to skin surrounding the wound prior to dressing change.  [] Apply thin film of moisture barrier ointment to skin immediately around wound.  [] Apply Betadine to skim immediately around wound      Dressings:           Wound Location R Leg   [x] Apply Primary Dressing:       [] MediHoney Gel [] MediHoney Alginate  [] Calcium Alginate with Silver   [] Calcium Alginate without silver   [x] Collagen with silver 1st [] Collagen without Silver    [] Santyl with moist saline gauze     [] Hydrofera Blue (cut to size and moistened with normal saline)  [x] Hydrofera Blue Ready 2nd (cut to size)      [] Normal Saline wet to dry  [] Betadine wet to dry    [] Hydrogel  [] Mepitel     [] Bactroban/Mupirocin   [] Iodoform Packing Strip [] Plain Packing Strip   [] Skin Sub:   [] Other:

## 2024-11-25 ENCOUNTER — HOSPITAL ENCOUNTER (OUTPATIENT)
Facility: HOSPITAL | Age: 79
Discharge: HOME | End: 2024-11-25
Attending: SPECIALIST | Admitting: SPECIALIST
Payer: MEDICARE

## 2024-11-25 VITALS
TEMPERATURE: 97.7 F | SYSTOLIC BLOOD PRESSURE: 130 MMHG | RESPIRATION RATE: 18 BRPM | DIASTOLIC BLOOD PRESSURE: 75 MMHG | HEART RATE: 84 BPM

## 2024-11-25 DIAGNOSIS — L97.822 NON-PRESSURE CHRONIC ULCER OF OTHER PART OF LEFT LOWER LEG WITH FAT LAYER EXPOSED (HCC): ICD-10-CM

## 2024-11-25 DIAGNOSIS — L97.812 NON-PRESSURE CHRONIC ULCER OF OTHER PART OF RIGHT LOWER LEG WITH FAT LAYER EXPOSED (HCC): Primary | ICD-10-CM

## 2024-11-25 PROCEDURE — 29581 APPL MULTLAYER CMPRN SYS LEG: CPT

## 2024-11-25 NOTE — WOUND CARE
Wound Clinic Physician Orders and Discharge Instructions  [facility]  [address], [address]  [place] [address]  Telephone: [phone]     FAX [phone]    NAME:  Ramin Brown Jr.  YOB: 1945  MEDICAL RECORD NUMBER:  [id number]  DATE:  11/25/2024      Return Appointment:  [] Dressing Supply Provider:   [x] Home Healthcare: [facility]. Fax [phone]  [x] Return Appointment: 2 Week(s)  [] Nurse Visit:     [] Discharge from [facility]: [] Healed            [] Refer to Provider:    Follow-up Information:  [] Ordered Tests:   [] Referrals:   [] Rx:   [x] Other: Lymph pump 2-3 times daily x 1 hour each  [] Other:     Wound Cleansing:   Do not scrub or use excessive force.  Cleanse wound prior to applying a clean dressing with:  [] Normal Saline   [x] Keep Wound Dry in Shower     [] Wound Cleanser   [x] Cleanse wound with Mild Soap & Water on dressing change days  [] Other:       Topical Treatments:  Do not apply lotions, creams, or ointments to wound bed unless directed.   [] Apply moisturizing lotion to skin surrounding the wound prior to dressing change.  [] Apply antifungal ointment to skin surrounding the wound prior to dressing change.  [] Apply thin film of moisture barrier ointment to skin immediately around wound.  [] Apply Betadine to skim immediately around wound      Dressings:           Wound Location R Leg   [x] Apply Primary Dressing:       [] MediHoney Gel [] MediHoney Alginate  [] Calcium Alginate with Silver   [] Calcium Alginate without silver   [x] Collagen with silver 1st [] Collagen without Silver    [] Santyl with moist saline gauze     [] Hydrofera Blue (cut to size and moistened with normal saline)  [x] Hydrofera Blue Ready 2nd (cut to size)      [] Normal Saline wet to dry  [] Betadine wet to dry    [] Hydrogel  [] Mepitel     [] Bactroban/Mupirocin   [] Iodoform Packing Strip [] Plain Packing Strip   [] Skin Sub:   [] Other:

## 2024-11-25 NOTE — DISCHARGE INSTRUCTIONS
place.    Dietary:  [x] Diet as tolerated: [] Calorie Diabetic Diet: [] No Added Salt:  [x] Increase Protein: [] Other:     Activity:  [x] Activity as tolerated:    [] Patient has no activity restrictions      [] Strict Bedrest   [] Remain off Work      [] May return to full duty work                                     [] Return to work with restrictions     Physician:  [] Ramin Ramirez PA-C  [] Dr. Natalie Lang  [x] Dr. Fernando Peterson      Nurse Case Manger:  Alisson      Wound Care Center Information: Should you experience any significant changes in your wound(s) or have questions about your wound care, please contact the Wound Center at [phone]. Our hours are Monday - Friday 8am - 4:30pm, closed all major holidays. If you need help with your wound outside these hours and cannot wait until we are again available, contact your PCP or go to the hospital emergency room.     PLEASE NOTE: IF YOU ARE UNABLE TO OBTAIN WOUND SUPPLIES, CONTINUE TO USE THE SUPPLIES YOU HAVE AVAILABLE UNTIL YOU ARE ABLE TO REACH US. IT IS MOST IMPORTANT TO KEEP THE WOUND COVERED AT ALL TIMES.

## 2024-11-25 NOTE — PROGRESS NOTES
[facility]   Wound Care and Hyperbaric Oxygen Therapy Center   Medical Staff Note     Ramin Brown Jr.  MEDICAL RECORD NUMBER:  [id number]  AGE: 79 y.o.   GENDER: male  : 1945  EPISODE DATE:  2024      Chief Complaint:   Chief Complaint   Patient presents with    Wound Check     Right and left leg wound        History of Present Illness:  Ramin Brown Jr. is a 79 y.o. male who presents today for wound/ulcer evaluationof bilateral lower extremity lymphedema with bilateral leg ulcers.  Wound culture showed gram-negative rods eventhough the final culture was suggestive of skin patty and hence Augmentin started for 2-week.     He states that he still has a lot of drainage from bilateral lower extremity open wounds.  No fever or chills.  He is unable to afford to go to lymphedema clinic because of high co-pay.  He has been applying Hydrofera Blue to bilateral lower extremity open wounds and supposed to have Profore or Coflex dressing.  But the home health agency nurse had applied Unna boot for some reason.     Cultures from were positive for Pseudomonas sensitive to ciprofloxacin.  He has been on ciprofloxacin 5 mg p.o. twice daily.     History of Wound Context: Per original history and physical on this patient. Changes in history since last evaluation: none     Wound: Bilateral lower extremity lymphedema with ulcers.           Past Medical History:       Diagnosis Date    Dyslipidemia     Dyslipidemia     HTN (hypertension)     HTN (hypertension)        Past Surgical History:   Past Surgical History:   Procedure Laterality Date    CHOLECYSTECTOMY         Allergy:No Known Allergies    Social History:   Social History     Tobacco Use    Smoking status: Never    Smokeless tobacco: Never   Vaping Use    Vaping status: Never Used   Substance Use Topics    Alcohol use: Never    Drug use: Never       Family History:   Family History   Problem Relation Age of Onset    Diabetes

## 2024-11-25 NOTE — WOUND CARE
Multilayer Compression Wrap   (Not Unna) Below the Knee    NAME:  Ramin Brown Jr.  YOB: 1945  MEDICAL RECORD NUMBER:  [id number]  DATE:  11/25/2024    Multilayer compression wrap: Removed old Multilayer wrap if indicated and wash leg with mild soap/water.  Applied moisturizing agent to dry skin as needed.   Applied primary and secondary dressing as ordered.  Applied multilayered dressing below the knee to right lower leg.  Applied multilayered dressing below the knee to left lower leg.  Instructed patient/caregiver not to remove dressing and to keep it clean and dry.   Instructed patient/caregiver on complications to report to provider, such as pain, numbness in toes, heavy drainage, and slippage of dressing.  Instructed patient on purpose of compression dressing and on activity and exercise recommendations.      Electronically signed by Jenni Arzate LPN on 11/25/2024 at 12:05 PM

## 2024-12-09 ENCOUNTER — HOSPITAL ENCOUNTER (OUTPATIENT)
Facility: HOSPITAL | Age: 79
Discharge: HOME | End: 2024-12-09
Attending: SPECIALIST | Admitting: SPECIALIST
Payer: MEDICARE

## 2024-12-09 VITALS
HEART RATE: 73 BPM | TEMPERATURE: 97.2 F | SYSTOLIC BLOOD PRESSURE: 147 MMHG | DIASTOLIC BLOOD PRESSURE: 84 MMHG | RESPIRATION RATE: 18 BRPM

## 2024-12-09 DIAGNOSIS — L97.812 NON-PRESSURE CHRONIC ULCER OF OTHER PART OF RIGHT LOWER LEG WITH FAT LAYER EXPOSED (HCC): Primary | ICD-10-CM

## 2024-12-09 DIAGNOSIS — L97.822 NON-PRESSURE CHRONIC ULCER OF OTHER PART OF LEFT LOWER LEG WITH FAT LAYER EXPOSED (HCC): ICD-10-CM

## 2024-12-09 PROCEDURE — 87076 CULTURE ANAEROBE IDENT EACH: CPT

## 2024-12-09 PROCEDURE — 87070 CULTURE OTHR SPECIMN AEROBIC: CPT

## 2024-12-09 PROCEDURE — 87077 CULTURE AEROBIC IDENTIFY: CPT

## 2024-12-09 PROCEDURE — 87205 SMEAR GRAM STAIN: CPT

## 2024-12-09 PROCEDURE — 87185 SC STD ENZYME DETCJ PER NZM: CPT

## 2024-12-09 PROCEDURE — 87186 SC STD MICRODIL/AGAR DIL: CPT

## 2024-12-09 PROCEDURE — 29581 APPL MULTLAYER CMPRN SYS LEG: CPT

## 2024-12-09 NOTE — PROGRESS NOTES
[facility]   Wound Care and Hyperbaric Oxygen Therapy Center   Medical Staff Note     Ramin Brown Jr.  MEDICAL RECORD NUMBER:  [id number]  AGE: 79 y.o.   GENDER: male  : 1945  EPISODE DATE:  2024      Chief Complaint:   Chief Complaint   Patient presents with    Wound Check     Bilateral legs       History of Present Illness:     Ramin Brown Jr. is a 79 y.o. male who presents today for wound/ulcer evaluation.     History of Wound Context: Per original history and physical on this patient. Changes in history since last evaluation: ***  Wound/Ulcer Pain Timing/Severity: {PAIN ASSESSMENT WOUND:04263:::1}  Quality of pain: {PAIN QUALITY::::1}  Severity:  {NUMBERS; 0-10:5044} / 10   Modifying Factors: {Modifying Factors Wound:891795046:::1}  Associated Signs/Symptoms: {ASSOCIATED SYMPTOMS WOUND:111801636:::1}    Ulcer Identification:  Ulcer Type: {Wound type:01784:::1}    Contributing Factors: {HEALTH FACTORS:724921258:::1}    Wound: ***     Past Medical History:       Diagnosis Date    Dyslipidemia     Dyslipidemia     HTN (hypertension)     HTN (hypertension)        Past Surgical History:   Past Surgical History:   Procedure Laterality Date    CHOLECYSTECTOMY         Allergy:No Known Allergies    Social History:   Social History     Tobacco Use    Smoking status: Never    Smokeless tobacco: Never   Vaping Use    Vaping status: Never Used   Substance Use Topics    Alcohol use: Never    Drug use: Never       Family History:   Family History   Problem Relation Age of Onset    Diabetes Mother     Diabetes Brother     Diabetes Son        Current Medications:  Current Outpatient Medications on File Prior to Encounter   Medication Sig Dispense Refill    Omega-3 Fatty Acids (FISH OIL OMEGA-3 PO) Take by mouth      aspirin 325 MG tablet Take 1 tablet by mouth daily      furosemide (LASIX) 40 MG tablet Take 1 tablet by mouth 2 times daily      vitamin E 400 UNIT capsule Take 1

## 2024-12-09 NOTE — DISCHARGE INSTRUCTIONS
Strip   [] Skin Sub:   [] Other: Drawtex    [x] Cover and Secure with:     [x] Gauze [] Demetrius [] Kerlix   [] Foam  [x] Super Absorbant    [x] ABD     [] Ace Wrap [] Other:    Limit contact of tape with skin.    [x] Change dressing: [] Daily    [] Every Other Day   [x] Twice per week   [] Three times per week   [] Once a week [] Do Not Change Dressing   [] Other:    Dressings:           Wound Location L Leg   [x] Apply Primary Dressing:       [] MediHoney Gel [] MediHoney Alginate  [] Calcium Alginate with Silver  [] Calcium Alginate without silver   [x] Collagen with silver 1st [] Collagen without Silver    [] Santyl with moist saline gauze     [] Hydrofera Blue (cut to size and moistened with normal saline)  [x] Hydrofera Blue Ready 2nd (cut to size)      [] Normal Saline wet to dry  [] Betadine wet to dry    [] Hydrogel  [] Mepitel     [] Bactroban/Mupirocin   [] Iodoform Packing Strip [] Plain Packing Strip   [] Skin Sub:   [] Other: Drawtex     [x] Cover and Secure with:     [x] Gauze [] Demetrius [] Kerlix   [] Foam  [x] Super Absorbant    [x] ABD     [] Ace Wrap [] Other:    Limit contact of tape with skin.    [x] Change dressing: [] Daily    [] Every Other Day   [x] Twice per week   [] Three times per week   [] Once a week [] Do Not Change Dressing   [] Other:     Edema Control:  Apply: [] Compression Stocking:  mmHg  []Right Leg []Left Leg   [] Tubigrip []Right Leg Double Layer []Left Leg Double Layer      []Right Leg Single Layer []Left Leg Single Layer      [] Elevate leg(s) above the level of the heart when sitting.    [] Avoid prolonged standing in one place.     Compression:  Apply: [x] Compression Wrap to [x]RightLeg [x]Left Leg    [x]  Coflex [] Coflex Lite  [] Unna with Calamine Lite      [x] Do not get leg(s) with wrap wet. If wrap becomes too tight, call the wound center and remove wrap from leg by unrolling each layer.   [x] Elevate leg(s) above the level of the heart when sitting.    [x] Avoid

## 2024-12-09 NOTE — WOUND CARE
Multilayer Compression Wrap   (Not Unna) Below the Knee    NAME:  Ramin Brown Jr.  YOB: 1945  MEDICAL RECORD NUMBER:  [id number]  DATE:  12/9/2024    Multilayer compression wrap: Removed old Multilayer wrap if indicated and wash leg with mild soap/water.  Applied moisturizing agent to dry skin as needed.   Applied primary and secondary dressing as ordered.  Applied multilayered dressing below the knee to right lower leg.  Applied multilayered dressing below the knee to left lower leg.  Instructed patient/caregiver not to remove dressing and to keep it clean and dry.   Instructed patient/caregiver on complications to report to provider, such as pain, numbness in toes, heavy drainage, and slippage of dressing.  Instructed patient on purpose of compression dressing and on activity and exercise recommendations.      Electronically signed by Naina Reyes RN on 12/9/2024 at 12:48 PM

## 2024-12-09 NOTE — WOUND CARE
Wound Clinic Physician Orders and Discharge Instructions  [facility]  [address], [address]  [place] [address]  Telephone: [phone]     FAX [phone]    NAME:  Ramin Brown Jr.  YOB: 1945  MEDICAL RECORD NUMBER:  [id number]  DATE:  12/9/2024      Return Appointment:  [] Dressing Supply Provider:   [x] Home Healthcare: [facility]. Fax [phone]  [x] Return Appointment: 4 Week(s)  [] Nurse Visit:     [] Discharge from [facility]: [] Healed            [] Refer to Provider:    Follow-up Information:  [] Ordered Tests:   [] Referrals:   [] Rx:   [x] Other: Lymph pump 2-3 times daily x 1 hour each  [x] Other: wound cultures obtained from [facility]    Wound Cleansing:   Do not scrub or use excessive force.  Cleanse wound prior to applying a clean dressing with:  [] Normal Saline   [x] Keep Wound Dry in Shower     [] Wound Cleanser   [x] Cleanse wound with Mild Soap & Water on dressing change days  [] Other:       Topical Treatments:  Do not apply lotions, creams, or ointments to wound bed unless directed.   [] Apply moisturizing lotion to skin surrounding the wound prior to dressing change.  [] Apply antifungal ointment to skin surrounding the wound prior to dressing change.  [] Apply thin film of moisture barrier ointment to skin immediately around wound.  [] Apply Betadine to skim immediately around wound      Dressings:           Wound Location R Leg   [x] Apply Primary Dressing:       [] MediHoney Gel [] MediHoney Alginate  [] Calcium Alginate with Silver   [] Calcium Alginate without silver   [x] Collagen with silver 1st [] Collagen without Silver    [] Santyl with moist saline gauze     [] Hydrofera Blue (cut to size and moistened with normal saline)  [x] Hydrofera Blue Ready 2nd (cut to size)      [] Normal Saline wet to dry  [] Betadine wet to dry    [] Hydrogel  [] Mepitel     [] Bactroban/Mupirocin   [] Iodoform Packing Strip [] Plain Packing

## 2024-12-13 LAB
BACTERIA SPEC CULT: ABNORMAL
GRAM STN SPEC: ABNORMAL
Lab: ABNORMAL
Lab: ABNORMAL

## 2025-01-13 ENCOUNTER — HOSPITAL ENCOUNTER (OUTPATIENT)
Facility: HOSPITAL | Age: 80
Discharge: HOME | End: 2025-01-13
Attending: SPECIALIST | Admitting: SPECIALIST
Payer: MEDICARE

## 2025-01-13 VITALS
RESPIRATION RATE: 20 BRPM | DIASTOLIC BLOOD PRESSURE: 76 MMHG | HEART RATE: 82 BPM | TEMPERATURE: 97.3 F | SYSTOLIC BLOOD PRESSURE: 132 MMHG

## 2025-01-13 DIAGNOSIS — L97.512 ULCER OF FOOT, CHRONIC, RIGHT, WITH FAT LAYER EXPOSED (HCC): ICD-10-CM

## 2025-01-13 DIAGNOSIS — L97.812 NON-PRESSURE CHRONIC ULCER OF OTHER PART OF RIGHT LOWER LEG WITH FAT LAYER EXPOSED (HCC): Primary | ICD-10-CM

## 2025-01-13 DIAGNOSIS — L97.822 NON-PRESSURE CHRONIC ULCER OF OTHER PART OF LEFT LOWER LEG WITH FAT LAYER EXPOSED (HCC): ICD-10-CM

## 2025-01-13 DIAGNOSIS — I89.0 LYMPHEDEMA: ICD-10-CM

## 2025-01-13 PROCEDURE — 29581 APPL MULTLAYER CMPRN SYS LEG: CPT

## 2025-01-13 NOTE — PROGRESS NOTES
[facility]   Wound Care and Hyperbaric Oxygen Therapy Center   Medical Staff Note     Ramin Brown Jr.  MEDICAL RECORD NUMBER:  [id number]  AGE: 79 y.o.   GENDER: male  : 1945  EPISODE DATE:  2025      Chief Complaint:   Chief Complaint   Patient presents with    Wound Check     R & L leg       History of Present Illness:  Ramin Brown Jr. is a 79 y.o. male who presents today for wound/ulcer evaluationof bilateral lower extremity lymphedema with bilateral leg ulcers.  Wound culture showed gram-negative rods eventhough the final culture was suggestive of skin patty and hence Augmentin started for 2-week.     He states that he still has a lot of drainage from bilateral lower extremity open wounds.  No fever or chills.  He is unable to afford to go to lymphedema clinic because of high co-pay.  He has been applying Hydrofera Blue to bilateral lower extremity open wounds and supposed to have Profore or Coflex dressing.  But the home health agency nurse had applied Unna boot for some reason.     Cultures from were positive for Pseudomonas sensitive to ciprofloxacin.  He has been on ciprofloxacin 500 mg p.o. twice daily.     History of Wound Context: Per original history and physical on this patient. Changes in history since last evaluation: none     Wound: Bilateral lower extremity lymphedema with ulcers.     Past Medical History:       Diagnosis Date    Dyslipidemia     Dyslipidemia     HTN (hypertension)     HTN (hypertension)        Past Surgical History:   Past Surgical History:   Procedure Laterality Date    CHOLECYSTECTOMY         Allergy:No Known Allergies    Social History:   Social History     Tobacco Use    Smoking status: Never    Smokeless tobacco: Never   Vaping Use    Vaping status: Never Used   Substance Use Topics    Alcohol use: Never    Drug use: Never       Family History:   Family History   Problem Relation Age of Onset    Diabetes Mother     Diabetes

## 2025-01-13 NOTE — WOUND CARE
Multilayer Compression Wrap   (Not Unna) Below the Knee    NAME:  Ramin Brown Jr.  YOB: 1945  MEDICAL RECORD NUMBER:  [id number]  DATE:  1/13/2025    Multilayer compression wrap: Removed old Multilayer wrap if indicated and wash leg with mild soap/water.  Applied primary and secondary dressing as ordered.  Applied multilayered dressing below the knee to right lower leg.  Applied multilayered dressing below the knee to left lower leg.  Instructed patient/caregiver not to remove dressing and to keep it clean and dry.   Instructed patient/caregiver on complications to report to provider, such as pain, numbness in toes, heavy drainage, and slippage of dressing.  Instructed patient on purpose of compression dressing and on activity and exercise recommendations.      Electronically signed by Miranda Barry RN on 1/13/2025 at 12:23 PM

## 2025-01-13 NOTE — FLOWSHEET NOTE
01/13/25 1110   Wound 05/08/23 Leg Right #1   Date First Assessed/Time First Assessed: 05/08/23 1055   Present on Hospital Admission: Yes  Wound Approximate Age at First Assessment (Weeks): 40 weeks  Primary Wound Type: Venous Ulcer  Location: Leg  Wound Location Orientation: Right  Wound Descrip...   Wound Image    Wound Etiology Venous   Dressing Status Intact;Dry;Clean   Wound Cleansed Soap and water   Wound Length (cm) 24 cm   Wound Width (cm) 27.5 cm   Wound Depth (cm) 0.1 cm   Wound Surface Area (cm^2) 660 cm^2   Change in Wound Size % (l*w) -931.25   Wound Volume (cm^3) 66 cm^3   Wound Healing % -931   Wound Assessment Granulation tissue;Slough   Drainage Amount Copious (>75 % saturated)   Drainage Description Serosanguinous   Odor Moderate   Lyla-wound Assessment Maceration;Fragile   Margins Undefined edges   Wound Thickness Description not for Pressure Injury Full thickness

## 2025-01-13 NOTE — DISCHARGE INSTRUCTIONS
place.    Dietary:  [x] Diet as tolerated: [] Calorie Diabetic Diet: [] No Added Salt:  [x] Increase Protein: [] Other:     Activity:  [x] Activity as tolerated:    [] Patient has no activity restrictions      [] Strict Bedrest   [] Remain off Work      [] May return to full duty work                                     [] Return to work with restrictions     Physician:  [] Ramin Ramirez PA-C  [] Dr. Natalie Lang  [x] Dr. Fernando Peterson      Nurse Case Manger:  Alisson      Wound Care Center Information: Should you experience any significant changes in your wound(s) or have questions about your wound care, please contact the Wound Center at [phone]. Our hours are Monday - Friday 8am - 4:30pm, closed all major holidays. If you need help with your wound outside these hours and cannot wait until we are again available, contact your PCP or go to the hospital emergency room.     PLEASE NOTE: IF YOU ARE UNABLE TO OBTAIN WOUND SUPPLIES, CONTINUE TO USE THE SUPPLIES YOU HAVE AVAILABLE UNTIL YOU ARE ABLE TO REACH US. IT IS MOST IMPORTANT TO KEEP THE WOUND COVERED AT ALL TIMES.

## 2025-01-13 NOTE — FLOWSHEET NOTE
01/13/25 1111   Wound 05/08/23 Leg Left #2   Date First Assessed/Time First Assessed: 05/08/23 1103   Wound Approximate Age at First Assessment (Weeks): 40 weeks  Primary Wound Type: Venous Ulcer  Location: Leg  Wound Location Orientation: Left  Wound Description (Comments): #2   Wound Image    Wound Etiology Venous   Dressing Status Intact;Dry;Clean   Wound Cleansed Soap and water   Wound Length (cm) 23.5 cm   Wound Width (cm) 25 cm   Wound Depth (cm) 0.1 cm   Wound Surface Area (cm^2) 587.5 cm^2   Change in Wound Size % (l*w) -319.64   Wound Volume (cm^3) 58.75 cm^3   Wound Healing % -320   Wound Assessment Granulation tissue;Slough   Drainage Amount Copious (>75 % saturated)   Drainage Description Serosanguinous   Odor Moderate   Lyla-wound Assessment Maceration;Fragile   Margins Undefined edges   Wound Thickness Description not for Pressure Injury Full thickness

## 2025-01-13 NOTE — WOUND CARE
Wound Clinic Physician Orders and Discharge Instructions  [facility]  [address], [address]  [place] [address]  Telephone: [phone]     FAX [phone]    NAME:  Ramin Brown Jr.  YOB: 1945  MEDICAL RECORD NUMBER:  [id number]  DATE:  1/13/2025      Return Appointment:  [] Dressing Supply Provider:   [x] Home Healthcare: [facility]. Fax [phone]  [x] Return Appointment: 3 Week(s)  [] Nurse Visit:     [] Discharge from [facility]: [] Healed            [] Refer to Provider:    Follow-up Information:  [] Ordered Tests:   [x] Referrals: [facility]   [] Rx:   [x] Other: Lymph pump 2-3 times daily x 1 hour each  [] Other:     Wound Cleansing:   Do not scrub or use excessive force.  Cleanse wound prior to applying a clean dressing with:  [] Normal Saline   [x] Keep Wound Dry in Shower     [] Wound Cleanser   [x] Cleanse wound with Mild Soap & Water on dressing change days  [] Other:       Topical Treatments:  Do not apply lotions, creams, or ointments to wound bed unless directed.   [] Apply moisturizing lotion to skin surrounding the wound prior to dressing change.  [] Apply antifungal ointment to skin surrounding the wound prior to dressing change.  [] Apply thin film of moisture barrier ointment to skin immediately around wound.  [] Apply Betadine to skim immediately around wound      Dressings:           Wound Location R Leg   [x] Apply Primary Dressing:       [] MediHoney Gel [] MediHoney Alginate  [x] Calcium Alginate with Silver   [] Calcium Alginate without silver   [] Collagen with silver  [] Collagen without Silver    [] Santyl with moist saline gauze     [] Hydrofera Blue (cut to size and moistened with normal saline)  [] Hydrofera Blue Ready (cut to size)      [] Normal Saline wet to dry  [] Betadine wet to dry    [] Hydrogel  [] Mepitel     [] Bactroban/Mupirocin   [] Iodoform Packing Strip [] Plain Packing Strip   [] Skin Sub:   []

## 2025-02-03 ENCOUNTER — HOSPITAL ENCOUNTER (OUTPATIENT)
Facility: HOSPITAL | Age: 80
Discharge: HOME | End: 2025-02-03
Attending: SPECIALIST | Admitting: SPECIALIST
Payer: MEDICARE

## 2025-02-03 VITALS
SYSTOLIC BLOOD PRESSURE: 146 MMHG | TEMPERATURE: 97.3 F | DIASTOLIC BLOOD PRESSURE: 79 MMHG | HEART RATE: 95 BPM | RESPIRATION RATE: 20 BRPM

## 2025-02-03 DIAGNOSIS — L97.822 NON-PRESSURE CHRONIC ULCER OF OTHER PART OF LEFT LOWER LEG WITH FAT LAYER EXPOSED (HCC): ICD-10-CM

## 2025-02-03 DIAGNOSIS — L97.812 NON-PRESSURE CHRONIC ULCER OF OTHER PART OF RIGHT LOWER LEG WITH FAT LAYER EXPOSED (HCC): Primary | ICD-10-CM

## 2025-02-03 PROCEDURE — 29581 APPL MULTLAYER CMPRN SYS LEG: CPT

## 2025-02-03 RX ORDER — CIPROFLOXACIN 500 MG/1
500 TABLET, FILM COATED ORAL 2 TIMES DAILY
Qty: 28 TABLET | Refills: 0 | Status: SHIPPED | OUTPATIENT
Start: 2025-02-03 | End: 2025-02-17

## 2025-02-03 NOTE — PROGRESS NOTES
lb)   02/21/22 126 kg (277 lb 12.8 oz)     Temp Readings from Last 3 Encounters:   02/03/25 97.3 °F (36.3 °C) (Temporal)   01/13/25 97.3 °F (36.3 °C) (Temporal)   12/09/24 97.2 °F (36.2 °C) (Temporal)     BP Readings from Last 3 Encounters:   02/03/25 (!) 146/79   01/13/25 132/76   12/09/24 (!) 147/84     Pulse Readings from Last 3 Encounters:   02/03/25 95   01/13/25 82   12/09/24 73      Ht Readings from Last 3 Encounters:   05/08/23 1.727 m (5' 8\")   07/21/22 1.727 m (5' 8\")   02/21/22 1.727 m (5' 8\")        General: well appearing, no acute distress  Head: Normal  Face: Nornal  HEENT: atraumatic, PERRLA, moist mucosa, normal pharynx, normal tonsils and adenoids, normal tongue, no fluid in sinuses  Neck: Trachea midline, no carotid bruit, no masses  Chest: Normal.  Respiratory: Normal chest wall expansion, CTA B, no r/r/w, no rubs  Cardiovascular: RRR, no m/r/g, Normal S1 and S2  Abdomen: Soft, non tender, non-distended, normal bowel sounds in all quadrants, no hepatosplenomegaly, no tympany. Incision scar:   Genitourinary: No inguinal hernia, normal external gentalia, Testis & scrotum normal, no renal angle tenderness  Rectal: deferred  Musculoskeletal: normal ROM in upper and lower extremities, No joint swelling, bilateral lower extremity lymphedema.  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) - Primary    Non-pressure chronic ulcer of other part of left lower leg with 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 (HCC)         Wound/Ulcer #: 1 and 2  Wound 05/08/23 Leg Right #1 (Active)   Wound Image   02/03/25 1106   Wound Etiology Venous 02/03/25 1106   Dressing Status

## 2025-02-03 NOTE — WOUND CARE
Wound Clinic Physician Orders and Discharge Instructions  [facility]  [address], [address]  [place] [address]  Telephone: [phone]     FAX [phone]    NAME:  Ramin Brown Jr.  YOB: 1945  MEDICAL RECORD NUMBER:  [id number]  DATE:  2/3/2025      Return Appointment:  [] Dressing Supply Provider:   [x] Home Healthcare: [facility]. Fax [phone]  [x] Return Appointment: 3 Week(s)  [] Nurse Visit:     [] Discharge from [facility]: [] Healed            [] Refer to Provider:    Follow-up Information:  [] Ordered Tests:   [x] Referrals: [facility]   [x] Rx: Cipro to pharmacy  [x] Other: Lymph pump 2-3 times daily x 1 hour each  [] Other:     Wound Cleansing:   Do not scrub or use excessive force.  Cleanse wound prior to applying a clean dressing with:  [] Normal Saline   [x] Keep Wound Dry in Shower     [] Wound Cleanser   [x] Cleanse wound with Mild Soap & Water on dressing change days  [] Other:       Topical Treatments:  Do not apply lotions, creams, or ointments to wound bed unless directed.   [] Apply moisturizing lotion to skin surrounding the wound prior to dressing change.  [] Apply antifungal ointment to skin surrounding the wound prior to dressing change.  [] Apply thin film of moisture barrier ointment to skin immediately around wound.  [] Apply Betadine to skim immediately around wound      Dressings:           Wound Location R Leg   [x] Apply Primary Dressing:       [] MediHoney Gel [] MediHoney Alginate  [x] Calcium Alginate with Silver   [] Calcium Alginate without silver   [] Collagen with silver  [] Collagen without Silver    [] Santyl with moist saline gauze     [] Hydrofera Blue (cut to size and moistened with normal saline)  [] Hydrofera Blue Ready (cut to size)      [] Normal Saline wet to dry  [] Betadine wet to dry    [] Hydrogel  [] Mepitel     [] Bactroban/Mupirocin   [] Iodoform Packing Strip [] Plain Packing Strip   []

## 2025-02-03 NOTE — WOUND CARE
Multilayer Compression Wrap   (Not Unna) Below the Knee    NAME:  Ramin Brown Jr.  YOB: 1945  MEDICAL RECORD NUMBER:  [id number]  DATE:  2/3/2025    Multilayer compression wrap: Removed old Multilayer wrap if indicated and wash leg with mild soap/water.  Applied moisturizing agent to dry skin as needed.   Applied primary and secondary dressing as ordered.  Applied multilayered dressing below the knee to right lower leg.  Applied multilayered dressing below the knee to left lower leg.  Instructed patient/caregiver not to remove dressing and to keep it clean and dry.   Instructed patient/caregiver on complications to report to provider, such as pain, numbness in toes, heavy drainage, and slippage of dressing.  Instructed patient on purpose of compression dressing and on activity and exercise recommendations.      Electronically signed by Jaimie Drummond RN on 2/3/2025 at 12:09 PM

## 2025-02-03 NOTE — DISCHARGE INSTRUCTIONS
Wound Clinic Physician Orders and Discharge Instructions  [facility]  [address], [address]  [place] [address]  Telephone: [phone]     FAX [phone]    NAME:  Ramin Brown Jr.  YOB: 1945  MEDICAL RECORD NUMBER:  [id number]  DATE:  2/3/2025      Return Appointment:  [] Dressing Supply Provider:   [x] Home Healthcare: [facility]. Fax [phone]  [x] Return Appointment: 3 Week(s)  [] Nurse Visit:     [] Discharge from [facility]: [] Healed            [] Refer to Provider:    Follow-up Information:  [] Ordered Tests:   [x] Referrals: [facility]   [x] Rx: Cipro to pharmacy  [x] Other: Lymph pump 2-3 times daily x 1 hour each  [] Other:     Wound Cleansing:   Do not scrub or use excessive force.  Cleanse wound prior to applying a clean dressing with:  [] Normal Saline   [x] Keep Wound Dry in Shower     [] Wound Cleanser   [x] Cleanse wound with Mild Soap & Water on dressing change days  [] Other:       Topical Treatments:  Do not apply lotions, creams, or ointments to wound bed unless directed.   [] Apply moisturizing lotion to skin surrounding the wound prior to dressing change.  [] Apply antifungal ointment to skin surrounding the wound prior to dressing change.  [] Apply thin film of moisture barrier ointment to skin immediately around wound.  [] Apply Betadine to skim immediately around wound      Dressings:           Wound Location R Leg   [x] Apply Primary Dressing:       [] MediHoney Gel [] MediHoney Alginate  [x] Calcium Alginate with Silver   [] Calcium Alginate without silver   [] Collagen with silver  [] Collagen without Silver    [] Santyl with moist saline gauze     [] Hydrofera Blue (cut to size and moistened with normal saline)  [] Hydrofera Blue Ready (cut to size)      [] Normal Saline wet to dry  [] Betadine wet to dry    [] Hydrogel  [] Mepitel     [] Bactroban/Mupirocin   [] Iodoform Packing Strip [] Plain Packing Strip   []

## 2025-02-24 ENCOUNTER — HOSPITAL ENCOUNTER (OUTPATIENT)
Facility: HOSPITAL | Age: 80
Discharge: HOME | End: 2025-02-24
Attending: SPECIALIST | Admitting: SPECIALIST
Payer: MEDICARE

## 2025-02-24 VITALS
SYSTOLIC BLOOD PRESSURE: 159 MMHG | HEART RATE: 84 BPM | TEMPERATURE: 97.2 F | RESPIRATION RATE: 20 BRPM | DIASTOLIC BLOOD PRESSURE: 86 MMHG

## 2025-02-24 DIAGNOSIS — L97.812 NON-PRESSURE CHRONIC ULCER OF OTHER PART OF RIGHT LOWER LEG WITH FAT LAYER EXPOSED (HCC): ICD-10-CM

## 2025-02-24 DIAGNOSIS — L97.822 NON-PRESSURE CHRONIC ULCER OF OTHER PART OF LEFT LOWER LEG WITH FAT LAYER EXPOSED (HCC): Primary | ICD-10-CM

## 2025-02-24 PROCEDURE — 29581 APPL MULTLAYER CMPRN SYS LEG: CPT

## 2025-02-24 NOTE — DISCHARGE INSTRUCTIONS
Plain Packing Strip   [] Skin Sub:   [] Other: Drawtex    [x] Cover and Secure with:     [x] Gauze [] Demetrius [] Kerlix   [] Foam  [x] Super Absorbant    [x] ABD     [] Ace Wrap [] Other:    Limit contact of tape with skin.    [x] Change dressing: [] Daily    [] Every Other Day   [x] Twice per week   [] Three times per week   [] Once a week [] Do Not Change Dressing   [] Other:    Dressings:           Wound Location L Leg   [x] Apply Primary Dressing:       [] MediHoney Gel [] MediHoney Alginate  [x] Calcium Alginate with Silver  [] Calcium Alginate without silver   [] Collagen with silver 1st [] Collagen without Silver    [] Santyl with moist saline gauze     [] Hydrofera Blue (cut to size and moistened with normal saline)  [] Hydrofera Blue Ready 2nd (cut to size)      [] Normal Saline wet to dry  [] Betadine wet to dry    [] Hydrogel  [] Mepitel     [] Bactroban/Mupirocin   [] Iodoform Packing Strip [] Plain Packing Strip   [] Skin Sub:   [] Other: Drawtex     [x] Cover and Secure with:     [x] Gauze [] Demetrius [] Kerlix   [] Foam  [x] Super Absorbant    [x] ABD     [] Ace Wrap [] Other:    Limit contact of tape with skin.    [x] Change dressing: [] Daily    [] Every Other Day   [x] Twice per week   [] Three times per week   [] Once a week [] Do Not Change Dressing   [] Other:     Edema Control:  Apply: [] Compression Stocking:  mmHg  []Right Leg []Left Leg   [] Tubigrip []Right Leg Double Layer []Left Leg Double Layer      []Right Leg Single Layer []Left Leg Single Layer      [] Elevate leg(s) above the level of the heart when sitting.    [] Avoid prolonged standing in one place.     Compression:  Apply: [x] Compression Wrap to [x]RightLeg [x]Left Leg    [x]  Coflex [] Coflex Lite  [] Unna with Calamine Lite      [x] Do not get leg(s) with wrap wet. If wrap becomes too tight, call the wound center and remove wrap from leg by unrolling each layer.   [x] Elevate leg(s) above the level of the heart when sitting.    [x]

## 2025-02-24 NOTE — PROGRESS NOTES
[facility]   Wound Care and Hyperbaric Oxygen Therapy Center   Medical Staff Note     Ramin Brown Jr.  MEDICAL RECORD NUMBER:  [id number]  AGE: 79 y.o.   GENDER: male  : 1945  EPISODE DATE:  2025      Chief Complaint:   Chief Complaint   Patient presents with    Wound Check     R & L leg       History of Present Illness:  Ramin Brown Jr. is a 79 y.o. male who presents today for wound/ulcer evaluationof bilateral lower extremity lymphedema with bilateral leg ulcers.  Wound culture showed gram-negative rods eventhough the final culture was suggestive of skin patty and hence Augmentin started for 2-week.     He states that the drainage is reduced.  No fever or chills.  He is unable to afford to go to lymphedema clinic because of high co-pay.  He has been applying Hydrofera Blue to bilateral lower extremity open wounds.          History of Wound Context: Per original history and physical on this patient. Changes in history since last evaluation: none     Wound: Bilateral lower extremity lymphedema with ulcers.     Past Medical History:       Diagnosis Date    Dyslipidemia     Dyslipidemia     HTN (hypertension)     HTN (hypertension)        Past Surgical History:   Past Surgical History:   Procedure Laterality Date    CHOLECYSTECTOMY         Allergy:No Known Allergies    Social History:   Social History     Tobacco Use    Smoking status: Never    Smokeless tobacco: Never   Vaping Use    Vaping status: Never Used   Substance Use Topics    Alcohol use: Never    Drug use: Never       Family History:   Family History   Problem Relation Age of Onset    Diabetes Mother     Diabetes Brother     Diabetes Son        Current Medications:  Current Outpatient Medications on File Prior to Encounter   Medication Sig Dispense Refill    Omega-3 Fatty Acids (FISH OIL OMEGA-3 PO) Take by mouth      aspirin 325 MG tablet Take 1 tablet by mouth daily      furosemide (LASIX) 40 MG tablet Take 1

## 2025-02-24 NOTE — WOUND CARE
Compression Garments    Supply Company for Compression Stockings:     [employer] [address] [place] [address] f: [phone] f: [phone] p: [phone] [email or web address]      Ordering Center:     [facility]  [address] [place] [address] [phone]  WOUND CARE Dept: [phone]   FAX NUMBER [phone]    Patient Information:      Ramin Brown Jr.  [address] [facility] [address] [phone]   : 1945  AGE: 79 y.o.     GENDER: male   TODAYS DATE:  2025    Insurance:      PRIMARY INSURANCE:  Plan: [employer] MEDICARE  Coverage: [employer] MEDICARE  Effective Date: 2018  Group Number: [unfilled]  Subscriber Number: [id number] - (Medicare Managed)    Payer/Plan Subscr  Sex Relation Sub. Ins. ID Effective Group Num   1. [employer] MED[employer]* RAMIN BROWN * 1945 Male Self [id number]                                   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 
Multilayer Compression Wrap   (Not Unna) Below the Knee    NAME:  Ramin Brown Jr.  YOB: 1945  MEDICAL RECORD NUMBER:  [id number]  DATE:  2/24/2025    Multilayer compression wrap: Removed old Multilayer wrap if indicated and wash leg with mild soap/water.  Applied primary and secondary dressing as ordered.  Applied multilayered dressing below the knee to right lower leg.  Applied multilayered dressing below the knee to left lower leg.  Instructed patient/caregiver not to remove dressing and to keep it clean and dry.   Instructed patient/caregiver on complications to report to provider, such as pain, numbness in toes, heavy drainage, and slippage of dressing.  Instructed patient on purpose of compression dressing and on activity and exercise recommendations.      Electronically signed by Miranda Barry RN on 2/24/2025 at 11:49 AM  
Plain Packing Strip   [] Skin Sub:   [] Other: Drawtex    [x] Cover and Secure with:     [x] Gauze [] Demetrius [] Kerlix   [] Foam  [x] Super Absorbant    [x] ABD     [] Ace Wrap [] Other:    Limit contact of tape with skin.    [x] Change dressing: [] Daily    [] Every Other Day   [x] Twice per week   [] Three times per week   [] Once a week [] Do Not Change Dressing   [] Other:    Dressings:           Wound Location L Leg   [x] Apply Primary Dressing:       [] MediHoney Gel [] MediHoney Alginate  [x] Calcium Alginate with Silver  [] Calcium Alginate without silver   [] Collagen with silver 1st [] Collagen without Silver    [] Santyl with moist saline gauze     [] Hydrofera Blue (cut to size and moistened with normal saline)  [] Hydrofera Blue Ready 2nd (cut to size)      [] Normal Saline wet to dry  [] Betadine wet to dry    [] Hydrogel  [] Mepitel     [] Bactroban/Mupirocin   [] Iodoform Packing Strip [] Plain Packing Strip   [] Skin Sub:   [] Other: Drawtex     [x] Cover and Secure with:     [x] Gauze [] Demetrius [] Kerlix   [] Foam  [x] Super Absorbant    [x] ABD     [] Ace Wrap [] Other:    Limit contact of tape with skin.    [x] Change dressing: [] Daily    [] Every Other Day   [x] Twice per week   [] Three times per week   [] Once a week [] Do Not Change Dressing   [] Other:     Edema Control:  Apply: [] Compression Stocking:  mmHg  []Right Leg []Left Leg   [] Tubigrip []Right Leg Double Layer []Left Leg Double Layer      []Right Leg Single Layer []Left Leg Single Layer      [] Elevate leg(s) above the level of the heart when sitting.    [] Avoid prolonged standing in one place.     Compression:  Apply: [x] Compression Wrap to [x]RightLeg [x]Left Leg    [x]  Coflex [] Coflex Lite  [] Unna with Calamine Lite      [x] Do not get leg(s) with wrap wet. If wrap becomes too tight, call the wound center and remove wrap from leg by unrolling each layer.   [x] Elevate leg(s) above the level of the heart when sitting.    [x]

## 2025-03-10 ENCOUNTER — HOSPITAL ENCOUNTER (OUTPATIENT)
Facility: HOSPITAL | Age: 80
Discharge: HOME | End: 2025-03-10
Attending: SPECIALIST | Admitting: SPECIALIST
Payer: MEDICARE

## 2025-03-10 VITALS
RESPIRATION RATE: 20 BRPM | TEMPERATURE: 97.3 F | SYSTOLIC BLOOD PRESSURE: 142 MMHG | HEART RATE: 80 BPM | DIASTOLIC BLOOD PRESSURE: 81 MMHG

## 2025-03-10 DIAGNOSIS — L97.822 NON-PRESSURE CHRONIC ULCER OF OTHER PART OF LEFT LOWER LEG WITH FAT LAYER EXPOSED (HCC): Primary | ICD-10-CM

## 2025-03-10 DIAGNOSIS — L97.812 NON-PRESSURE CHRONIC ULCER OF OTHER PART OF RIGHT LOWER LEG WITH FAT LAYER EXPOSED (HCC): ICD-10-CM

## 2025-03-10 PROCEDURE — 29581 APPL MULTLAYER CMPRN SYS LEG: CPT

## 2025-03-10 NOTE — DISCHARGE INSTRUCTIONS
Wound Clinic Physician Orders and Discharge Instructions  [facility]  [address], [address]  [place] [address]  Telephone: [phone]     FAX [phone]    NAME:  Ramin Brown Jr.  YOB: 1945  MEDICAL RECORD NUMBER:  [id number]  DATE:  3/10/2025      Return Appointment:  [] Dressing Supply Provider:   [x] Home Healthcare: [facility]. Fax [phone]  [x] Return Appointment: 2 Week(s) Bring velcro compression next visit  [] Nurse Visit:     [] Discharge from [facility]: [] Healed            [] Refer to Provider:    Follow-up Information:  [] Ordered Tests:   [x] Referrals: [facility]  5/7/25  [] Rx:   [x] Other: Lymph pump 2-3 times daily x 1 hour each  [] Other:      Wound Cleansing:   Do not scrub or use excessive force.  Cleanse wound prior to applying a clean dressing with:  [] Normal Saline   [x] Keep Wound Dry in Shower     [] Wound Cleanser   [x] Cleanse wound with Mild Soap & Water on dressing change days  [] Other:       Topical Treatments:  Do not apply lotions, creams, or ointments to wound bed unless directed.   [] Apply moisturizing lotion to skin surrounding the wound prior to dressing change.  [] Apply antifungal ointment to skin surrounding the wound prior to dressing change.  [] Apply thin film of moisture barrier ointment to skin immediately around wound.  [] Apply Betadine to skim immediately around wound      Dressings:           Wound Location R Leg   [x] Apply Primary Dressing:       [] MediHoney Gel [] MediHoney Alginate  [x] Calcium Alginate with Silver   [] Calcium Alginate without silver   [] Collagen with silver  [] Collagen without Silver    [] Santyl with moist saline gauze     [] Hydrofera Blue (cut to size and moistened with normal saline)  [] Hydrofera Blue Ready (cut to size)      [] Normal Saline wet to dry  [] Betadine wet to dry    [] Hydrogel  [] Mepitel     [] Bactroban/Mupirocin   [] Iodoform Packing Strip []

## 2025-03-10 NOTE — PROGRESS NOTES
Hydrogel  [] Mepitel     [] Bactroban/Mupirocin   [] Iodoform Packing Strip [] Plain Packing Strip   [] Skin Sub:   [] Other: Drawtex    [x] Cover and Secure with:     [x] Gauze [] Demetrius [] Kerlix   [] Foam  [x] Super Absorbant    [x] ABD     [] Ace Wrap [] Other:    Limit contact of tape with skin.    [x] Change dressing: [] Daily    [] Every Other Day   [x] Twice per week   [] Three times per week   [] Once a week [] Do Not Change Dressing   [] Other:    Dressings:           Wound Location L Leg   [x] Apply Primary Dressing:       [] MediHoney Gel [] MediHoney Alginate  [x] Calcium Alginate with Silver  [] Calcium Alginate without silver   [] Collagen with silver 1st [] Collagen without Silver    [] Santyl with moist saline gauze     [] Hydrofera Blue (cut to size and moistened with normal saline)  [] Hydrofera Blue Ready 2nd (cut to size)      [] Normal Saline wet to dry  [] Betadine wet to dry    [] Hydrogel  [] Mepitel     [] Bactroban/Mupirocin   [] Iodoform Packing Strip [] Plain Packing Strip   [] Skin Sub:   [] Other: Drawtex     [x] Cover and Secure with:     [x] Gauze [] Demetrius [] Kerlix   [] Foam  [x] Super Absorbant    [x] ABD     [] Ace Wrap [] Other:    Limit contact of tape with skin.    [x] Change dressing: [] Daily    [] Every Other Day   [x] Twice per week   [] Three times per week   [] Once a week [] Do Not Change Dressing   [] Other:     Edema Control:  Apply: [] Compression Stocking:  mmHg  []Right Leg []Left Leg   [] Tubigrip []Right Leg Double Layer []Left Leg Double Layer      []Right Leg Single Layer []Left Leg Single Layer      [] Elevate leg(s) above the level of the heart when sitting.    [] Avoid prolonged standing in one place.     Compression:  Apply: [x] Compression Wrap to [x]RightLeg [x]Left Leg    [x]  Coflex [] Coflex Lite  [] Unna with Calamine Lite      [x] Do not get leg(s) with wrap wet. If wrap becomes too tight, call the wound center and remove wrap from leg by unrolling

## 2025-03-10 NOTE — WOUND CARE
Wound Clinic Physician Orders and Discharge Instructions  [facility]  [address], [address]  [place] [address]  Telephone: [phone]     FAX [phone]    NAME:  Ramin Brown Jr.  YOB: 1945  MEDICAL RECORD NUMBER:  [id number]  DATE:  3/10/2025      Return Appointment:  [] Dressing Supply Provider:   [x] Home Healthcare: [facility]. Fax [phone]  [x] Return Appointment: 2 Week(s) Bring velcro compression next visit  [] Nurse Visit:     [] Discharge from [facility]: [] Healed            [] Refer to Provider:    Follow-up Information:  [] Ordered Tests:   [x] Referrals: [facility]  5/7/25  [] Rx:   [x] Other: Lymph pump 2-3 times daily x 1 hour each  [] Other:      Wound Cleansing:   Do not scrub or use excessive force.  Cleanse wound prior to applying a clean dressing with:  [] Normal Saline   [x] Keep Wound Dry in Shower     [] Wound Cleanser   [x] Cleanse wound with Mild Soap & Water on dressing change days  [] Other:       Topical Treatments:  Do not apply lotions, creams, or ointments to wound bed unless directed.   [] Apply moisturizing lotion to skin surrounding the wound prior to dressing change.  [] Apply antifungal ointment to skin surrounding the wound prior to dressing change.  [] Apply thin film of moisture barrier ointment to skin immediately around wound.  [] Apply Betadine to skim immediately around wound      Dressings:           Wound Location R Leg   [x] Apply Primary Dressing:       [] MediHoney Gel [] MediHoney Alginate  [x] Calcium Alginate with Silver   [] Calcium Alginate without silver   [] Collagen with silver  [] Collagen without Silver    [] Santyl with moist saline gauze     [] Hydrofera Blue (cut to size and moistened with normal saline)  [] Hydrofera Blue Ready (cut to size)      [] Normal Saline wet to dry  [] Betadine wet to dry    [] Hydrogel  [] Mepitel     [] Bactroban/Mupirocin   [] Iodoform Packing

## 2025-03-10 NOTE — WOUND CARE
Multilayer Compression Wrap   (Not Unna) Below the Knee    NAME:  Ramin Brown Jr.  YOB: 1945  MEDICAL RECORD NUMBER:  [id number]  DATE:  3/10/2025    Multilayer compression wrap: Removed old Multilayer wrap if indicated and wash leg with mild soap/water.  Applied moisturizing agent to dry skin as needed.   Applied primary and secondary dressing as ordered.  Applied multilayered dressing below the knee to right lower leg.  Applied multilayered dressing below the knee to left lower leg.  Instructed patient/caregiver not to remove dressing and to keep it clean and dry.   Instructed patient/caregiver on complications to report to provider, such as pain, numbness in toes, heavy drainage, and slippage of dressing.  Instructed patient on purpose of compression dressing and on activity and exercise recommendations.      Electronically signed by Miranda Barry RN on 3/10/2025 at 12:08 PM

## 2025-03-24 ENCOUNTER — HOSPITAL ENCOUNTER (OUTPATIENT)
Facility: HOSPITAL | Age: 80
Discharge: HOME | End: 2025-03-24
Attending: SPECIALIST | Admitting: SPECIALIST
Payer: MEDICARE

## 2025-03-24 VITALS
TEMPERATURE: 97.2 F | RESPIRATION RATE: 20 BRPM | SYSTOLIC BLOOD PRESSURE: 134 MMHG | DIASTOLIC BLOOD PRESSURE: 79 MMHG | HEART RATE: 117 BPM

## 2025-03-24 DIAGNOSIS — L97.812 NON-PRESSURE CHRONIC ULCER OF OTHER PART OF RIGHT LOWER LEG WITH FAT LAYER EXPOSED: ICD-10-CM

## 2025-03-24 DIAGNOSIS — L97.822 NON-PRESSURE CHRONIC ULCER OF OTHER PART OF LEFT LOWER LEG WITH FAT LAYER EXPOSED: Primary | ICD-10-CM

## 2025-03-24 PROCEDURE — 99213 OFFICE O/P EST LOW 20 MIN: CPT

## 2025-03-24 RX ORDER — MUPIROCIN 20 MG/G
OINTMENT TOPICAL ONCE
Status: CANCELLED | OUTPATIENT
Start: 2025-03-24 | End: 2025-03-24

## 2025-03-24 RX ORDER — TRIAMCINOLONE ACETONIDE 1 MG/G
OINTMENT TOPICAL ONCE
OUTPATIENT
Start: 2025-03-24 | End: 2025-03-24

## 2025-03-24 RX ORDER — TRIAMCINOLONE ACETONIDE 1 MG/G
OINTMENT TOPICAL ONCE
Status: CANCELLED | OUTPATIENT
Start: 2025-03-24 | End: 2025-03-24

## 2025-03-24 RX ORDER — SODIUM CHLOR/HYPOCHLOROUS ACID 0.033 %
SOLUTION, IRRIGATION IRRIGATION ONCE
OUTPATIENT
Start: 2025-03-24 | End: 2025-03-24

## 2025-03-24 RX ORDER — SILVER SULFADIAZINE 10 MG/G
CREAM TOPICAL ONCE
Status: CANCELLED | OUTPATIENT
Start: 2025-03-24 | End: 2025-03-24

## 2025-03-24 RX ORDER — MUPIROCIN 20 MG/G
OINTMENT TOPICAL ONCE
OUTPATIENT
Start: 2025-03-24 | End: 2025-03-24

## 2025-03-24 RX ORDER — LIDOCAINE HYDROCHLORIDE 20 MG/ML
JELLY TOPICAL ONCE
Status: CANCELLED | OUTPATIENT
Start: 2025-03-24 | End: 2025-03-24

## 2025-03-24 RX ORDER — SILVER SULFADIAZINE 10 MG/G
CREAM TOPICAL ONCE
OUTPATIENT
Start: 2025-03-24 | End: 2025-03-24

## 2025-03-24 RX ORDER — SODIUM CHLOR/HYPOCHLOROUS ACID 0.033 %
SOLUTION, IRRIGATION IRRIGATION ONCE
Status: CANCELLED | OUTPATIENT
Start: 2025-03-24 | End: 2025-03-24

## 2025-03-24 RX ORDER — LIDOCAINE HYDROCHLORIDE 20 MG/ML
JELLY TOPICAL ONCE
OUTPATIENT
Start: 2025-03-24 | End: 2025-03-24

## 2025-03-24 NOTE — DISCHARGE INSTRUCTIONS
Wound Clinic Physician Orders and Discharge Instructions  [facility]  [address], [address]  [place] [address]  Telephone: [phone]     FAX [phone]    NAME:  Ramin Brown Jr.  YOB: 1945  MEDICAL RECORD NUMBER:  [id number]  DATE:  3/24/2025      Return Appointment:  [] Dressing Supply Provider:   [x] Home Healthcare: [facility]. Fax [phone]  [x] Return Appointment: 1 Week(s)   [] Nurse Visit:     [] Discharge from [facility]: [] Healed            [] Refer to Provider:    Follow-up Information:  [] Ordered Tests:   [x] Referrals: [facility]  5/7/25  [] Rx:   [x] Other: Lymph pump 2-3 times daily x 1 hour each  [] Other:      Wound Cleansing:   Do not scrub or use excessive force.  Cleanse wound prior to applying a clean dressing with:  [] Normal Saline   [x] Keep Wound Dry in Shower     [] Wound Cleanser   [x] Cleanse wound with Mild Soap & Water on dressing change days  [] Other:       Topical Treatments:  Do not apply lotions, creams, or ointments to wound bed unless directed.   [] Apply moisturizing lotion to skin surrounding the wound prior to dressing change.  [] Apply antifungal ointment to skin surrounding the wound prior to dressing change.  [] Apply thin film of moisture barrier ointment to skin immediately around wound.  [] Apply Betadine to skim immediately around wound      Dressings:           Wound Location R Leg   [x] Apply Primary Dressing:       [] MediHoney Gel [] MediHoney Alginate  [x] Calcium Alginate with Silver   [] Calcium Alginate without silver   [] Collagen with silver  [] Collagen without Silver    [] Santyl with moist saline gauze     [] Hydrofera Blue (cut to size and moistened with normal saline)  [] Hydrofera Blue Ready (cut to size)      [] Normal Saline wet to dry  [] Betadine wet to dry    [] Hydrogel  [] Mepitel     [] Bactroban/Mupirocin   [] Iodoform Packing Strip [] Plain Packing Strip   [] Skin Sub:

## 2025-03-24 NOTE — PROGRESS NOTES
[facility]   Wound Care and Hyperbaric Oxygen Therapy Center   Medical Staff Note     Ramin Brown Jr.  MEDICAL RECORD NUMBER:  [id number]  AGE: 79 y.o.   GENDER: male  : 1945  EPISODE DATE:  3/24/2025      Chief Complaint:   Chief Complaint   Patient presents with    Wound Check     R & L leg       History of Present Illness:  Ramin Brown Jr. is a 79 y.o. male who presents today for wound/ulcer evaluation of bilateral lower extremity lymphedema with bilateral leg ulcers.  Wound culture showed gram-negative rods eventhough the final culture was suggestive of skin patty and hence Augmentin started for 2-week.     He states that he still has drainage from bilateral lower extremity wounds. No fever or chills.  He is unable to afford to go to lymphedema clinic because of high co-pay.  He has been applying Hydrofera Blue to bilateral lower extremity open wounds.  He has an appointment with the vascular clinic at [facility].    He has brought the juxta light (velcro) compression wrap. The unna boot had cut through right lower leg.     History of Wound Context: Per original history and physical on this patient. Changes in history since last evaluation: none     Wound: Bilateral lower extremity lymphedema with ulcers.     Past Medical History:       Diagnosis Date    Dyslipidemia     Dyslipidemia     HTN (hypertension)     HTN (hypertension)        Past Surgical History:   Past Surgical History:   Procedure Laterality Date    CHOLECYSTECTOMY         Allergy:No Known Allergies    Social History:   Social History     Tobacco Use    Smoking status: Never    Smokeless tobacco: Never   Vaping Use    Vaping status: Never Used   Substance Use Topics    Alcohol use: Never    Drug use: Never       Family History:   Family History   Problem Relation Age of Onset    Diabetes Mother     Diabetes Brother     Diabetes Son        Current Medications:  Current Outpatient Medications on File 
show

## 2025-03-24 NOTE — WOUND CARE
Wound Clinic Physician Orders and Discharge Instructions  [facility]  [address], [address]  [place] [address]  Telephone: [phone]     FAX [phone]    NAME:  Ramin Brown Jr.  YOB: 1945  MEDICAL RECORD NUMBER:  [id number]  DATE:  3/24/2025      Return Appointment:  [] Dressing Supply Provider:   [x] Home Healthcare: [facility]. Fax [phone]  [x] Return Appointment: 1 Week(s)   [] Nurse Visit:     [] Discharge from [facility]: [] Healed            [] Refer to Provider:    Follow-up Information:  [] Ordered Tests:   [x] Referrals: [facility]  5/7/25  [] Rx:   [x] Other: Lymph pump 2-3 times daily x 1 hour each  [] Other:      Wound Cleansing:   Do not scrub or use excessive force.  Cleanse wound prior to applying a clean dressing with:  [] Normal Saline   [x] Keep Wound Dry in Shower     [] Wound Cleanser   [x] Cleanse wound with Mild Soap & Water on dressing change days  [] Other:       Topical Treatments:  Do not apply lotions, creams, or ointments to wound bed unless directed.   [] Apply moisturizing lotion to skin surrounding the wound prior to dressing change.  [] Apply antifungal ointment to skin surrounding the wound prior to dressing change.  [] Apply thin film of moisture barrier ointment to skin immediately around wound.  [] Apply Betadine to skim immediately around wound      Dressings:           Wound Location R Leg   [x] Apply Primary Dressing:       [] MediHoney Gel [] MediHoney Alginate  [x] Calcium Alginate with Silver   [] Calcium Alginate without silver   [] Collagen with silver  [] Collagen without Silver    [] Santyl with moist saline gauze     [] Hydrofera Blue (cut to size and moistened with normal saline)  [] Hydrofera Blue Ready (cut to size)      [] Normal Saline wet to dry  [] Betadine wet to dry    [] Hydrogel  [] Mepitel     [] Bactroban/Mupirocin   [] Iodoform Packing Strip [] Plain Packing Strip   [] Skin  Skin normal color for race, warm, dry and intact. No evidence of rash.

## 2025-03-31 ENCOUNTER — HOSPITAL ENCOUNTER (OUTPATIENT)
Facility: HOSPITAL | Age: 80
Discharge: HOME | End: 2025-03-31
Attending: SPECIALIST | Admitting: SPECIALIST
Payer: MEDICARE

## 2025-03-31 VITALS
HEART RATE: 99 BPM | SYSTOLIC BLOOD PRESSURE: 122 MMHG | RESPIRATION RATE: 20 BRPM | DIASTOLIC BLOOD PRESSURE: 71 MMHG | TEMPERATURE: 97.5 F

## 2025-03-31 DIAGNOSIS — L97.822 NON-PRESSURE CHRONIC ULCER OF OTHER PART OF LEFT LOWER LEG WITH FAT LAYER EXPOSED: ICD-10-CM

## 2025-03-31 DIAGNOSIS — L97.812 NON-PRESSURE CHRONIC ULCER OF OTHER PART OF RIGHT LOWER LEG WITH FAT LAYER EXPOSED: Primary | ICD-10-CM

## 2025-03-31 PROCEDURE — 99213 OFFICE O/P EST LOW 20 MIN: CPT

## 2025-03-31 RX ORDER — TRIAMCINOLONE ACETONIDE 1 MG/G
OINTMENT TOPICAL ONCE
OUTPATIENT
Start: 2025-03-31 | End: 2025-03-31

## 2025-03-31 RX ORDER — SODIUM CHLOR/HYPOCHLOROUS ACID 0.033 %
SOLUTION, IRRIGATION IRRIGATION ONCE
OUTPATIENT
Start: 2025-03-31 | End: 2025-03-31

## 2025-03-31 RX ORDER — SILVER SULFADIAZINE 10 MG/G
CREAM TOPICAL ONCE
OUTPATIENT
Start: 2025-03-31 | End: 2025-03-31

## 2025-03-31 RX ORDER — LIDOCAINE HYDROCHLORIDE 20 MG/ML
JELLY TOPICAL ONCE
OUTPATIENT
Start: 2025-03-31 | End: 2025-03-31

## 2025-03-31 RX ORDER — MUPIROCIN 20 MG/G
OINTMENT TOPICAL ONCE
OUTPATIENT
Start: 2025-03-31 | End: 2025-03-31

## 2025-03-31 NOTE — DISCHARGE INSTRUCTIONS
Skin Sub:   [] Other: Drawtex    [x] Cover and Secure with:     [x] Gauze [] Demetrius [x] Kerlix   [] Foam  [] Super Absorbant    [x] ABD     [] Ace Wrap [] Other:    Limit contact of tape with skin.    [x] Change dressing: [] Daily    [] Every Other Day   [x] Twice per week   [] Three times per week   [] Once a week [] Do Not Change Dressing   [] Other:    Dressings:           Wound Location L Leg   [x] Apply Primary Dressing:       [] MediHoney Gel [] MediHoney Alginate  [x] Calcium Alginate with Silver  [] Calcium Alginate without silver   [] Collagen with silver 1st [] Collagen without Silver    [] Santyl with moist saline gauze     [] Hydrofera Blue (cut to size and moistened with normal saline)  [] Hydrofera Blue Ready 2nd (cut to size)      [] Normal Saline wet to dry  [] Betadine wet to dry    [] Hydrogel  [] Mepitel     [] Bactroban/Mupirocin   [] Iodoform Packing Strip [] Plain Packing Strip   [] Skin Sub:   [] Other: Drawtex     [x] Cover and Secure with:     [x] Gauze [] Demetrius [x] Kerlix   [] Foam  [] Super Absorbant    [x] ABD     [] Ace Wrap [] Other:    Limit contact of tape with skin.    [x] Change dressing: [] Daily    [] Every Other Day   [x] Twice per week   [] Three times per week   [] Once a week [] Do Not Change Dressing   [] Other:     Edema Control:  Apply: [x] Compression Stocking:  Velcro Compression Wraps  [x]Right Leg [x]Left Leg   [] Tubigrip []Right Leg Double Layer []Left Leg Double Layer      []Right Leg Single Layer []Left Leg Single Layer      [x] Elevate leg(s) above the level of the heart when sitting.    [x] Avoid prolonged standing in one place.     Compression:  Apply: [] Compression Wrap to []RightLeg []Left Leg    []  Coflex [] Coflex Lite  [] Unna with Calamine Lite      [] Do not get leg(s) with wrap wet. If wrap becomes too tight, call the wound center and remove wrap from leg by unrolling each layer.   [] Elevate leg(s) above the level of the heart when sitting.    [] Avoid

## 2025-03-31 NOTE — WOUND CARE
n     Wound Clinic Physician Orders and Discharge Instructions  [facility]  [address], [address]  [place] [address]  Telephone: [phone]     FAX [phone]    NAME:  Ramin Brown Jr.  YOB: 1945  MEDICAL RECORD NUMBER:  [id number]  DATE:  3/31/2025      Return Appointment:  [] Dressing Supply Provider:   [x] Home Healthcare: [facility]. Fax [phone]  [x] Return Appointment: 4 Week(s)   [] Nurse Visit:     [] Discharge from [facility]: [] Healed            [] Refer to Provider:    Follow-up Information:  [] Ordered Tests:   [x] Referrals: [facility]  5/7/25  [] Rx:   [x] Other: Lymph pump 2-3 times daily x 1 hour each  [] Other:      Wound Cleansing:   Do not scrub or use excessive force.  Cleanse wound prior to applying a clean dressing with:  [] Normal Saline   [x] Keep Wound Dry in Shower     [] Wound Cleanser   [x] Cleanse wound with Mild Soap & Water on dressing change days  [] Other:       Topical Treatments:  Do not apply lotions, creams, or ointments to wound bed unless directed.   [] Apply moisturizing lotion to skin surrounding the wound prior to dressing change.  [] Apply antifungal ointment to skin surrounding the wound prior to dressing change.  [] Apply thin film of moisture barrier ointment to skin immediately around wound.  [] Apply Betadine to skim immediately around wound      Dressings:           Wound Location R Leg   [x] Apply Primary Dressing:       [] MediHoney Gel [] MediHoney Alginate  [x] Calcium Alginate with Silver   [] Calcium Alginate without silver   [] Collagen with silver  [] Collagen without Silver    [] Santyl with moist saline gauze     [] Hydrofera Blue (cut to size and moistened with normal saline)  [] Hydrofera Blue Ready (cut to size)      [] Normal Saline wet to dry  [] Betadine wet to dry    [] Hydrogel  [] Mepitel     [] Bactroban/Mupirocin   [] Iodoform Packing Strip [] Plain Packing

## 2025-03-31 NOTE — PROGRESS NOTES
[facility]   Wound Care and Hyperbaric Oxygen Therapy Center   Medical Staff Note     Ramin Brown Jr.  MEDICAL RECORD NUMBER:  [id number]  AGE: 79 y.o.   GENDER: male  : 1945  EPISODE DATE:  3/31/2025      Chief Complaint:   Chief Complaint   Patient presents with    Wound Check     R & L leg       History of Present Illness:  Ramin Brown Jr. is a 79 y.o. male who presents today for wound/ulcer evaluation of bilateral lower extremity lymphedema with bilateral leg ulcers.  Wound culture showed gram-negative rods eventhough the final culture was suggestive of skin patty and hence Augmentin started for 2-week.     He states that he still has drainage from bilateral lower extremity wounds. No fever or chills.  He is unable to afford to go to lymphedema clinic because of high co-pay.  He has been applying Hydrofera Blue to bilateral lower extremity open wounds.  He has an appointment with the vascular clinic at [facility].    He has been using the juxta light (velcro) compression wrap. .     History of Wound Context: Per original history and physical on this patient. Changes in history since last evaluation: none     Wound: Bilateral lower extremity lymphedema with ulcers.     Past Medical History:       Diagnosis Date    Dyslipidemia     Dyslipidemia     HTN (hypertension)     HTN (hypertension)        Past Surgical History:   Past Surgical History:   Procedure Laterality Date    CHOLECYSTECTOMY         Allergy:No Known Allergies    Social History:   Social History     Tobacco Use    Smoking status: Never    Smokeless tobacco: Never   Vaping Use    Vaping status: Never Used   Substance Use Topics    Alcohol use: Never    Drug use: Never       Family History:   Family History   Problem Relation Age of Onset    Diabetes Mother     Diabetes Brother     Diabetes Son        Current Medications:  Current Outpatient Medications on File Prior to Encounter   Medication Sig Dispense

## 2025-04-28 ENCOUNTER — HOSPITAL ENCOUNTER (OUTPATIENT)
Facility: HOSPITAL | Age: 80
Discharge: HOME | End: 2025-04-28
Attending: SPECIALIST | Admitting: SPECIALIST
Payer: MEDICARE

## 2025-04-28 VITALS
HEART RATE: 84 BPM | RESPIRATION RATE: 20 BRPM | SYSTOLIC BLOOD PRESSURE: 158 MMHG | TEMPERATURE: 97.7 F | DIASTOLIC BLOOD PRESSURE: 82 MMHG

## 2025-04-28 DIAGNOSIS — L97.812 NON-PRESSURE CHRONIC ULCER OF OTHER PART OF RIGHT LOWER LEG WITH FAT LAYER EXPOSED (HCC): Primary | ICD-10-CM

## 2025-04-28 DIAGNOSIS — L97.822 NON-PRESSURE CHRONIC ULCER OF OTHER PART OF LEFT LOWER LEG WITH FAT LAYER EXPOSED (HCC): ICD-10-CM

## 2025-04-28 PROCEDURE — 99213 OFFICE O/P EST LOW 20 MIN: CPT

## 2025-04-28 RX ORDER — SODIUM CHLOR/HYPOCHLOROUS ACID 0.033 %
SOLUTION, IRRIGATION IRRIGATION ONCE
OUTPATIENT
Start: 2025-04-28 | End: 2025-04-28

## 2025-04-28 RX ORDER — SILVER SULFADIAZINE 10 MG/G
CREAM TOPICAL ONCE
OUTPATIENT
Start: 2025-04-28 | End: 2025-04-28

## 2025-04-28 RX ORDER — LIDOCAINE HYDROCHLORIDE 20 MG/ML
JELLY TOPICAL ONCE
OUTPATIENT
Start: 2025-04-28 | End: 2025-04-28

## 2025-04-28 RX ORDER — TRIAMCINOLONE ACETONIDE 1 MG/G
OINTMENT TOPICAL ONCE
OUTPATIENT
Start: 2025-04-28 | End: 2025-04-28

## 2025-04-28 RX ORDER — MUPIROCIN 20 MG/G
OINTMENT TOPICAL ONCE
OUTPATIENT
Start: 2025-04-28 | End: 2025-04-28

## 2025-04-28 NOTE — WOUND CARE
until we are again available, contact your PCP or go to the hospital emergency room.     PLEASE NOTE: IF YOU ARE UNABLE TO OBTAIN WOUND SUPPLIES, CONTINUE TO USE THE SUPPLIES YOU HAVE AVAILABLE UNTIL YOU ARE ABLE TO REACH US. IT IS MOST IMPORTANT TO KEEP THE WOUND COVERED AT ALL TIMES.

## 2025-04-28 NOTE — PROGRESS NOTES
silver   [] Collagen with silver  [] Collagen without Silver    [] Santyl with moist saline gauze     [] Hydrofera Blue (cut to size and moistened with normal saline)  [] Hydrofera Blue Ready (cut to size)      [] Normal Saline wet to dry  [] Betadine wet to dry    [] Hydrogel  [] Mepitel     [] Bactroban/Mupirocin   [] Iodoform Packing Strip [] Plain Packing Strip   [] Skin Sub:   [] Other: Drawtex    [x] Cover and Secure with:     [x] Gauze [] Demetrius [x] Kerlix   [] Foam  [] Super Absorbant    [x] ABD if needed     [] Ace Wrap [] Other:    Limit contact of tape with skin.    [x] Change dressing: [] Daily    [] Every Other Day   [x] Twice per week and as needed   [] Three times per week   [] Once a week [] Do Not Change Dressing   [] Other:     Edema Control:  Apply: [x] Compression Stocking:  Velcro Compression Wraps (Use tubigrip under velcro instead of sock) [x]Right Leg [x]Left Leg   [x] Tubigrip []Right Leg Double Layer []Left Leg Double Layer      [x]Right Leg Single Layer [x]Left Leg Single Layer      [x] Elevate leg(s) above the level of the heart when sitting.    [x] Avoid prolonged standing in one place.     Compression:  Apply: [] Compression Wrap to []RightLeg []Left Leg    []  Coflex [] Coflex Lite  [] Unna with Calamine Lite      [] Do not get leg(s) with wrap wet. If wrap becomes too tight, call the wound center and remove wrap from leg by unrolling each layer.   [] Elevate leg(s) above the level of the heart when sitting.    [] Avoid prolonged standing in one place.    Dietary:  [x] Diet as tolerated: [] Calorie Diabetic Diet: [] No Added Salt:  [x] Increase Protein: [] Other:     Activity:  [x] Activity as tolerated:    [] Patient has no activity restrictions      [] Strict Bedrest   [] Remain off Work      [] May return to full duty work                                     [] Return to work with restrictions     Physician:  [] Ramin Ramirez PA-C  [] Dr. Natalie Lang  [x] Dr. King

## 2025-04-28 NOTE — DISCHARGE INSTRUCTIONS
again available, contact your PCP or go to the hospital emergency room.     PLEASE NOTE: IF YOU ARE UNABLE TO OBTAIN WOUND SUPPLIES, CONTINUE TO USE THE SUPPLIES YOU HAVE AVAILABLE UNTIL YOU ARE ABLE TO REACH US. IT IS MOST IMPORTANT TO KEEP THE WOUND COVERED AT ALL TIMES.

## 2025-05-19 ENCOUNTER — HOSPITAL ENCOUNTER (OUTPATIENT)
Facility: HOSPITAL | Age: 80
Discharge: HOME | End: 2025-05-19
Attending: SPECIALIST | Admitting: SPECIALIST
Payer: MEDICARE

## 2025-05-19 VITALS
HEART RATE: 92 BPM | TEMPERATURE: 98 F | DIASTOLIC BLOOD PRESSURE: 76 MMHG | RESPIRATION RATE: 20 BRPM | SYSTOLIC BLOOD PRESSURE: 142 MMHG

## 2025-05-19 DIAGNOSIS — L97.822 NON-PRESSURE CHRONIC ULCER OF OTHER PART OF LEFT LOWER LEG WITH FAT LAYER EXPOSED (HCC): ICD-10-CM

## 2025-05-19 DIAGNOSIS — L97.812 NON-PRESSURE CHRONIC ULCER OF OTHER PART OF RIGHT LOWER LEG WITH FAT LAYER EXPOSED (HCC): Primary | ICD-10-CM

## 2025-05-19 PROCEDURE — 99213 OFFICE O/P EST LOW 20 MIN: CPT

## 2025-05-19 RX ORDER — TRIAMCINOLONE ACETONIDE 1 MG/G
OINTMENT TOPICAL ONCE
OUTPATIENT
Start: 2025-05-19 | End: 2025-05-19

## 2025-05-19 RX ORDER — SILVER SULFADIAZINE 10 MG/G
CREAM TOPICAL ONCE
OUTPATIENT
Start: 2025-05-19 | End: 2025-05-19

## 2025-05-19 RX ORDER — LIDOCAINE HYDROCHLORIDE 20 MG/ML
JELLY TOPICAL ONCE
OUTPATIENT
Start: 2025-05-19 | End: 2025-05-19

## 2025-05-19 RX ORDER — MUPIROCIN 20 MG/G
OINTMENT TOPICAL ONCE
OUTPATIENT
Start: 2025-05-19 | End: 2025-05-19

## 2025-05-19 RX ORDER — SODIUM CHLOR/HYPOCHLOROUS ACID 0.033 %
SOLUTION, IRRIGATION IRRIGATION ONCE
OUTPATIENT
Start: 2025-05-19 | End: 2025-05-19

## 2025-05-19 NOTE — PROGRESS NOTES
No abnormal balance, No headache, No confusion, No numbness, No tingling.  Psychiatric: No anxiety, No depression, No august.    Physical Exam:     Vitals & Measurements:    Wt Readings from Last 3 Encounters:   05/08/23 117.9 kg (260 lb)   07/21/22 130.6 kg (288 lb)   02/21/22 126 kg (277 lb 12.8 oz)     Temp Readings from Last 3 Encounters:   05/19/25 98 °F (36.7 °C) (Temporal)   04/28/25 97.7 °F (36.5 °C) (Temporal)   03/31/25 97.5 °F (36.4 °C) (Temporal)     BP Readings from Last 3 Encounters:   05/19/25 (!) 142/76   04/28/25 (!) 158/82   03/31/25 122/71     Pulse Readings from Last 3 Encounters:   05/19/25 92   04/28/25 84   03/31/25 99      Ht Readings from Last 3 Encounters:   05/08/23 1.727 m (5' 8\")   07/21/22 1.727 m (5' 8\")   02/21/22 1.727 m (5' 8\")        General: well appearing, no acute distress  Head: Normal  Face: Nornal  HEENT: atraumatic, PERRLA, moist mucosa, normal pharynx, normal tonsils and adenoids, normal tongue, no fluid in sinuses  Neck: Trachea midline, no carotid bruit, no masses  Chest: Normal.  Respiratory: Normal chest wall expansion, CTA B, no r/r/w, no rubs  Cardiovascular: RRR, no m/r/g, Normal S1 and S2  Abdomen: Soft, non tender, non-distended, normal bowel sounds in all quadrants, no hepatosplenomegaly, no tympany. Incision scar:   Genitourinary: No inguinal hernia, normal external gentalia, Testis & scrotum normal, no renal angle tenderness  Rectal: deferred  Musculoskeletal: normal ROM in upper and lower extremities, No joint swelling, bilateral lower extremity lymphedema.  Integumentary: Warm, dry, and pink, with no rash, purpura, or petechia  Heme/Lymph: No lymphadenopathy, no bruises  Neurological:Cranial Nerves II-XII grossly intact, no gross motor or sensory deficit  Psychiatric: Cooperative with normal mood, affect, and cognition    Problem List Items Addressed This Visit          Other    Non-pressure chronic ulcer of other part of right lower leg with 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

## 2025-05-19 NOTE — DISCHARGE INSTRUCTIONS
Wound Clinic Physician Orders and Discharge Instructions  [facility]  [address], [address]  [place] [address]  Telephone: [phone]     FAX [phone]    NAME:  Ramin Brown Jr.  YOB: 1945  MEDICAL RECORD NUMBER:  [id number]  DATE:  5/19/2025      Return Appointment:  [] Dressing Supply Provider:   [x] Home Healthcare: [facility]. Fax [phone]  [x] Return Appointment: 4 Week(s)   [] Nurse Visit:     [] Discharge from [facility]: [] Healed            [] Refer to Provider:    Follow-up Information:  [] Ordered Tests:   [x] Referrals: [facility] 6/3/24  [] Rx:   [x] Other: Lymph pump 2-3 times daily x 1 hour each  [] Other:      Wound Cleansing:   Do not scrub or use excessive force.  Cleanse wound prior to applying a clean dressing with:  [] Normal Saline   [] Keep Wound Dry in Shower     [] Wound Cleanser   [x] Cleanse wound with Mild Soap & Water on dressing change days  [] Other:       Topical Treatments:  Do not apply lotions, creams, or ointments to wound bed unless directed.   [] Apply moisturizing lotion to skin surrounding the wound prior to dressing change.  [] Apply antifungal ointment to skin surrounding the wound prior to dressing change.  [] Apply thin film of moisture barrier ointment to skin immediately around wound.  [] Apply Betadine to skim immediately around wound      Dressings:           Wound Location R and L Leg   [x] Apply Primary Dressing:       [] MediHoney Gel [] MediHoney Alginate  [x] Calcium Alginate with Silver   [] Calcium Alginate without silver   [] Collagen with silver  [] Collagen without Silver    [] Santyl with moist saline gauze     [] Hydrofera Blue (cut to size and moistened with normal saline)  [] Hydrofera Blue Ready (cut to size)      [] Normal Saline wet to dry  [] Betadine wet to dry    [] Hydrogel  [] Mepitel     [] Bactroban/Mupirocin   [] Iodoform Packing Strip [] Plain Packing Strip   [] Skin

## 2025-05-19 NOTE — WOUND CARE
Wound Clinic Physician Orders and Discharge Instructions  [facility]  [address], [address]  [place] [address]  Telephone: [phone]     FAX [phone]    NAME:  Ramin Brown Jr.  YOB: 1945  MEDICAL RECORD NUMBER:  [id number]  DATE:  5/19/2025      Return Appointment:  [] Dressing Supply Provider:   [x] Home Healthcare: [facility]. Fax [phone]  [x] Return Appointment: 4 Week(s)   [] Nurse Visit:     [] Discharge from [facility]: [] Healed            [] Refer to Provider:    Follow-up Information:  [] Ordered Tests:   [x] Referrals: [facility] 6/3/24  [] Rx:   [x] Other: Lymph pump 2-3 times daily x 1 hour each  [] Other:      Wound Cleansing:   Do not scrub or use excessive force.  Cleanse wound prior to applying a clean dressing with:  [] Normal Saline   [] Keep Wound Dry in Shower     [] Wound Cleanser   [x] Cleanse wound with Mild Soap & Water on dressing change days  [] Other:       Topical Treatments:  Do not apply lotions, creams, or ointments to wound bed unless directed.   [] Apply moisturizing lotion to skin surrounding the wound prior to dressing change.  [] Apply antifungal ointment to skin surrounding the wound prior to dressing change.  [] Apply thin film of moisture barrier ointment to skin immediately around wound.  [] Apply Betadine to skim immediately around wound      Dressings:           Wound Location R and L Leg   [x] Apply Primary Dressing:       [] MediHoney Gel [] MediHoney Alginate  [x] Calcium Alginate with Silver   [] Calcium Alginate without silver   [] Collagen with silver  [] Collagen without Silver    [] Santyl with moist saline gauze     [] Hydrofera Blue (cut to size and moistened with normal saline)  [] Hydrofera Blue Ready (cut to size)      [] Normal Saline wet to dry  [] Betadine wet to dry    [] Hydrogel  [] Mepitel     [] Bactroban/Mupirocin   [] Iodoform Packing Strip [] Plain Packing

## 2025-06-16 ENCOUNTER — HOSPITAL ENCOUNTER (OUTPATIENT)
Facility: HOSPITAL | Age: 80
Discharge: HOME | End: 2025-06-16
Attending: SPECIALIST | Admitting: SPECIALIST
Payer: MEDICARE

## 2025-06-16 VITALS
RESPIRATION RATE: 20 BRPM | SYSTOLIC BLOOD PRESSURE: 146 MMHG | HEART RATE: 89 BPM | TEMPERATURE: 97.4 F | DIASTOLIC BLOOD PRESSURE: 79 MMHG

## 2025-06-16 DIAGNOSIS — L97.812 NON-PRESSURE CHRONIC ULCER OF OTHER PART OF RIGHT LOWER LEG WITH FAT LAYER EXPOSED (HCC): Primary | ICD-10-CM

## 2025-06-16 DIAGNOSIS — L97.822 NON-PRESSURE CHRONIC ULCER OF OTHER PART OF LEFT LOWER LEG WITH FAT LAYER EXPOSED (HCC): ICD-10-CM

## 2025-06-16 PROCEDURE — 99213 OFFICE O/P EST LOW 20 MIN: CPT

## 2025-06-16 RX ORDER — MUPIROCIN 2 %
OINTMENT (GRAM) TOPICAL ONCE
OUTPATIENT
Start: 2025-06-16 | End: 2025-06-16

## 2025-06-16 RX ORDER — SILVER SULFADIAZINE 10 MG/G
CREAM TOPICAL ONCE
OUTPATIENT
Start: 2025-06-16 | End: 2025-06-16

## 2025-06-16 RX ORDER — SODIUM CHLOR/HYPOCHLOROUS ACID 0.033 %
SOLUTION, IRRIGATION IRRIGATION ONCE
OUTPATIENT
Start: 2025-06-16 | End: 2025-06-16

## 2025-06-16 RX ORDER — LIDOCAINE HYDROCHLORIDE 20 MG/ML
JELLY TOPICAL ONCE
OUTPATIENT
Start: 2025-06-16 | End: 2025-06-16

## 2025-06-16 RX ORDER — TRIAMCINOLONE ACETONIDE 1 MG/G
OINTMENT TOPICAL ONCE
OUTPATIENT
Start: 2025-06-16 | End: 2025-06-16

## 2025-06-16 NOTE — DISCHARGE INSTRUCTIONS
Wound Clinic Physician Orders and Discharge Instructions  [facility]  [address], [address]  [place] [address]  Telephone: [phone]     FAX [phone]    NAME:  Ramin Brown Jr.  YOB: 1945  MEDICAL RECORD NUMBER:  [id number]  DATE:  6/16/2025      Return Appointment:  [] Dressing Supply Provider:   [x] Home Healthcare: [facility]. Fax [phone]  [x] Return Appointment: 3 Week(s)   [] Nurse Visit:     [] Discharge from [facility]: [] Healed            [] Refer to Provider:    Follow-up Information:  [] Ordered Tests:   [x] Referrals: [facility] 6/18/25  [] Rx:   [x] Other: Lymph pump 2-3 times daily x 1 hour each  [] Other:      Wound Cleansing:   Do not scrub or use excessive force.  Cleanse wound prior to applying a clean dressing with:  [] Normal Saline   [] Keep Wound Dry in Shower     [] Wound Cleanser   [x] Cleanse wound with Mild Soap & Water on dressing change days  [] Other:       Topical Treatments:  Do not apply lotions, creams, or ointments to wound bed unless directed.   [] Apply moisturizing lotion to skin surrounding the wound prior to dressing change.  [] Apply antifungal ointment to skin surrounding the wound prior to dressing change.  [] Apply thin film of moisture barrier ointment to skin immediately around wound.  [] Apply Betadine to skim immediately around wound      Dressings:           Wound Location R and L Leg   [x] Apply Primary Dressing:       [] MediHoney Gel [] MediHoney Alginate  [x] Calcium Alginate with Silver   [] Calcium Alginate without silver   [] Collagen with silver  [] Collagen without Silver    [] Santyl with moist saline gauze     [] Hydrofera Blue (cut to size and moistened with normal saline)  [] Hydrofera Blue Ready (cut to size)      [] Normal Saline wet to dry  [] Betadine wet to dry    [] Hydrogel  [] Mepitel     [] Bactroban/Mupirocin   [] Iodoform Packing Strip [] Plain Packing Strip   [] Skin

## 2025-06-16 NOTE — PROGRESS NOTES
superabsorbent and ABD pads and Coflex to bilateral lower extremities.  Follow up with [facility].  Continue using juxta light stocking  Continue lymphedema pump at least 3 times a day.  Avoid prolonged standing.  Keep the legs elevated as much as possible while sitting.  Follow-up in 3 weeks.    Discharge:  Written patient dismissal instructions given to patient and signed by patient or POA.         Discharge Instructions         Wound Clinic Physician Orders and Discharge Instructions  [facility]  [facility] [address], [address] [place] [address]  Telephone: [phone]     FAX [phone]    NAME:  Ramin Brown Jr.  YOB: 1945  MEDICAL RECORD NUMBER:  [id number]  DATE:  6/16/2025      Return Appointment:  [] Dressing Supply Provider:   [x] Home Healthcare: [facility]. Fax [phone]  [x] Return Appointment: 3 Week(s)   [] Nurse Visit:     [] Discharge from [facility]: [] Healed            [] Refer to Provider:    Follow-up Information:  [] Ordered Tests:   [x] Referrals: [facility] 6/18/25  [] Rx:   [x] Other: Lymph pump 2-3 times daily x 1 hour each  [] Other:      Wound Cleansing:   Do not scrub or use excessive force.  Cleanse wound prior to applying a clean dressing with:  [] Normal Saline   [] Keep Wound Dry in Shower     [] Wound Cleanser   [x] Cleanse wound with Mild Soap & Water on dressing change days  [] Other:       Topical Treatments:  Do not apply lotions, creams, or ointments to wound bed unless directed.   [] Apply moisturizing lotion to skin surrounding the wound prior to dressing change.  [] Apply antifungal ointment to skin surrounding the wound prior to dressing change.  [] Apply thin film of moisture barrier ointment to skin immediately around wound.  [] Apply Betadine to skim immediately around wound      Dressings:           Wound Location R and L Leg   [x] Apply Primary Dressing:       [] MediHoney Gel [] MediHoney

## 2025-06-16 NOTE — WOUND CARE
Wound Clinic Physician Orders and Discharge Instructions  [facility]  [address], [address]  [place] [address]  Telephone: [phone]     FAX [phone]    NAME:  Ramin Brown Jr.  YOB: 1945  MEDICAL RECORD NUMBER:  [id number]  DATE:  6/16/2025      Return Appointment:  [] Dressing Supply Provider:   [x] Home Healthcare: [facility]. Fax [phone]  [x] Return Appointment: 3 Week(s)   [] Nurse Visit:     [] Discharge from [facility]: [] Healed            [] Refer to Provider:    Follow-up Information:  [] Ordered Tests:   [x] Referrals: [facility] 6/18/25  [] Rx:   [x] Other: Lymph pump 2-3 times daily x 1 hour each  [] Other:      Wound Cleansing:   Do not scrub or use excessive force.  Cleanse wound prior to applying a clean dressing with:  [] Normal Saline   [] Keep Wound Dry in Shower     [] Wound Cleanser   [x] Cleanse wound with Mild Soap & Water on dressing change days  [] Other:       Topical Treatments:  Do not apply lotions, creams, or ointments to wound bed unless directed.   [] Apply moisturizing lotion to skin surrounding the wound prior to dressing change.  [] Apply antifungal ointment to skin surrounding the wound prior to dressing change.  [] Apply thin film of moisture barrier ointment to skin immediately around wound.  [] Apply Betadine to skim immediately around wound      Dressings:           Wound Location R and L Leg   [x] Apply Primary Dressing:       [] MediHoney Gel [] MediHoney Alginate  [x] Calcium Alginate with Silver   [] Calcium Alginate without silver   [] Collagen with silver  [] Collagen without Silver    [] Santyl with moist saline gauze     [] Hydrofera Blue (cut to size and moistened with normal saline)  [] Hydrofera Blue Ready (cut to size)      [] Normal Saline wet to dry  [] Betadine wet to dry    [] Hydrogel  [] Mepitel     [] Bactroban/Mupirocin   [] Iodoform Packing Strip [] Plain Packing

## 2025-07-07 ENCOUNTER — HOSPITAL ENCOUNTER (OUTPATIENT)
Facility: HOSPITAL | Age: 80
Discharge: HOME | End: 2025-07-07
Attending: SPECIALIST | Admitting: SPECIALIST
Payer: MEDICARE

## 2025-07-07 VITALS
HEART RATE: 89 BPM | RESPIRATION RATE: 20 BRPM | DIASTOLIC BLOOD PRESSURE: 73 MMHG | TEMPERATURE: 97.2 F | SYSTOLIC BLOOD PRESSURE: 122 MMHG

## 2025-07-07 DIAGNOSIS — L97.822 NON-PRESSURE CHRONIC ULCER OF OTHER PART OF LEFT LOWER LEG WITH FAT LAYER EXPOSED (HCC): ICD-10-CM

## 2025-07-07 DIAGNOSIS — L97.812 NON-PRESSURE CHRONIC ULCER OF OTHER PART OF RIGHT LOWER LEG WITH FAT LAYER EXPOSED (HCC): Primary | ICD-10-CM

## 2025-07-07 PROCEDURE — 99213 OFFICE O/P EST LOW 20 MIN: CPT

## 2025-07-07 RX ORDER — SODIUM CHLOR/HYPOCHLOROUS ACID 0.033 %
SOLUTION, IRRIGATION IRRIGATION ONCE
OUTPATIENT
Start: 2025-07-07 | End: 2025-07-07

## 2025-07-07 RX ORDER — TRIAMCINOLONE ACETONIDE 1 MG/G
OINTMENT TOPICAL ONCE
OUTPATIENT
Start: 2025-07-07 | End: 2025-07-07

## 2025-07-07 RX ORDER — SILVER SULFADIAZINE 10 MG/G
CREAM TOPICAL ONCE
OUTPATIENT
Start: 2025-07-07 | End: 2025-07-07

## 2025-07-07 RX ORDER — LIDOCAINE HYDROCHLORIDE 20 MG/ML
JELLY TOPICAL ONCE
OUTPATIENT
Start: 2025-07-07 | End: 2025-07-07

## 2025-07-07 RX ORDER — MUPIROCIN 2 %
OINTMENT (GRAM) TOPICAL ONCE
OUTPATIENT
Start: 2025-07-07 | End: 2025-07-07

## 2025-07-07 NOTE — DISCHARGE INSTRUCTIONS
Wound Clinic Physician Orders and Discharge Instructions  [facility]  [address], [address]  [place] [address]  Telephone: [phone]     FAX [phone]    NAME:  Ramin Brown Jr.  YOB: 1945  MEDICAL RECORD NUMBER:  [id number]  DATE:  7/7/2025      Return Appointment:  [] Dressing Supply Provider:   [x] Home Healthcare: [facility]. Fax [phone]  [x] Return Appointment: 4 Week(s)   [] Nurse Visit:     [] Discharge from [facility]: [] Healed            [] Refer to Provider:    Follow-up Information:  [] Ordered Tests:   [x] Referrals: [facility]   [] Rx:   [x] Other: Lymph pump 2-3 times daily x 1 hour each  [x] Other: Discussed following up with Lymphedema Clinic again    Wound Cleansing:   Do not scrub or use excessive force.  Cleanse wound prior to applying a clean dressing with:  [] Normal Saline   [] Keep Wound Dry in Shower     [] Wound Cleanser   [x] Cleanse wound with Mild Soap & Water on dressing change days  [] Other:       Topical Treatments:  Do not apply lotions, creams, or ointments to wound bed unless directed.   [] Apply moisturizing lotion to skin surrounding the wound prior to dressing change.  [] Apply antifungal ointment to skin surrounding the wound prior to dressing change.  [] Apply thin film of moisture barrier ointment to skin immediately around wound.  [] Apply Betadine to skim immediately around wound      Dressings:           Wound Location R and L Leg   [x] Apply Primary Dressing:       [] MediHoney Gel [] MediHoney Alginate  [x] Calcium Alginate with Silver   [] Calcium Alginate without silver   [] Collagen with silver  [] Collagen without Silver    [] Santyl with moist saline gauze     [] Hydrofera Blue (cut to size and moistened with normal saline)  [] Hydrofera Blue Ready (cut to size)      [] Normal Saline wet to dry  [] Betadine wet to dry    [] Hydrogel  [] Mepitel     [] Bactroban/Mupirocin   []

## 2025-07-07 NOTE — PROGRESS NOTES
[facility]   Wound Care and [facility] Oxygen Therapy Center   Medical Staff Note     Ramin Brown Jr.  MEDICAL RECORD NUMBER:  [id number]  AGE: 80 y.o.   GENDER: male  : 1945  EPISODE DATE:  2025      Chief Complaint:   Chief Complaint   Patient presents with    Wound Check     R & L leg       History of Present Illness:  Ramin Brown Jr. is a 79 y.o. male who presents today for wound/ulcer evaluation of bilateral lower extremity lymphedema with bilateral leg ulcers.  Wound culture showed gram-negative rods eventhough the final culture was suggestive of skin patty and hence Augmentin started for 2-week.     He states that he still has drainage from bilateral lower extremity wounds. No fever or chills.  He is unable to afford to go to lymphedema clinic because of high co-pay.  He has been applying Hydrofera Blue to bilateral lower extremity open wounds.  He was seen in the vascular clinic and cleared.      He has been using the juxta light (velcro) compression wrap and lymphedema pump.    No other new issues     History of Wound Context: Per original history and physical on this patient. Changes in history since last evaluation: none     Wound: Bilateral lower extremity lymphedema with ulcers.     Past Medical History:       Diagnosis Date    Dyslipidemia     Dyslipidemia     HTN (hypertension)     HTN (hypertension)        Past Surgical History:   Past Surgical History:   Procedure Laterality Date    CHOLECYSTECTOMY         Allergy:No Known Allergies    Social History:   Social History     Tobacco Use    Smoking status: Never    Smokeless tobacco: Never   Vaping Use    Vaping status: Never Used   Substance Use Topics    Alcohol use: Never    Drug use: Never       Family History:   Family History   Problem Relation Age of Onset    Diabetes Mother     Diabetes Brother     Diabetes Son        Current Medications:  Current Outpatient Medications on File Prior to Encounter

## 2025-07-07 NOTE — WOUND CARE
Wound Clinic Physician Orders and Discharge Instructions  [facility]  [address], [address]  [place] [address]  Telephone: [phone]     FAX [phone]    NAME:  Ramin Brown Jr.  YOB: 1945  MEDICAL RECORD NUMBER:  [id number]  DATE:  7/7/2025      Return Appointment:  [] Dressing Supply Provider:   [x] Home Healthcare: [facility]. Fax [phone]  [x] Return Appointment: 4 Week(s)   [] Nurse Visit:     [] Discharge from [facility]: [] Healed            [] Refer to Provider:    Follow-up Information:  [] Ordered Tests:   [x] Referrals: [facility]   [] Rx:   [x] Other: Lymph pump 2-3 times daily x 1 hour each  [x] Other: Discussed following up with Lymphedema Clinic again    Wound Cleansing:   Do not scrub or use excessive force.  Cleanse wound prior to applying a clean dressing with:  [] Normal Saline   [] Keep Wound Dry in Shower     [] Wound Cleanser   [x] Cleanse wound with Mild Soap & Water on dressing change days  [] Other:       Topical Treatments:  Do not apply lotions, creams, or ointments to wound bed unless directed.   [] Apply moisturizing lotion to skin surrounding the wound prior to dressing change.  [] Apply antifungal ointment to skin surrounding the wound prior to dressing change.  [] Apply thin film of moisture barrier ointment to skin immediately around wound.  [] Apply Betadine to skim immediately around wound      Dressings:           Wound Location R and L Leg   [x] Apply Primary Dressing:       [] MediHoney Gel [] MediHoney Alginate  [x] Calcium Alginate with Silver   [] Calcium Alginate without silver   [] Collagen with silver  [] Collagen without Silver    [] Santyl with moist saline gauze     [] Hydrofera Blue (cut to size and moistened with normal saline)  [] Hydrofera Blue Ready (cut to size)      [] Normal Saline wet to dry  [] Betadine wet to dry    [] Hydrogel  [] Mepitel     [] Bactroban/Mupirocin   []

## 2025-08-18 ENCOUNTER — HOSPITAL ENCOUNTER (OUTPATIENT)
Facility: HOSPITAL | Age: 80
Discharge: HOME | End: 2025-08-18
Attending: SPECIALIST | Admitting: SPECIALIST
Payer: MEDICARE

## 2025-08-18 VITALS
TEMPERATURE: 97.2 F | SYSTOLIC BLOOD PRESSURE: 160 MMHG | RESPIRATION RATE: 18 BRPM | DIASTOLIC BLOOD PRESSURE: 90 MMHG | HEART RATE: 69 BPM

## 2025-08-18 DIAGNOSIS — L97.822 NON-PRESSURE CHRONIC ULCER OF OTHER PART OF LEFT LOWER LEG WITH FAT LAYER EXPOSED (HCC): ICD-10-CM

## 2025-08-18 DIAGNOSIS — L97.812 NON-PRESSURE CHRONIC ULCER OF OTHER PART OF RIGHT LOWER LEG WITH FAT LAYER EXPOSED (HCC): Primary | ICD-10-CM

## 2025-08-18 PROCEDURE — 99213 OFFICE O/P EST LOW 20 MIN: CPT

## 2025-08-18 RX ORDER — SILVER SULFADIAZINE 10 MG/G
CREAM TOPICAL ONCE
OUTPATIENT
Start: 2025-08-18 | End: 2025-08-18

## 2025-08-18 RX ORDER — MUPIROCIN 2 %
OINTMENT (GRAM) TOPICAL ONCE
OUTPATIENT
Start: 2025-08-18 | End: 2025-08-18

## 2025-08-18 RX ORDER — LIDOCAINE HYDROCHLORIDE 20 MG/ML
JELLY TOPICAL ONCE
OUTPATIENT
Start: 2025-08-18 | End: 2025-08-18

## 2025-08-18 RX ORDER — SODIUM CHLOR/HYPOCHLOROUS ACID 0.033 %
SOLUTION, IRRIGATION IRRIGATION ONCE
OUTPATIENT
Start: 2025-08-18 | End: 2025-08-18

## 2025-08-18 RX ORDER — TRIAMCINOLONE ACETONIDE 1 MG/G
OINTMENT TOPICAL ONCE
OUTPATIENT
Start: 2025-08-18 | End: 2025-08-18

## 2025-08-18 ASSESSMENT — PAIN SCALES - GENERAL: PAINLEVEL_OUTOF10: 0

## (undated) DEVICE — REM POLYHESIVE ADULT PATIENT RETURN ELECTRODE: Brand: VALLEYLAB

## (undated) DEVICE — MASK ANES INF SZ 2 PREM TAIL VLV INFL PRT UNSCENTED SGL PT

## (undated) DEVICE — TRAP ENDOSCP CLR PLAS 4 CHMBR FIX DISP CATCHEM

## (undated) DEVICE — SINGLE USE LIGATING DEVICE: Brand: SINGLE USE LIGATING DEVICE

## (undated) DEVICE — SNARE POLYP MIC OVL 13X2.4MM -- CAPTIVATOR BX/10

## (undated) DEVICE — THE ENDO CARRY-ON PROCEDURE KIT CONTAINS ALL OF THE SUPPLIES AND INFECTION PREVENTION PRODUCTS NEEDED FOR ENDOSCOPIC PROCEDURES: Brand: ENDO CARRY-ON PROCEDURE KIT

## (undated) DEVICE — CANNULA NSL O2 AD 7 FT END-TIDAL CARBON DIOX VENTFLO